# Patient Record
Sex: FEMALE | Race: WHITE | NOT HISPANIC OR LATINO | Employment: OTHER | URBAN - METROPOLITAN AREA
[De-identification: names, ages, dates, MRNs, and addresses within clinical notes are randomized per-mention and may not be internally consistent; named-entity substitution may affect disease eponyms.]

---

## 2017-01-04 ENCOUNTER — OFFICE VISIT (OUTPATIENT)
Dept: INTERNAL MEDICINE | Facility: CLINIC | Age: 67
End: 2017-01-04

## 2017-01-04 VITALS
SYSTOLIC BLOOD PRESSURE: 140 MMHG | DIASTOLIC BLOOD PRESSURE: 80 MMHG | OXYGEN SATURATION: 98 % | HEIGHT: 63 IN | BODY MASS INDEX: 35.26 KG/M2 | WEIGHT: 199 LBS | HEART RATE: 87 BPM

## 2017-01-04 DIAGNOSIS — I10 ESSENTIAL HYPERTENSION: ICD-10-CM

## 2017-01-04 DIAGNOSIS — R10.11 RUQ PAIN: ICD-10-CM

## 2017-01-04 DIAGNOSIS — N30.00 ACUTE CYSTITIS WITHOUT HEMATURIA: Primary | ICD-10-CM

## 2017-01-04 DIAGNOSIS — R53.82 CHRONIC FATIGUE: ICD-10-CM

## 2017-01-04 LAB
ALBUMIN SERPL-MCNC: 3.9 G/DL (ref 3.5–5.2)
ALBUMIN/GLOB SERPL: 1.6 G/DL
ALP SERPL-CCNC: 69 U/L (ref 40–129)
ALT SERPL-CCNC: 90 U/L (ref 5–33)
AST SERPL-CCNC: 130 U/L (ref 5–32)
BASOPHILS # BLD AUTO: 0.08 10*3/MM3 (ref 0–0.2)
BASOPHILS NFR BLD AUTO: 1.3 % (ref 0–2)
BILIRUB BLD-MCNC: NEGATIVE MG/DL
BILIRUB SERPL-MCNC: 0.3 MG/DL (ref 0.2–1.2)
BUN SERPL-MCNC: 12 MG/DL (ref 8–23)
BUN/CREAT SERPL: 20.3 (ref 7–25)
CALCIUM SERPL-MCNC: 9.1 MG/DL (ref 8.8–10.5)
CHLORIDE SERPL-SCNC: 91 MMOL/L (ref 98–107)
CLARITY, POC: CLEAR
CO2 SERPL-SCNC: 23.8 MMOL/L (ref 22–29)
COLOR UR: YELLOW
CREAT SERPL-MCNC: 0.59 MG/DL (ref 0.57–1)
EOSINOPHIL # BLD AUTO: 0.34 10*3/MM3 (ref 0.1–0.3)
EOSINOPHIL NFR BLD AUTO: 5.4 % (ref 0–4)
ERYTHROCYTE [DISTWIDTH] IN BLOOD BY AUTOMATED COUNT: 13.3 % (ref 11.5–14.5)
GLOBULIN SER CALC-MCNC: 2.4 GM/DL
GLUCOSE SERPL-MCNC: 370 MG/DL (ref 65–99)
GLUCOSE UR STRIP-MCNC: NEGATIVE MG/DL
HCT VFR BLD AUTO: 37.7 % (ref 37–47)
HGB BLD-MCNC: 12 G/DL (ref 12–16)
IMM GRANULOCYTES # BLD: 0.03 10*3/MM3 (ref 0–0.03)
IMM GRANULOCYTES NFR BLD: 0.5 % (ref 0–0.5)
KETONES UR QL: ABNORMAL
LEUKOCYTE EST, POC: NEGATIVE
LYMPHOCYTES # BLD AUTO: 2.16 10*3/MM3 (ref 0.6–4.8)
LYMPHOCYTES NFR BLD AUTO: 34.5 % (ref 20–45)
MCH RBC QN AUTO: 26.5 PG (ref 27–31)
MCHC RBC AUTO-ENTMCNC: 31.8 G/DL (ref 31–37)
MCV RBC AUTO: 83.4 FL (ref 81–99)
MONOCYTES # BLD AUTO: 0.49 10*3/MM3 (ref 0–1)
MONOCYTES NFR BLD AUTO: 7.8 % (ref 3–8)
NEUTROPHILS # BLD AUTO: 3.16 10*3/MM3 (ref 1.5–8.3)
NEUTROPHILS NFR BLD AUTO: 50.5 % (ref 45–70)
NITRITE UR-MCNC: NEGATIVE MG/ML
NRBC BLD AUTO-RTO: 0 /100 WBC (ref 0–0)
PH UR: 5.5 [PH] (ref 5–8)
PLATELET # BLD AUTO: 372 10*3/MM3 (ref 140–500)
POTASSIUM SERPL-SCNC: 4.1 MMOL/L (ref 3.5–5.2)
PROT SERPL-MCNC: 6.3 G/DL (ref 6–8.5)
PROT UR STRIP-MCNC: NEGATIVE MG/DL
RBC # BLD AUTO: 4.52 10*6/MM3 (ref 4.2–5.4)
RBC # UR STRIP: NEGATIVE /UL
SODIUM SERPL-SCNC: 132 MMOL/L (ref 136–145)
SP GR UR: 1.02 (ref 1–1.03)
T4 SERPL-MCNC: 8.57 MCG/DL (ref 4.5–11.7)
TSH SERPL DL<=0.005 MIU/L-ACNC: 1.96 MIU/ML (ref 0.27–4.2)
UROBILINOGEN UR QL: NORMAL
VIT B12 SERPL-MCNC: 450 PG/ML (ref 211–946)
WBC # BLD AUTO: 6.26 10*3/MM3 (ref 4.8–10.8)

## 2017-01-04 PROCEDURE — 99214 OFFICE O/P EST MOD 30 MIN: CPT | Performed by: NURSE PRACTITIONER

## 2017-01-04 PROCEDURE — 81003 URINALYSIS AUTO W/O SCOPE: CPT | Performed by: NURSE PRACTITIONER

## 2017-01-04 RX ORDER — CIPROFLOXACIN 250 MG/1
250 TABLET, FILM COATED ORAL 2 TIMES DAILY
Qty: 14 TABLET | Refills: 0 | Status: SHIPPED | OUTPATIENT
Start: 2017-01-04 | End: 2017-01-11

## 2017-01-04 NOTE — PROGRESS NOTES
"Chief Complaint   Patient presents with   • Urinary Tract Infection       Subjective   Amber Campos is a 66 y.o. female is being seen for an acute appointment for fatigue and \"several concerns.\". She is complaining of fatigue for 1 year that has progressively worsened. She goes to sleep tired, wakes up tired. She is not well sleeping at night, waking up 2-3 times a night. No known snoring.     She is also seeing Dr. Finn for frequent PVCs. Her ECHO was normal. She switched her to atenolol for BP control. She is tolerating that well.     She also reports RUQ pain intermittently for several months. Worse with high fat foods. No N/V/D. She has a family history of colon and esophageal cancer. She is going to see Dr. Roca for a repeat C-scope and EGD.     She also has dark urine and suprapubic tenderness for 1 week. No back pain or blood in stool.     History of Present Illness     Current Outpatient Prescriptions on File Prior to Visit   Medication Sig Dispense Refill   • albuterol (PROVENTIL) (2.5 MG/3ML) 0.083% nebulizer solution USE 1 UNIT DOSE EVERY 4-6 HOURS AS NEEDED FOR WHEEZING     • atenolol (TENORMIN) 50 MG tablet Take 1 tablet by mouth Daily. 30 tablet 6   • BENICAR 40 MG tablet TAKE 1 TABLET DAILY 90 tablet 2   • budesonide-formoterol (SYMBICORT) 160-4.5 MCG/ACT inhaler 2 puffs 2 (two) times a day.     • Calcium-Magnesium-Vitamin D (CALCIUM 500 PO) Take by mouth.     • cetirizine (ZyrTEC) 1 MG/ML syrup Take  by mouth.     • chlorhexidine (PERIDEX) 0.12 % solution Chlorhexidine Gluconate 0.12 % Mouth/Throat Solution; Patient Sig: Chlorhexidine Gluconate 0.12 % Mouth/Throat Solution ; 473; 0; 16-Dec-2015; Active     • Cholecalciferol (VITAMIN D3) 10301 UNITS capsule Take 1 capsule by mouth every 7 days. 12 capsule 3   • Clocortolone Pivalate 0.1 % cream Apply topically 3 (three) times a day.     • DUAVEE 0.45-20 MG tablet Take 1 tablet by mouth daily. 90 tablet 3   • Empagliflozin-Linagliptin (GLYXAMBI) " 10-5 MG tablet Take 1 tablet by mouth Daily. 30 tablet 0   • glucose blood test strip 3 (three) times a day.     • metFORMIN (GLUCOPHAGE) 1000 MG tablet Take 1 tablet by mouth 2 (Two) Times a Day With Meals. 60 tablet 6   • Misc Natural Products (GLUCOSAMINE CHONDROITIN ADV PO) Take  by mouth.     • mometasone (NASONEX) 50 MCG/ACT nasal spray 2 sprays into each nostril.     • montelukast (SINGULAIR) 10 MG tablet Take by mouth. TAKE 1 AT BEDTIME     • MULTIPLE VITAMIN PO Take  by mouth.     • Omega-3 Fatty Acids (FISH OIL) 1200 MG capsule capsule Take  by mouth.     • pantoprazole (PROTONIX) 40 MG EC tablet TAKE 1 TABLET DAILY 90 tablet 2   • urea (CARMOL) 40 % cream Apply topically.     • venlafaxine XR (EFFEXOR-XR) 75 MG 24 hr capsule Take 1 capsule by mouth daily. 90 capsule 3   • VOLTAREN 1 % gel gel APPLY TO AFFECTED AREA TWICE A DAY  3     No current facility-administered medications on file prior to visit.        The following portions of the patient's history were reviewed and updated as appropriate: allergies, current medications, past family history, past medical history, past social history, past surgical history and problem list.    Review of Systems   Constitutional: Positive for fatigue.   HENT: Negative.    Eyes: Negative.    Cardiovascular: Negative.    Gastrointestinal: Positive for abdominal pain (RUQ).   Endocrine: Negative.    Genitourinary: Positive for frequency. Negative for menstrual problem, vaginal bleeding and vaginal discharge.   Musculoskeletal: Positive for arthralgias.   Skin: Negative.    Allergic/Immunologic: Positive for environmental allergies.   Neurological: Negative.  Negative for dizziness.   Hematological: Negative.    Psychiatric/Behavioral: Positive for sleep disturbance. Negative for behavioral problems.       Objective   Physical Exam   Constitutional: She is oriented to person, place, and time. She appears well-developed and well-nourished. No distress.   HENT:   Head:  Normocephalic.   Right Ear: External ear normal.   Left Ear: External ear normal.   Nose: Nose normal.   Mouth/Throat: Oropharynx is clear and moist. No oropharyngeal exudate.   Neck large. Small air passage opening due to redundant tissue.    Neck: Neck supple.   Cardiovascular: Normal rate and intact distal pulses.  A regularly irregular rhythm present.   No murmur heard.  Pulmonary/Chest: Effort normal and breath sounds normal. No respiratory distress. She has no wheezes.   Abdominal: Soft. Bowel sounds are normal. There is no hepatosplenomegaly. There is tenderness in the right upper quadrant, epigastric area and suprapubic area. There is positive Huynh's sign. There is no rebound, no guarding and no CVA tenderness.   Neurological: She is alert and oriented to person, place, and time. No cranial nerve deficit.   Skin: Skin is warm.   Psychiatric: She has a normal mood and affect. Her speech is normal and behavior is normal. Judgment normal. Cognition and memory are normal.       Assessment/Plan   Amber was seen today for urinary tract infection.    Diagnoses and all orders for this visit:    Acute cystitis without hematuria  -     POC Urinalysis Dipstick, Automated  -     ciprofloxacin (CIPRO) 250 MG tablet; Take 1 tablet by mouth 2 (Two) Times a Day for 7 days.    Chronic fatigue  -     CBC & Differential  -     Vitamin B12  -     Comprehensive Metabolic Panel  -     T4 & TSH ([facility])  -     Ambulatory Referral to Sleep Medicine    Essential hypertension  -     Comprehensive Metabolic Panel  -     Ambulatory Referral to Sleep Medicine    RUQ pain  -     US Gallbladder; Future      We are going to start valuating her chronic fatigue with labs today, ruling out anemia and thyroid dysfunction. She has some other contributing factors for fatigue including BP medications, Diabetes, and poor sleep quality. Pending labs, we will evaluate for sleep apnea.

## 2017-01-05 ENCOUNTER — TELEPHONE (OUTPATIENT)
Dept: INTERNAL MEDICINE | Facility: CLINIC | Age: 67
End: 2017-01-05

## 2017-01-05 NOTE — TELEPHONE ENCOUNTER
----- Message from JAKE Blount sent at 1/4/2017  4:16 PM EST -----  No pernicious anemia. Glucose was 370, which is very high, and her Liver function is up again. Please give her the numbers. I want her to get the sleep study complete.   ----- Message -----     From: Nicol Vanegas MA     Sent: 1/4/2017  10:10 AM       To: JAKE Blount

## 2017-01-05 NOTE — TELEPHONE ENCOUNTER
----- Message from JAKE Blount sent at 1/5/2017 10:54 AM EST -----  Yes, i already ordered yesterday. Hospital will be calling her.   ----- Message -----     From: Nicol Vanegas MA     Sent: 1/5/2017  10:14 AM       To: JAKE Blount    Pt. Was to know if she still needs to get a gb us  ----- Message -----     From: JAKE Blount     Sent: 1/4/2017   4:16 PM       To: [facility] Clinical Pool    No pernicious anemia. Glucose was 370, which is very high, and her Liver function is up again. Please give her the numbers. I want her to get the sleep study complete.   ----- Message -----     From: Nicol Vanegas MA     Sent: 1/4/2017  10:10 AM       To: JAKE Blount

## 2017-01-10 ENCOUNTER — TELEPHONE (OUTPATIENT)
Dept: INTERNAL MEDICINE | Facility: CLINIC | Age: 67
End: 2017-01-10

## 2017-01-10 ENCOUNTER — HOSPITAL ENCOUNTER (OUTPATIENT)
Dept: ULTRASOUND IMAGING | Facility: HOSPITAL | Age: 67
Discharge: HOME | End: 2017-01-10
Admitting: NURSE PRACTITIONER

## 2017-01-10 DIAGNOSIS — R10.11 RUQ PAIN: ICD-10-CM

## 2017-01-10 PROCEDURE — 76705 ECHO EXAM OF ABDOMEN: CPT

## 2017-01-10 NOTE — TELEPHONE ENCOUNTER
----- Message from JAKE Blount sent at 1/10/2017  9:52 AM EST -----  GB US is normal.   ----- Message -----     From: Marcell, Rad Results [place] [facility]     Sent: 1/10/2017   9:50 AM       To: JAKE Blount

## 2017-03-06 ENCOUNTER — OFFICE VISIT (OUTPATIENT)
Dept: INTERNAL MEDICINE | Facility: CLINIC | Age: 67
End: 2017-03-06

## 2017-03-06 VITALS
HEIGHT: 63 IN | TEMPERATURE: 98.1 F | DIASTOLIC BLOOD PRESSURE: 88 MMHG | OXYGEN SATURATION: 98 % | WEIGHT: 198 LBS | SYSTOLIC BLOOD PRESSURE: 148 MMHG | HEART RATE: 78 BPM | BODY MASS INDEX: 35.08 KG/M2

## 2017-03-06 DIAGNOSIS — E11.69 DIABETES MELLITUS TYPE 2 IN OBESE (HCC): ICD-10-CM

## 2017-03-06 DIAGNOSIS — J01.00 SUBACUTE MAXILLARY SINUSITIS: ICD-10-CM

## 2017-03-06 DIAGNOSIS — E66.9 DIABETES MELLITUS TYPE 2 IN OBESE (HCC): ICD-10-CM

## 2017-03-06 DIAGNOSIS — N30.00 ACUTE CYSTITIS WITHOUT HEMATURIA: Primary | ICD-10-CM

## 2017-03-06 LAB
BILIRUB BLD-MCNC: NEGATIVE MG/DL
CLARITY, POC: CLEAR
COLOR UR: YELLOW
GLUCOSE UR STRIP-MCNC: ABNORMAL MG/DL
KETONES UR QL: ABNORMAL
LEUKOCYTE EST, POC: NEGATIVE
NITRITE UR-MCNC: NEGATIVE MG/ML
PH UR: 5 [PH] (ref 5–8)
PROT UR STRIP-MCNC: NEGATIVE MG/DL
RBC # UR STRIP: NEGATIVE /UL
SP GR UR: 1.01 (ref 1–1.03)
UROBILINOGEN UR QL: NORMAL

## 2017-03-06 PROCEDURE — 81003 URINALYSIS AUTO W/O SCOPE: CPT | Performed by: NURSE PRACTITIONER

## 2017-03-06 PROCEDURE — 99213 OFFICE O/P EST LOW 20 MIN: CPT | Performed by: NURSE PRACTITIONER

## 2017-03-06 RX ORDER — CEFDINIR 300 MG/1
300 CAPSULE ORAL 2 TIMES DAILY
Qty: 20 CAPSULE | Refills: 0 | Status: SHIPPED | OUTPATIENT
Start: 2017-03-06 | End: 2017-04-20

## 2017-03-06 NOTE — PROGRESS NOTES
Chief Complaint   Patient presents with   • Sinusitis   • Urinary Tract Infection       Subjective   Amber Campos is a 66 y.o. female is being seen for an acute appointment for sinus symptoms. This began 1 week ago. She is currently on Nasonex, Singulair, Zyrtec. Associated fatigue, sinus pressure, green nasal congestion. Denies     History of Present Illness     Current Outpatient Prescriptions on File Prior to Visit   Medication Sig Dispense Refill   • albuterol (PROVENTIL) (2.5 MG/3ML) 0.083% nebulizer solution USE 1 UNIT DOSE EVERY 4-6 HOURS AS NEEDED FOR WHEEZING     • atenolol (TENORMIN) 50 MG tablet Take 1 tablet by mouth Daily. 30 tablet 6   • BENICAR 40 MG tablet TAKE 1 TABLET DAILY 90 tablet 2   • budesonide-formoterol (SYMBICORT) 160-4.5 MCG/ACT inhaler 2 puffs 2 (two) times a day.     • Calcium-Magnesium-Vitamin D (CALCIUM 500 PO) Take by mouth.     • cetirizine (ZyrTEC) 1 MG/ML syrup Take  by mouth.     • chlorhexidine (PERIDEX) 0.12 % solution Chlorhexidine Gluconate 0.12 % Mouth/Throat Solution; Patient Sig: Chlorhexidine Gluconate 0.12 % Mouth/Throat Solution ; 473; 0; 16-Dec-2015; Active     • Cholecalciferol (VITAMIN D3) 27360 UNITS capsule Take 1 capsule by mouth every 7 days. 12 capsule 3   • Clocortolone Pivalate 0.1 % cream Apply topically 3 (three) times a day.     • DUAVEE 0.45-20 MG tablet Take 1 tablet by mouth daily. 90 tablet 3   • Empagliflozin-Linagliptin (GLYXAMBI) 10-5 MG tablet Take 1 tablet by mouth Daily. 30 tablet 0   • glucose blood test strip 3 (three) times a day.     • metFORMIN (GLUCOPHAGE) 1000 MG tablet Take 1 tablet by mouth 2 (Two) Times a Day With Meals. 60 tablet 6   • Misc Natural Products (GLUCOSAMINE CHONDROITIN ADV PO) Take  by mouth.     • mometasone (NASONEX) 50 MCG/ACT nasal spray 2 sprays into each nostril.     • montelukast (SINGULAIR) 10 MG tablet Take by mouth. TAKE 1 AT BEDTIME     • MULTIPLE VITAMIN PO Take  by mouth.     • Omega-3 Fatty Acids (FISH OIL)  1200 MG capsule capsule Take  by mouth.     • pantoprazole (PROTONIX) 40 MG EC tablet TAKE 1 TABLET DAILY 90 tablet 2   • urea (CARMOL) 40 % cream Apply topically.     • venlafaxine XR (EFFEXOR-XR) 75 MG 24 hr capsule Take 1 capsule by mouth daily. 90 capsule 3   • VOLTAREN 1 % gel gel APPLY TO AFFECTED AREA TWICE A DAY  3     No current facility-administered medications on file prior to visit.        The following portions of the patient's history were reviewed and updated as appropriate: allergies, current medications, past family history, past medical history, past social history, past surgical history and problem list.    Review of Systems   Constitutional: Positive for fatigue. Negative for fever.   HENT: Positive for postnasal drip, rhinorrhea, sneezing and sore throat.    Eyes: Negative.    Respiratory: Positive for cough and chest tightness. Negative for apnea, choking and shortness of breath.    Cardiovascular: Negative.    Gastrointestinal: Negative.    Musculoskeletal: Negative.    Allergic/Immunologic: Positive for environmental allergies.   Hematological: Negative.    Psychiatric/Behavioral: Negative.        Objective   Physical Exam   Constitutional: She is oriented to person, place, and time. She appears well-developed and well-nourished.   HENT:   Head: Normocephalic.   Right Ear: External ear normal. A middle ear effusion is present.   Left Ear: External ear normal. A middle ear effusion is present.   Nose: Right sinus exhibits frontal sinus tenderness. Left sinus exhibits frontal sinus tenderness.   Mouth/Throat: Oropharynx is clear and moist. No oropharyngeal exudate.   Neck: Neck supple. No thyromegaly present.   Cardiovascular: Normal rate, regular rhythm and normal heart sounds.    No murmur heard.  Pulmonary/Chest: Effort normal and breath sounds normal. No respiratory distress. She has no wheezes.   Musculoskeletal: She exhibits no edema.   Neurological: She is alert and oriented to person,  place, and time. No cranial nerve deficit.   Psychiatric: She has a normal mood and affect. Her behavior is normal.   Vitals reviewed.      Assessment/Plan        Diagnosis Plan   1. Acute cystitis without hematuria  POCT urinalysis dipstick, automated   2. Subacute maxillary sinusitis  cefdinir (OMNICEF) 300 MG capsule

## 2017-03-21 ENCOUNTER — TELEPHONE (OUTPATIENT)
Dept: GASTROENTEROLOGY | Facility: CLINIC | Age: 67
End: 2017-03-21

## 2017-03-27 ENCOUNTER — PREP FOR SURGERY (OUTPATIENT)
Dept: GASTROENTEROLOGY | Facility: CLINIC | Age: 67
End: 2017-03-27

## 2017-03-27 DIAGNOSIS — Z80.0 FAMILY HISTORY OF GI MALIGNANCY: Primary | ICD-10-CM

## 2017-03-27 RX ORDER — SODIUM CHLORIDE 0.9 % (FLUSH) 0.9 %
1-10 SYRINGE (ML) INJECTION AS NEEDED
Status: CANCELLED | OUTPATIENT
Start: 2017-03-27

## 2017-03-27 RX ORDER — SODIUM CHLORIDE, SODIUM LACTATE, POTASSIUM CHLORIDE, CALCIUM CHLORIDE 600; 310; 30; 20 MG/100ML; MG/100ML; MG/100ML; MG/100ML
30 INJECTION, SOLUTION INTRAVENOUS CONTINUOUS
Status: CANCELLED | OUTPATIENT
Start: 2017-03-27

## 2017-03-28 ENCOUNTER — APPOINTMENT (OUTPATIENT)
Dept: WOMENS IMAGING | Facility: HOSPITAL | Age: 67
End: 2017-03-28

## 2017-03-28 PROCEDURE — G0202 SCR MAMMO BI INCL CAD: HCPCS | Performed by: RADIOLOGY

## 2017-04-20 ENCOUNTER — OFFICE VISIT (OUTPATIENT)
Dept: INTERNAL MEDICINE | Facility: CLINIC | Age: 67
End: 2017-04-20

## 2017-04-20 VITALS
DIASTOLIC BLOOD PRESSURE: 88 MMHG | HEART RATE: 91 BPM | SYSTOLIC BLOOD PRESSURE: 162 MMHG | HEIGHT: 63 IN | BODY MASS INDEX: 34.38 KG/M2 | OXYGEN SATURATION: 98 % | WEIGHT: 194 LBS

## 2017-04-20 DIAGNOSIS — H11.32 CONJUNCTIVAL HEMORRHAGE OF LEFT EYE: ICD-10-CM

## 2017-04-20 DIAGNOSIS — J32.0 CHRONIC MAXILLARY SINUSITIS: Primary | ICD-10-CM

## 2017-04-20 PROCEDURE — 99213 OFFICE O/P EST LOW 20 MIN: CPT | Performed by: NURSE PRACTITIONER

## 2017-04-20 NOTE — PATIENT INSTRUCTIONS
Subconjunctival Hemorrhage  Subconjunctival hemorrhage is bleeding that happens between the white part of your eye (sclera) and the clear membrane that covers the outside of your eye (conjunctiva). There are many tiny blood vessels near the surface of your eye. A subconjunctival hemorrhage happens when one or more of these vessels breaks and bleeds, causing a red patch to appear on your eye. This is similar to a bruise.  Depending on the amount of bleeding, the red patch may only cover a small area of your eye or it may cover the entire visible part of the sclera. If a lot of blood collects under the conjunctiva, there may also be swelling. Subconjunctival hemorrhages do not affect your vision or cause pain, but your eye may feel irritated if there is swelling. Subconjunctival hemorrhages usually do not require treatment, and they disappear on their own within two weeks.  CAUSES  This condition may be caused by:  · Mild trauma, such as rubbing your eye too hard.  · Severe trauma or blunt injuries.  · Coughing, sneezing, or vomiting.  · Straining, such as when lifting a heavy object.  · High blood pressure.  · Recent eye surgery.  · A history of diabetes.  · Certain medicines, especially blood thinners (anticoagulants).  · Other conditions, such as eye tumors, bleeding disorders, or blood vessel abnormalities.  Subconjunctival hemorrhages can happen without an obvious cause.   SYMPTOMS   Symptoms of this condition include:  · A bright red or dark red patch on the white part of the eye.    The red area may spread out to cover a larger area of the eye before it goes away.    The red area may turn brownish-yellow before it goes away.  · Swelling.  · Mild eye irritation.  DIAGNOSIS  This condition is diagnosed with a physical exam. If your subconjunctival hemorrhage was caused by trauma, your health care provider may refer you to an eye specialist (ophthalmologist) or another specialist to check for other injuries. You  may have other tests, including:  · An eye exam.  · A blood pressure check.  · Blood tests to check for bleeding disorders.  If your subconjunctival hemorrhage was caused by trauma, X-rays or a CT scan may be done to check for other injuries.  TREATMENT  Usually, no treatment is needed. Your health care provider may recommend eye drops or cold compresses to help with discomfort.  HOME CARE INSTRUCTIONS  · Take over-the-counter and prescription medicines only as directed by your health care provider.  · Use eye drops or cold compresses to help with discomfort as directed by your health care provider.  · Avoid activities, things, and environments that may irritate or injure your eye.  · Keep all follow-up visits as told by your health care provider. This is important.  SEEK MEDICAL CARE IF:  · You have pain in your eye.  · The bleeding does not go away within 3 weeks.  · You keep getting new subconjunctival hemorrhages.  SEEK IMMEDIATE MEDICAL CARE IF:  · Your vision changes or you have difficulty seeing.  · You suddenly develop severe sensitivity to light.  · You develop a severe headache, persistent vomiting, confusion, or abnormal tiredness (lethargy).  · Your eye seems to bulge or protrude from your eye socket.  · You develop unexplained bruises on your body.  · You have unexplained bleeding in another area of your body.     This information is not intended to replace advice given to you by your health care provider. Make sure you discuss any questions you have with your health care provider.     Document Released: 12/18/2006 Document Revised: 09/07/2016 Document Reviewed: 02/24/2016  [employer] Interactive Patient Education ©2016 [employer]

## 2017-04-20 NOTE — PROGRESS NOTES
Chief Complaint   Patient presents with   • Sinusitis   • Eye Problem     blood in eye       Subjective   Amber Campos is a 66 y.o. female is being seen for an acute appointment for Sinus problems  And red eye. She is currently on allergy injections, but she is still having bloody discharge from nose, nasal congestion, sinus pressure. Associted Left eye redness for 24 hours after a sneezing fit. No visual changes or eye pain.     History of Present Illness     Current Outpatient Prescriptions on File Prior to Visit   Medication Sig Dispense Refill   • BENICAR 40 MG tablet TAKE 1 TABLET DAILY 90 tablet 2   • budesonide-formoterol (SYMBICORT) 160-4.5 MCG/ACT inhaler 2 puffs 2 (two) times a day.     • Calcium-Magnesium-Vitamin D (CALCIUM 500 PO) Take by mouth.     • cetirizine (ZyrTEC) 1 MG/ML syrup Take  by mouth.     • chlorhexidine (PERIDEX) 0.12 % solution Chlorhexidine Gluconate 0.12 % Mouth/Throat Solution; Patient Sig: Chlorhexidine Gluconate 0.12 % Mouth/Throat Solution ; 473; 0; 16-Dec-2015; Active     • Cholecalciferol (VITAMIN D3) 86570 UNITS capsule Take 1 capsule by mouth every 7 days. 12 capsule 3   • Clocortolone Pivalate 0.1 % cream Apply topically 3 (three) times a day.     • DUAVEE 0.45-20 MG tablet Take 1 tablet by mouth daily. 90 tablet 3   • Empagliflozin-Linagliptin (GLYXAMBI) 10-5 MG tablet Take 1 tablet by mouth Daily. 30 tablet 0   • glucose blood test strip 3 (three) times a day.     • metFORMIN (GLUCOPHAGE) 1000 MG tablet Take 1 tablet by mouth 2 (Two) Times a Day With Meals. 60 tablet 6   • Misc Natural Products (GLUCOSAMINE CHONDROITIN ADV PO) Take  by mouth.     • mometasone (NASONEX) 50 MCG/ACT nasal spray 2 sprays into each nostril.     • montelukast (SINGULAIR) 10 MG tablet Take by mouth. TAKE 1 AT BEDTIME     • MULTIPLE VITAMIN PO Take  by mouth.     • naltrexone-bupropion ER (CONTRAVE) 8-90 MG tablet Wk 1: 1 tab daily, Wk 2: 1 tab twice a day, Wk 3: 2 tabs in AM, 1 tab in PM, Wk 4: 2  tabs twice a day, Maintenance dose: 2 tabs twice daily. 60 tablet 3   • Omega-3 Fatty Acids (FISH OIL) 1200 MG capsule capsule Take  by mouth.     • pantoprazole (PROTONIX) 40 MG EC tablet TAKE 1 TABLET DAILY 90 tablet 2   • urea (CARMOL) 40 % cream Apply topically.     • venlafaxine XR (EFFEXOR-XR) 75 MG 24 hr capsule Take 1 capsule by mouth daily. 90 capsule 3   • VOLTAREN 1 % gel gel APPLY TO AFFECTED AREA TWICE A DAY  3   • atenolol (TENORMIN) 50 MG tablet Take 1 tablet by mouth Daily. 30 tablet 6   • cefdinir (OMNICEF) 300 MG capsule Take 1 capsule by mouth 2 (Two) Times a Day. 20 capsule 0     No current facility-administered medications on file prior to visit.        The following portions of the patient's history were reviewed and updated as appropriate: allergies, current medications, past family history, past medical history, past social history, past surgical history and problem list.    Review of Systems   Constitutional: Negative.    HENT: Positive for congestion, postnasal drip, rhinorrhea and sneezing.    Eyes: Positive for redness. Negative for visual disturbance.   Respiratory: Negative.  Negative for shortness of breath and stridor.    Cardiovascular: Negative.    Gastrointestinal: Negative.    Endocrine: Negative.    Genitourinary: Negative.    Musculoskeletal: Positive for arthralgias.   Allergic/Immunologic: Positive for environmental allergies.   Neurological: Negative.    Hematological: Negative.    Psychiatric/Behavioral: Negative.        Objective   Physical Exam   Constitutional: She is oriented to person, place, and time. She appears well-developed and well-nourished.   HENT:   Head: Normocephalic.   Right Ear: No decreased hearing is noted.   Left Ear: No decreased hearing is noted.   Nose: Mucosal edema and rhinorrhea present. No sinus tenderness. Left sinus exhibits frontal sinus tenderness.   Mouth/Throat: No oropharyngeal exudate or posterior oropharyngeal edema.   Eyes: Pupils are  equal, round, and reactive to light.   Neck: Neck supple. No thyromegaly present.   Cardiovascular: Normal rate, regular rhythm and normal heart sounds.    No murmur heard.  Pulmonary/Chest: Effort normal and breath sounds normal. No respiratory distress. She has no wheezes.   Abdominal: Soft. Bowel sounds are normal. She exhibits no distension.   Musculoskeletal: Normal range of motion. She exhibits no edema.   Neurological: She is alert and oriented to person, place, and time. No cranial nerve deficit.   Psychiatric: She has a normal mood and affect. Her behavior is normal.   Vitals reviewed.      Assessment/Plan   Amber was seen today for sinusitis and eye problem.    Diagnoses and all orders for this visit:    Chronic maxillary sinusitis  -     CT Sinus Without Contrast; Future  -     Ambulatory Referral to ENT (Otolaryngology)    Conjunctival hemorrhage of left eye  -     CT Sinus Without Contrast; Future

## 2017-04-24 RX ORDER — OLMESARTAN MEDOXOMIL 40 MG/1
40 TABLET ORAL EVERY MORNING
COMMUNITY
End: 2017-05-04 | Stop reason: SDUPTHER

## 2017-04-24 RX ORDER — MELATONIN
1000 DAILY
COMMUNITY
End: 2021-08-05

## 2017-04-25 ENCOUNTER — ANESTHESIA (OUTPATIENT)
Dept: GASTROENTEROLOGY | Facility: HOSPITAL | Age: 67
End: 2017-04-25

## 2017-04-25 ENCOUNTER — HOSPITAL ENCOUNTER (OUTPATIENT)
Facility: HOSPITAL | Age: 67
Setting detail: HOSPITAL OUTPATIENT SURGERY
Discharge: HOME | End: 2017-04-25
Attending: INTERNAL MEDICINE | Admitting: INTERNAL MEDICINE

## 2017-04-25 ENCOUNTER — ANESTHESIA EVENT (OUTPATIENT)
Dept: GASTROENTEROLOGY | Facility: HOSPITAL | Age: 67
End: 2017-04-25

## 2017-04-25 VITALS
WEIGHT: 190 LBS | SYSTOLIC BLOOD PRESSURE: 133 MMHG | HEART RATE: 89 BPM | OXYGEN SATURATION: 95 % | BODY MASS INDEX: 33.66 KG/M2 | DIASTOLIC BLOOD PRESSURE: 72 MMHG | RESPIRATION RATE: 14 BRPM | HEIGHT: 63 IN | TEMPERATURE: 98 F

## 2017-04-25 DIAGNOSIS — Z80.0 FAMILY HISTORY OF GI MALIGNANCY: ICD-10-CM

## 2017-04-25 LAB — GLUCOSE BLDC GLUCOMTR-MCNC: 323 MG/DL (ref 70–130)

## 2017-04-25 PROCEDURE — 82962 GLUCOSE BLOOD TEST: CPT

## 2017-04-25 PROCEDURE — 25010000002 MIDAZOLAM PER 1 MG: Performed by: ANESTHESIOLOGY

## 2017-04-25 PROCEDURE — G0105 COLORECTAL SCRN; HI RISK IND: HCPCS | Performed by: INTERNAL MEDICINE

## 2017-04-25 PROCEDURE — 25010000002 PROPOFOL 10 MG/ML EMULSION: Performed by: ANESTHESIOLOGY

## 2017-04-25 RX ORDER — PROPOFOL 10 MG/ML
VIAL (ML) INTRAVENOUS CONTINUOUS PRN
Status: DISCONTINUED | OUTPATIENT
Start: 2017-04-25 | End: 2017-04-25 | Stop reason: SURG

## 2017-04-25 RX ORDER — MIDAZOLAM HYDROCHLORIDE 1 MG/ML
INJECTION INTRAMUSCULAR; INTRAVENOUS AS NEEDED
Status: DISCONTINUED | OUTPATIENT
Start: 2017-04-25 | End: 2017-04-25 | Stop reason: SURG

## 2017-04-25 RX ORDER — SODIUM CHLORIDE, SODIUM LACTATE, POTASSIUM CHLORIDE, CALCIUM CHLORIDE 600; 310; 30; 20 MG/100ML; MG/100ML; MG/100ML; MG/100ML
30 INJECTION, SOLUTION INTRAVENOUS CONTINUOUS
Status: DISCONTINUED | OUTPATIENT
Start: 2017-04-25 | End: 2017-04-25 | Stop reason: HOSPADM

## 2017-04-25 RX ORDER — PROPOFOL 10 MG/ML
VIAL (ML) INTRAVENOUS AS NEEDED
Status: DISCONTINUED | OUTPATIENT
Start: 2017-04-25 | End: 2017-04-25 | Stop reason: SURG

## 2017-04-25 RX ORDER — SODIUM CHLORIDE 0.9 % (FLUSH) 0.9 %
1-10 SYRINGE (ML) INJECTION AS NEEDED
Status: DISCONTINUED | OUTPATIENT
Start: 2017-04-25 | End: 2017-04-25 | Stop reason: HOSPADM

## 2017-04-25 RX ORDER — LIDOCAINE HYDROCHLORIDE 20 MG/ML
INJECTION, SOLUTION INFILTRATION; PERINEURAL AS NEEDED
Status: DISCONTINUED | OUTPATIENT
Start: 2017-04-25 | End: 2017-04-25 | Stop reason: SURG

## 2017-04-25 RX ADMIN — LIDOCAINE HYDROCHLORIDE 60 MG: 20 INJECTION, SOLUTION INFILTRATION; PERINEURAL at 09:25

## 2017-04-25 RX ADMIN — PROPOFOL 100 MCG/KG/MIN: 10 INJECTION, EMULSION INTRAVENOUS at 09:24

## 2017-04-25 RX ADMIN — PROPOFOL 100 MG: 10 INJECTION, EMULSION INTRAVENOUS at 09:24

## 2017-04-25 RX ADMIN — ALFENTANIL HYDROCHLORIDE 250 MCG: 500 INJECTION, SOLUTION INTRAVENOUS at 09:23

## 2017-04-25 RX ADMIN — SODIUM CHLORIDE, POTASSIUM CHLORIDE, SODIUM LACTATE AND CALCIUM CHLORIDE: 600; 310; 30; 20 INJECTION, SOLUTION INTRAVENOUS at 09:29

## 2017-04-25 RX ADMIN — MIDAZOLAM HYDROCHLORIDE 1 MG: 1 INJECTION, SOLUTION INTRAMUSCULAR; INTRAVENOUS at 09:23

## 2017-04-25 NOTE — ANESTHESIA PREPROCEDURE EVALUATION
Anesthesia Evaluation     Patient summary reviewed and Nursing notes reviewed   no history of anesthetic complications:  NPO Status: > 8 hours   Airway   Mallampati: II  Dental      Pulmonary - normal exam   (+) asthma,   Cardiovascular - normal exam    (+) hypertension well controlled,       Neuro/Psych  (+) psychiatric history Anxiety and Depression,    GI/Hepatic/Renal/Endo    (+)  GERD, diabetes mellitus,     Musculoskeletal     Abdominal    Substance History      OB/GYN          Other                                    Anesthesia Plan    ASA 3     MAC     intravenous induction   Anesthetic plan and risks discussed with patient.

## 2017-04-25 NOTE — ANESTHESIA POSTPROCEDURE EVALUATION
Patient: Amber Campos    Procedure Summary     Date Anesthesia Start Anesthesia Stop Room / Location    04/25/17 0918 0947  [facility]       Procedure Diagnosis Surgeon Provider    COLONOSCOPY TO CECUM  (N/A ) Family history of GI malignancy  (Family history of GI malignancy [Z80.0]) MD Brown Wheeler MD          Anesthesia Type: MAC  Last vitals  /73 (04/25/17 0946)    Temp      Pulse 87 (04/25/17 0946)   Resp 14 (04/25/17 0946)    SpO2 97 % (04/25/17 0946)      Post Anesthesia Care and Evaluation    Patient location during evaluation: bedside  Patient participation: complete - patient participated  Level of consciousness: awake and alert  Pain management: adequate  Airway patency: patent  Anesthetic complications: No anesthetic complications    Cardiovascular status: acceptable  Respiratory status: acceptable  Hydration status: acceptable

## 2017-04-28 ENCOUNTER — HOSPITAL ENCOUNTER (OUTPATIENT)
Dept: CT IMAGING | Facility: HOSPITAL | Age: 67
Discharge: HOME | End: 2017-04-28
Admitting: NURSE PRACTITIONER

## 2017-04-28 DIAGNOSIS — J32.0 CHRONIC MAXILLARY SINUSITIS: ICD-10-CM

## 2017-04-28 DIAGNOSIS — H11.32 CONJUNCTIVAL HEMORRHAGE OF LEFT EYE: ICD-10-CM

## 2017-04-28 PROCEDURE — 70486 CT MAXILLOFACIAL W/O DYE: CPT

## 2017-05-01 ENCOUNTER — TELEPHONE (OUTPATIENT)
Dept: INTERNAL MEDICINE | Facility: CLINIC | Age: 67
End: 2017-05-01

## 2017-05-04 DIAGNOSIS — M81.0 OSTEOPOROSIS: ICD-10-CM

## 2017-05-04 RX ORDER — OLMESARTAN MEDOXOMIL 40 MG/1
40 TABLET ORAL EVERY MORNING
Qty: 90 TABLET | Refills: 3 | Status: SHIPPED | OUTPATIENT
Start: 2017-05-04 | End: 2018-05-14 | Stop reason: SDUPTHER

## 2017-05-04 RX ORDER — CONJUGATED ESTROGENS/BAZEDOXIFENE .45; 2 MG/1; MG/1
1 TABLET, FILM COATED ORAL DAILY
Qty: 90 TABLET | Refills: 3 | Status: SHIPPED | OUTPATIENT
Start: 2017-05-04 | End: 2018-08-09 | Stop reason: SDUPTHER

## 2017-05-09 ENCOUNTER — TELEPHONE (OUTPATIENT)
Dept: INTERNAL MEDICINE | Facility: CLINIC | Age: 67
End: 2017-05-09

## 2017-06-19 ENCOUNTER — OFFICE VISIT (OUTPATIENT)
Dept: INTERNAL MEDICINE | Facility: CLINIC | Age: 67
End: 2017-06-19

## 2017-06-19 VITALS
WEIGHT: 193 LBS | HEART RATE: 112 BPM | DIASTOLIC BLOOD PRESSURE: 102 MMHG | BODY MASS INDEX: 34.2 KG/M2 | SYSTOLIC BLOOD PRESSURE: 156 MMHG | OXYGEN SATURATION: 95 % | HEIGHT: 63 IN

## 2017-06-19 DIAGNOSIS — I10 ESSENTIAL HYPERTENSION: ICD-10-CM

## 2017-06-19 DIAGNOSIS — R35.0 FREQUENT URINATION: Primary | ICD-10-CM

## 2017-06-19 DIAGNOSIS — IMO0001 UNCONTROLLED TYPE 2 DIABETES MELLITUS WITHOUT COMPLICATION, WITHOUT LONG-TERM CURRENT USE OF INSULIN: ICD-10-CM

## 2017-06-19 DIAGNOSIS — M79.89 MASS OF SOFT TISSUE: ICD-10-CM

## 2017-06-19 DIAGNOSIS — B37.9 CANDIDA INFECTION: ICD-10-CM

## 2017-06-19 LAB
BILIRUB BLD-MCNC: ABNORMAL MG/DL
CLARITY, POC: CLEAR
COLOR UR: YELLOW
GLUCOSE UR STRIP-MCNC: ABNORMAL MG/DL
HBA1C MFR BLD: 11.5 %
KETONES UR QL: ABNORMAL
LEUKOCYTE EST, POC: NEGATIVE
NITRITE UR-MCNC: NEGATIVE MG/ML
PH UR: 5 [PH] (ref 5–8)
PROT UR STRIP-MCNC: ABNORMAL MG/DL
RBC # UR STRIP: ABNORMAL /UL
SP GR UR: 1.02 (ref 1–1.03)
UROBILINOGEN UR QL: NORMAL

## 2017-06-19 PROCEDURE — 99214 OFFICE O/P EST MOD 30 MIN: CPT | Performed by: NURSE PRACTITIONER

## 2017-06-19 PROCEDURE — 81003 URINALYSIS AUTO W/O SCOPE: CPT | Performed by: NURSE PRACTITIONER

## 2017-06-19 PROCEDURE — 83036 HEMOGLOBIN GLYCOSYLATED A1C: CPT | Performed by: NURSE PRACTITIONER

## 2017-06-19 RX ORDER — FLUCONAZOLE 150 MG/1
TABLET ORAL
Qty: 3 TABLET | Refills: 0 | Status: SHIPPED | OUTPATIENT
Start: 2017-06-19 | End: 2017-08-10

## 2017-06-19 NOTE — PROGRESS NOTES
Chief Complaint   Patient presents with   • Urinary Tract Infection       Subjective   Amber Campos is a 66 y.o. female is being seen for an acute appointment for UTI symptoms and to follow up on DM 2. UTI symptoms include Frequency and urgency. She has been checking her glucose 194-200s, fasting. She admits to poor diet, but recently started walking with her neighbor for exercise.     She is not taking her atenolol at this time. No BP checks at this time. Denies CP. She does have SOA with walking, but she also is not taking her dulera at this time.     RLE with palpable mass for > 1 month. She had an XR of RLE at the podiatrist's office with neg results. Nontender, without increase in size.      History of Present Illness     Current Outpatient Prescriptions on File Prior to Visit   Medication Sig Dispense Refill   • atenolol (TENORMIN) 50 MG tablet Take 1 tablet by mouth Daily. (Patient taking differently: Take 50 mg by mouth Every Evening.) 30 tablet 6   • budesonide-formoterol (SYMBICORT) 160-4.5 MCG/ACT inhaler Inhale 2 puffs As Needed.     • Calcium-Magnesium-Vitamin D (CALCIUM 500 PO) Take 2 tablets by mouth Every Night.     • cetirizine (ZyrTEC) 1 MG/ML syrup Take 10 mg by mouth As Needed.     • cholecalciferol (VITAMIN D3) 1000 UNITS tablet Take 1,000 Units by mouth Daily.     • Clocortolone Pivalate 0.1 % cream Apply  topically As Needed.     • DUAVEE 0.45-20 MG tablet Take 1 tablet by mouth Daily. 90 tablet 3   • glucose blood test strip 3 (three) times a day.     • metFORMIN (GLUCOPHAGE) 850 MG tablet Take 1 tablet by mouth 2 (Two) Times a Day With Meals. 180 tablet 3   • Misc Natural Products (GLUCOSAMINE CHONDROITIN ADV PO) Take 1 tablet by mouth Daily.     • mometasone (NASONEX) 50 MCG/ACT nasal spray 2 sprays into each nostril As Needed.     • MULTIPLE VITAMIN PO Take 1 tablet by mouth Daily.     • naltrexone-bupropion ER (CONTRAVE) 8-90 MG tablet Wk 1: 1 tab daily, Wk 2: 1 tab twice a day, Wk 3: 2  tabs in AM, 1 tab in PM, Wk 4: 2 tabs twice a day, Maintenance dose: 2 tabs twice daily. (Patient taking differently: Take 1 tablet by mouth Daily.) 60 tablet 3   • olmesartan (BENICAR) 40 MG tablet Take 1 tablet by mouth Every Morning. 90 tablet 3   • Omega-3 Fatty Acids (FISH OIL) 1200 MG capsule capsule Take  by mouth.     • pantoprazole (PROTONIX) 40 MG EC tablet TAKE 1 TABLET DAILY 90 tablet 2   • urea (CARMOL) 40 % cream Apply topically.     • venlafaxine XR (EFFEXOR-XR) 75 MG 24 hr capsule Take 1 capsule by mouth daily. 90 capsule 3   • VOLTAREN 1 % gel gel As Needed. APPLY TO AFFECTED AREA TWICE A DAY  3     No current facility-administered medications on file prior to visit.        The following portions of the patient's history were reviewed and updated as appropriate: allergies, current medications, past family history, past medical history, past social history, past surgical history and problem list.    Review of Systems   Constitutional: Negative.    HENT: Negative.    Eyes: Negative.    Respiratory: Negative.    Cardiovascular: Negative for chest pain and leg swelling.   Gastrointestinal: Negative.    Endocrine: Negative.    Genitourinary: Negative.    Musculoskeletal: Positive for arthralgias.   Skin: Negative.    Neurological: Positive for dizziness.   Hematological: Negative.    Psychiatric/Behavioral: Negative.        Objective   Physical Exam   Constitutional: She is oriented to person, place, and time. She appears well-developed and well-nourished.   HENT:   Head: Normocephalic.   Right Ear: External ear normal.   Left Ear: External ear normal.   Nose: Nose normal.   Mouth/Throat: Oropharynx is clear and moist. No oropharyngeal exudate.   Eyes: Pupils are equal, round, and reactive to light.   Neck: Neck supple. No thyromegaly present.   Cardiovascular: Normal rate, regular rhythm and normal heart sounds.    No murmur heard.  Pulmonary/Chest: Effort normal and breath sounds normal. No respiratory  distress. She has no wheezes.   Musculoskeletal: She exhibits no edema.   Right lower shin with 5 cm soft tissue mass   Neurological: She is alert and oriented to person, place, and time.   Psychiatric: She has a normal mood and affect. Her behavior is normal.   Vitals reviewed.      Assessment/Plan   Amber was seen today for urinary tract infection.    Diagnoses and all orders for this visit:    Frequent urination  -     POCT urinalysis dipstick, automated    Uncontrolled type 2 diabetes mellitus without complication, without long-term current use of insulin  -     POC Glycosylated Hemoglobin (Hb A1C)    Essential hypertension    Mass of soft tissue  -     US soft tissue; Future    Candida infection  -     fluconazole (DIFLUCAN) 150 MG tablet; Take 1 by mouth daily      She will resume her atenolol due to elevated BP in office today. I have asked her to take Symbicort 15 minutes prior to exercise.

## 2017-07-19 ENCOUNTER — TELEPHONE (OUTPATIENT)
Dept: INTERNAL MEDICINE | Facility: CLINIC | Age: 67
End: 2017-07-19

## 2017-07-19 ENCOUNTER — HOSPITAL ENCOUNTER (OUTPATIENT)
Dept: ULTRASOUND IMAGING | Facility: HOSPITAL | Age: 67
Discharge: HOME | End: 2017-07-19
Admitting: NURSE PRACTITIONER

## 2017-07-19 DIAGNOSIS — M79.89 MASS OF SOFT TISSUE: ICD-10-CM

## 2017-07-19 DIAGNOSIS — R22.41 MASS OF LEG, RIGHT: Primary | ICD-10-CM

## 2017-07-19 PROCEDURE — 76999 ECHO EXAMINATION PROCEDURE: CPT

## 2017-07-19 NOTE — TELEPHONE ENCOUNTER
Pt. Notified     ----- Message from JAKE Blount sent at 7/19/2017 12:02 PM EDT -----  The US of her leg is not capturing a mass. The radiologist is recommending an MRI. I will order.   ----- Message -----     From: Interface, Rad Results [place] In     Sent: 7/19/2017   9:47 AM       To: JAKE Blount

## 2017-07-21 ENCOUNTER — TELEPHONE (OUTPATIENT)
Dept: INTERNAL MEDICINE | Facility: CLINIC | Age: 67
End: 2017-07-21

## 2017-07-21 NOTE — TELEPHONE ENCOUNTER
----- Message from Tuyet Morocho MA sent at 7/21/2017  3:20 PM EDT -----  PT IS HAVING MRI OPN  7/18./17 AT 8:00 SHE ALWAYS HAS TO TAKE MEDICATION AS SHE IS CLAUSTROPHOBIC.  [facility] USUALLY GIVES HER SOMETHING BEFORE THESE TEST.     [phone]            [facility],   VALIUM  10 MG

## 2017-07-24 RX ORDER — LORAZEPAM 2 MG/1
TABLET ORAL
Qty: 1 TABLET | Refills: 0
Start: 2017-07-24 | End: 2017-08-08

## 2017-07-27 ENCOUNTER — TELEPHONE (OUTPATIENT)
Dept: INTERNAL MEDICINE | Facility: CLINIC | Age: 67
End: 2017-07-27

## 2017-07-28 ENCOUNTER — APPOINTMENT (OUTPATIENT)
Dept: MRI IMAGING | Facility: HOSPITAL | Age: 67
End: 2017-07-28

## 2017-08-08 ENCOUNTER — TELEPHONE (OUTPATIENT)
Dept: INTERNAL MEDICINE | Facility: CLINIC | Age: 67
End: 2017-08-08

## 2017-08-08 RX ORDER — DIAZEPAM 10 MG/1
TABLET ORAL
Qty: 1 TABLET | Refills: 0
Start: 2017-08-08 | End: 2017-10-31

## 2017-08-08 NOTE — TELEPHONE ENCOUNTER
----- Message from Tiffany Srivastava MA sent at 8/1/2017 11:11 AM EDT -----      ----- Message -----     From: JAKE Blount     Sent: 8/1/2017   9:50 AM       To: Marcos García [facility]    This was a valium 10mg, which she can have and had prior. (not lorazepam)  ----- Message -----     From: Tuyet Morocho MA     Sent: 7/27/2017   2:10 PM       To: JAKE Blount     Pt canceled her mri on her knee for 7/28/17  Due to some problem with the machine at [place],  Dr russell order lorazepam  2 mg.  Pt wants  The lorazepam   10mg that we ordered for her several years ago.  She is going to reschedule  This test and will let us know when this is reschedule     ----- Message -----     From: John Vergara MD     Sent: 7/24/2017   5:08 PM       To: Tuyet Morocho MA    Lorazepam 2 mg tablet.  Take one hour prior to procedure.  ----- Message -----     From: Tuyet Morocho MA     Sent: 7/24/2017   6:49 AM       To: John Vergara MD    This is [facility] pt can we call in the lorazepam  10mg  Take one hour prior to procedure for pt.   This is what was prescribed before,  I looked on allscript   thanks  ----- Message -----     From: Tuyet Morocho MA     Sent: 7/21/2017   4:36 PM       To: Tuyet Morocho MA        ----- Message -----     From: Tuyet Morocho MA     Sent: 7/21/2017   3:20 PM       To: Tuyet Morocho MA    PT IS HAVING MRI OPN  7/18./17 AT 8:00 SHE ALWAYS HAS TO TAKE MEDICATION AS SHE IS CLAUSTROPHOBIC.  [facility]UALLY GIVES HER SOMETHING BEFORE THESE TEST.     [phone]          PT IS GOING TO MAKE APPT W/[facility] TO DISCUSS THEN SHE WILL NEEDS 10MG

## 2017-08-10 ENCOUNTER — OFFICE VISIT (OUTPATIENT)
Dept: INTERNAL MEDICINE | Facility: CLINIC | Age: 67
End: 2017-08-10

## 2017-08-10 VITALS
DIASTOLIC BLOOD PRESSURE: 80 MMHG | HEIGHT: 63 IN | HEART RATE: 107 BPM | SYSTOLIC BLOOD PRESSURE: 128 MMHG | WEIGHT: 185 LBS | BODY MASS INDEX: 32.78 KG/M2 | OXYGEN SATURATION: 96 %

## 2017-08-10 DIAGNOSIS — E78.5 HYPERLIPIDEMIA, UNSPECIFIED HYPERLIPIDEMIA TYPE: ICD-10-CM

## 2017-08-10 DIAGNOSIS — IMO0001 UNCONTROLLED TYPE 2 DIABETES MELLITUS WITHOUT COMPLICATION, WITHOUT LONG-TERM CURRENT USE OF INSULIN: Primary | ICD-10-CM

## 2017-08-10 DIAGNOSIS — I10 ESSENTIAL HYPERTENSION: ICD-10-CM

## 2017-08-10 DIAGNOSIS — Z11.59 NEED FOR HEPATITIS C SCREENING TEST: ICD-10-CM

## 2017-08-10 PROCEDURE — 99213 OFFICE O/P EST LOW 20 MIN: CPT | Performed by: NURSE PRACTITIONER

## 2017-08-10 NOTE — PROGRESS NOTES
Chief Complaint   Patient presents with   • Diabetes       Subjective     Amber Campos is a 66 y.o. female being seen for a follow up appointment today regarding DM 2. She has joined [facility] 3 weeks ago. She is on a diabetic 1300 ADA diet. Weight is down 8 pounds . She is feeling better. Her fasting glucose is running 253-298. She is only taking Glucophage intermittently and she is switching from 850-1000 mg once daily. She is not having any hypoglycemic symptoms.       History of Present Illness     Allergies   Allergen Reactions   • Adhesive Tape      EKG stickers only   • Iodinated Diagnostic Agents Photosensitivity     contrast   • Neomycin-Bacitracin Zn-Polymyx Rash         Current Outpatient Prescriptions:   •  atenolol (TENORMIN) 50 MG tablet, Take 1 tablet by mouth Daily. (Patient taking differently: Take 50 mg by mouth Every Evening.), Disp: 30 tablet, Rfl: 6  •  budesonide-formoterol (SYMBICORT) 160-4.5 MCG/ACT inhaler, Inhale 2 puffs As Needed., Disp: , Rfl:   •  Calcium-Magnesium-Vitamin D (CALCIUM 500 PO), Take 2 tablets by mouth Every Night., Disp: , Rfl:   •  cetirizine (ZyrTEC) 1 MG/ML syrup, Take 10 mg by mouth As Needed., Disp: , Rfl:   •  cholecalciferol (VITAMIN D3) 1000 UNITS tablet, Take 1,000 Units by mouth Daily., Disp: , Rfl:   •  Clocortolone Pivalate 0.1 % cream, Apply  topically As Needed., Disp: , Rfl:   •  diazePAM (VALIUM) 10 MG tablet, TAKE ONE HOUR PRIOR TO PROCEDURE, Disp: 1 tablet, Rfl: 0  •  DUAVEE 0.45-20 MG tablet, Take 1 tablet by mouth Daily., Disp: 90 tablet, Rfl: 3  •  fluconazole (DIFLUCAN) 150 MG tablet, Take 1 by mouth daily, Disp: 3 tablet, Rfl: 0  •  glucose blood test strip, 3 (three) times a day., Disp: , Rfl:   •  metFORMIN (GLUCOPHAGE) 850 MG tablet, Take 1 tablet by mouth 2 (Two) Times a Day With Meals., Disp: 180 tablet, Rfl: 3  •  Misc Natural Products (GLUCOSAMINE CHONDROITIN ADV PO), Take 1 tablet by mouth Daily., Disp: , Rfl:   •  mometasone (NASONEX) 50  MCG/ACT nasal spray, 2 sprays into each nostril As Needed., Disp: , Rfl:   •  MULTIPLE VITAMIN PO, Take 1 tablet by mouth Daily., Disp: , Rfl:   •  naltrexone-bupropion ER (CONTRAVE) 8-90 MG tablet, Wk 1: 1 tab daily, Wk 2: 1 tab twice a day, Wk 3: 2 tabs in AM, 1 tab in PM, Wk 4: 2 tabs twice a day, Maintenance dose: 2 tabs twice daily. (Patient taking differently: Take 1 tablet by mouth Daily.), Disp: 60 tablet, Rfl: 3  •  olmesartan (BENICAR) 40 MG tablet, Take 1 tablet by mouth Every Morning., Disp: 90 tablet, Rfl: 3  •  Omega-3 Fatty Acids (FISH OIL) 1200 MG capsule capsule, Take  by mouth., Disp: , Rfl:   •  pantoprazole (PROTONIX) 40 MG EC tablet, TAKE 1 TABLET DAILY, Disp: 90 tablet, Rfl: 2  •  urea (CARMOL) 40 % cream, Apply topically., Disp: , Rfl:   •  venlafaxine XR (EFFEXOR-XR) 75 MG 24 hr capsule, Take 1 capsule by mouth daily., Disp: 90 capsule, Rfl: 3  •  VOLTAREN 1 % gel gel, As Needed. APPLY TO AFFECTED AREA TWICE A DAY, Disp: , Rfl: 3  •  ciprofloxacin (CIPRO) 500 MG tablet, Take 1 tablet by mouth 2 (Two) Times a Day., Disp: 14 tablet, Rfl: 0    The following portions of the patient's history were reviewed and updated as appropriate: allergies, current medications, past family history, past medical history, past social history, past surgical history and problem list.    Review of Systems   Constitutional: Negative.    HENT: Negative.    Eyes: Negative.    Respiratory: Negative.  Negative for shortness of breath.    Gastrointestinal: Negative.    Endocrine: Negative.    Genitourinary: Negative.    Musculoskeletal: Negative.    Skin: Negative.    Allergic/Immunologic: Negative.    Neurological: Negative.    Hematological: Negative.    Psychiatric/Behavioral: Negative.  Negative for agitation.       Assessment     Physical Exam   Constitutional: She is oriented to person, place, and time. She appears well-developed and well-nourished.   HENT:   Head: Normocephalic.   Cardiovascular: Normal rate,  regular rhythm and normal heart sounds.    No murmur heard.  Pulmonary/Chest: Effort normal and breath sounds normal. No respiratory distress. She has no wheezes.   Musculoskeletal: She exhibits no edema.   Neurological: She is alert and oriented to person, place, and time. No cranial nerve deficit.   Psychiatric: She has a normal mood and affect. Her behavior is normal.   Vitals reviewed.      Plan     Her fasting labs were reviewed with the patient from last week.     Amber was seen today for diabetes.    Diagnoses and all orders for this visit:    Uncontrolled type 2 diabetes mellitus without complication, without long-term current use of insulin  -     Hemoglobin A1c  -     Comprehensive Metabolic Panel    Essential hypertension  -     Hemoglobin A1c  -     Comprehensive Metabolic Panel    Hyperlipidemia, unspecified hyperlipidemia type  -     Hemoglobin A1c

## 2017-08-15 ENCOUNTER — RESULTS ENCOUNTER (OUTPATIENT)
Dept: INTERNAL MEDICINE | Facility: CLINIC | Age: 67
End: 2017-08-15

## 2017-08-15 DIAGNOSIS — Z11.59 NEED FOR HEPATITIS C SCREENING TEST: ICD-10-CM

## 2017-08-15 DIAGNOSIS — E11.8 TYPE 2 DIABETES MELLITUS WITH COMPLICATION, UNSPECIFIED LONG TERM INSULIN USE STATUS: ICD-10-CM

## 2017-08-15 DIAGNOSIS — E78.5 HYPERLIPIDEMIA, UNSPECIFIED HYPERLIPIDEMIA TYPE: Primary | ICD-10-CM

## 2017-08-15 DIAGNOSIS — I10 ESSENTIAL HYPERTENSION: ICD-10-CM

## 2017-08-16 LAB
ALBUMIN SERPL-MCNC: 4.3 G/DL (ref 3.5–5.2)
ALBUMIN/GLOB SERPL: 1.9 G/DL
ALP SERPL-CCNC: 60 U/L (ref 40–129)
ALT SERPL-CCNC: 72 U/L (ref 5–33)
AST SERPL-CCNC: 90 U/L (ref 5–32)
BASOPHILS # BLD AUTO: 0.07 10*3/MM3 (ref 0–0.2)
BASOPHILS NFR BLD AUTO: 1 % (ref 0–2)
BILIRUB SERPL-MCNC: 0.4 MG/DL (ref 0.2–1.2)
BUN SERPL-MCNC: 11 MG/DL (ref 8–23)
BUN/CREAT SERPL: 19.3 (ref 7–25)
CALCIUM SERPL-MCNC: 9.6 MG/DL (ref 8.8–10.5)
CHLORIDE SERPL-SCNC: 96 MMOL/L (ref 98–107)
CHOLEST SERPL-MCNC: 174 MG/DL (ref 0–200)
CHOLEST/HDLC SERPL: 3 {RATIO}
CO2 SERPL-SCNC: 26.8 MMOL/L (ref 22–29)
CREAT SERPL-MCNC: 0.57 MG/DL (ref 0.57–1)
EOSINOPHIL # BLD AUTO: 0.42 10*3/MM3 (ref 0.1–0.3)
EOSINOPHIL NFR BLD AUTO: 5.8 % (ref 0–4)
ERYTHROCYTE [DISTWIDTH] IN BLOOD BY AUTOMATED COUNT: 13.9 % (ref 11.5–14.5)
GLOBULIN SER CALC-MCNC: 2.3 GM/DL
GLUCOSE SERPL-MCNC: 246 MG/DL (ref 65–99)
HBA1C MFR BLD: 9.9 % (ref 4.8–5.6)
HCT VFR BLD AUTO: 39.4 % (ref 37–47)
HCV AB S/CO SERPL IA: <0.1 S/CO RATIO (ref 0–0.9)
HDLC SERPL-MCNC: 58 MG/DL (ref 40–60)
HGB BLD-MCNC: 12.5 G/DL (ref 12–16)
IMM GRANULOCYTES # BLD: 0.02 10*3/MM3 (ref 0–0.03)
IMM GRANULOCYTES NFR BLD: 0.3 % (ref 0–0.5)
LDLC SERPL CALC-MCNC: 72 MG/DL (ref 0–100)
LYMPHOCYTES # BLD AUTO: 2.48 10*3/MM3 (ref 0.6–4.8)
LYMPHOCYTES NFR BLD AUTO: 34.3 % (ref 20–45)
MCH RBC QN AUTO: 26.8 PG (ref 27–31)
MCHC RBC AUTO-ENTMCNC: 31.7 G/DL (ref 31–37)
MCV RBC AUTO: 84.5 FL (ref 81–99)
MONOCYTES # BLD AUTO: 0.44 10*3/MM3 (ref 0–1)
MONOCYTES NFR BLD AUTO: 6.1 % (ref 3–8)
NEUTROPHILS # BLD AUTO: 3.79 10*3/MM3 (ref 1.5–8.3)
NEUTROPHILS NFR BLD AUTO: 52.5 % (ref 45–70)
NRBC BLD AUTO-RTO: 0 /100 WBC (ref 0–0)
PLATELET # BLD AUTO: 316 10*3/MM3 (ref 140–500)
POTASSIUM SERPL-SCNC: 4.7 MMOL/L (ref 3.5–5.2)
PROT SERPL-MCNC: 6.6 G/DL (ref 6–8.5)
RBC # BLD AUTO: 4.66 10*6/MM3 (ref 4.2–5.4)
SODIUM SERPL-SCNC: 138 MMOL/L (ref 136–145)
TRIGL SERPL-MCNC: 218 MG/DL (ref 0–150)
VLDLC SERPL CALC-MCNC: 43.6 MG/DL (ref 7–27)
WBC # BLD AUTO: 7.22 10*3/MM3 (ref 4.8–10.8)

## 2017-08-18 ENCOUNTER — TELEPHONE (OUTPATIENT)
Dept: INTERNAL MEDICINE | Facility: CLINIC | Age: 67
End: 2017-08-18

## 2017-08-18 NOTE — TELEPHONE ENCOUNTER
Pt. Notified     ----- Message from JAKE Blount sent at 8/16/2017  8:51 AM EDT -----  Hgb A1c 9.9. Cholesterol and glucose improving., Consider this a baseline will repeat before next appt. Stay on Nutri systems.  ----- Message -----     From: Marcell, Reflab Results In     Sent: 8/16/2017   8:18 AM       To: JAKE Blount

## 2017-08-30 ENCOUNTER — OFFICE VISIT (OUTPATIENT)
Dept: INTERNAL MEDICINE | Facility: CLINIC | Age: 67
End: 2017-08-30

## 2017-08-30 VITALS
HEART RATE: 102 BPM | WEIGHT: 188 LBS | SYSTOLIC BLOOD PRESSURE: 130 MMHG | OXYGEN SATURATION: 98 % | BODY MASS INDEX: 33.31 KG/M2 | HEIGHT: 63 IN | DIASTOLIC BLOOD PRESSURE: 72 MMHG | TEMPERATURE: 97.2 F

## 2017-08-30 DIAGNOSIS — N30.01 ACUTE CYSTITIS WITH HEMATURIA: Primary | ICD-10-CM

## 2017-08-30 DIAGNOSIS — IMO0001 UNCONTROLLED TYPE 2 DIABETES MELLITUS WITHOUT COMPLICATION, WITHOUT LONG-TERM CURRENT USE OF INSULIN: ICD-10-CM

## 2017-08-30 LAB
BILIRUB BLD-MCNC: NEGATIVE MG/DL
CLARITY, POC: CLEAR
COLOR UR: YELLOW
GLUCOSE UR STRIP-MCNC: NEGATIVE MG/DL
KETONES UR QL: ABNORMAL
LEUKOCYTE EST, POC: ABNORMAL
NITRITE UR-MCNC: NEGATIVE MG/ML
PH UR: 5 [PH] (ref 5–8)
PROT UR STRIP-MCNC: NEGATIVE MG/DL
RBC # UR STRIP: ABNORMAL /UL
SP GR UR: 1.02 (ref 1–1.03)
UROBILINOGEN UR QL: NORMAL

## 2017-08-30 PROCEDURE — 99213 OFFICE O/P EST LOW 20 MIN: CPT | Performed by: NURSE PRACTITIONER

## 2017-08-30 PROCEDURE — 81003 URINALYSIS AUTO W/O SCOPE: CPT | Performed by: NURSE PRACTITIONER

## 2017-08-30 RX ORDER — NITROFURANTOIN 25; 75 MG/1; MG/1
100 CAPSULE ORAL 2 TIMES DAILY
Qty: 14 CAPSULE | Refills: 0 | Status: SHIPPED | OUTPATIENT
Start: 2017-08-30 | End: 2017-09-13

## 2017-08-30 NOTE — PROGRESS NOTES
Chief Complaint   Patient presents with   • Urinary Tract Infection       Subjective   Amber Campos is a 66 y.o. female is being seen for an acute appointment for UTI. She is following up on DM 2. Luz is still on Navid Jovanny, and her total weight loss She started with symptoms yesterday, with dysuria, frequency.     History of Present Illness     Current Outpatient Prescriptions on File Prior to Visit   Medication Sig Dispense Refill   • atenolol (TENORMIN) 50 MG tablet Take 1 tablet by mouth Daily. (Patient taking differently: Take 50 mg by mouth Every Evening.) 30 tablet 6   • budesonide-formoterol (SYMBICORT) 160-4.5 MCG/ACT inhaler Inhale 2 puffs As Needed.     • Calcium-Magnesium-Vitamin D (CALCIUM 500 PO) Take 2 tablets by mouth Every Night.     • cetirizine (ZyrTEC) 1 MG/ML syrup Take 10 mg by mouth As Needed.     • cholecalciferol (VITAMIN D3) 1000 UNITS tablet Take 1,000 Units by mouth Daily.     • Clocortolone Pivalate 0.1 % cream Apply  topically As Needed.     • diazePAM (VALIUM) 10 MG tablet TAKE ONE HOUR PRIOR TO PROCEDURE 1 tablet 0   • DUAVEE 0.45-20 MG tablet Take 1 tablet by mouth Daily. 90 tablet 3   • glucose blood test strip 3 (three) times a day.     • metFORMIN (GLUCOPHAGE) 850 MG tablet Take 1 tablet by mouth 2 (Two) Times a Day With Meals. 180 tablet 3   • Misc Natural Products (GLUCOSAMINE CHONDROITIN ADV PO) Take 1 tablet by mouth Daily.     • mometasone (NASONEX) 50 MCG/ACT nasal spray 2 sprays into each nostril As Needed.     • MULTIPLE VITAMIN PO Take 1 tablet by mouth Daily.     • naltrexone-bupropion ER (CONTRAVE) 8-90 MG tablet Wk 1: 1 tab daily, Wk 2: 1 tab twice a day, Wk 3: 2 tabs in AM, 1 tab in PM, Wk 4: 2 tabs twice a day, Maintenance dose: 2 tabs twice daily. (Patient taking differently: Take 1 tablet by mouth Daily.) 60 tablet 3   • olmesartan (BENICAR) 40 MG tablet Take 1 tablet by mouth Every Morning. 90 tablet 3   • Omega-3 Fatty Acids (FISH OIL) 1200 MG capsule capsule  Take  by mouth.     • pantoprazole (PROTONIX) 40 MG EC tablet TAKE 1 TABLET DAILY 90 tablet 2   • urea (CARMOL) 40 % cream Apply topically.     • venlafaxine XR (EFFEXOR-XR) 75 MG 24 hr capsule Take 1 capsule by mouth daily. 90 capsule 3   • VOLTAREN 1 % gel gel As Needed. APPLY TO AFFECTED AREA TWICE A DAY  3     No current facility-administered medications on file prior to visit.        The following portions of the patient's history were reviewed and updated as appropriate: allergies, current medications, past family history, past medical history, past social history, past surgical history and problem list.    Review of Systems   Constitutional: Negative.    HENT: Negative.  Negative for congestion and dental problem.    Eyes: Negative.    Respiratory: Negative.    Cardiovascular: Negative.  Negative for chest pain and leg swelling.   Gastrointestinal: Negative.    Endocrine: Negative.    Genitourinary: Positive for dysuria, flank pain and frequency. Negative for decreased urine volume, difficulty urinating and urgency.   Skin: Negative.    Allergic/Immunologic: Negative.    Neurological: Negative.    Hematological: Negative.    Psychiatric/Behavioral: Negative.        Objective   Physical Exam   Constitutional: She is oriented to person, place, and time. She appears well-developed and well-nourished.   HENT:   Head: Normocephalic.   Right Ear: External ear normal.   Left Ear: External ear normal.   Nose: Nose normal.   Mouth/Throat: Oropharynx is clear and moist. No oropharyngeal exudate.   Neck: Neck supple. No thyromegaly present.   Cardiovascular: Normal rate, regular rhythm and normal heart sounds.    No murmur heard.  Pulmonary/Chest: Effort normal and breath sounds normal. No respiratory distress. She has no wheezes.   Abdominal: Bowel sounds are normal. There is no CVA tenderness.   Neurological: She is alert and oriented to person, place, and time.   Psychiatric: She has a normal mood and affect. Her  behavior is normal.   Vitals reviewed.      Assessment/Plan   Amber was seen today for urinary tract infection.    Diagnoses and all orders for this visit:    Acute cystitis with hematuria  -     POCT urinalysis dipstick, automated  -     Urine Culture  -     nitrofurantoin, macrocrystal-monohydrate, (MACROBID) 100 MG capsule; Take 1 capsule by mouth 2 (Two) Times a Day.    Uncontrolled type 2 diabetes mellitus without complication, without long-term current use of insulin  -     metFORMIN (GLUCOPHAGE) 1000 MG tablet; Take 1 tablet by mouth 2 (Two) Times a Day With Meals.  -     Comprehensive metabolic panel; Future  -     Lipid panel; Future  -     Hemoglobin A1c; Future

## 2017-09-03 LAB
BACTERIA UR CULT: ABNORMAL
BACTERIA UR CULT: ABNORMAL
OTHER ANTIBIOTIC SUSC ISLT: ABNORMAL

## 2017-09-05 ENCOUNTER — TELEPHONE (OUTPATIENT)
Dept: INTERNAL MEDICINE | Facility: CLINIC | Age: 67
End: 2017-09-05

## 2017-09-05 NOTE — TELEPHONE ENCOUNTER
----- Message from JAKE Blount sent at 9/5/2017  8:07 AM EDT -----  Bactrim as prescribed will treat UTI  ----- Message -----     From: Nicol Vanegas MA     Sent: 8/30/2017   1:31 PM       To: JAKE Blount

## 2017-09-13 ENCOUNTER — OFFICE VISIT (OUTPATIENT)
Dept: INTERNAL MEDICINE | Facility: CLINIC | Age: 67
End: 2017-09-13

## 2017-09-13 VITALS
WEIGHT: 184.6 LBS | HEART RATE: 100 BPM | OXYGEN SATURATION: 95 % | DIASTOLIC BLOOD PRESSURE: 84 MMHG | HEIGHT: 63 IN | SYSTOLIC BLOOD PRESSURE: 142 MMHG | BODY MASS INDEX: 32.71 KG/M2

## 2017-09-13 DIAGNOSIS — J01.00 SUBACUTE MAXILLARY SINUSITIS: ICD-10-CM

## 2017-09-13 DIAGNOSIS — J45.20 ASTHMATIC BRONCHITIS, MILD INTERMITTENT, UNCOMPLICATED: Primary | ICD-10-CM

## 2017-09-13 PROBLEM — N30.00 ACUTE CYSTITIS WITHOUT HEMATURIA: Status: RESOLVED | Noted: 2017-01-04 | Resolved: 2017-09-13

## 2017-09-13 PROCEDURE — 99213 OFFICE O/P EST LOW 20 MIN: CPT | Performed by: NURSE PRACTITIONER

## 2017-09-13 PROCEDURE — 94640 AIRWAY INHALATION TREATMENT: CPT | Performed by: NURSE PRACTITIONER

## 2017-09-13 PROCEDURE — 90471 IMMUNIZATION ADMIN: CPT | Performed by: NURSE PRACTITIONER

## 2017-09-13 RX ORDER — PREDNISONE 20 MG/1
20 TABLET ORAL DAILY
Qty: 5 TABLET | Refills: 0 | Status: CANCELLED | OUTPATIENT
Start: 2017-09-13

## 2017-09-13 RX ORDER — IPRATROPIUM BROMIDE AND ALBUTEROL SULFATE 2.5; .5 MG/3ML; MG/3ML
3 SOLUTION RESPIRATORY (INHALATION) ONCE
Status: COMPLETED | OUTPATIENT
Start: 2017-09-13 | End: 2017-09-13

## 2017-09-13 RX ORDER — TRIAMCINOLONE ACETONIDE 40 MG/ML
40 INJECTION, SUSPENSION INTRA-ARTICULAR; INTRAMUSCULAR ONCE
Status: COMPLETED | OUTPATIENT
Start: 2017-09-13 | End: 2017-09-13

## 2017-09-13 RX ORDER — CEFDINIR 300 MG/1
300 CAPSULE ORAL 2 TIMES DAILY
Qty: 20 CAPSULE | Refills: 0 | Status: SHIPPED | OUTPATIENT
Start: 2017-09-13 | End: 2017-10-31

## 2017-09-13 RX ADMIN — IPRATROPIUM BROMIDE AND ALBUTEROL SULFATE 3 ML: 2.5; .5 SOLUTION RESPIRATORY (INHALATION) at 10:19

## 2017-09-13 RX ADMIN — TRIAMCINOLONE ACETONIDE 40 MG: 40 INJECTION, SUSPENSION INTRA-ARTICULAR; INTRAMUSCULAR at 10:20

## 2017-09-13 NOTE — PROGRESS NOTES
Chief Complaint   Patient presents with   • URI       Subjective   Amber Campos is a 66 y.o. female is being seen for an acute appointment for nasal congestion, dry cough. She returned home from a trip and this started Friday. She has been on dimetapp OTC and mucinex. She recently resumed Symbicort due to symptoms. Asoicted wheezing. No fevers, no Chest pain.     History of Present Illness     Current Outpatient Prescriptions on File Prior to Visit   Medication Sig Dispense Refill   • budesonide-formoterol (SYMBICORT) 160-4.5 MCG/ACT inhaler Inhale 2 puffs As Needed.     • Calcium-Magnesium-Vitamin D (CALCIUM 500 PO) Take 2 tablets by mouth Every Night.     • cetirizine (ZyrTEC) 1 MG/ML syrup Take 10 mg by mouth As Needed.     • cholecalciferol (VITAMIN D3) 1000 UNITS tablet Take 1,000 Units by mouth Daily.     • Clocortolone Pivalate 0.1 % cream Apply  topically As Needed.     • diazePAM (VALIUM) 10 MG tablet TAKE ONE HOUR PRIOR TO PROCEDURE 1 tablet 0   • DUAVEE 0.45-20 MG tablet Take 1 tablet by mouth Daily. 90 tablet 3   • glucose blood test strip 3 (three) times a day.     • metFORMIN (GLUCOPHAGE) 1000 MG tablet Take 1 tablet by mouth 2 (Two) Times a Day With Meals. 60 tablet 3   • Misc Natural Products (GLUCOSAMINE CHONDROITIN ADV PO) Take 1 tablet by mouth Daily.     • mometasone (NASONEX) 50 MCG/ACT nasal spray 2 sprays into each nostril As Needed.     • MULTIPLE VITAMIN PO Take 1 tablet by mouth Daily.     • naltrexone-bupropion ER (CONTRAVE) 8-90 MG tablet Wk 1: 1 tab daily, Wk 2: 1 tab twice a day, Wk 3: 2 tabs in AM, 1 tab in PM, Wk 4: 2 tabs twice a day, Maintenance dose: 2 tabs twice daily. (Patient taking differently: Take 1 tablet by mouth Daily.) 60 tablet 3   • nitrofurantoin, macrocrystal-monohydrate, (MACROBID) 100 MG capsule Take 1 capsule by mouth 2 (Two) Times a Day. 14 capsule 0   • olmesartan (BENICAR) 40 MG tablet Take 1 tablet by mouth Every Morning. 90 tablet 3   • Omega-3 Fatty Acids  (FISH OIL) 1200 MG capsule capsule Take  by mouth.     • pantoprazole (PROTONIX) 40 MG EC tablet TAKE 1 TABLET DAILY 90 tablet 2   • urea (CARMOL) 40 % cream Apply topically.     • venlafaxine XR (EFFEXOR-XR) 75 MG 24 hr capsule Take 1 capsule by mouth daily. 90 capsule 3   • VOLTAREN 1 % gel gel As Needed. APPLY TO AFFECTED AREA TWICE A DAY  3   • atenolol (TENORMIN) 50 MG tablet Take 1 tablet by mouth Daily. (Patient taking differently: Take 50 mg by mouth Every Evening.) 30 tablet 6     No current facility-administered medications on file prior to visit.        The following portions of the patient's history were reviewed and updated as appropriate: allergies, current medications, past family history, past medical history, past social history, past surgical history and problem list.    Review of Systems   Constitutional: Negative for fever.   HENT: Positive for congestion, postnasal drip, rhinorrhea, sinus pressure, sneezing and sore throat.    Eyes: Negative.    Respiratory: Positive for cough, chest tightness and wheezing. Negative for shortness of breath.    Cardiovascular: Negative.    Gastrointestinal: Negative.    Musculoskeletal: Positive for arthralgias.   Allergic/Immunologic: Positive for environmental allergies.   Psychiatric/Behavioral: Negative.        Objective   Physical Exam   Constitutional: She is oriented to person, place, and time. She appears well-developed and well-nourished. No distress.   HENT:   Head: Normocephalic.   Nose: Mucosal edema, rhinorrhea and septal deviation present. Right sinus exhibits frontal sinus tenderness. Left sinus exhibits frontal sinus tenderness.   Neck: Neck supple.   Cardiovascular: Normal rate, regular rhythm and normal heart sounds.    No murmur heard.  Pulmonary/Chest: Breath sounds normal.   Musculoskeletal: She exhibits no edema.   Lymphadenopathy:     She has cervical adenopathy.   Neurological: She is alert and oriented to person, place, and time.    Psychiatric: She has a normal mood and affect. Her behavior is normal.   Vitals reviewed.      Assessment/Plan   Amber was seen today for uri.    Diagnoses and all orders for this visit:    Asthmatic bronchitis, mild intermittent, uncomplicated  -     HYDROcod Polst-CPM Polst ER (TUSSIONEX PENNKINETIC ER) 10-8 MG/5ML ER suspension; Take 5 mL by mouth Every 12 (Twelve) Hours As Needed for Cough.    Subacute maxillary sinusitis  -     cefdinir (OMNICEF) 300 MG capsule; Take 1 capsule by mouth 2 (Two) Times a Day.    Other orders  -     Cancel: predniSONE (DELTASONE) 20 MG tablet; Take 1 tablet by mouth Daily.         Symbicort 160 2 puffs given in office with Combivent nebulizer. Kenalog 40mg IM today in office. Will give a prednisone burst if needed.

## 2017-10-13 ENCOUNTER — LAB (OUTPATIENT)
Dept: INTERNAL MEDICINE | Facility: CLINIC | Age: 67
End: 2017-10-13

## 2017-10-13 DIAGNOSIS — IMO0001 UNCONTROLLED TYPE 2 DIABETES MELLITUS WITHOUT COMPLICATION, WITHOUT LONG-TERM CURRENT USE OF INSULIN: ICD-10-CM

## 2017-10-13 LAB
ALBUMIN SERPL-MCNC: 4.1 G/DL (ref 3.5–5.2)
ALBUMIN/GLOB SERPL: 1.8 G/DL
ALP SERPL-CCNC: 51 U/L (ref 40–129)
ALT SERPL-CCNC: 64 U/L (ref 5–33)
AST SERPL-CCNC: 85 U/L (ref 5–32)
BILIRUB SERPL-MCNC: 0.3 MG/DL (ref 0.2–1.2)
BUN SERPL-MCNC: 13 MG/DL (ref 8–23)
BUN/CREAT SERPL: 22.8 (ref 7–25)
CALCIUM SERPL-MCNC: 8.8 MG/DL (ref 8.8–10.5)
CHLORIDE SERPL-SCNC: 101 MMOL/L (ref 98–107)
CHOLEST SERPL-MCNC: 191 MG/DL (ref 0–200)
CO2 SERPL-SCNC: 25.1 MMOL/L (ref 22–29)
CREAT SERPL-MCNC: 0.57 MG/DL (ref 0.57–1)
GLOBULIN SER CALC-MCNC: 2.3 GM/DL
GLUCOSE SERPL-MCNC: 239 MG/DL (ref 65–99)
HBA1C MFR BLD: 8.9 % (ref 4.8–5.6)
HDLC SERPL-MCNC: 66 MG/DL (ref 40–60)
LDLC SERPL CALC-MCNC: 89 MG/DL (ref 0–100)
POTASSIUM SERPL-SCNC: 4.5 MMOL/L (ref 3.5–5.2)
PROT SERPL-MCNC: 6.4 G/DL (ref 6–8.5)
SODIUM SERPL-SCNC: 140 MMOL/L (ref 136–145)
TRIGL SERPL-MCNC: 181 MG/DL (ref 0–150)
VLDLC SERPL CALC-MCNC: 36.2 MG/DL (ref 7–27)

## 2017-10-16 DIAGNOSIS — IMO0001 UNCONTROLLED TYPE 2 DIABETES MELLITUS WITHOUT COMPLICATION, WITHOUT LONG-TERM CURRENT USE OF INSULIN: ICD-10-CM

## 2017-10-18 RX ORDER — VENLAFAXINE HYDROCHLORIDE 75 MG/1
CAPSULE, EXTENDED RELEASE ORAL
Qty: 90 CAPSULE | Refills: 3 | Status: SHIPPED | OUTPATIENT
Start: 2017-10-18 | End: 2018-10-01 | Stop reason: SDUPTHER

## 2017-10-31 ENCOUNTER — OFFICE VISIT (OUTPATIENT)
Dept: INTERNAL MEDICINE | Facility: CLINIC | Age: 67
End: 2017-10-31

## 2017-10-31 VITALS
BODY MASS INDEX: 33.38 KG/M2 | WEIGHT: 188.4 LBS | HEIGHT: 63 IN | TEMPERATURE: 98.7 F | DIASTOLIC BLOOD PRESSURE: 70 MMHG | HEART RATE: 98 BPM | SYSTOLIC BLOOD PRESSURE: 138 MMHG | OXYGEN SATURATION: 97 %

## 2017-10-31 DIAGNOSIS — IMO0001 UNCONTROLLED TYPE 2 DIABETES MELLITUS WITHOUT COMPLICATION, WITHOUT LONG-TERM CURRENT USE OF INSULIN: ICD-10-CM

## 2017-10-31 DIAGNOSIS — Z91.09 ENVIRONMENTAL ALLERGIES: Primary | ICD-10-CM

## 2017-10-31 PROCEDURE — 99213 OFFICE O/P EST LOW 20 MIN: CPT | Performed by: NURSE PRACTITIONER

## 2017-10-31 RX ORDER — MONTELUKAST SODIUM 10 MG/1
10 TABLET ORAL NIGHTLY
Qty: 30 TABLET | Refills: 3 | Status: SHIPPED | OUTPATIENT
Start: 2017-10-31 | End: 2018-07-26

## 2017-10-31 NOTE — PROGRESS NOTES
Chief Complaint   Patient presents with   • Diabetes       Subjective     Amber Campos is a 66 y.o. female being seen for a follow up appointment today regarding  DM 2. She is currently on Glucophage 1000mg twice daily.  She is getting 190-216 for morning glucose. She is eating well, and following Anamika Petty at home.       History of Present Illness     Allergies   Allergen Reactions   • Adhesive Tape      EKG stickers only   • Iodinated Diagnostic Agents Photosensitivity     contrast   • Neomycin-Bacitracin Zn-Polymyx Rash         Current Outpatient Prescriptions:   •  budesonide-formoterol (SYMBICORT) 160-4.5 MCG/ACT inhaler, Inhale 2 puffs As Needed., Disp: , Rfl:   •  Calcium-Magnesium-Vitamin D (CALCIUM 500 PO), Take 2 tablets by mouth Every Night., Disp: , Rfl:   •  cetirizine (ZyrTEC) 1 MG/ML syrup, Take 10 mg by mouth As Needed., Disp: , Rfl:   •  cholecalciferol (VITAMIN D3) 1000 UNITS tablet, Take 1,000 Units by mouth Daily., Disp: , Rfl:   •  Clocortolone Pivalate 0.1 % cream, Apply  topically As Needed., Disp: , Rfl:   •  DUAVEE 0.45-20 MG tablet, Take 1 tablet by mouth Daily., Disp: 90 tablet, Rfl: 3  •  glucose blood test strip, 3 (three) times a day., Disp: , Rfl:   •  metFORMIN (GLUCOPHAGE) 1000 MG tablet, Take 1 tablet by mouth 2 (Two) Times a Day With Meals., Disp: 180 tablet, Rfl: 3  •  Misc Natural Products (GLUCOSAMINE CHONDROITIN ADV PO), Take 1 tablet by mouth Daily., Disp: , Rfl:   •  mometasone (NASONEX) 50 MCG/ACT nasal spray, 2 sprays into each nostril As Needed., Disp: , Rfl:   •  MULTIPLE VITAMIN PO, Take 1 tablet by mouth Daily., Disp: , Rfl:   •  olmesartan (BENICAR) 40 MG tablet, Take 1 tablet by mouth Every Morning., Disp: 90 tablet, Rfl: 3  •  Omega-3 Fatty Acids (FISH OIL) 1200 MG capsule capsule, Take  by mouth., Disp: , Rfl:   •  pantoprazole (PROTONIX) 40 MG EC tablet, TAKE 1 TABLET DAILY, Disp: 90 tablet, Rfl: 2  •  urea (CARMOL) 40 % cream, Apply topically., Disp: , Rfl:   •   venlafaxine XR (EFFEXOR-XR) 75 MG 24 hr capsule, TAKE 1 CAPSULE DAILY, Disp: 90 capsule, Rfl: 3  •  VOLTAREN 1 % gel gel, As Needed. APPLY TO AFFECTED AREA TWICE A DAY, Disp: , Rfl: 3    The following portions of the patient's history were reviewed and updated as appropriate: allergies, current medications, past family history, past medical history, past social history, past surgical history and problem list.    Review of Systems   Constitutional: Negative.  Negative for fever.   HENT: Positive for congestion.    Eyes: Negative.    Respiratory: Negative.  Negative for shortness of breath and wheezing.    Cardiovascular: Negative.  Negative for chest pain, palpitations and leg swelling.   Gastrointestinal: Negative.    Endocrine: Negative.    Musculoskeletal: Negative.    Allergic/Immunologic: Positive for environmental allergies.       Assessment     Physical Exam   Constitutional: She is oriented to person, place, and time. She appears well-developed and well-nourished.   HENT:   Head: Normocephalic.   Right Ear: External ear normal.   Nose: Mucosal edema and rhinorrhea present. No sinus tenderness. Right sinus exhibits no frontal sinus tenderness. Left sinus exhibits no frontal sinus tenderness.   Mouth/Throat: Uvula is midline and oropharynx is clear and moist. No oropharyngeal exudate or posterior oropharyngeal edema.   Neck: Neck supple. No thyromegaly present.   Cardiovascular: Normal rate, regular rhythm and normal heart sounds.    No murmur heard.  Pulmonary/Chest: Effort normal and breath sounds normal. No respiratory distress. She has no wheezes.   Musculoskeletal: She exhibits no edema.   Neurological: She is alert and oriented to person, place, and time.   Psychiatric: She has a normal mood and affect. Her behavior is normal.   Vitals reviewed.      Plan      Diagnosis Plan   1. Environmental allergies  montelukast (SINGULAIR) 10 MG tablet   2. Uncontrolled type 2 diabetes mellitus without complication,  without long-term current use of insulin         She will start Contrave 1 at night and then increase to 2 by mouth fdaily.

## 2017-11-21 ENCOUNTER — OFFICE VISIT (OUTPATIENT)
Dept: INTERNAL MEDICINE | Facility: CLINIC | Age: 67
End: 2017-11-21

## 2017-11-21 VITALS
TEMPERATURE: 97.9 F | HEIGHT: 63 IN | RESPIRATION RATE: 16 BRPM | OXYGEN SATURATION: 96 % | SYSTOLIC BLOOD PRESSURE: 140 MMHG | WEIGHT: 191 LBS | BODY MASS INDEX: 33.84 KG/M2 | HEART RATE: 109 BPM | DIASTOLIC BLOOD PRESSURE: 80 MMHG

## 2017-11-21 DIAGNOSIS — E11.00 UNCONTROLLED TYPE 2 DIABETES MELLITUS WITH HYPEROSMOLARITY WITHOUT COMA, WITHOUT LONG-TERM CURRENT USE OF INSULIN (HCC): Primary | ICD-10-CM

## 2017-11-21 DIAGNOSIS — R82.998 LEUKOCYTES IN URINE: ICD-10-CM

## 2017-11-21 DIAGNOSIS — N30.01 ACUTE CYSTITIS WITH HEMATURIA: ICD-10-CM

## 2017-11-21 LAB
BILIRUB BLD-MCNC: NEGATIVE MG/DL
CLARITY, POC: ABNORMAL
COLOR UR: YELLOW
GLUCOSE UR STRIP-MCNC: NEGATIVE MG/DL
KETONES UR QL: ABNORMAL
LEUKOCYTE EST, POC: ABNORMAL
NITRITE UR-MCNC: POSITIVE MG/ML
PH UR: 6 [PH] (ref 5–8)
POC CREATININE URINE: 200
POC MICROALBUMIN URINE: 80
PROT UR STRIP-MCNC: NEGATIVE MG/DL
RBC # UR STRIP: ABNORMAL /UL
SP GR UR: 1.01 (ref 1–1.03)
UROBILINOGEN UR QL: NORMAL

## 2017-11-21 PROCEDURE — 81003 URINALYSIS AUTO W/O SCOPE: CPT | Performed by: NURSE PRACTITIONER

## 2017-11-21 PROCEDURE — 82044 UR ALBUMIN SEMIQUANTITATIVE: CPT | Performed by: NURSE PRACTITIONER

## 2017-11-21 PROCEDURE — 99213 OFFICE O/P EST LOW 20 MIN: CPT | Performed by: NURSE PRACTITIONER

## 2017-11-21 RX ORDER — SULFAMETHOXAZOLE AND TRIMETHOPRIM 800; 160 MG/1; MG/1
1 TABLET ORAL 2 TIMES DAILY
Qty: 14 TABLET | Refills: 0 | Status: SHIPPED | OUTPATIENT
Start: 2017-11-21 | End: 2018-01-23

## 2017-11-21 NOTE — PROGRESS NOTES
Chief Complaint   Patient presents with   • Urinary Tract Infection     burning   • Diabetes     a1c       Subjective     Amber Campos is a 66 y.o. female being seen for a follow up appointment today regarding DM 2. She is following a weight watchers plan. She did not have any side effects but she reports it was . She is checking her glucose in the morning, running 220. .       History of Present Illness     Allergies   Allergen Reactions   • Adhesive Tape      EKG stickers only   • Iodinated Diagnostic Agents Photosensitivity     contrast   • Neomycin-Bacitracin Zn-Polymyx Rash         Current Outpatient Prescriptions:   •  budesonide-formoterol (SYMBICORT) 160-4.5 MCG/ACT inhaler, Inhale 2 puffs As Needed., Disp: , Rfl:   •  Calcium-Magnesium-Vitamin D (CALCIUM 500 PO), Take 2 tablets by mouth Every Night., Disp: , Rfl:   •  cetirizine (ZyrTEC) 1 MG/ML syrup, Take 10 mg by mouth As Needed., Disp: , Rfl:   •  cholecalciferol (VITAMIN D3) 1000 UNITS tablet, Take 1,000 Units by mouth Daily., Disp: , Rfl:   •  Clocortolone Pivalate 0.1 % cream, Apply  topically As Needed., Disp: , Rfl:   •  DUAVEE 0.45-20 MG tablet, Take 1 tablet by mouth Daily., Disp: 90 tablet, Rfl: 3  •  glucose blood test strip, 3 (three) times a day., Disp: , Rfl:   •  metFORMIN (GLUCOPHAGE) 1000 MG tablet, Take 1 tablet by mouth 2 (Two) Times a Day With Meals., Disp: 180 tablet, Rfl: 3  •  Misc Natural Products (GLUCOSAMINE CHONDROITIN ADV PO), Take 1 tablet by mouth Daily., Disp: , Rfl:   •  mometasone (NASONEX) 50 MCG/ACT nasal spray, 2 sprays into each nostril As Needed., Disp: , Rfl:   •  montelukast (SINGULAIR) 10 MG tablet, Take 1 tablet by mouth Every Night., Disp: 30 tablet, Rfl: 3  •  MULTIPLE VITAMIN PO, Take 1 tablet by mouth Daily., Disp: , Rfl:   •  olmesartan (BENICAR) 40 MG tablet, Take 1 tablet by mouth Every Morning., Disp: 90 tablet, Rfl: 3  •  Omega-3 Fatty Acids (FISH OIL) 1200 MG capsule capsule, Take  by mouth.,  Disp: , Rfl:   •  pantoprazole (PROTONIX) 40 MG EC tablet, TAKE 1 TABLET DAILY, Disp: 90 tablet, Rfl: 2  •  urea (CARMOL) 40 % cream, Apply topically., Disp: , Rfl:   •  venlafaxine XR (EFFEXOR-XR) 75 MG 24 hr capsule, TAKE 1 CAPSULE DAILY, Disp: 90 capsule, Rfl: 3  •  VOLTAREN 1 % gel gel, As Needed. APPLY TO AFFECTED AREA TWICE A DAY, Disp: , Rfl: 3    The following portions of the patient's history were reviewed and updated as appropriate: allergies, current medications, past family history, past medical history, past social history, past surgical history and problem list.    Review of Systems   Constitutional: Negative.    HENT: Negative.    Eyes: Negative.    Respiratory: Negative.    Cardiovascular: Negative.  Negative for chest pain.   Gastrointestinal: Negative.    Endocrine: Negative.    Genitourinary: Negative.    Musculoskeletal: Negative.    Allergic/Immunologic: Negative.  Negative for environmental allergies, food allergies and immunocompromised state.   Neurological: Negative.    Hematological: Negative.    Psychiatric/Behavioral: Negative.        Assessment     Physical Exam   Constitutional: She is oriented to person, place, and time. She appears well-developed and well-nourished.   HENT:   Head: Normocephalic.   Right Ear: External ear normal.   Left Ear: External ear normal.   Nose: Nose normal.   Mouth/Throat: Oropharynx is clear and moist.   Neck: Neck supple. No thyromegaly present.   Cardiovascular: Normal rate, regular rhythm and normal heart sounds.    No murmur heard.  Pulmonary/Chest: Effort normal and breath sounds normal. No respiratory distress. She has no wheezes.   Neurological: She is alert and oriented to person, place, and time. No cranial nerve deficit.   Skin: Skin is warm and dry.   Psychiatric: She has a normal mood and affect. Her behavior is normal.   Vitals reviewed.      Plan     Her fasting labs were reviewed with the patient from last week.     Amber was seen today for  urinary tract infection and diabetes.    Diagnoses and all orders for this visit:    Uncontrolled type 2 diabetes mellitus with hyperosmolarity without coma, without long-term current use of insulin  -     naltrexone-bupropion ER (CONTRAVE) 8-90 MG tablet; Wk 1: 1 tab daily, Wk 2: 1 tab twice a day, Wk 3: 2 tabs in AM, 1 tab in PM, Wk 4: 2 tabs twice a day, Maintenance dose: 2 tabs twice daily.  -     POCT urinalysis dipstick, automated  -     POCT microalbumin    Acute cystitis with hematuria  -     sulfamethoxazole-trimethoprim (BACTRIM DS,SEPTRA DS) 800-160 MG per tablet; Take 1 tablet by mouth 2 (Two) Times a Day.  -     POCT urinalysis dipstick, automated  -     Urine Culture - Urine, Urine, Clean Catch    Leukocytes in urine  -     Urine Culture - Urine, Urine, Clean Catch

## 2017-11-22 ENCOUNTER — TELEPHONE (OUTPATIENT)
Dept: INTERNAL MEDICINE | Facility: CLINIC | Age: 67
End: 2017-11-22

## 2017-11-22 NOTE — TELEPHONE ENCOUNTER
Tiffany BERMUDEZ sent for culture yesterday.     ----- Message from JAKE Blount sent at 11/22/2017  7:44 AM EST -----  Send for a culture.

## 2017-11-25 LAB
BACTERIA UR CULT: ABNORMAL
BACTERIA UR CULT: ABNORMAL
OTHER ANTIBIOTIC SUSC ISLT: ABNORMAL

## 2017-12-14 ENCOUNTER — TELEPHONE (OUTPATIENT)
Dept: INTERNAL MEDICINE | Facility: CLINIC | Age: 67
End: 2017-12-14

## 2017-12-14 ENCOUNTER — CLINICAL SUPPORT (OUTPATIENT)
Dept: INTERNAL MEDICINE | Facility: CLINIC | Age: 67
End: 2017-12-14

## 2017-12-14 DIAGNOSIS — N39.0 URINARY TRACT INFECTION WITHOUT HEMATURIA, SITE UNSPECIFIED: ICD-10-CM

## 2017-12-14 DIAGNOSIS — N39.0 URINARY TRACT INFECTION WITHOUT HEMATURIA, SITE UNSPECIFIED: Primary | ICD-10-CM

## 2017-12-14 NOTE — TELEPHONE ENCOUNTER
----- Message from JAKE Blount sent at 12/14/2017  8:25 AM EST -----  Regarding: FW: UTI or Bladder infection again  Contact: [phone]  Have her leave a urine for culture, level 1  ----- Message -----     From: Tuyet Morocho MA     Sent: 12/14/2017   8:24 AM       To: JAKE Blount  Subject: FW: UTI or Bladder infection again                   ----- Message -----     From: Neva Rivera     Sent: 12/14/2017   8:18 AM       To: [facility]  Subject: UTI or Bladder infection again                   Jenny pt    Patient wanted to make appointment (after 1:00 today), she said that she has the same thing again but now it has a chemical smell to it. She thinks maybe the med was not strong enough to kill the infection?      [facility]    Thanks!  Neva CAMPOS AWARE COMING IN TODAY AT  12:30

## 2017-12-19 LAB
APPEARANCE UR: CLEAR
BACTERIA #/AREA URNS HPF: ABNORMAL /HPF
BACTERIA UR CULT: ABNORMAL
BACTERIA UR CULT: ABNORMAL
BILIRUB UR QL STRIP: NEGATIVE
COLOR UR: YELLOW
EPI CELLS #/AREA URNS HPF: ABNORMAL /HPF
GLUCOSE UR QL: ABNORMAL
HGB UR QL STRIP: NEGATIVE
KETONES UR QL STRIP: NEGATIVE
LEUKOCYTE ESTERASE UR QL STRIP: ABNORMAL
MICRO URNS: ABNORMAL
NITRITE UR QL STRIP: POSITIVE
OTHER ANTIBIOTIC SUSC ISLT: ABNORMAL
PH UR STRIP: 5 [PH] (ref 5–7.5)
PROT UR QL STRIP: NEGATIVE
RBC #/AREA URNS HPF: ABNORMAL /HPF
SP GR UR: 1.03 (ref 1–1.03)
URINALYSIS REFLEX: ABNORMAL
UROBILINOGEN UR STRIP-MCNC: 0.2 MG/DL (ref 0.2–1)
WBC #/AREA URNS HPF: >30 /HPF

## 2017-12-20 ENCOUNTER — TELEPHONE (OUTPATIENT)
Dept: INTERNAL MEDICINE | Facility: CLINIC | Age: 67
End: 2017-12-20

## 2017-12-20 RX ORDER — NITROFURANTOIN 25; 75 MG/1; MG/1
100 CAPSULE ORAL EVERY 12 HOURS SCHEDULED
Qty: 14 CAPSULE | Refills: 0 | Status: SHIPPED | OUTPATIENT
Start: 2017-12-20 | End: 2018-01-23

## 2017-12-20 NOTE — TELEPHONE ENCOUNTER
Patient advised and Rx sent in per note on lab result.    ----- Message from Tiffany Srivastava MA sent at 12/20/2017  8:32 AM EST -----  Regarding: FW: UA RESULTS  Contact: [phone]      ----- Message -----     From: JAKE Blount     Sent: 12/20/2017   8:09 AM       To: Tiffany Srivastava MA  Subject: FW: UA RESULTS                                   Addressed in other communication  ----- Message -----     From: Tiffany Srivastava MA     Sent: 12/19/2017   5:16 PM       To: JAKE Blount  Subject: FW: UA RESULTS                                   Results on your desk.  ----- Message -----     From: Tiffany Srivastava MA     Sent: 12/19/2017  12:17 PM       To: Tiffany Srivastava MA  Subject: FW: UA RESULTS                                       ----- Message -----     From: Mitch Rivera     Sent: 12/19/2017   9:30 AM       [facility]  Subject: UA RESULTS                                       KIRK CAMPOS    Patient is calling for lab results from ua. Please call her    Thanks!  mitch

## 2018-01-22 RX ORDER — PANTOPRAZOLE SODIUM 40 MG/1
TABLET, DELAYED RELEASE ORAL
Qty: 90 TABLET | Refills: 2 | Status: SHIPPED | OUTPATIENT
Start: 2018-01-22 | End: 2018-10-01 | Stop reason: SDUPTHER

## 2018-01-23 ENCOUNTER — OFFICE VISIT (OUTPATIENT)
Dept: INTERNAL MEDICINE | Facility: CLINIC | Age: 68
End: 2018-01-23

## 2018-01-23 VITALS
TEMPERATURE: 98.4 F | BODY MASS INDEX: 34.2 KG/M2 | HEART RATE: 112 BPM | SYSTOLIC BLOOD PRESSURE: 136 MMHG | DIASTOLIC BLOOD PRESSURE: 70 MMHG | HEIGHT: 63 IN | OXYGEN SATURATION: 98 % | WEIGHT: 193 LBS

## 2018-01-23 DIAGNOSIS — N30.00 ACUTE CYSTITIS WITHOUT HEMATURIA: Primary | ICD-10-CM

## 2018-01-23 DIAGNOSIS — K21.9 GASTROESOPHAGEAL REFLUX DISEASE WITHOUT ESOPHAGITIS: ICD-10-CM

## 2018-01-23 DIAGNOSIS — R07.89 MUSCULOSKELETAL CHEST PAIN: ICD-10-CM

## 2018-01-23 LAB
BILIRUB BLD-MCNC: NEGATIVE MG/DL
CLARITY, POC: ABNORMAL
COLOR UR: YELLOW
GLUCOSE UR STRIP-MCNC: ABNORMAL MG/DL
KETONES UR QL: ABNORMAL
LEUKOCYTE EST, POC: ABNORMAL
NITRITE UR-MCNC: POSITIVE MG/ML
PH UR: 5 [PH] (ref 5–8)
PROT UR STRIP-MCNC: ABNORMAL MG/DL
RBC # UR STRIP: ABNORMAL /UL
SP GR UR: 1.02 (ref 1–1.03)
UROBILINOGEN UR QL: NORMAL

## 2018-01-23 PROCEDURE — 99214 OFFICE O/P EST MOD 30 MIN: CPT | Performed by: NURSE PRACTITIONER

## 2018-01-23 PROCEDURE — 81003 URINALYSIS AUTO W/O SCOPE: CPT | Performed by: NURSE PRACTITIONER

## 2018-01-23 RX ORDER — SULFAMETHOXAZOLE AND TRIMETHOPRIM 800; 160 MG/1; MG/1
1 TABLET ORAL 2 TIMES DAILY
Qty: 14 TABLET | Refills: 0 | Status: SHIPPED | OUTPATIENT
Start: 2018-01-23 | End: 2018-02-27

## 2018-01-23 NOTE — PROGRESS NOTES
Chief Complaint   Patient presents with   • Urinary Tract Infection       Subjective   Amber Campos is a 67 y.o. female is being seen for an acute appointment for possible UTI. Symptoms began 1 week ago. Frequency, urgency.     She is having pain in breast bone since picking up the laundry basket. Described as soreness in breastbone. No masses, no N/V/D.      She is having increasing heartburn since running out of Protonix. She I on Nexium 20 mg daily. She has been having break thru GERD symptoms.     History of Present Illness     Current Outpatient Prescriptions on File Prior to Visit   Medication Sig Dispense Refill   • budesonide-formoterol (SYMBICORT) 160-4.5 MCG/ACT inhaler Inhale 2 puffs As Needed.     • Calcium-Magnesium-Vitamin D (CALCIUM 500 PO) Take 2 tablets by mouth Every Night.     • cetirizine (ZyrTEC) 1 MG/ML syrup Take 10 mg by mouth As Needed.     • cholecalciferol (VITAMIN D3) 1000 UNITS tablet Take 1,000 Units by mouth Daily.     • Clocortolone Pivalate 0.1 % cream Apply  topically As Needed.     • DUAVEE 0.45-20 MG tablet Take 1 tablet by mouth Daily. 90 tablet 3   • glucose blood test strip 3 (three) times a day.     • metFORMIN (GLUCOPHAGE) 1000 MG tablet Take 1 tablet by mouth 2 (Two) Times a Day With Meals. 180 tablet 3   • mometasone (NASONEX) 50 MCG/ACT nasal spray 2 sprays into each nostril As Needed.     • MULTIPLE VITAMIN PO Take 1 tablet by mouth Daily.     • olmesartan (BENICAR) 40 MG tablet Take 1 tablet by mouth Every Morning. 90 tablet 3   • Omega-3 Fatty Acids (FISH OIL) 1200 MG capsule capsule Take  by mouth.     • pantoprazole (PROTONIX) 40 MG EC tablet Take one po daily 90 tablet 2   • urea (CARMOL) 40 % cream Apply topically.     • venlafaxine XR (EFFEXOR-XR) 75 MG 24 hr capsule TAKE 1 CAPSULE DAILY 90 capsule 3   • VOLTAREN 1 % gel gel As Needed. APPLY TO AFFECTED AREA TWICE A DAY  3   • Misc Natural Products (GLUCOSAMINE CHONDROITIN ADV PO) Take 1 tablet by mouth Daily.      • montelukast (SINGULAIR) 10 MG tablet Take 1 tablet by mouth Every Night. 30 tablet 3   • naltrexone-bupropion ER (CONTRAVE) 8-90 MG tablet Wk 1: 1 tab daily, Wk 2: 1 tab twice a day, Wk 3: 2 tabs in AM, 1 tab in PM, Wk 4: 2 tabs twice a day, Maintenance dose: 2 tabs twice daily. 120 tablet 3   • [DISCONTINUED] nitrofurantoin, macrocrystal-monohydrate, (MACROBID) 100 MG capsule Take 1 capsule by mouth Every 12 (Twelve) Hours. 14 capsule 0   • [DISCONTINUED] sulfamethoxazole-trimethoprim (BACTRIM DS,SEPTRA DS) 800-160 MG per tablet Take 1 tablet by mouth 2 (Two) Times a Day. 14 tablet 0     No current facility-administered medications on file prior to visit.        The following portions of the patient's history were reviewed and updated as appropriate: allergies, current medications, past family history, past medical history, past social history, past surgical history and problem list.    Review of Systems   Constitutional: Negative.    HENT: Negative.    Eyes: Negative.    Respiratory: Negative.    Gastrointestinal: Negative.    Endocrine: Negative.    Genitourinary: Positive for frequency and urgency.   Musculoskeletal: Negative.    Allergic/Immunologic: Negative.    Neurological: Negative.    Hematological: Negative.    Psychiatric/Behavioral: Negative.        Objective   Physical Exam   Constitutional: She is oriented to person, place, and time. She appears well-developed and well-nourished.   HENT:   Head: Normocephalic.   Cardiovascular: Normal rate, regular rhythm and normal heart sounds.    No murmur heard.  Pulmonary/Chest: Effort normal and breath sounds normal. No respiratory distress. She has no wheezes.   Abdominal: There is tenderness in the epigastric area. There is no CVA tenderness.   Musculoskeletal: Normal range of motion.   Neurological: She is alert and oriented to person, place, and time.   Psychiatric: She has a normal mood and affect. Her behavior is normal.   Vitals  reviewed.      Assessment/Plan   Amber was seen today for urinary tract infection.    Diagnoses and all orders for this visit:    Acute cystitis without hematuria  -     sulfamethoxazole-trimethoprim (BACTRIM DS,SEPTRA DS) 800-160 MG per tablet; Take 1 tablet by mouth 2 (Two) Times a Day.    Gastroesophageal reflux disease without esophagitis    Musculoskeletal chest pain    Pain under breasts should resolve in 1 week.     Increase Nexium to 20 mg twice daily.     Follow up on DM in 4 weeks

## 2018-01-25 LAB
BACTERIA UR CULT: ABNORMAL
BACTERIA UR CULT: ABNORMAL
OTHER ANTIBIOTIC SUSC ISLT: ABNORMAL

## 2018-02-01 NOTE — TELEPHONE ENCOUNTER
----- Message from Rox Todd sent at 1/31/2018  3:51 PM EST -----  Regarding: TEST STRIPS  ZEB    PATIENT NEEDS SCRIPT FOR FREESTYLE LITE STRIPS.  PHARMACIST RECOMMENDED PT GET A SCRIPT FOR IT AS IT SHOULD BE LESS EXPENSIVE THEN BUYING IT OTC.    PATIENT REQUESTING SCRIPT BE SENT TO [facility]    FAXED TO [facility]   [facility]

## 2018-02-19 DIAGNOSIS — I10 ESSENTIAL HYPERTENSION: ICD-10-CM

## 2018-02-19 DIAGNOSIS — IMO0001 UNCONTROLLED TYPE 2 DIABETES MELLITUS WITHOUT COMPLICATION, WITHOUT LONG-TERM CURRENT USE OF INSULIN: ICD-10-CM

## 2018-02-19 DIAGNOSIS — E78.5 HYPERLIPIDEMIA, UNSPECIFIED HYPERLIPIDEMIA TYPE: Primary | ICD-10-CM

## 2018-02-21 LAB
ALBUMIN SERPL-MCNC: 3.9 G/DL (ref 3.5–5.2)
ALBUMIN/GLOB SERPL: 1.5 G/DL
ALP SERPL-CCNC: 55 U/L (ref 40–129)
ALT SERPL-CCNC: 52 U/L (ref 5–33)
AST SERPL-CCNC: 60 U/L (ref 5–32)
BASOPHILS # BLD AUTO: 0.05 10*3/MM3 (ref 0–0.2)
BASOPHILS NFR BLD AUTO: 0.9 % (ref 0–2)
BILIRUB SERPL-MCNC: 0.3 MG/DL (ref 0.2–1.2)
BUN SERPL-MCNC: 11 MG/DL (ref 8–23)
BUN/CREAT SERPL: 22 (ref 7–25)
CALCIUM SERPL-MCNC: 8.7 MG/DL (ref 8.8–10.5)
CHLORIDE SERPL-SCNC: 99 MMOL/L (ref 98–107)
CHOLEST SERPL-MCNC: 192 MG/DL (ref 0–200)
CO2 SERPL-SCNC: 24.6 MMOL/L (ref 22–29)
CREAT SERPL-MCNC: 0.5 MG/DL (ref 0.57–1)
EOSINOPHIL # BLD AUTO: 0.38 10*3/MM3 (ref 0.1–0.3)
EOSINOPHIL NFR BLD AUTO: 6.9 % (ref 0–4)
ERYTHROCYTE [DISTWIDTH] IN BLOOD BY AUTOMATED COUNT: 13 % (ref 11.5–14.5)
GFR SERPLBLD CREATININE-BSD FMLA CKD-EPI: 122 ML/MIN/1.73
GFR SERPLBLD CREATININE-BSD FMLA CKD-EPI: 148 ML/MIN/1.73
GLOBULIN SER CALC-MCNC: 2.6 GM/DL
GLUCOSE SERPL-MCNC: 211 MG/DL (ref 65–99)
HBA1C MFR BLD: 8.4 % (ref 4.8–5.6)
HCT VFR BLD AUTO: 37.4 % (ref 37–47)
HDLC SERPL-MCNC: 59 MG/DL (ref 40–60)
HGB BLD-MCNC: 12.4 G/DL (ref 12–16)
IMM GRANULOCYTES # BLD: 0.01 10*3/MM3 (ref 0–0.03)
IMM GRANULOCYTES NFR BLD: 0.2 % (ref 0–0.5)
LDLC SERPL CALC-MCNC: 96 MG/DL (ref 0–100)
LYMPHOCYTES # BLD AUTO: 2.54 10*3/MM3 (ref 0.6–4.8)
LYMPHOCYTES NFR BLD AUTO: 46.2 % (ref 20–45)
MCH RBC QN AUTO: 28.6 PG (ref 27–31)
MCHC RBC AUTO-ENTMCNC: 33.2 G/DL (ref 31–37)
MCV RBC AUTO: 86.4 FL (ref 81–99)
MONOCYTES # BLD AUTO: 0.41 10*3/MM3 (ref 0–1)
MONOCYTES NFR BLD AUTO: 7.5 % (ref 3–8)
NEUTROPHILS # BLD AUTO: 2.11 10*3/MM3 (ref 1.5–8.3)
NEUTROPHILS NFR BLD AUTO: 38.3 % (ref 45–70)
NRBC BLD AUTO-RTO: 0 /100 WBC (ref 0–0)
PLATELET # BLD AUTO: 322 10*3/MM3 (ref 140–500)
POTASSIUM SERPL-SCNC: 4.3 MMOL/L (ref 3.5–5.2)
PROT SERPL-MCNC: 6.5 G/DL (ref 6–8.5)
RBC # BLD AUTO: 4.33 10*6/MM3 (ref 4.2–5.4)
SODIUM SERPL-SCNC: 137 MMOL/L (ref 136–145)
TRIGL SERPL-MCNC: 187 MG/DL (ref 0–150)
VLDLC SERPL CALC-MCNC: 37.4 MG/DL (ref 7–27)
WBC # BLD AUTO: 5.5 10*3/MM3 (ref 4.8–10.8)

## 2018-02-24 ENCOUNTER — RESULTS ENCOUNTER (OUTPATIENT)
Dept: INTERNAL MEDICINE | Facility: CLINIC | Age: 68
End: 2018-02-24

## 2018-02-24 DIAGNOSIS — IMO0001 UNCONTROLLED TYPE 2 DIABETES MELLITUS WITHOUT COMPLICATION, WITHOUT LONG-TERM CURRENT USE OF INSULIN: ICD-10-CM

## 2018-02-24 DIAGNOSIS — I10 ESSENTIAL HYPERTENSION: ICD-10-CM

## 2018-02-24 DIAGNOSIS — E78.5 HYPERLIPIDEMIA, UNSPECIFIED HYPERLIPIDEMIA TYPE: ICD-10-CM

## 2018-02-27 ENCOUNTER — OFFICE VISIT (OUTPATIENT)
Dept: INTERNAL MEDICINE | Facility: CLINIC | Age: 68
End: 2018-02-27

## 2018-02-27 VITALS
WEIGHT: 193 LBS | HEIGHT: 63 IN | RESPIRATION RATE: 16 BRPM | DIASTOLIC BLOOD PRESSURE: 82 MMHG | HEART RATE: 102 BPM | TEMPERATURE: 98 F | OXYGEN SATURATION: 97 % | BODY MASS INDEX: 34.2 KG/M2 | SYSTOLIC BLOOD PRESSURE: 132 MMHG

## 2018-02-27 DIAGNOSIS — M25.541 ARTHRALGIA OF BOTH HANDS: Primary | ICD-10-CM

## 2018-02-27 DIAGNOSIS — E78.5 HYPERLIPIDEMIA, UNSPECIFIED HYPERLIPIDEMIA TYPE: ICD-10-CM

## 2018-02-27 DIAGNOSIS — M25.542 ARTHRALGIA OF BOTH HANDS: Primary | ICD-10-CM

## 2018-02-27 DIAGNOSIS — I10 ESSENTIAL HYPERTENSION: ICD-10-CM

## 2018-02-27 DIAGNOSIS — IMO0001 UNCONTROLLED TYPE 2 DIABETES MELLITUS WITHOUT COMPLICATION, WITHOUT LONG-TERM CURRENT USE OF INSULIN: ICD-10-CM

## 2018-02-27 DIAGNOSIS — Z82.61 FAMILY HISTORY OF RHEUMATOID ARTHRITIS: ICD-10-CM

## 2018-02-27 DIAGNOSIS — K21.9 GASTROESOPHAGEAL REFLUX DISEASE WITHOUT ESOPHAGITIS: ICD-10-CM

## 2018-02-27 PROCEDURE — 99214 OFFICE O/P EST MOD 30 MIN: CPT | Performed by: NURSE PRACTITIONER

## 2018-02-27 RX ORDER — LANCETS 28 GAUGE
EACH MISCELLANEOUS
COMMUNITY
Start: 2018-02-03

## 2018-02-27 NOTE — PROGRESS NOTES
"Chief Complaint   Patient presents with   • Diabetes   • Follow-up       Subjective     Amber Campos is a 67 y.o. female being seen for a follow up appointment today regarding her diabetes and hyperlipidemia.She is taking Metformin 1000mg BID.  She reports to be doing well and does not have any complaints.  She has been monitoring her glucose every morning and it is usually ~160.  She admits that she is not checking it during the day on most days.       She was previously on Anamika Jovanny but has become \"bored\" with the menu and is considering [facility]'s HMR program.    She is currently taking an OTC remedy that contains cranberries and probiotics.  Reports that this has decreased her night waking to urinate.  Requesting urinalysis with next visit to check if current OTC regimen is improving her urine quality and kidney function.      Diabetes   She presents for her follow-up diabetic visit. She has type 2 diabetes mellitus. Her disease course has been stable. There are no hypoglycemic associated symptoms. Pertinent negatives for hypoglycemia include no dizziness, headaches, nervousness/anxiousness or pallor. Associated symptoms include fatigue (attributes symptoms to allergies) and polyuria (usually gets up to bathroom 3-4x per night). Pertinent negatives for diabetes include no blurred vision, no chest pain, no foot ulcerations, no visual change and no weight loss. There are no hypoglycemic complications. Symptoms are stable. Diabetic complications include a CVA. Pertinent negatives for diabetic complications include no autonomic neuropathy, nephropathy, peripheral neuropathy or retinopathy. Risk factors for coronary artery disease include obesity, family history, post-menopausal, sedentary lifestyle, hypertension and diabetes mellitus. Current diabetic treatment includes diet and oral agent (monotherapy). She is compliant with treatment some of the time. Meal planning includes carbohydrate counting and calorie " counting. She never participates in exercise. Her home blood glucose trend is decreasing steadily. Her breakfast blood glucose range is generally 140-180 mg/dl. Sees podiatrist: pain related to fall and surgery 2015--scheduling appointment soon.Eye exam is current.        Allergies   Allergen Reactions   • Adhesive Tape      EKG stickers only   • Iodinated Diagnostic Agents Photosensitivity     contrast   • Neomycin-Bacitracin Zn-Polymyx Rash         Current Outpatient Prescriptions:   •  budesonide-formoterol (SYMBICORT) 160-4.5 MCG/ACT inhaler, Inhale 2 puffs As Needed., Disp: , Rfl:   •  Calcium-Magnesium-Vitamin D (CALCIUM 500 PO), Take 2 tablets by mouth Every Night., Disp: , Rfl:   •  cetirizine (ZyrTEC) 1 MG/ML syrup, Take 10 mg by mouth As Needed., Disp: , Rfl:   •  cholecalciferol (VITAMIN D3) 1000 UNITS tablet, Take 1,000 Units by mouth Daily., Disp: , Rfl:   •  Clocortolone Pivalate 0.1 % cream, Apply  topically As Needed., Disp: , Rfl:   •  DUAVEE 0.45-20 MG tablet, Take 1 tablet by mouth Daily., Disp: 90 tablet, Rfl: 3  •  glucose blood test strip, 3 (three) times a day., Disp: , Rfl:   •  Lancets (FREESTYLE) lancets, , Disp: , Rfl:   •  metFORMIN (GLUCOPHAGE) 1000 MG tablet, Take 1 tablet by mouth 2 (Two) Times a Day With Meals., Disp: 180 tablet, Rfl: 3  •  Misc Natural Products (GLUCOSAMINE CHONDROITIN ADV PO), Take 1 tablet by mouth Daily., Disp: , Rfl:   •  MULTIPLE VITAMIN PO, Take 1 tablet by mouth Daily., Disp: , Rfl:   •  olmesartan (BENICAR) 40 MG tablet, Take 1 tablet by mouth Every Morning., Disp: 90 tablet, Rfl: 3  •  Omega-3 Fatty Acids (FISH OIL) 1200 MG capsule capsule, Take  by mouth., Disp: , Rfl:   •  pantoprazole (PROTONIX) 40 MG EC tablet, Take one po daily, Disp: 90 tablet, Rfl: 2  •  urea (CARMOL) 40 % cream, Apply topically., Disp: , Rfl:   •  venlafaxine XR (EFFEXOR-XR) 75 MG 24 hr capsule, TAKE 1 CAPSULE DAILY, Disp: 90 capsule, Rfl: 3  •  VOLTAREN 1 % gel gel, As Needed. APPLY  TO AFFECTED AREA TWICE A DAY, Disp: , Rfl: 3  •  mometasone (NASONEX) 50 MCG/ACT nasal spray, 2 sprays into each nostril As Needed., Disp: , Rfl:   •  montelukast (SINGULAIR) 10 MG tablet, Take 1 tablet by mouth Every Night., Disp: 30 tablet, Rfl: 3  •  naltrexone-bupropion ER (CONTRAVE) 8-90 MG tablet, Wk 1: 1 tab daily, Wk 2: 1 tab twice a day, Wk 3: 2 tabs in AM, 1 tab in PM, Wk 4: 2 tabs twice a day, Maintenance dose: 2 tabs twice daily., Disp: 120 tablet, Rfl: 3    The following portions of the patient's history were reviewed and updated as appropriate: allergies, current medications, past family history, past medical history, past social history, past surgical history and problem list.    Review of Systems   Constitutional: Positive for fatigue (attributes symptoms to allergies). Negative for activity change, appetite change, unexpected weight change and weight loss.   HENT: Positive for postnasal drip (attributes to allergies) and rhinorrhea. Negative for sore throat.    Eyes: Positive for itching (allergies). Negative for blurred vision and visual disturbance.   Respiratory: Negative for cough, shortness of breath and wheezing.    Cardiovascular: Negative for chest pain and leg swelling.   Gastrointestinal: Positive for vomiting. Negative for constipation, diarrhea and nausea.   Endocrine: Positive for polyuria (usually gets up to bathroom 3-4x per night).   Genitourinary: Positive for frequency. Negative for difficulty urinating.   Musculoskeletal: Positive for arthralgias (reports occassional joint pain in hands).   Skin: Negative.  Negative for color change and pallor.   Allergic/Immunologic: Positive for environmental allergies. Negative for food allergies.   Neurological: Negative for dizziness and headaches.   Psychiatric/Behavioral: The patient is not nervous/anxious.        Assessment     Physical Exam   Constitutional: She is oriented to person, place, and time. Vital signs are normal. She appears  well-developed and well-nourished. She is cooperative.  Non-toxic appearance. She does not have a sickly appearance. She does not appear ill. No distress.   HENT:   Head: Normocephalic.   Mouth/Throat: No oropharyngeal exudate.   Eyes: Conjunctivae are normal. Right eye exhibits no discharge. Left eye exhibits no discharge.   Cardiovascular: Normal rate, regular rhythm and normal heart sounds.    No murmur heard.  Pulmonary/Chest: Effort normal and breath sounds normal. No respiratory distress. She has no wheezes.   Neurological: She is alert and oriented to person, place, and time.   Skin: Skin is warm.   Psychiatric: She has a normal mood and affect. Her behavior is normal.   Vitals reviewed.     Diagnosis Plan   1. Arthralgia of both hands  Cyclic Citrul Peptide Antibody, IgG / IgA    ALFA With / DsDNA, RNP, Sjogrens A / B, Carney   2. Family history of rheumatoid arthritis  Cyclic Citrul Peptide Antibody, IgG / IgA    ALFA With / DsDNA, RNP, Sjogrens A / B, Smith   3. Uncontrolled type 2 diabetes mellitus without complication, without long-term current use of insulin     4. Essential hypertension     5. Hyperlipidemia, unspecified hyperlipidemia type     6. Gastroesophageal reflux disease without esophagitis           Plan     Her fasting labs were reviewed with the patient from last week. Discussed gradual progress but need to stick to diet/medication plan and to include exercise in her routine. Advised that diet and exercise can prevent further progression and complications of diabetes and hyperlipidemia.  Discussed other diabetic and weight loss prescription options but she declines at this time.    To continue all current medications and return to clinic in 3 months for repeat fasting labs and urinalysis.

## 2018-03-30 ENCOUNTER — OFFICE VISIT (OUTPATIENT)
Dept: INTERNAL MEDICINE | Facility: CLINIC | Age: 68
End: 2018-03-30

## 2018-03-30 VITALS
DIASTOLIC BLOOD PRESSURE: 82 MMHG | SYSTOLIC BLOOD PRESSURE: 160 MMHG | BODY MASS INDEX: 34.73 KG/M2 | HEART RATE: 112 BPM | TEMPERATURE: 98.3 F | HEIGHT: 63 IN | RESPIRATION RATE: 16 BRPM | WEIGHT: 196 LBS | OXYGEN SATURATION: 96 %

## 2018-03-30 DIAGNOSIS — Z82.0 FAMILY HISTORY OF ALZHEIMER'S DISEASE: ICD-10-CM

## 2018-03-30 DIAGNOSIS — G89.29 CHRONIC LEFT SHOULDER PAIN: ICD-10-CM

## 2018-03-30 DIAGNOSIS — M25.512 CHRONIC LEFT SHOULDER PAIN: ICD-10-CM

## 2018-03-30 DIAGNOSIS — J45.20 MILD INTERMITTENT ASTHMA WITHOUT COMPLICATION: ICD-10-CM

## 2018-03-30 DIAGNOSIS — N39.0 URINARY TRACT INFECTION WITHOUT HEMATURIA, SITE UNSPECIFIED: Primary | ICD-10-CM

## 2018-03-30 LAB
BILIRUB BLD-MCNC: NEGATIVE MG/DL
CLARITY, POC: ABNORMAL
COLOR UR: YELLOW
GLUCOSE UR STRIP-MCNC: ABNORMAL MG/DL
KETONES UR QL: ABNORMAL
LEUKOCYTE EST, POC: ABNORMAL
NITRITE UR-MCNC: POSITIVE MG/ML
PH UR: 5.5 [PH] (ref 5–8)
PROT UR STRIP-MCNC: ABNORMAL MG/DL
RBC # UR STRIP: ABNORMAL /UL
SP GR UR: 1.02 (ref 1–1.03)
UROBILINOGEN UR QL: NORMAL

## 2018-03-30 PROCEDURE — 99214 OFFICE O/P EST MOD 30 MIN: CPT | Performed by: NURSE PRACTITIONER

## 2018-03-30 PROCEDURE — 81003 URINALYSIS AUTO W/O SCOPE: CPT | Performed by: NURSE PRACTITIONER

## 2018-03-30 RX ORDER — OXYCODONE HYDROCHLORIDE AND ACETAMINOPHEN 5; 325 MG/1; MG/1
TABLET ORAL
Refills: 0 | COMMUNITY
Start: 2018-03-12 | End: 2018-03-30

## 2018-03-30 RX ORDER — CIPROFLOXACIN 250 MG/1
250 TABLET, FILM COATED ORAL 2 TIMES DAILY
Qty: 14 TABLET | Refills: 0 | Status: SHIPPED | OUTPATIENT
Start: 2018-03-30 | End: 2018-05-29

## 2018-03-30 RX ORDER — TRAMADOL HYDROCHLORIDE 50 MG/1
50 TABLET ORAL EVERY 12 HOURS
Qty: 60 TABLET | Refills: 0 | Status: SHIPPED | OUTPATIENT
Start: 2018-03-30 | End: 2018-07-26

## 2018-03-30 RX ORDER — L-MEFOL/A-CYST/MEB12/ALGAL OIL 6-600-2 MG
1 TABLET ORAL DAILY
Qty: 30 TABLET | Refills: 0
Start: 2018-03-30 | End: 2018-05-29

## 2018-03-30 NOTE — PROGRESS NOTES
"Chief Complaint   Patient presents with   • Urinary Tract Infection     Pt just completed a round of amoxicillin    • Shoulder Pain       Subjective   Amber Campos is a 67 y.o. female is being seen for an acute appointment for UTI. However, she has several other concerns.     She just recently returned from [place]. She was taking Amoxil. She started with symptoms while out of town last week. Symptoms include burning, frequency. Denies fever, denies back pain    Secondly, she reports \" I am terrified by the possibility of Alzheimer's'.\" She was reading about short term memory loss and had some uncles with the disease.    She has seen the allergist, and she needs a spacer for her rescue inhaler. Denies wheezing, SOA.     History of Present Illness     Current Outpatient Prescriptions on File Prior to Visit   Medication Sig Dispense Refill   • budesonide-formoterol (SYMBICORT) 160-4.5 MCG/ACT inhaler Inhale 2 puffs As Needed.     • Calcium-Magnesium-Vitamin D (CALCIUM 500 PO) Take 2 tablets by mouth Every Night.     • cetirizine (ZyrTEC) 1 MG/ML syrup Take 10 mg by mouth As Needed.     • cholecalciferol (VITAMIN D3) 1000 UNITS tablet Take 1,000 Units by mouth Daily.     • Clocortolone Pivalate 0.1 % cream Apply  topically As Needed.     • DUAVEE 0.45-20 MG tablet Take 1 tablet by mouth Daily. 90 tablet 3   • glucose blood test strip 3 (three) times a day.     • Lancets (FREESTYLE) lancets      • metFORMIN (GLUCOPHAGE) 1000 MG tablet Take 1 tablet by mouth 2 (Two) Times a Day With Meals. 180 tablet 3   • Misc Natural Products (GLUCOSAMINE CHONDROITIN ADV PO) Take 1 tablet by mouth Daily.     • mometasone (NASONEX) 50 MCG/ACT nasal spray 2 sprays into each nostril As Needed.     • MULTIPLE VITAMIN PO Take 1 tablet by mouth Daily.     • olmesartan (BENICAR) 40 MG tablet Take 1 tablet by mouth Every Morning. 90 tablet 3   • Omega-3 Fatty Acids (FISH OIL) 1200 MG capsule capsule Take  by mouth.     • pantoprazole " (PROTONIX) 40 MG EC tablet Take one po daily 90 tablet 2   • venlafaxine XR (EFFEXOR-XR) 75 MG 24 hr capsule TAKE 1 CAPSULE DAILY 90 capsule 3   • VOLTAREN 1 % gel gel As Needed. APPLY TO AFFECTED AREA TWICE A DAY  3   • montelukast (SINGULAIR) 10 MG tablet Take 1 tablet by mouth Every Night. 30 tablet 3   • naltrexone-bupropion ER (CONTRAVE) 8-90 MG tablet Wk 1: 1 tab daily, Wk 2: 1 tab twice a day, Wk 3: 2 tabs in AM, 1 tab in PM, Wk 4: 2 tabs twice a day, Maintenance dose: 2 tabs twice daily. 120 tablet 3   • urea (CARMOL) 40 % cream Apply topically.       No current facility-administered medications on file prior to visit.        The following portions of the patient's history were reviewed and updated as appropriate: allergies, current medications, past family history, past medical history, past social history, past surgical history and problem list.    Review of Systems   Constitutional: Negative.    HENT: Negative.    Eyes: Negative.    Respiratory: Negative.  Negative for shortness of breath and wheezing.    Cardiovascular: Negative.  Negative for chest pain, palpitations and leg swelling.   Gastrointestinal: Negative.    Endocrine: Negative.    Genitourinary: Positive for frequency. Negative for flank pain.   Musculoskeletal: Negative.    Skin: Negative.    Allergic/Immunologic: Negative.  Negative for environmental allergies, food allergies and immunocompromised state.   Neurological: Negative.    Hematological: Negative.    Psychiatric/Behavioral: Negative.        Objective   Physical Exam   Constitutional: She is oriented to person, place, and time. She appears well-developed and well-nourished.   HENT:   Head: Normocephalic.   Right Ear: External ear normal.   Left Ear: External ear normal.   Nose: Nose normal.   Mouth/Throat: Oropharynx is clear and moist.   Cardiovascular: Normal rate, regular rhythm and normal heart sounds.    No murmur heard.  Pulmonary/Chest: Effort normal and breath sounds normal.  No respiratory distress. She has no wheezes.   Abdominal: Soft. Bowel sounds are normal.   Musculoskeletal: She exhibits no edema.   Neurological: She is alert and oriented to person, place, and time.   Psychiatric: She has a normal mood and affect. Her behavior is normal.   Vitals reviewed.      Assessment/Plan           Diagnosis Plan   1. Urinary tract infection without hematuria, site unspecified  ciprofloxacin (CIPRO) 250 MG tablet    POCT urinalysis dipstick, automated   2. Chronic left shoulder pain  traMADol (ULTRAM) 50 MG tablet   3. Mild intermittent asthma without complication  Spacer/Aero Chamber Mouthpiece misc   4. Family history of Alzheimer's disease  Methylfol-Algae-R54-Mwikptgeyd (CEREFOLIN NAC) 6-90.314-2-600 MG tablet       Follow up in 3 months with labs as scheduled

## 2018-04-02 ENCOUNTER — APPOINTMENT (OUTPATIENT)
Dept: WOMENS IMAGING | Facility: HOSPITAL | Age: 68
End: 2018-04-02

## 2018-04-02 PROCEDURE — 77063 BREAST TOMOSYNTHESIS BI: CPT | Performed by: RADIOLOGY

## 2018-04-02 PROCEDURE — 77067 SCR MAMMO BI INCL CAD: CPT | Performed by: RADIOLOGY

## 2018-04-09 ENCOUNTER — HOSPITAL ENCOUNTER (OUTPATIENT)
Dept: GENERAL RADIOLOGY | Facility: HOSPITAL | Age: 68
Discharge: HOME | End: 2018-04-09
Attending: PODIATRIST | Admitting: PODIATRIST

## 2018-04-09 ENCOUNTER — HOSPITAL ENCOUNTER (OUTPATIENT)
Dept: GENERAL RADIOLOGY | Facility: HOSPITAL | Age: 68
Discharge: HOME | End: 2018-04-09
Attending: PODIATRIST

## 2018-04-09 ENCOUNTER — TRANSCRIBE ORDERS (OUTPATIENT)
Dept: ADMINISTRATIVE | Facility: HOSPITAL | Age: 68
End: 2018-04-09

## 2018-04-09 DIAGNOSIS — M25.571 ACUTE RIGHT ANKLE PAIN: ICD-10-CM

## 2018-04-09 DIAGNOSIS — M25.571 ACUTE RIGHT ANKLE PAIN: Primary | ICD-10-CM

## 2018-04-09 PROCEDURE — 73630 X-RAY EXAM OF FOOT: CPT

## 2018-04-09 PROCEDURE — 73650 X-RAY EXAM OF HEEL: CPT

## 2018-05-14 RX ORDER — OLMESARTAN MEDOXOMIL 40 MG/1
TABLET ORAL
Qty: 90 TABLET | Refills: 3 | Status: SHIPPED | OUTPATIENT
Start: 2018-05-14 | End: 2019-04-29 | Stop reason: SDUPTHER

## 2018-05-21 ENCOUNTER — TELEPHONE (OUTPATIENT)
Dept: INTERNAL MEDICINE | Facility: CLINIC | Age: 68
End: 2018-05-21

## 2018-05-21 DIAGNOSIS — M25.542 ARTHRALGIA OF LEFT HAND: ICD-10-CM

## 2018-05-21 DIAGNOSIS — Z82.61 FAMILY HISTORY OF ARTHRITIS: ICD-10-CM

## 2018-05-21 DIAGNOSIS — M25.541 ARTHRALGIA OF RIGHT HAND: Primary | ICD-10-CM

## 2018-05-21 DIAGNOSIS — Z20.5 CONTACT WITH AND (SUSPECTED) EXPOSURE TO VIRAL HEPATITIS (CODE): ICD-10-CM

## 2018-05-21 NOTE — TELEPHONE ENCOUNTER
Labs re-entered to include Hep A Igm.  Patient advised.    ----- Message from JAKE Blount sent at 5/21/2018  9:10 AM EDT -----  Regarding: FW: LAB ORDERS  Contact: [phone]  Hep A Igm add to labs    ----- Message -----  From: Tiffany Srivastava MA  Sent: 5/21/2018   8:49 AM  To: JAKE Blount  Subject: FW: LAB ORDERS                                   Just add the hep A or other labs?  ----- Message -----  From: Mitch Rivera  Sent: 5/21/2018   8:33 AM  To: Marcos García [address]  Subject: LAB ORDERS                                       KIRK CAMPOS    Patient is scheduled for labs on Wednesday. She called to say that she has not been feeling well, (she thought she had food poisoning), but she would like to have Hep A added to her labs.      Thanks!  mitch

## 2018-05-21 NOTE — TELEPHONE ENCOUNTER
----- Message from JAKE Blount sent at 5/21/2018  9:10 AM EDT -----  Regarding: FW: LAB ORDERS  Contact: [phone]  Hep A Igm add to labs    ----- Message -----  From: Tiffany Srivastava MA  Sent: 5/21/2018   8:49 AM  To: JAKE Blount  Subject: FW: LAB ORDERS                                   Just add the hep A or other labs?  ----- Message -----  From: Mitch Rivera  Sent: 5/21/2018   8:33 AM  [facility]  Subject: LAB ORDERS                                       KIRK PT    Patient is scheduled for labs on Wednesday. She called to say that she has not been feeling well, (she thought she had food poisoning), but she would like to have Hep A added to her labs.      Thanks!  mitch

## 2018-05-22 DIAGNOSIS — Z82.61 FAMILY HISTORY OF ARTHRITIS: ICD-10-CM

## 2018-05-22 DIAGNOSIS — M25.542 ARTHRALGIA OF LEFT HAND: ICD-10-CM

## 2018-05-22 DIAGNOSIS — Z20.5 EXPOSURE TO HEPATITIS A: ICD-10-CM

## 2018-05-22 DIAGNOSIS — E78.5 HYPERLIPIDEMIA, UNSPECIFIED HYPERLIPIDEMIA TYPE: ICD-10-CM

## 2018-05-22 DIAGNOSIS — IMO0001 UNCONTROLLED TYPE 2 DIABETES MELLITUS WITHOUT COMPLICATION, WITHOUT LONG-TERM CURRENT USE OF INSULIN: ICD-10-CM

## 2018-05-22 DIAGNOSIS — I10 ESSENTIAL HYPERTENSION: Primary | ICD-10-CM

## 2018-05-22 DIAGNOSIS — M25.541 ARTHRALGIA OF RIGHT HAND: ICD-10-CM

## 2018-05-23 ENCOUNTER — RESULTS ENCOUNTER (OUTPATIENT)
Dept: INTERNAL MEDICINE | Facility: CLINIC | Age: 68
End: 2018-05-23

## 2018-05-23 ENCOUNTER — LAB (OUTPATIENT)
Dept: INTERNAL MEDICINE | Facility: CLINIC | Age: 68
End: 2018-05-23

## 2018-05-23 DIAGNOSIS — Z20.5 CONTACT WITH AND (SUSPECTED) EXPOSURE TO VIRAL HEPATITIS (CODE): ICD-10-CM

## 2018-05-23 DIAGNOSIS — M25.541 ARTHRALGIA OF RIGHT HAND: ICD-10-CM

## 2018-05-23 DIAGNOSIS — Z82.61 FAMILY HISTORY OF ARTHRITIS: ICD-10-CM

## 2018-05-23 DIAGNOSIS — IMO0001 UNCONTROLLED TYPE 2 DIABETES MELLITUS WITHOUT COMPLICATION, WITHOUT LONG-TERM CURRENT USE OF INSULIN: ICD-10-CM

## 2018-05-23 DIAGNOSIS — I10 ESSENTIAL HYPERTENSION: ICD-10-CM

## 2018-05-23 DIAGNOSIS — M25.542 ARTHRALGIA OF LEFT HAND: ICD-10-CM

## 2018-05-23 DIAGNOSIS — E78.5 HYPERLIPIDEMIA, UNSPECIFIED HYPERLIPIDEMIA TYPE: ICD-10-CM

## 2018-05-23 DIAGNOSIS — Z20.5 EXPOSURE TO HEPATITIS A: ICD-10-CM

## 2018-05-25 LAB
ALBUMIN SERPL-MCNC: 3.9 G/DL (ref 3.5–5.2)
ALBUMIN/GLOB SERPL: 1.4 G/DL
ALP SERPL-CCNC: 55 U/L (ref 40–129)
ALT SERPL-CCNC: 72 U/L (ref 5–33)
ANA SER QL: NEGATIVE
AST SERPL-CCNC: 62 U/L (ref 5–32)
BASOPHILS # BLD MANUAL: 0.11 10*3/MM3 (ref 0–0.2)
BASOPHILS NFR BLD MANUAL: 2 % (ref 0–2)
BILIRUB SERPL-MCNC: 0.4 MG/DL (ref 0.2–1.2)
BUN SERPL-MCNC: 11 MG/DL (ref 8–23)
BUN/CREAT SERPL: 23.9 (ref 7–25)
CALCIUM SERPL-MCNC: 8.5 MG/DL (ref 8.8–10.5)
CCP IGA+IGG SERPL IA-ACNC: 4 UNITS (ref 0–19)
CHLORIDE SERPL-SCNC: 96 MMOL/L (ref 98–107)
CHOLEST SERPL-MCNC: 127 MG/DL (ref 0–200)
CHOLEST/HDLC SERPL: 2.59 {RATIO}
CO2 SERPL-SCNC: 28.4 MMOL/L (ref 22–29)
CREAT SERPL-MCNC: 0.46 MG/DL (ref 0.57–1)
DIFFERENTIAL COMMENT: ABNORMAL
EOSINOPHIL # BLD MANUAL: 0.34 10*3/MM3 (ref 0.1–0.3)
EOSINOPHIL NFR BLD MANUAL: 6 % (ref 0–4)
ERYTHROCYTE [DISTWIDTH] IN BLOOD BY AUTOMATED COUNT: 13.2 % (ref 11.5–14.5)
GFR SERPLBLD CREATININE-BSD FMLA CKD-EPI: 135 ML/MIN/1.73
GFR SERPLBLD CREATININE-BSD FMLA CKD-EPI: >150 ML/MIN/1.73
GLOBULIN SER CALC-MCNC: 2.8 GM/DL
GLUCOSE SERPL-MCNC: 269 MG/DL (ref 65–99)
HAV IGM SERPL QL IA: NEGATIVE
HBA1C MFR BLD: 9.4 % (ref 4.8–5.6)
HCT VFR BLD AUTO: 38.9 % (ref 37–47)
HDLC SERPL-MCNC: 49 MG/DL (ref 40–60)
HGB BLD-MCNC: 12.8 G/DL (ref 12–16)
LDLC SERPL CALC-MCNC: 49 MG/DL (ref 0–100)
LYMPHOCYTES # BLD MANUAL: 2.82 10*3/MM3 (ref 0.6–4.8)
LYMPHOCYTES NFR BLD MANUAL: 50 % (ref 20–45)
MCH RBC QN AUTO: 27.8 PG (ref 27–31)
MCHC RBC AUTO-ENTMCNC: 32.9 G/DL (ref 31–37)
MCV RBC AUTO: 84.6 FL (ref 81–99)
MONOCYTES # BLD MANUAL: 0.34 10*3/MM3 (ref 0–1)
MONOCYTES NFR BLD MANUAL: 6 % (ref 3–8)
NEUTROPHILS # BLD MANUAL: 1.63 10*3/MM3 (ref 1.5–8.3)
NEUTROPHILS NFR BLD MANUAL: 29 % (ref 45–70)
PLATELET # BLD AUTO: 315 10*3/MM3 (ref 140–500)
PLATELET BLD QL SMEAR: ABNORMAL
POTASSIUM SERPL-SCNC: 4.2 MMOL/L (ref 3.5–5.2)
PROT SERPL-MCNC: 6.7 G/DL (ref 6–8.5)
RBC # BLD AUTO: 4.6 10*6/MM3 (ref 4.2–5.4)
RBC MORPH BLD: ABNORMAL
SODIUM SERPL-SCNC: 137 MMOL/L (ref 136–145)
TRIGL SERPL-MCNC: 146 MG/DL (ref 0–150)
VLDLC SERPL CALC-MCNC: 29.2 MG/DL (ref 7–27)
WBC # BLD AUTO: 5.63 10*3/MM3 (ref 4.8–10.8)

## 2018-05-29 ENCOUNTER — OFFICE VISIT (OUTPATIENT)
Dept: INTERNAL MEDICINE | Facility: CLINIC | Age: 68
End: 2018-05-29

## 2018-05-29 VITALS
HEIGHT: 63 IN | BODY MASS INDEX: 34.02 KG/M2 | HEART RATE: 97 BPM | SYSTOLIC BLOOD PRESSURE: 182 MMHG | RESPIRATION RATE: 16 BRPM | TEMPERATURE: 98.5 F | WEIGHT: 192 LBS | DIASTOLIC BLOOD PRESSURE: 86 MMHG | OXYGEN SATURATION: 98 %

## 2018-05-29 DIAGNOSIS — IMO0001 UNCONTROLLED TYPE 2 DIABETES MELLITUS WITHOUT COMPLICATION, WITHOUT LONG-TERM CURRENT USE OF INSULIN: Primary | ICD-10-CM

## 2018-05-29 DIAGNOSIS — E78.2 MIXED HYPERLIPIDEMIA: ICD-10-CM

## 2018-05-29 DIAGNOSIS — I10 ESSENTIAL HYPERTENSION: ICD-10-CM

## 2018-05-29 DIAGNOSIS — F32.0 MILD SINGLE CURRENT EPISODE OF MAJOR DEPRESSIVE DISORDER (HCC): ICD-10-CM

## 2018-05-29 DIAGNOSIS — Z23 IMMUNIZATION DUE: ICD-10-CM

## 2018-05-29 LAB
BILIRUB BLD-MCNC: NEGATIVE MG/DL
CLARITY, POC: CLEAR
COLOR UR: YELLOW
GLUCOSE UR STRIP-MCNC: ABNORMAL MG/DL
KETONES UR QL: ABNORMAL
LEUKOCYTE EST, POC: NEGATIVE
NITRITE UR-MCNC: NEGATIVE MG/ML
PH UR: 5 [PH] (ref 5–8)
PROT UR STRIP-MCNC: ABNORMAL MG/DL
RBC # UR STRIP: ABNORMAL /UL
SP GR UR: 1.02 (ref 1–1.03)
UROBILINOGEN UR QL: NORMAL

## 2018-05-29 PROCEDURE — 81003 URINALYSIS AUTO W/O SCOPE: CPT | Performed by: NURSE PRACTITIONER

## 2018-05-29 PROCEDURE — 90632 HEPA VACCINE ADULT IM: CPT | Performed by: NURSE PRACTITIONER

## 2018-05-29 PROCEDURE — 99214 OFFICE O/P EST MOD 30 MIN: CPT | Performed by: NURSE PRACTITIONER

## 2018-05-29 PROCEDURE — 90471 IMMUNIZATION ADMIN: CPT | Performed by: NURSE PRACTITIONER

## 2018-05-29 RX ORDER — INHALER, ASSIST DEVICES
SPACER (EA) MISCELLANEOUS SEE ADMIN INSTRUCTIONS
Refills: 0 | COMMUNITY
Start: 2018-03-30 | End: 2018-05-29 | Stop reason: SDUPTHER

## 2018-05-29 RX ORDER — CONJUGATED ESTROGENS 0.62 MG/G
CREAM VAGINAL
Refills: 12 | COMMUNITY
Start: 2018-04-04 | End: 2018-07-26

## 2018-05-29 NOTE — PROGRESS NOTES
Chief Complaint   Patient presents with   • Follow-up   • Hypertension   • Hyperlipidemia   • Diabetes   • Urinary Tract Infection       Subjective     Amber Campos is a 67 y.o. female being seen for a follow up appointment today regarding uncontrolled DM 2, HTN, Hyperllipidemia. She has recently had BPPV and is followed by Dr. Stevens. She also had a recent viral illness,which is why she asked to be tested for Hepattis A. Otherwise, she is feeling well. She has not had any allergy issues.     She has been on a low sodium diet, and she reports BP running 139/81. She is taking Metformin 1000 mg BID and fasting glucose 139-155. She admits to poor diet and poor exercise.       History of Present Illness     Allergies   Allergen Reactions   • Adhesive Tape      EKG stickers only   • Iodinated Diagnostic Agents Photosensitivity     contrast   • Neomycin-Bacitracin Zn-Polymyx Rash         Current Outpatient Prescriptions:   •  budesonide-formoterol (SYMBICORT) 160-4.5 MCG/ACT inhaler, Inhale 2 puffs As Needed., Disp: , Rfl:   •  Calcium Carbonate-Vit D-Min (CALCIUM 600 + MINERALS) 600-200 MG-UNIT tablet, Take 1 tablet by mouth 2 (Two) Times a Day., Disp: 60 each, Rfl: 0  •  cetirizine (ZyrTEC) 1 MG/ML syrup, Take 10 mg by mouth As Needed., Disp: , Rfl:   •  cholecalciferol (VITAMIN D3) 1000 UNITS tablet, Take 1,000 Units by mouth Daily., Disp: , Rfl:   •  Clocortolone Pivalate 0.1 % cream, Apply  topically As Needed., Disp: , Rfl:   •  DUAVEE 0.45-20 MG tablet, Take 1 tablet by mouth Daily., Disp: 90 tablet, Rfl: 3  •  glucose blood test strip, 3 (three) times a day., Disp: , Rfl:   •  Lancets (FREESTYLE) lancets, , Disp: , Rfl:   •  metFORMIN (GLUCOPHAGE) 1000 MG tablet, Take 1 tablet by mouth 2 (Two) Times a Day With Meals., Disp: 180 tablet, Rfl: 3  •  Misc Natural Products (GLUCOSAMINE CHONDROITIN ADV PO), Take 1 tablet by mouth Daily., Disp: , Rfl:   •  mometasone (NASONEX) 50 MCG/ACT nasal spray, 2 sprays into each  nostril As Needed., Disp: , Rfl:   •  montelukast (SINGULAIR) 10 MG tablet, Take 1 tablet by mouth Every Night., Disp: 30 tablet, Rfl: 3  •  MULTIPLE VITAMIN PO, Take 1 tablet by mouth Daily., Disp: , Rfl:   •  olmesartan (BENICAR) 40 MG tablet, TAKE 1 TABLET EVERY MORNING, Disp: 90 tablet, Rfl: 3  •  pantoprazole (PROTONIX) 40 MG EC tablet, Take one po daily, Disp: 90 tablet, Rfl: 2  •  PREMARIN 0.625 MG/GM vaginal cream, USE 0.5 GRAM DAILY (APPLY SMALL PEAS SIZE AMOUNT DAILY) AS DIRECTED, Disp: , Rfl: 12  •  Spacer/Aero Chamber Mouthpiece misc, 1 each Daily., Disp: 1 each, Rfl: 0  •  urea (CARMOL) 40 % cream, Apply topically., Disp: , Rfl:   •  venlafaxine XR (EFFEXOR-XR) 75 MG 24 hr capsule, TAKE 1 CAPSULE DAILY, Disp: 90 capsule, Rfl: 3  •  VOLTAREN 1 % gel gel, As Needed. APPLY TO AFFECTED AREA TWICE A DAY, Disp: , Rfl: 3  •  traMADol (ULTRAM) 50 MG tablet, Take 1 tablet by mouth Every 12 (Twelve) Hours., Disp: 60 tablet, Rfl: 0    The following portions of the patient's history were reviewed and updated as appropriate: allergies, current medications, past family history, past medical history, past social history, past surgical history and problem list.    Review of Systems   Constitutional: Negative.    HENT: Negative.    Eyes: Negative.    Respiratory: Negative.    Cardiovascular: Negative.  Negative for chest pain, palpitations and leg swelling.   Gastrointestinal: Negative.    Endocrine: Negative.    Genitourinary: Positive for frequency.   Musculoskeletal: Positive for arthralgias.   Skin: Negative.    Allergic/Immunologic: Positive for environmental allergies.   Neurological: Negative.    Hematological: Negative.    Psychiatric/Behavioral: Negative.        Assessment     Physical Exam   Constitutional: She is oriented to person, place, and time. She appears well-developed and well-nourished.   HENT:   Head: Normocephalic.   Right Ear: External ear normal.   Left Ear: External ear normal.   Nose: Nose  normal.   Mouth/Throat: Oropharynx is clear and moist. No oropharyngeal exudate.   Neck: Neck supple. No thyromegaly present.   Cardiovascular: Normal rate, regular rhythm and normal heart sounds.    No murmur heard.  Pulmonary/Chest: Effort normal and breath sounds normal. No respiratory distress. She has no wheezes.   Musculoskeletal: She exhibits no edema.   Neurological: She is alert and oriented to person, place, and time.   Skin: Skin is warm and dry.   Psychiatric: She has a normal mood and affect. Her behavior is normal.   Vitals reviewed.      Plan     Her fasting labs were reviewed with the patient from last week.     Amber was seen today for follow-up, hypertension, hyperlipidemia, diabetes and urinary tract infection.    Diagnoses and all orders for this visit:    Uncontrolled type 2 diabetes mellitus without complication, without long-term current use of insulin  -     Comprehensive metabolic panel; Future  -     Lipid panel; Future  -     Hemoglobin A1c; Future    Essential hypertension  -     Comprehensive metabolic panel; Future  -     Lipid panel; Future    Mixed hyperlipidemia  -     Comprehensive metabolic panel; Future  -     Lipid panel; Future    Mild single current episode of major depressive disorder  -     Ambulatory Referral to Psychology    Immunization due  -     Hepatitis A Vaccine Adult IM      We discussed Victoza,. She will try a low calorie/low carb diet. 1500 calorie per day and 50 grams of carbs per meal or less.

## 2018-07-26 ENCOUNTER — OFFICE VISIT (OUTPATIENT)
Dept: INTERNAL MEDICINE | Facility: CLINIC | Age: 68
End: 2018-07-26

## 2018-07-26 VITALS
TEMPERATURE: 98.1 F | HEIGHT: 63 IN | BODY MASS INDEX: 33.13 KG/M2 | DIASTOLIC BLOOD PRESSURE: 84 MMHG | HEART RATE: 108 BPM | RESPIRATION RATE: 16 BRPM | OXYGEN SATURATION: 98 % | WEIGHT: 187 LBS | SYSTOLIC BLOOD PRESSURE: 138 MMHG

## 2018-07-26 DIAGNOSIS — N39.0 URINARY TRACT INFECTION WITHOUT HEMATURIA, SITE UNSPECIFIED: Primary | ICD-10-CM

## 2018-07-26 LAB
BILIRUB BLD-MCNC: NEGATIVE MG/DL
CLARITY, POC: ABNORMAL
COLOR UR: YELLOW
GLUCOSE UR STRIP-MCNC: ABNORMAL MG/DL
KETONES UR QL: ABNORMAL
LEUKOCYTE EST, POC: ABNORMAL
NITRITE UR-MCNC: NEGATIVE MG/ML
PH UR: 5 [PH] (ref 5–8)
PROT UR STRIP-MCNC: NEGATIVE MG/DL
RBC # UR STRIP: ABNORMAL /UL
SP GR UR: 1.01 (ref 1–1.03)
UROBILINOGEN UR QL: NORMAL

## 2018-07-26 PROCEDURE — 99213 OFFICE O/P EST LOW 20 MIN: CPT | Performed by: NURSE PRACTITIONER

## 2018-07-26 PROCEDURE — 81003 URINALYSIS AUTO W/O SCOPE: CPT | Performed by: NURSE PRACTITIONER

## 2018-07-26 RX ORDER — SULFAMETHOXAZOLE AND TRIMETHOPRIM 800; 160 MG/1; MG/1
1 TABLET ORAL 2 TIMES DAILY
Qty: 14 TABLET | Refills: 0 | Status: SHIPPED | OUTPATIENT
Start: 2018-07-26 | End: 2018-09-05

## 2018-07-26 NOTE — PROGRESS NOTES
"Chief Complaint   Patient presents with   • Urinary Tract Infection       Subjective   Amber Campos is a 67 y.o. female is being seen for an acute appointment for burning sensation when she urinates associated with frequency.  She states it feels like a \"hot poker up her urethra\".  Pt states her issues started approximately  3 weeks ago but has been getting worse the past week.  She has been really busy and on vacation which is why she has not come for treatment.  Pt has been running a very low grade fever per her account.      History of Present Illness     Current Outpatient Prescriptions on File Prior to Visit   Medication Sig Dispense Refill   • budesonide-formoterol (SYMBICORT) 160-4.5 MCG/ACT inhaler Inhale 2 puffs As Needed.     • Calcium Carbonate-Vit D-Min (CALCIUM 600 + MINERALS) 600-200 MG-UNIT tablet Take 1 tablet by mouth 2 (Two) Times a Day. 60 each 0   • cholecalciferol (VITAMIN D3) 1000 UNITS tablet Take 1,000 Units by mouth Daily.     • Clocortolone Pivalate 0.1 % cream Apply  topically As Needed.     • DUAVEE 0.45-20 MG tablet Take 1 tablet by mouth Daily. 90 tablet 3   • glucose blood test strip 3 (three) times a day.     • Lancets (FREESTYLE) lancets      • metFORMIN (GLUCOPHAGE) 1000 MG tablet Take 1 tablet by mouth 2 (Two) Times a Day With Meals. 180 tablet 3   • Misc Natural Products (GLUCOSAMINE CHONDROITIN ADV PO) Take 1 tablet by mouth Daily.     • mometasone (NASONEX) 50 MCG/ACT nasal spray 2 sprays into each nostril As Needed.     • MULTIPLE VITAMIN PO Take 1 tablet by mouth Daily.     • olmesartan (BENICAR) 40 MG tablet TAKE 1 TABLET EVERY MORNING 90 tablet 3   • pantoprazole (PROTONIX) 40 MG EC tablet Take one po daily 90 tablet 2   • Spacer/Aero Chamber Mouthpiece misc 1 each Daily. 1 each 0   • urea (CARMOL) 40 % cream Apply topically.     • venlafaxine XR (EFFEXOR-XR) 75 MG 24 hr capsule TAKE 1 CAPSULE DAILY 90 capsule 3   • VOLTAREN 1 % gel gel As Needed. APPLY TO AFFECTED AREA " TWICE A DAY  3   • [DISCONTINUED] cetirizine (ZyrTEC) 1 MG/ML syrup Take 10 mg by mouth As Needed.     • [DISCONTINUED] montelukast (SINGULAIR) 10 MG tablet Take 1 tablet by mouth Every Night. 30 tablet 3   • [DISCONTINUED] PREMARIN 0.625 MG/GM vaginal cream USE 0.5 GRAM DAILY (APPLY SMALL PEAS SIZE AMOUNT DAILY) AS DIRECTED  12   • [DISCONTINUED] traMADol (ULTRAM) 50 MG tablet Take 1 tablet by mouth Every 12 (Twelve) Hours. 60 tablet 0     No current facility-administered medications on file prior to visit.          Review of Systems   Constitutional: Negative for activity change, chills and fatigue.   Respiratory: Negative for chest tightness and shortness of breath.    Cardiovascular: Negative for chest pain and palpitations.   Gastrointestinal: Negative for abdominal pain and nausea.   Endocrine: Positive for polyuria.   Genitourinary: Positive for dysuria, frequency and urgency. Negative for flank pain and pelvic pain.       Objective   Physical Exam   Constitutional: She is oriented to person, place, and time. She appears well-developed and well-nourished.   HENT:   Head: Normocephalic.   Neck: Normal range of motion. Neck supple.   Cardiovascular: Normal rate, regular rhythm, normal heart sounds and intact distal pulses.    Pulmonary/Chest: Effort normal and breath sounds normal.   Abdominal: Soft. She exhibits no distension. There is no tenderness. There is no guarding and no CVA tenderness.   Neurological: She is alert and oriented to person, place, and time.   Skin: Skin is warm and dry.   Psychiatric: She has a normal mood and affect. Her behavior is normal.       Assessment/Plan    Your urine was sent for culture.    An antibiotic was sent to your pharmacy.  The office will call you with the results of your culture and if there is any change in your plan of care.  Please take all of the medication as prescribed.    If symptoms worsen or persist, please call the office back.    Amber was seen today for  urinary tract infection.    Diagnoses and all orders for this visit:    Urinary tract infection without hematuria, site unspecified  -     POCT urinalysis dipstick, automated  -     Urine Culture - Urine, Urine, Clean Catch       Diagnosis Plan   1. Urinary tract infection without hematuria, site unspecified  POCT urinalysis dipstick, automated    Urine Culture - Urine, Urine, Clean Catch    sulfamethoxazole-trimethoprim (BACTRIM DS,SEPTRA DS) 800-160 MG per tablet     Follow up prn    History, Physical exam and assessment reviewed by JAKE Blount

## 2018-08-01 LAB
BACTERIA UR CULT: ABNORMAL
BACTERIA UR CULT: ABNORMAL
OTHER ANTIBIOTIC SUSC ISLT: ABNORMAL

## 2018-08-09 DIAGNOSIS — M81.0 OSTEOPOROSIS: ICD-10-CM

## 2018-08-09 RX ORDER — CONJUGATED ESTROGENS/BAZEDOXIFENE .45; 2 MG/1; MG/1
TABLET, FILM COATED ORAL
Qty: 90 TABLET | Refills: 3 | Status: SHIPPED | OUTPATIENT
Start: 2018-08-09 | End: 2019-06-03

## 2018-08-29 ENCOUNTER — RESULTS ENCOUNTER (OUTPATIENT)
Dept: INTERNAL MEDICINE | Facility: CLINIC | Age: 68
End: 2018-08-29

## 2018-08-29 DIAGNOSIS — IMO0001 UNCONTROLLED TYPE 2 DIABETES MELLITUS WITHOUT COMPLICATION, WITHOUT LONG-TERM CURRENT USE OF INSULIN: ICD-10-CM

## 2018-08-29 DIAGNOSIS — I10 ESSENTIAL HYPERTENSION: ICD-10-CM

## 2018-08-29 DIAGNOSIS — E78.2 MIXED HYPERLIPIDEMIA: ICD-10-CM

## 2018-08-30 LAB
ALBUMIN SERPL-MCNC: 4.1 G/DL (ref 3.5–5.2)
ALBUMIN/GLOB SERPL: 1.6 G/DL
ALP SERPL-CCNC: 64 U/L (ref 40–129)
ALT SERPL-CCNC: 65 U/L (ref 5–33)
AST SERPL-CCNC: 75 U/L (ref 5–32)
BILIRUB SERPL-MCNC: 0.4 MG/DL (ref 0.2–1.2)
BUN SERPL-MCNC: 10 MG/DL (ref 8–23)
BUN/CREAT SERPL: 17.9 (ref 7–25)
CALCIUM SERPL-MCNC: 9 MG/DL (ref 8.8–10.5)
CHLORIDE SERPL-SCNC: 97 MMOL/L (ref 98–107)
CHOLEST SERPL-MCNC: 176 MG/DL (ref 0–200)
CO2 SERPL-SCNC: 27.2 MMOL/L (ref 22–29)
CREAT SERPL-MCNC: 0.56 MG/DL (ref 0.57–1)
GLOBULIN SER CALC-MCNC: 2.5 GM/DL
GLUCOSE SERPL-MCNC: 307 MG/DL (ref 65–99)
HBA1C MFR BLD: 10.5 % (ref 4.8–5.6)
HDLC SERPL-MCNC: 60 MG/DL (ref 40–60)
LDLC SERPL CALC-MCNC: 77 MG/DL (ref 0–100)
POTASSIUM SERPL-SCNC: 4.1 MMOL/L (ref 3.5–5.2)
PROT SERPL-MCNC: 6.6 G/DL (ref 6–8.5)
SODIUM SERPL-SCNC: 137 MMOL/L (ref 136–145)
TRIGL SERPL-MCNC: 193 MG/DL (ref 0–150)
VLDLC SERPL CALC-MCNC: 38.6 MG/DL (ref 7–27)

## 2018-09-05 ENCOUNTER — OFFICE VISIT (OUTPATIENT)
Dept: INTERNAL MEDICINE | Facility: CLINIC | Age: 68
End: 2018-09-05

## 2018-09-05 VITALS
RESPIRATION RATE: 16 BRPM | OXYGEN SATURATION: 98 % | WEIGHT: 190 LBS | BODY MASS INDEX: 33.66 KG/M2 | HEIGHT: 63 IN | SYSTOLIC BLOOD PRESSURE: 138 MMHG | DIASTOLIC BLOOD PRESSURE: 68 MMHG | HEART RATE: 114 BPM | TEMPERATURE: 98 F

## 2018-09-05 DIAGNOSIS — IMO0001 UNCONTROLLED TYPE 2 DIABETES MELLITUS WITHOUT COMPLICATION, WITHOUT LONG-TERM CURRENT USE OF INSULIN: Primary | ICD-10-CM

## 2018-09-05 DIAGNOSIS — K76.0 STEATOSIS OF LIVER: ICD-10-CM

## 2018-09-05 DIAGNOSIS — Z23 IMMUNIZATION DUE: ICD-10-CM

## 2018-09-05 DIAGNOSIS — B37.9 YEAST INFECTION: ICD-10-CM

## 2018-09-05 DIAGNOSIS — I10 ESSENTIAL HYPERTENSION: ICD-10-CM

## 2018-09-05 DIAGNOSIS — E78.2 MIXED HYPERLIPIDEMIA: ICD-10-CM

## 2018-09-05 PROBLEM — N30.00 ACUTE CYSTITIS: Status: RESOLVED | Noted: 2017-08-30 | Resolved: 2018-09-05

## 2018-09-05 PROBLEM — R10.11 RUQ PAIN: Status: RESOLVED | Noted: 2017-01-04 | Resolved: 2018-09-05

## 2018-09-05 PROBLEM — M25.512 CHRONIC LEFT SHOULDER PAIN: Status: RESOLVED | Noted: 2018-03-30 | Resolved: 2018-09-05

## 2018-09-05 PROBLEM — R07.89 MUSCULOSKELETAL CHEST PAIN: Status: RESOLVED | Noted: 2018-01-23 | Resolved: 2018-09-05

## 2018-09-05 PROBLEM — G89.29 CHRONIC LEFT SHOULDER PAIN: Status: RESOLVED | Noted: 2018-03-30 | Resolved: 2018-09-05

## 2018-09-05 PROCEDURE — 90662 IIV NO PRSV INCREASED AG IM: CPT | Performed by: NURSE PRACTITIONER

## 2018-09-05 PROCEDURE — 99214 OFFICE O/P EST MOD 30 MIN: CPT | Performed by: NURSE PRACTITIONER

## 2018-09-05 PROCEDURE — G0008 ADMIN INFLUENZA VIRUS VAC: HCPCS | Performed by: NURSE PRACTITIONER

## 2018-09-05 RX ORDER — FLUCONAZOLE 150 MG/1
150 TABLET ORAL ONCE
Qty: 1 TABLET | Refills: 0 | Status: SHIPPED | OUTPATIENT
Start: 2018-09-05 | End: 2018-09-05

## 2018-09-05 NOTE — PROGRESS NOTES
Chief Complaint   Patient presents with   • Follow-up   • Diabetes       Subjective     Amber Campos is a 67 y.o. female being seen for a follow up appointment today regarding DM 2, HTN, Hyperlipidemia, and MITCHELL. She reports that she has not been taking her glucose regularly. She admits to poor diet and poor exercise. She denies blurred vision, dry mouth, excess uriantion . She is on Metformin 1000mg twice daily. She has not been checking her BP at home. She is on Benicar 40 mg daily. She is wondering what her ALT/AST are. She does not drink, but reports that her  has cirrhosis from alcohol.        History of Present Illness     Allergies   Allergen Reactions   • Adhesive Tape      EKG stickers only   • Iodinated Diagnostic Agents Photosensitivity     contrast   • Farxiga [Dapagliflozin] Itching     Yeast infeciotn   • Neomycin-Bacitracin Zn-Polymyx Rash         Current Outpatient Prescriptions:   •  budesonide-formoterol (SYMBICORT) 160-4.5 MCG/ACT inhaler, Inhale 2 puffs As Needed., Disp: , Rfl:   •  Calcium Carbonate-Vit D-Min (CALCIUM 600 + MINERALS) 600-200 MG-UNIT tablet, Take 1 tablet by mouth 2 (Two) Times a Day., Disp: 60 each, Rfl: 0  •  cholecalciferol (VITAMIN D3) 1000 UNITS tablet, Take 1,000 Units by mouth Daily., Disp: , Rfl:   •  Clocortolone Pivalate 0.1 % cream, Apply  topically As Needed., Disp: , Rfl:   •  DUAVEE 0.45-20 MG tablet, TAKE 1 TABLET DAILY, Disp: 90 tablet, Rfl: 3  •  glucose blood test strip, 3 (three) times a day., Disp: , Rfl:   •  Lancets (FREESTYLE) lancets, , Disp: , Rfl:   •  metFORMIN (GLUCOPHAGE) 1000 MG tablet, Take 1 tablet by mouth 2 (Two) Times a Day With Meals., Disp: 180 tablet, Rfl: 3  •  Misc Natural Products (GLUCOSAMINE CHONDROITIN ADV PO), Take 1 tablet by mouth Daily., Disp: , Rfl:   •  mometasone (NASONEX) 50 MCG/ACT nasal spray, 2 sprays into each nostril As Needed., Disp: , Rfl:   •  MULTIPLE VITAMIN PO, Take 1 tablet by mouth Daily., Disp: , Rfl:   •   olmesartan (BENICAR) 40 MG tablet, TAKE 1 TABLET EVERY MORNING, Disp: 90 tablet, Rfl: 3  •  pantoprazole (PROTONIX) 40 MG EC tablet, Take one po daily, Disp: 90 tablet, Rfl: 2  •  Spacer/Aero Chamber Mouthpiece misc, 1 each Daily., Disp: 1 each, Rfl: 0  •  venlafaxine XR (EFFEXOR-XR) 75 MG 24 hr capsule, TAKE 1 CAPSULE DAILY, Disp: 90 capsule, Rfl: 3  •  VOLTAREN 1 % gel gel, As Needed. APPLY TO AFFECTED AREA TWICE A DAY, Disp: , Rfl: 3    The following portions of the patient's history were reviewed and updated as appropriate: allergies, current medications, past family history, past medical history, past social history, past surgical history and problem list.    Review of Systems   Constitutional: Negative.  Negative for fatigue, fever and unexpected weight change.   Eyes: Negative.    Respiratory: Negative.    Cardiovascular: Negative for chest pain, palpitations and leg swelling.   Gastrointestinal: Negative.    Endocrine: Negative.    Genitourinary: Negative.  Negative for difficulty urinating and dyspareunia.   Musculoskeletal: Positive for arthralgias.   Allergic/Immunologic: Positive for environmental allergies.   Neurological: Negative.    Hematological: Negative.  Negative for adenopathy. Does not bruise/bleed easily.   Psychiatric/Behavioral: Negative.        Assessment     Physical Exam   Constitutional: She is oriented to person, place, and time. She appears well-developed and well-nourished.   HENT:   Head: Normocephalic.   Right Ear: External ear normal.   Left Ear: External ear normal.   Nose: Nose normal.   Mouth/Throat: Oropharynx is clear and moist. No oropharyngeal exudate.   Neck: Neck supple.   Cardiovascular: Normal rate, regular rhythm and normal heart sounds.    No murmur heard.  Pulmonary/Chest: Effort normal and breath sounds normal. No respiratory distress. She has no wheezes.   Musculoskeletal: She exhibits no edema.   Neurological: She is alert and oriented to person, place, and time.    Skin: Skin is warm and dry.   Psychiatric: She has a normal mood and affect. Her behavior is normal.   Vitals reviewed.      Plan     Her fasting labs were reviewed with the patient from last week.     Amber was seen today for follow-up and diabetes.    Diagnoses and all orders for this visit:    Uncontrolled type 2 diabetes mellitus without complication, without long-term current use of insulin (CMS/[facility])  -     Dapagliflozin Propanediol 10 MG tablet; Take 10 mg by mouth Daily.    Essential hypertension    Mixed hyperlipidemia    Yeast infection  -     fluconazole (DIFLUCAN) 150 MG tablet; Take 1 tablet by mouth 1 (One) Time for 1 dose.    Immunization due  -     Flu Vaccine High Dose PF 65YR+ ([id number])    Steatosis of liver      We had an extensive discussion on lowering A1c, diet, and exercise. She will start a Farxiga 5 mg daily and increase to 10 mg after 2 weeks. She will stay on Metformin 1000mg twice daily.

## 2018-09-14 ENCOUNTER — OFFICE VISIT (OUTPATIENT)
Dept: INTERNAL MEDICINE | Facility: CLINIC | Age: 68
End: 2018-09-14

## 2018-09-14 VITALS
RESPIRATION RATE: 16 BRPM | OXYGEN SATURATION: 98 % | DIASTOLIC BLOOD PRESSURE: 74 MMHG | HEART RATE: 104 BPM | HEIGHT: 63 IN | WEIGHT: 188 LBS | BODY MASS INDEX: 33.31 KG/M2 | TEMPERATURE: 97.8 F | SYSTOLIC BLOOD PRESSURE: 138 MMHG

## 2018-09-14 DIAGNOSIS — A08.4 VIRAL GASTROENTERITIS: ICD-10-CM

## 2018-09-14 DIAGNOSIS — IMO0001 UNCONTROLLED TYPE 2 DIABETES MELLITUS WITHOUT COMPLICATION, WITHOUT LONG-TERM CURRENT USE OF INSULIN: ICD-10-CM

## 2018-09-14 DIAGNOSIS — N39.0 FREQUENT UTI: Primary | ICD-10-CM

## 2018-09-14 LAB
BILIRUB BLD-MCNC: NEGATIVE MG/DL
CLARITY, POC: CLEAR
COLOR UR: YELLOW
GLUCOSE UR STRIP-MCNC: ABNORMAL MG/DL
KETONES UR QL: ABNORMAL
LEUKOCYTE EST, POC: NEGATIVE
NITRITE UR-MCNC: NEGATIVE MG/ML
PH UR: 5 [PH] (ref 5–8)
PROT UR STRIP-MCNC: NEGATIVE MG/DL
RBC # UR STRIP: NEGATIVE /UL
SP GR UR: 1 (ref 1–1.03)
UROBILINOGEN UR QL: NORMAL

## 2018-09-14 PROCEDURE — 99213 OFFICE O/P EST LOW 20 MIN: CPT | Performed by: NURSE PRACTITIONER

## 2018-09-14 PROCEDURE — 81003 URINALYSIS AUTO W/O SCOPE: CPT | Performed by: NURSE PRACTITIONER

## 2018-09-14 RX ORDER — DIPHENOXYLATE HYDROCHLORIDE AND ATROPINE SULFATE 2.5; .025 MG/1; MG/1
1 TABLET ORAL 4 TIMES DAILY PRN
Qty: 10 TABLET | Refills: 0 | Status: SHIPPED | OUTPATIENT
Start: 2018-09-14 | End: 2019-02-12

## 2018-09-14 NOTE — PROGRESS NOTES
Chief Complaint   Patient presents with   • Diarrhea     X3 days        Subjective     Amber Campos is a 67 y.o. female being seen today regarding for diarrhea she has been experiencing the past 4-5 days. She reports that she is having liquid diarrhea multiple times each day.  She denies abdominal pain, fever, or seeing blood/pus in her stools.  She does not recall being exposed to anyone else who has been sick and states the only change she can recall is beginning Farxiga 4 days prior.      Patient states that she held her Metformin dose last night and has not taken this morning because of concerns that this is what is causing her stomach upset.      History of Present Illness     Allergies   Allergen Reactions   • Adhesive Tape      EKG stickers only   • Iodinated Diagnostic Agents Photosensitivity     contrast   • Farxiga [Dapagliflozin] Itching     Yeast infeciotn   • Neomycin-Bacitracin Zn-Polymyx Rash         Current Outpatient Prescriptions:   •  budesonide-formoterol (SYMBICORT) 160-4.5 MCG/ACT inhaler, Inhale 2 puffs As Needed., Disp: , Rfl:   •  Calcium Carbonate-Vit D-Min (CALCIUM 600 + MINERALS) 600-200 MG-UNIT tablet, Take 1 tablet by mouth 2 (Two) Times a Day., Disp: 60 each, Rfl: 0  •  cholecalciferol (VITAMIN D3) 1000 UNITS tablet, Take 1,000 Units by mouth Daily., Disp: , Rfl:   •  Clocortolone Pivalate 0.1 % cream, Apply  topically As Needed., Disp: , Rfl:   •  Dapagliflozin Propanediol 10 MG tablet, Take 10 mg by mouth Daily., Disp: 30 tablet, Rfl: 3  •  DUAVEE 0.45-20 MG tablet, TAKE 1 TABLET DAILY, Disp: 90 tablet, Rfl: 3  •  glucose blood test strip, 3 (three) times a day., Disp: , Rfl:   •  Lancets (FREESTYLE) lancets, , Disp: , Rfl:   •  metFORMIN (GLUCOPHAGE) 1000 MG tablet, Take 1 tablet by mouth 2 (Two) Times a Day With Meals., Disp: 180 tablet, Rfl: 3  •  Misc Natural Products (GLUCOSAMINE CHONDROITIN ADV PO), Take 1 tablet by mouth Daily., Disp: , Rfl:   •  mometasone (NASONEX) 50  MCG/ACT nasal spray, 2 sprays into each nostril As Needed., Disp: , Rfl:   •  MULTIPLE VITAMIN PO, Take 1 tablet by mouth Daily., Disp: , Rfl:   •  olmesartan (BENICAR) 40 MG tablet, TAKE 1 TABLET EVERY MORNING, Disp: 90 tablet, Rfl: 3  •  pantoprazole (PROTONIX) 40 MG EC tablet, Take one po daily, Disp: 90 tablet, Rfl: 2  •  Spacer/Aero Chamber Mouthpiece misc, 1 each Daily., Disp: 1 each, Rfl: 0  •  venlafaxine XR (EFFEXOR-XR) 75 MG 24 hr capsule, TAKE 1 CAPSULE DAILY, Disp: 90 capsule, Rfl: 3  •  VOLTAREN 1 % gel gel, As Needed. APPLY TO AFFECTED AREA TWICE A DAY, Disp: , Rfl: 3    The following portions of the patient's history were reviewed and updated as appropriate: allergies, current medications, past family history, past medical history, past social history, past surgical history and problem list.    Review of Systems   Constitutional: Positive for fatigue. Negative for activity change, appetite change and fever.   HENT: Negative for congestion.    Eyes: Negative for discharge and visual disturbance.   Respiratory: Negative for cough and shortness of breath.    Cardiovascular: Negative for chest pain.   Gastrointestinal: Positive for abdominal distention and diarrhea. Negative for abdominal pain, blood in stool, nausea and vomiting.   Endocrine: Positive for polyuria.   Genitourinary: Positive for frequency. Negative for difficulty urinating.   Musculoskeletal: Negative.    Skin: Negative for color change.   Allergic/Immunologic: Negative.    Neurological: Negative for dizziness and light-headedness.   Hematological: Negative.    Psychiatric/Behavioral: Positive for sleep disturbance (up during the night to go to the bathroom). Negative for agitation.       Assessment     Physical Exam   Constitutional: She is oriented to person, place, and time. Vital signs are normal. She appears well-developed and well-nourished. She is cooperative.  Non-toxic appearance. She does not have a sickly appearance. She does  not appear ill. No distress.   HENT:   Head: Normocephalic.   Eyes: Pupils are equal, round, and reactive to light. Right eye exhibits no discharge. Left eye exhibits no discharge.   Neck: Normal range of motion.   Cardiovascular: Normal rate, regular rhythm and normal heart sounds.    No murmur heard.  Pulmonary/Chest: Effort normal and breath sounds normal. No respiratory distress. She has no wheezes.   Abdominal: Soft. Bowel sounds are normal. She exhibits distension. There is no tenderness. There is no rigidity.   Musculoskeletal: Normal range of motion. She exhibits no edema.   Neurological: She is alert and oriented to person, place, and time.   Skin: Skin is warm and dry. She is not diaphoretic.   Psychiatric: She has a normal mood and affect. Her behavior is normal. Thought content normal.   Nursing note and vitals reviewed.      Plan      Urine in office today is within normal limits. Glucose is elevated as anticipated due to Farxiga regimen.     Diagnosis Plan   1. Viral gastroenteritis  diphenoxylate-atropine (LOMOTIL) 2.5-0.025 MG per tablet   2. Uncontrolled type 2 diabetes mellitus without complication, without long-term current use of insulin (CMS/[facility])  dapagliflozin-metformin HCl ER (XIGDUO XR)  MG tablet     Discussed likely viral etiology for diarrhea and that symptoms will run their course in next 7-10 days.  Patient to continue increasing fluids and rest.  Changing diabetic medication to Xigduo to help with possible stomach upset due to medications.  She is to take her Metformin dose tonight and then to begin Xigduo tomorrow evening with her dinner.  She is to stop Farxiga and Metformin after this evening.    Return to clinic at next scheduled appointment on 10/3/18.  To call if diarrhea does not improve in next few days.      Assessment, medical plan and documentation reviewed and approved by KOBE Blount.

## 2018-10-01 ENCOUNTER — OFFICE VISIT (OUTPATIENT)
Dept: INTERNAL MEDICINE | Facility: CLINIC | Age: 68
End: 2018-10-01

## 2018-10-01 VITALS
OXYGEN SATURATION: 98 % | SYSTOLIC BLOOD PRESSURE: 142 MMHG | RESPIRATION RATE: 16 BRPM | TEMPERATURE: 98.2 F | BODY MASS INDEX: 33.66 KG/M2 | HEIGHT: 63 IN | HEART RATE: 112 BPM | WEIGHT: 190 LBS | DIASTOLIC BLOOD PRESSURE: 80 MMHG

## 2018-10-01 DIAGNOSIS — IMO0001 UNCONTROLLED TYPE 2 DIABETES MELLITUS WITHOUT COMPLICATION, WITHOUT LONG-TERM CURRENT USE OF INSULIN: ICD-10-CM

## 2018-10-01 DIAGNOSIS — N30.00 ACUTE CYSTITIS WITHOUT HEMATURIA: ICD-10-CM

## 2018-10-01 DIAGNOSIS — N39.0 FREQUENT UTI: ICD-10-CM

## 2018-10-01 DIAGNOSIS — IMO0001 UNCONTROLLED DIABETES MELLITUS TYPE 2 WITHOUT COMPLICATIONS: ICD-10-CM

## 2018-10-01 PROBLEM — A08.4 VIRAL GASTROENTERITIS: Status: RESOLVED | Noted: 2018-09-14 | Resolved: 2018-10-01

## 2018-10-01 LAB
BILIRUB BLD-MCNC: NEGATIVE MG/DL
CLARITY, POC: CLEAR
COLOR UR: YELLOW
GLUCOSE UR STRIP-MCNC: ABNORMAL MG/DL
HBA1C MFR BLD: 10.4 %
KETONES UR QL: ABNORMAL
LEUKOCYTE EST, POC: ABNORMAL
NITRITE UR-MCNC: POSITIVE MG/ML
PH UR: 5 [PH] (ref 5–8)
PROT UR STRIP-MCNC: NEGATIVE MG/DL
RBC # UR STRIP: ABNORMAL /UL
SP GR UR: 1.02 (ref 1–1.03)
UROBILINOGEN UR QL: NORMAL

## 2018-10-01 PROCEDURE — 83036 HEMOGLOBIN GLYCOSYLATED A1C: CPT | Performed by: NURSE PRACTITIONER

## 2018-10-01 PROCEDURE — 99213 OFFICE O/P EST LOW 20 MIN: CPT | Performed by: NURSE PRACTITIONER

## 2018-10-01 PROCEDURE — 81003 URINALYSIS AUTO W/O SCOPE: CPT | Performed by: NURSE PRACTITIONER

## 2018-10-01 RX ORDER — PANTOPRAZOLE SODIUM 40 MG/1
TABLET, DELAYED RELEASE ORAL
Qty: 90 TABLET | Refills: 2 | Status: SHIPPED | OUTPATIENT
Start: 2018-10-01 | End: 2019-06-22 | Stop reason: SDUPTHER

## 2018-10-01 RX ORDER — VENLAFAXINE HYDROCHLORIDE 75 MG/1
CAPSULE, EXTENDED RELEASE ORAL
Qty: 90 CAPSULE | Refills: 3 | Status: SHIPPED | OUTPATIENT
Start: 2018-10-01 | End: 2019-09-20 | Stop reason: SDUPTHER

## 2018-10-01 RX ORDER — NITROFURANTOIN 25; 75 MG/1; MG/1
100 CAPSULE ORAL EVERY 12 HOURS SCHEDULED
Qty: 14 CAPSULE | Refills: 0 | Status: SHIPPED | OUTPATIENT
Start: 2018-10-01 | End: 2018-12-12

## 2018-10-01 NOTE — PROGRESS NOTES
"Chief Complaint   Patient presents with   • Follow-up   • Diabetes       Subjective     Amber Campos is a 67 y.o. female being seen for a follow up appointment today regarding uncontrolled DM 2. She was switched from Metformin to Xigduo due to diarrhea. She continued to have diarrhea, and now she is having UTI symptoms. Symptoms include frequency, urgency. She went back to the Metformin 1000 mg BID.     She has not checked her glucose lately, and admits \"I pretend that I don;t have diabetes.\" She drinks alcohol almost daily, eats a lower fat diet. She has tried and failed Anamika Jovanny diet.       History of Present Illness     Allergies   Allergen Reactions   • Adhesive Tape      EKG stickers only   • Iodinated Diagnostic Agents Photosensitivity     contrast   • Farxiga [Dapagliflozin] Itching     Yeast infeciotn   • Neomycin-Bacitracin Zn-Polymyx Rash         Current Outpatient Prescriptions:   •  budesonide-formoterol (SYMBICORT) 160-4.5 MCG/ACT inhaler, Inhale 2 puffs As Needed., Disp: , Rfl:   •  Calcium Carbonate-Vit D-Min (CALCIUM 600 + MINERALS) 600-200 MG-UNIT tablet, Take 1 tablet by mouth 2 (Two) Times a Day., Disp: 60 each, Rfl: 0  •  cholecalciferol (VITAMIN D3) 1000 UNITS tablet, Take 1,000 Units by mouth Daily., Disp: , Rfl:   •  Clocortolone Pivalate 0.1 % cream, Apply  topically As Needed., Disp: , Rfl:   •  diphenoxylate-atropine (LOMOTIL) 2.5-0.025 MG per tablet, Take 1 tablet by mouth 4 (Four) Times a Day As Needed for Diarrhea., Disp: 10 tablet, Rfl: 0  •  DUAVEE 0.45-20 MG tablet, TAKE 1 TABLET DAILY, Disp: 90 tablet, Rfl: 3  •  glucose blood test strip, 3 (three) times a day., Disp: , Rfl:   •  Lancets (FREESTYLE) lancets, , Disp: , Rfl:   •  metFORMIN (GLUCOPHAGE) 1000 MG tablet, Take 1,000 mg by mouth 2 (Two) Times a Day With Meals., Disp: , Rfl:   •  Misc Natural Products (GLUCOSAMINE CHONDROITIN ADV PO), Take 1 tablet by mouth Daily., Disp: , Rfl:   •  mometasone (NASONEX) 50 MCG/ACT nasal " spray, 2 sprays into each nostril As Needed., Disp: , Rfl:   •  MULTIPLE VITAMIN PO, Take 1 tablet by mouth Daily., Disp: , Rfl:   •  olmesartan (BENICAR) 40 MG tablet, TAKE 1 TABLET EVERY MORNING, Disp: 90 tablet, Rfl: 3  •  pantoprazole (PROTONIX) 40 MG EC tablet, Take one po daily, Disp: 90 tablet, Rfl: 2  •  Spacer/Aero Chamber Mouthpiece misc, 1 each Daily., Disp: 1 each, Rfl: 0  •  venlafaxine XR (EFFEXOR-XR) 75 MG 24 hr capsule, TAKE 1 CAPSULE DAILY, Disp: 90 capsule, Rfl: 3  •  VOLTAREN 1 % gel gel, As Needed. APPLY TO AFFECTED AREA TWICE A DAY, Disp: , Rfl: 3  •  dapagliflozin-metformin HCl ER (XIGDUO XR)  MG tablet, Take 1 tablet by mouth Daily., Disp: 30 tablet, Rfl: 3    The following portions of the patient's history were reviewed and updated as appropriate: allergies, current medications, past family history, past medical history, past social history, past surgical history and problem list.    Review of Systems   Constitutional: Negative.    HENT: Negative.    Eyes: Negative.    Respiratory: Negative.    Cardiovascular: Negative for chest pain and palpitations.   Gastrointestinal: Negative.    Endocrine: Negative.    Genitourinary: Positive for decreased urine volume, frequency and urgency.   Musculoskeletal: Negative.    Skin: Negative.    Allergic/Immunologic: Positive for environmental allergies.   Neurological: Negative.    Hematological: Negative.    Psychiatric/Behavioral: Negative.        Assessment     Physical Exam   Constitutional: She is oriented to person, place, and time. She appears well-developed and well-nourished.   Cardiovascular: Normal rate, regular rhythm and normal heart sounds.    No murmur heard.  Pulmonary/Chest: Effort normal and breath sounds normal. No respiratory distress. She has no wheezes.   Abdominal: Soft. Bowel sounds are normal. She exhibits no distension. There is no tenderness.   Musculoskeletal: She exhibits no edema.   Neurological: She is alert and  oriented to person, place, and time.   Skin: Skin is warm and dry.   Psychiatric: She has a normal mood and affect. Her behavior is normal.   Vitals reviewed.      Plan     Her fasting labs were reviewed with the patient from last week.     Amber was seen today for follow-up and diabetes.    Diagnoses and all orders for this visit:    Uncontrolled diabetes mellitus type 2 without complications (CMS/[facility])  -     POC Glycosylated Hemoglobin (Hb A1C)  -     Ambulatory Referral to Endocrinology    Acute cystitis without hematuria  -     nitrofurantoin, macrocrystal-monohydrate, (MACROBID) 100 MG capsule; Take 1 capsule by mouth Every 12 (Twelve) Hours.    Frequent UTI  -     Urine Culture - Urine, Urine, Clean Catch  -     POCT urinalysis dipstick, automated        She is declining Victoza today. She has tried and failed multiple oral agents including Farxiga (diarrhea), Invokkana (chroni yeast infection), actos (edema). Follow up with srinath to discuss options as she wished to avoid injectables.

## 2018-10-04 LAB
BACTERIA UR CULT: ABNORMAL
BACTERIA UR CULT: ABNORMAL
OTHER ANTIBIOTIC SUSC ISLT: ABNORMAL

## 2018-10-05 RX ORDER — SULFAMETHOXAZOLE AND TRIMETHOPRIM 800; 160 MG/1; MG/1
1 TABLET ORAL EVERY 12 HOURS
Qty: 14 TABLET | Refills: 0 | Status: SHIPPED | OUTPATIENT
Start: 2018-10-05 | End: 2018-10-12

## 2018-12-12 ENCOUNTER — OFFICE VISIT (OUTPATIENT)
Dept: INTERNAL MEDICINE | Facility: CLINIC | Age: 68
End: 2018-12-12

## 2018-12-12 VITALS
WEIGHT: 193 LBS | HEIGHT: 63 IN | DIASTOLIC BLOOD PRESSURE: 80 MMHG | OXYGEN SATURATION: 94 % | TEMPERATURE: 98.2 F | HEART RATE: 109 BPM | BODY MASS INDEX: 34.2 KG/M2 | SYSTOLIC BLOOD PRESSURE: 126 MMHG | RESPIRATION RATE: 16 BRPM

## 2018-12-12 DIAGNOSIS — M79.10 GENERALIZED MUSCLE ACHE: ICD-10-CM

## 2018-12-12 DIAGNOSIS — R53.83 FATIGUE, UNSPECIFIED TYPE: Primary | ICD-10-CM

## 2018-12-12 DIAGNOSIS — M67.442 GANGLION CYST OF FLEXOR TENDON SHEATH OF FINGER OF LEFT HAND: ICD-10-CM

## 2018-12-12 DIAGNOSIS — R30.0 DYSURIA: ICD-10-CM

## 2018-12-12 DIAGNOSIS — E11.628 TYPE 2 DIABETES MELLITUS WITH OTHER SKIN COMPLICATIONS (HCC): ICD-10-CM

## 2018-12-12 LAB
BILIRUB BLD-MCNC: NEGATIVE MG/DL
CLARITY, POC: ABNORMAL
COLOR UR: YELLOW
GLUCOSE UR STRIP-MCNC: ABNORMAL MG/DL
KETONES UR QL: ABNORMAL
LEUKOCYTE EST, POC: NEGATIVE
NITRITE UR-MCNC: NEGATIVE MG/ML
PH UR: 5 [PH] (ref 5–8)
PROT UR STRIP-MCNC: NEGATIVE MG/DL
RBC # UR STRIP: NEGATIVE /UL
SP GR UR: 1.01 (ref 1–1.03)
UROBILINOGEN UR QL: NORMAL

## 2018-12-12 PROCEDURE — 81003 URINALYSIS AUTO W/O SCOPE: CPT | Performed by: NURSE PRACTITIONER

## 2018-12-12 PROCEDURE — 99214 OFFICE O/P EST MOD 30 MIN: CPT | Performed by: NURSE PRACTITIONER

## 2018-12-12 PROCEDURE — 96372 THER/PROPH/DIAG INJ SC/IM: CPT | Performed by: NURSE PRACTITIONER

## 2018-12-12 RX ORDER — TRIAMCINOLONE ACETONIDE 40 MG/ML
40 INJECTION, SUSPENSION INTRA-ARTICULAR; INTRAMUSCULAR ONCE
Status: COMPLETED | OUTPATIENT
Start: 2018-12-12 | End: 2018-12-12

## 2018-12-12 RX ADMIN — TRIAMCINOLONE ACETONIDE 40 MG: 40 INJECTION, SUSPENSION INTRA-ARTICULAR; INTRAMUSCULAR at 15:36

## 2018-12-12 NOTE — PROGRESS NOTES
"Chief Complaint   Patient presents with   • Urinary Frequency     burning and pain   • Muscle Pain       Subjective     Amber Campos is a 67 y.o. female being seen for a follow up appointment today regarding for \"several things going on.\" She recently got back from a cruise, and she thinks she ay have a UTI. Complaining of dysuria, frequency. No blood, no back pain.     She has been having muscle aches and cramps for several months. She has tried magnesium with little benefit. It is worse at night with toes cramping. She is also taking Aleve as needed and seen a chiropractor as needed.     She is also complaining of a growth on her left hand. Non-tender. Denies trouble opening and closing hand.       History of Present Illness     Allergies   Allergen Reactions   • Adhesive Tape      EKG stickers only   • Iodinated Diagnostic Agents Photosensitivity     contrast   • Farxiga [Dapagliflozin] Diarrhea and Itching     Yeast infeciotn   • Neomycin-Bacitracin Zn-Polymyx Rash         Current Outpatient Medications:   •  budesonide-formoterol (SYMBICORT) 160-4.5 MCG/ACT inhaler, Inhale 2 puffs As Needed., Disp: , Rfl:   •  Calcium Carbonate-Vit D-Min (CALCIUM 600 + MINERALS) 600-200 MG-UNIT tablet, Take 1 tablet by mouth 2 (Two) Times a Day., Disp: 60 each, Rfl: 0  •  cholecalciferol (VITAMIN D3) 1000 UNITS tablet, Take 1,000 Units by mouth Daily., Disp: , Rfl:   •  Clocortolone Pivalate 0.1 % cream, Apply  topically As Needed., Disp: , Rfl:   •  DUAVEE 0.45-20 MG tablet, TAKE 1 TABLET DAILY, Disp: 90 tablet, Rfl: 3  •  glucose blood test strip, 3 (three) times a day., Disp: , Rfl:   •  Lancets (FREESTYLE) lancets, , Disp: , Rfl:   •  metFORMIN (GLUCOPHAGE) 1000 MG tablet, TAKE 1 TABLET TWICE DAILY  WITH MEALS, Disp: 180 tablet, Rfl: 3  •  metFORMIN (GLUCOPHAGE) 1000 MG tablet, Take 1,000 mg by mouth 2 (Two) Times a Day With Meals., Disp: , Rfl:   •  Misc Natural Products (GLUCOSAMINE CHONDROITIN ADV PO), Take 1 tablet by " mouth Daily., Disp: , Rfl:   •  mometasone (NASONEX) 50 MCG/ACT nasal spray, 2 sprays into each nostril As Needed., Disp: , Rfl:   •  MULTIPLE VITAMIN PO, Take 1 tablet by mouth Daily., Disp: , Rfl:   •  olmesartan (BENICAR) 40 MG tablet, TAKE 1 TABLET EVERY MORNING, Disp: 90 tablet, Rfl: 3  •  pantoprazole (PROTONIX) 40 MG EC tablet, TAKE 1 TABLET DAILY, Disp: 90 tablet, Rfl: 2  •  Spacer/Aero Chamber Mouthpiece misc, 1 each Daily., Disp: 1 each, Rfl: 0  •  venlafaxine XR (EFFEXOR-XR) 75 MG 24 hr capsule, TAKE 1 CAPSULE DAILY, Disp: 90 capsule, Rfl: 3  •  VOLTAREN 1 % gel gel, As Needed. APPLY TO AFFECTED AREA TWICE A DAY, Disp: , Rfl: 3  •  diphenoxylate-atropine (LOMOTIL) 2.5-0.025 MG per tablet, Take 1 tablet by mouth 4 (Four) Times a Day As Needed for Diarrhea., Disp: 10 tablet, Rfl: 0  •  nitrofurantoin, macrocrystal-monohydrate, (MACROBID) 100 MG capsule, Take 1 capsule by mouth Every 12 (Twelve) Hours., Disp: 14 capsule, Rfl: 0    The following portions of the patient's history were reviewed and updated as appropriate: allergies, current medications, past family history, past medical history, past social history, past surgical history and problem list.    Review of Systems   Constitutional: Positive for fatigue.   HENT: Negative.    Eyes: Negative.    Respiratory: Negative.    Cardiovascular: Negative.    Gastrointestinal: Negative.    Endocrine: Negative.    Genitourinary: Positive for dysuria, flank pain and frequency.   Skin: Negative.    Allergic/Immunologic: Negative.    Neurological: Negative.    Hematological: Negative.    Psychiatric/Behavioral: Negative.        Assessment     Physical Exam   Constitutional: She is oriented to person, place, and time. She appears well-developed and well-nourished. No distress.   HENT:   Head: Normocephalic.   Right Ear: External ear normal.   Left Ear: External ear normal.   Nose: Nose normal.   Mouth/Throat: Oropharynx is clear and moist.   Neck: Neck supple. No  thyromegaly present.   Cardiovascular: Normal rate, regular rhythm and normal heart sounds.   No murmur heard.  Pulmonary/Chest: Effort normal and breath sounds normal. No stridor. No respiratory distress.   Musculoskeletal: She exhibits no edema.   Neurological: She is alert and oriented to person, place, and time.   Skin: Skin is warm and dry.   Psychiatric: She has a normal mood and affect. Her behavior is normal.   Vitals reviewed.      Bailey Clark was seen today for urinary frequency and muscle pain.    Diagnoses and all orders for this visit:    Fatigue, unspecified type  -     CBC & Differential  -     Comprehensive Metabolic Panel  -     Vitamin B12  -     Vitamin D 1,25 Dihydroxy  -     Iron Profile    Dysuria  -     POCT urinalysis dipstick, automated  -     Urine Culture - Urine, Urine, Clean Catch; Future  -     Urine Culture - Urine, Urine, Clean Catch    Generalized muscle ache  -     CBC & Differential  -     Comprehensive Metabolic Panel  -     Vitamin B12  -     Vitamin D 1,25 Dihydroxy  -     Iron Profile  -     triamcinolone acetonide (KENALOG-40) injection 40 mg; Inject 1 mL into the appropriate muscle as directed by prescriber 1 (One) Time.    Ganglion cyst of flexor tendon sheath of finger of left hand  -     Ambulatory Referral to Hand Surgery    Type 2 diabetes mellitus with other skin complications (CMS/HCC)   -     Iron Profile        Follow up in 1 month

## 2018-12-13 LAB
1,25(OH)2D3 SERPL-MCNC: 70.5 PG/ML (ref 19.9–79.3)
ALBUMIN SERPL-MCNC: 4.3 G/DL (ref 3.5–5.2)
ALBUMIN/GLOB SERPL: 1.7 G/DL
ALP SERPL-CCNC: 73 U/L (ref 40–129)
ALT SERPL-CCNC: 61 U/L (ref 5–33)
AST SERPL-CCNC: 74 U/L (ref 5–32)
BASOPHILS # BLD AUTO: 0.07 10*3/MM3 (ref 0–0.2)
BASOPHILS NFR BLD AUTO: 1 % (ref 0–2)
BILIRUB SERPL-MCNC: <0.2 MG/DL (ref 0.2–1.2)
BUN SERPL-MCNC: 15 MG/DL (ref 8–23)
BUN/CREAT SERPL: 22.4 (ref 7–25)
CALCIUM SERPL-MCNC: 9.7 MG/DL (ref 8.8–10.5)
CHLORIDE SERPL-SCNC: 96 MMOL/L (ref 98–107)
CO2 SERPL-SCNC: 23.9 MMOL/L (ref 22–29)
CREAT SERPL-MCNC: 0.67 MG/DL (ref 0.57–1)
EOSINOPHIL # BLD AUTO: 0.35 10*3/MM3 (ref 0.1–0.3)
EOSINOPHIL NFR BLD AUTO: 5 % (ref 0–4)
ERYTHROCYTE [DISTWIDTH] IN BLOOD BY AUTOMATED COUNT: 14.1 % (ref 11.5–14.5)
GLOBULIN SER CALC-MCNC: 2.6 GM/DL
GLUCOSE SERPL-MCNC: 354 MG/DL (ref 65–99)
HCT VFR BLD AUTO: 38.3 % (ref 37–47)
HGB BLD-MCNC: 12.2 G/DL (ref 12–16)
IMM GRANULOCYTES # BLD: 0.01 10*3/MM3 (ref 0–0.03)
IMM GRANULOCYTES NFR BLD: 0.1 % (ref 0–0.5)
IRON SATN MFR SERPL: 7 %
IRON SERPL-MCNC: 32 MCG/DL (ref 37–145)
LYMPHOCYTES # BLD AUTO: 2.83 10*3/MM3 (ref 0.6–4.8)
LYMPHOCYTES NFR BLD AUTO: 40.2 % (ref 20–45)
MCH RBC QN AUTO: 26.7 PG (ref 27–31)
MCHC RBC AUTO-ENTMCNC: 31.9 G/DL (ref 31–37)
MCV RBC AUTO: 83.8 FL (ref 81–99)
MONOCYTES # BLD AUTO: 0.48 10*3/MM3 (ref 0–1)
MONOCYTES NFR BLD AUTO: 6.8 % (ref 3–8)
NEUTROPHILS # BLD AUTO: 3.3 10*3/MM3 (ref 1.5–8.3)
NEUTROPHILS NFR BLD AUTO: 46.9 % (ref 45–70)
NRBC BLD AUTO-RTO: 0 /100 WBC (ref 0–0)
PLATELET # BLD AUTO: 349 10*3/MM3 (ref 140–500)
POTASSIUM SERPL-SCNC: 4.1 MMOL/L (ref 3.5–5.2)
PROT SERPL-MCNC: 6.9 G/DL (ref 6–8.5)
RBC # BLD AUTO: 4.57 10*6/MM3 (ref 4.2–5.4)
SODIUM SERPL-SCNC: 136 MMOL/L (ref 136–145)
TIBC SERPL-MCNC: 429 MCG/DL (ref 261–478)
UIBC SERPL-MCNC: 397 MCG/DL (ref 112–346)
VIT B12 SERPL-MCNC: 508 PG/ML
WBC # BLD AUTO: 7.04 10*3/MM3 (ref 4.8–10.8)

## 2018-12-15 LAB
BACTERIA UR CULT: NORMAL
BACTERIA UR CULT: NORMAL

## 2019-01-09 ENCOUNTER — RESULTS ENCOUNTER (OUTPATIENT)
Dept: INTERNAL MEDICINE | Facility: CLINIC | Age: 69
End: 2019-01-09

## 2019-01-09 ENCOUNTER — OFFICE VISIT (OUTPATIENT)
Dept: INTERNAL MEDICINE | Facility: CLINIC | Age: 69
End: 2019-01-09

## 2019-01-09 VITALS
OXYGEN SATURATION: 97 % | HEART RATE: 114 BPM | HEIGHT: 63 IN | TEMPERATURE: 97.8 F | DIASTOLIC BLOOD PRESSURE: 80 MMHG | BODY MASS INDEX: 33.13 KG/M2 | SYSTOLIC BLOOD PRESSURE: 110 MMHG | RESPIRATION RATE: 16 BRPM | WEIGHT: 187 LBS

## 2019-01-09 DIAGNOSIS — E61.1 IRON DEFICIENCY: ICD-10-CM

## 2019-01-09 DIAGNOSIS — Z12.11 ENCOUNTER FOR SCREENING FOR MALIGNANT NEOPLASM OF COLON: ICD-10-CM

## 2019-01-09 DIAGNOSIS — E61.1 IRON DEFICIENCY: Primary | ICD-10-CM

## 2019-01-09 PROCEDURE — 99213 OFFICE O/P EST LOW 20 MIN: CPT | Performed by: NURSE PRACTITIONER

## 2019-01-09 NOTE — PROGRESS NOTES
Chief Complaint   Patient presents with   • Anemia     review labs-has been on MVI with Fe       Subjective     Amber Campos is a 68 y.o. female being seen for a follow up appointment today regarding low iron levels. She had labs done 4 weeks ago, showing low iron level.  She started on a MVI with iron. Denies constipation, dark stools.        History of Present Illness     Allergies   Allergen Reactions   • Adhesive Tape      EKG stickers only   • Iodinated Diagnostic Agents Photosensitivity     contrast   • Farxiga [Dapagliflozin] Diarrhea and Itching     Yeast infeciotn   • Neomycin-Bacitracin Zn-Polymyx Rash         Current Outpatient Medications:   •  budesonide-formoterol (SYMBICORT) 160-4.5 MCG/ACT inhaler, Inhale 2 puffs As Needed., Disp: , Rfl:   •  Calcium Carbonate-Vit D-Min (CALCIUM 600 + MINERALS) 600-200 MG-UNIT tablet, Take 1 tablet by mouth 2 (Two) Times a Day., Disp: 60 each, Rfl: 0  •  cholecalciferol (VITAMIN D3) 1000 UNITS tablet, Take 1,000 Units by mouth Daily., Disp: , Rfl:   •  Clocortolone Pivalate 0.1 % cream, Apply  topically As Needed., Disp: , Rfl:   •  DUAVEE 0.45-20 MG tablet, TAKE 1 TABLET DAILY, Disp: 90 tablet, Rfl: 3  •  glucose blood test strip, 3 (three) times a day., Disp: , Rfl:   •  Lancets (FREESTYLE) lancets, , Disp: , Rfl:   •  metFORMIN (GLUCOPHAGE) 1000 MG tablet, TAKE 1 TABLET TWICE DAILY  WITH MEALS, Disp: 180 tablet, Rfl: 3  •  metFORMIN (GLUCOPHAGE) 1000 MG tablet, Take 1,000 mg by mouth 2 (Two) Times a Day With Meals., Disp: , Rfl:   •  mometasone (NASONEX) 50 MCG/ACT nasal spray, 2 sprays into each nostril As Needed., Disp: , Rfl:   •  MULTIPLE VITAMIN PO, Take 1 tablet by mouth Daily., Disp: , Rfl:   •  Multiple Vitamins-Iron (MULTIPLE VITAMIN/IRON PO), Take 1 tablet/day by mouth., Disp: , Rfl:   •  olmesartan (BENICAR) 40 MG tablet, TAKE 1 TABLET EVERY MORNING, Disp: 90 tablet, Rfl: 3  •  pantoprazole (PROTONIX) 40 MG EC tablet, TAKE 1 TABLET DAILY, Disp: 90  tablet, Rfl: 2  •  Spacer/Aero Chamber Mouthpiece misc, 1 each Daily., Disp: 1 each, Rfl: 0  •  venlafaxine XR (EFFEXOR-XR) 75 MG 24 hr capsule, TAKE 1 CAPSULE DAILY, Disp: 90 capsule, Rfl: 3  •  VOLTAREN 1 % gel gel, As Needed. APPLY TO AFFECTED AREA TWICE A DAY, Disp: , Rfl: 3  •  diphenoxylate-atropine (LOMOTIL) 2.5-0.025 MG per tablet, Take 1 tablet by mouth 4 (Four) Times a Day As Needed for Diarrhea., Disp: 10 tablet, Rfl: 0    The following portions of the patient's history were reviewed and updated as appropriate: allergies, current medications, past family history, past medical history, past social history, past surgical history and problem list.    Review of Systems   Constitutional: Negative.    HENT: Negative.    Eyes: Negative.    Respiratory: Negative.    Cardiovascular: Negative.    Gastrointestinal: Negative.    Endocrine: Negative.    Genitourinary: Negative.    Musculoskeletal: Negative.    Allergic/Immunologic: Negative.    Neurological: Negative.    Hematological: Negative.    Psychiatric/Behavioral: Negative.        Assessment     Physical Exam   Constitutional: She is oriented to person, place, and time. She appears well-developed and well-nourished. No distress.   HENT:   Head: Normocephalic.   Right Ear: External ear normal.   Left Ear: External ear normal.   Mouth/Throat: Oropharynx is clear and moist.   Neck: Neck supple. No thyromegaly present.   Cardiovascular: Normal rate, regular rhythm and normal heart sounds.   No murmur heard.  Pulmonary/Chest: Effort normal and breath sounds normal. No stridor. No respiratory distress.   Musculoskeletal: She exhibits no edema.   Neurological: She is alert and oriented to person, place, and time.   Skin: Skin is warm and dry.   Psychiatric: She has a normal mood and affect. Her behavior is normal.   Vitals reviewed.      Plan     Her labs were reviewed with the patient from last week.      Diagnosis Plan   1. Iron deficiency  Cologuard - Stool, Per  Rectum    CBC & Differential    Iron Profile   2. Encounter for screening for malignant neoplasm of colon   Cologuard - Stool, Per Rectum     2 Weeks non fasting labs    Follow up in 3 months with fasting labs

## 2019-01-29 ENCOUNTER — OFFICE VISIT (OUTPATIENT)
Dept: INTERNAL MEDICINE | Facility: CLINIC | Age: 69
End: 2019-01-29

## 2019-01-29 VITALS
TEMPERATURE: 97.6 F | BODY MASS INDEX: 33.49 KG/M2 | HEIGHT: 63 IN | SYSTOLIC BLOOD PRESSURE: 142 MMHG | OXYGEN SATURATION: 97 % | WEIGHT: 189 LBS | RESPIRATION RATE: 16 BRPM | HEART RATE: 105 BPM | DIASTOLIC BLOOD PRESSURE: 86 MMHG

## 2019-01-29 DIAGNOSIS — R30.0 DYSURIA: Primary | ICD-10-CM

## 2019-01-29 DIAGNOSIS — R82.998 LEUKOCYTES IN URINE: ICD-10-CM

## 2019-01-29 DIAGNOSIS — N30.00 ACUTE CYSTITIS WITHOUT HEMATURIA: ICD-10-CM

## 2019-01-29 PROCEDURE — 81003 URINALYSIS AUTO W/O SCOPE: CPT | Performed by: NURSE PRACTITIONER

## 2019-01-29 PROCEDURE — 99213 OFFICE O/P EST LOW 20 MIN: CPT | Performed by: NURSE PRACTITIONER

## 2019-01-29 RX ORDER — NITROFURANTOIN 25; 75 MG/1; MG/1
100 CAPSULE ORAL 2 TIMES DAILY
Qty: 14 CAPSULE | Refills: 0 | Status: SHIPPED | OUTPATIENT
Start: 2019-01-29 | End: 2019-02-04 | Stop reason: ALTCHOICE

## 2019-01-29 NOTE — PROGRESS NOTES
Chief Complaint   Patient presents with   • Difficulty Urinating     burning and low back pain       Subjective     Amber Campos is a 68 y.o. female being seen for a follow up appointment today regarding Possible UTI. She was playing bridge yesterday with burning pain with urination, associated frequency, urgency. Denies blood in urine, back pain. She has lost 20 pounds on WW.       History of Present Illness     Allergies   Allergen Reactions   • Adhesive Tape      EKG stickers only   • Iodinated Diagnostic Agents Photosensitivity     contrast   • Farxiga [Dapagliflozin] Diarrhea and Itching     Yeast infeciotn   • Neomycin-Bacitracin Zn-Polymyx Rash         Current Outpatient Medications:   •  budesonide-formoterol (SYMBICORT) 160-4.5 MCG/ACT inhaler, Inhale 2 puffs As Needed., Disp: , Rfl:   •  Calcium Carbonate-Vit D-Min (CALCIUM 600 + MINERALS) 600-200 MG-UNIT tablet, Take 1 tablet by mouth 2 (Two) Times a Day., Disp: 60 each, Rfl: 0  •  cholecalciferol (VITAMIN D3) 1000 UNITS tablet, Take 1,000 Units by mouth Daily., Disp: , Rfl:   •  Clocortolone Pivalate 0.1 % cream, Apply  topically As Needed., Disp: , Rfl:   •  diphenoxylate-atropine (LOMOTIL) 2.5-0.025 MG per tablet, Take 1 tablet by mouth 4 (Four) Times a Day As Needed for Diarrhea., Disp: 10 tablet, Rfl: 0  •  DUAVEE 0.45-20 MG tablet, TAKE 1 TABLET DAILY, Disp: 90 tablet, Rfl: 3  •  glucose blood test strip, 3 (three) times a day., Disp: , Rfl:   •  Lancets (FREESTYLE) lancets, , Disp: , Rfl:   •  metFORMIN (GLUCOPHAGE) 1000 MG tablet, TAKE 1 TABLET TWICE DAILY  WITH MEALS, Disp: 180 tablet, Rfl: 3  •  metFORMIN (GLUCOPHAGE) 1000 MG tablet, Take 1,000 mg by mouth 2 (Two) Times a Day With Meals., Disp: , Rfl:   •  mometasone (NASONEX) 50 MCG/ACT nasal spray, 2 sprays into each nostril As Needed., Disp: , Rfl:   •  MULTIPLE VITAMIN PO, Take 1 tablet by mouth Daily., Disp: , Rfl:   •  Multiple Vitamins-Iron (MULTIPLE VITAMIN/IRON PO), Take 1 tablet/day  by mouth., Disp: , Rfl:   •  olmesartan (BENICAR) 40 MG tablet, TAKE 1 TABLET EVERY MORNING, Disp: 90 tablet, Rfl: 3  •  pantoprazole (PROTONIX) 40 MG EC tablet, TAKE 1 TABLET DAILY, Disp: 90 tablet, Rfl: 2  •  Spacer/Aero Chamber Mouthpiece misc, 1 each Daily., Disp: 1 each, Rfl: 0  •  venlafaxine XR (EFFEXOR-XR) 75 MG 24 hr capsule, TAKE 1 CAPSULE DAILY, Disp: 90 capsule, Rfl: 3  •  VOLTAREN 1 % gel gel, As Needed. APPLY TO AFFECTED AREA TWICE A DAY, Disp: , Rfl: 3    The following portions of the patient's history were reviewed and updated as appropriate: allergies, current medications, past family history, past medical history, past social history, past surgical history and problem list.    Review of Systems   Constitutional: Negative.    HENT: Positive for ear pain, rhinorrhea and sinus pain.    Eyes: Negative.    Respiratory: Negative.    Cardiovascular: Negative.    Gastrointestinal: Negative.    Endocrine: Negative.    Genitourinary: Positive for dysuria.   Musculoskeletal: Negative.    Skin: Negative.    Allergic/Immunologic: Negative.    Neurological: Negative.    Hematological: Negative.    Psychiatric/Behavioral: Negative.        Assessment     Physical Exam   Constitutional: She is oriented to person, place, and time. She appears well-developed and well-nourished.   Cardiovascular: Normal rate, regular rhythm and normal heart sounds.   No murmur heard.  Pulmonary/Chest: Effort normal and breath sounds normal. No stridor. No respiratory distress.   Abdominal: Soft. Bowel sounds are normal. She exhibits no distension. There is no tenderness.   Musculoskeletal: She exhibits no edema.   Neurological: She is alert and oriented to person, place, and time. No cranial nerve deficit.   Psychiatric: She has a normal mood and affect. Her behavior is normal.   Vitals reviewed.      Bailey Clark was seen today for difficulty urinating, sinus problem and earache.    Diagnoses and all orders for this  visit:    Dysuria  -     POCT urinalysis dipstick, automated    Acute cystitis without hematuria  -     nitrofurantoin, macrocrystal-monohydrate, (MACROBID) 100 MG capsule; Take 1 capsule by mouth 2 (Two) Times a Day.      Urine shows Nitrates today. Culture has sent.     Keep well hydrated.

## 2019-02-01 LAB
BACTERIA UR CULT: ABNORMAL
BACTERIA UR CULT: ABNORMAL
OTHER ANTIBIOTIC SUSC ISLT: ABNORMAL

## 2019-02-04 ENCOUNTER — TELEPHONE (OUTPATIENT)
Dept: INTERNAL MEDICINE | Facility: CLINIC | Age: 69
End: 2019-02-04

## 2019-02-04 RX ORDER — AMOXICILLIN AND CLAVULANATE POTASSIUM 875; 125 MG/1; MG/1
1 TABLET, FILM COATED ORAL EVERY 12 HOURS
Qty: 14 TABLET | Refills: 0 | Status: SHIPPED | OUTPATIENT
Start: 2019-02-04 | End: 2019-02-12 | Stop reason: ALTCHOICE

## 2019-02-04 NOTE — TELEPHONE ENCOUNTER
LVM with results in full detail on patient personal mailbox. Sent new RX to [facility] in [place]. Advised patient to return call with questions or concerns.    ----- Message from JAKE Blount sent at 2019  7:54 AM EST -----  Culture is showing some resistance to current antibiotic. Stop Macrobid. Switch to Augmentin 875mg Si po q12hrs for 7 days Disp #14. Leave a urine at next visit to check for clear.

## 2019-02-12 ENCOUNTER — OFFICE VISIT (OUTPATIENT)
Dept: ENDOCRINOLOGY | Age: 69
End: 2019-02-12

## 2019-02-12 VITALS
RESPIRATION RATE: 16 BRPM | BODY MASS INDEX: 33.7 KG/M2 | DIASTOLIC BLOOD PRESSURE: 72 MMHG | SYSTOLIC BLOOD PRESSURE: 138 MMHG | WEIGHT: 190.2 LBS | HEIGHT: 63 IN

## 2019-02-12 DIAGNOSIS — E55.9 VITAMIN D DEFICIENCY: ICD-10-CM

## 2019-02-12 DIAGNOSIS — K76.0 STEATOSIS OF LIVER: ICD-10-CM

## 2019-02-12 DIAGNOSIS — I10 ESSENTIAL HYPERTENSION: ICD-10-CM

## 2019-02-12 DIAGNOSIS — IMO0001 UNCONTROLLED DIABETES MELLITUS TYPE 2 WITHOUT COMPLICATIONS: Primary | ICD-10-CM

## 2019-02-12 DIAGNOSIS — E78.2 MIXED HYPERLIPIDEMIA: ICD-10-CM

## 2019-02-12 PROCEDURE — 99205 OFFICE O/P NEW HI 60 MIN: CPT | Performed by: INTERNAL MEDICINE

## 2019-02-12 RX ORDER — EMPAGLIFLOZIN 25 MG/1
1 TABLET, FILM COATED ORAL DAILY
Qty: 30 TABLET | Refills: 5 | Status: SHIPPED | OUTPATIENT
Start: 2019-02-12 | End: 2019-02-21

## 2019-02-12 RX ORDER — PIOGLITAZONEHYDROCHLORIDE 30 MG/1
30 TABLET ORAL DAILY
Qty: 30 TABLET | Refills: 11 | Status: SHIPPED | OUTPATIENT
Start: 2019-02-12 | End: 2019-05-13

## 2019-02-12 RX ORDER — BLOOD SUGAR DIAGNOSTIC
1 STRIP MISCELLANEOUS
Qty: 90 EACH | Refills: 3 | Status: SHIPPED | OUTPATIENT
Start: 2019-02-12

## 2019-02-12 RX ORDER — INSULIN GLARGINE AND LIXISENATIDE 100; 33 U/ML; UG/ML
60 INJECTION, SOLUTION SUBCUTANEOUS
Qty: 5 PEN | Refills: 5 | Status: SHIPPED | OUTPATIENT
Start: 2019-02-12 | End: 2019-04-08

## 2019-02-12 NOTE — PROGRESS NOTES
"Subjective   Amber Campos is a 68 y.o. female seen as a new patient for DM2. She states that she started weight watchers the week before lorenza. She is checking BG 2 times a day. She states that before starting weight watchers her BG was over 200 and now they are running in the 140's. She denies any problems or concerns.     History of Present Illness this is a 68-year-old female who has been referred for further evaluation and treatment of uncontrolled type II diabetes.  Additionally she has the diagnoses of hypertension and dyslipidemia and vitamin D deficiency as well as obesity and fatty liver.  Her family history is essentially is unremarkable with no incidence of diabetes that she could not remember.  She is  however has never been pregnant or given birth to a child.    /72   Resp 16   Ht 160 cm (63\")   Wt 86.3 kg (190 lb 3.2 oz)   BMI 33.69 kg/m²      Allergies   Allergen Reactions   • Adhesive Tape      EKG stickers only   • Iodinated Diagnostic Agents Photosensitivity     contrast   • Farxiga [Dapagliflozin] Diarrhea and Itching     Yeast infeciotn   • Neomycin-Bacitracin Zn-Polymyx Rash       Current Outpatient Medications:   •  budesonide-formoterol (SYMBICORT) 160-4.5 MCG/ACT inhaler, Inhale 2 puffs As Needed., Disp: , Rfl:   •  cholecalciferol (VITAMIN D3) 1000 UNITS tablet, Take 1,000 Units by mouth Daily., Disp: , Rfl:   •  Clocortolone Pivalate 0.1 % cream, Apply  topically As Needed., Disp: , Rfl:   •  DUAVEE 0.45-20 MG tablet, TAKE 1 TABLET DAILY, Disp: 90 tablet, Rfl: 3  •  glucose blood test strip, 3 (three) times a day., Disp: , Rfl:   •  Lancets (FREESTYLE) lancets, , Disp: , Rfl:   •  metFORMIN (GLUCOPHAGE) 1000 MG tablet, TAKE 1 TABLET TWICE DAILY  WITH MEALS, Disp: 180 tablet, Rfl: 3  •  mometasone (NASONEX) 50 MCG/ACT nasal spray, 2 sprays into each nostril As Needed., Disp: , Rfl:   •  Multiple Vitamins-Iron (MULTIPLE VITAMIN/IRON PO), Take 1 tablet/day by mouth., " Disp: , Rfl:   •  olmesartan (BENICAR) 40 MG tablet, TAKE 1 TABLET EVERY MORNING, Disp: 90 tablet, Rfl: 3  •  pantoprazole (PROTONIX) 40 MG EC tablet, TAKE 1 TABLET DAILY, Disp: 90 tablet, Rfl: 2  •  Spacer/Aero Chamber Mouthpiece misc, 1 each Daily., Disp: 1 each, Rfl: 0  •  venlafaxine XR (EFFEXOR-XR) 75 MG 24 hr capsule, TAKE 1 CAPSULE DAILY, Disp: 90 capsule, Rfl: 3  •  VOLTAREN 1 % gel gel, As Needed. APPLY TO AFFECTED AREA TWICE A DAY, Disp: , Rfl: 3      The following portions of the patient's history were reviewed and updated as appropriate: allergies, current medications, past family history, past medical history, past social history, past surgical history and problem list.    Review of Systems   Constitutional: Negative.    HENT: Negative.    Eyes: Negative.    Respiratory: Negative.    Cardiovascular: Negative.    Gastrointestinal: Negative.    Endocrine: Negative.    Genitourinary: Negative.    Musculoskeletal: Negative.    Skin: Negative.    Allergic/Immunologic: Negative.    Neurological: Negative.    Hematological: Negative.    Psychiatric/Behavioral: Negative.        Objective   Physical Exam   Constitutional: She is oriented to person, place, and time. She appears well-developed and well-nourished. No distress.   HENT:   Head: Normocephalic and atraumatic.   Right Ear: External ear normal.   Left Ear: External ear normal.   Nose: Nose normal.   Mouth/Throat: Oropharynx is clear and moist. No oropharyngeal exudate.   Eyes: Conjunctivae and EOM are normal. Pupils are equal, round, and reactive to light. Right eye exhibits no discharge. Left eye exhibits no discharge. No scleral icterus.   Neck: Normal range of motion. Neck supple. No JVD present. No tracheal deviation present. No thyromegaly present.   Cardiovascular: Normal rate, regular rhythm, normal heart sounds and intact distal pulses. Exam reveals no gallop and no friction rub.   No murmur heard.  Pulmonary/Chest: Effort normal and breath  sounds normal. No stridor. No respiratory distress. She has no wheezes. She has no rales. She exhibits no tenderness.   Abdominal: Soft. Bowel sounds are normal. She exhibits no distension and no mass. There is no tenderness. There is no rebound and no guarding. No hernia.   Musculoskeletal: Normal range of motion. She exhibits no edema, tenderness or deformity.   Lymphadenopathy:     She has no cervical adenopathy.   Neurological: She is alert and oriented to person, place, and time. She has normal reflexes. She displays normal reflexes. No cranial nerve deficit or sensory deficit. She exhibits normal muscle tone. Coordination normal.   Skin: Skin is warm and dry. No rash noted. She is not diaphoretic. No erythema. No pallor.   Psychiatric: She has a normal mood and affect. Her behavior is normal. Judgment and thought content normal.   Nursing note and vitals reviewed.        Assessment/Plan   Diagnoses and all orders for this visit:    Uncontrolled diabetes mellitus type 2 without complications (CMS/HCC)  -     Discontinue: Insulin Glargine-Lixisenatide (SOLIQUA) 100-33 UNT-MCG/ML solution pen-injector; Inject 60 Units under the skin into the appropriate area as directed Daily With Breakfast.  -     pioglitazone (ACTOS) 30 MG tablet; Take 1 tablet by mouth Daily.  -     T3, Free  -     T4 & TSH ([facility])  -     T4, Free  -     Uric Acid  -     Vitamin D 25 Hydroxy  -     Comprehensive Metabolic Panel  -     C-Peptide  -     Hemoglobin A1c  -     Lipid Panel  -     ACTH  -     Cortisol  -     Calcium, Ionized  -     Phosphorus  -     PTH, Intact  -     SOLIQUA 100-33 UNT-MCG/ML solution pen-injector; Inject 60 Units under the skin into the appropriate area as directed Daily With Breakfast.  -     T4 & TSH ([facility]); Future  -     Vitamin D 25 Hydroxy; Future  -     Uric Acid; Future  -     Comprehensive Metabolic Panel; Future  -     C-Peptide; Future  -     Hemoglobin A1c; Future  -     Lipid Panel; Future  -      MicroAlbumin, Urine, Random - Urine, Clean Catch; Future    Mixed hyperlipidemia  -     Discontinue: Insulin Glargine-Lixisenatide (SOLIQUA) 100-33 UNT-MCG/ML solution pen-injector; Inject 60 Units under the skin into the appropriate area as directed Daily With Breakfast.  -     pioglitazone (ACTOS) 30 MG tablet; Take 1 tablet by mouth Daily.  -     T3, Free  -     T4 & TSH ([facility])  -     T4, Free  -     Uric Acid  -     Vitamin D 25 Hydroxy  -     Comprehensive Metabolic Panel  -     C-Peptide  -     Hemoglobin A1c  -     Lipid Panel  -     ACTH  -     Cortisol  -     Calcium, Ionized  -     Phosphorus  -     PTH, Intact  -     SOLIQUA 100-33 UNT-MCG/ML solution pen-injector; Inject 60 Units under the skin into the appropriate area as directed Daily With Breakfast.  -     T4 & TSH ([facility]); Future  -     Vitamin D 25 Hydroxy; Future  -     Uric Acid; Future  -     Comprehensive Metabolic Panel; Future  -     C-Peptide; Future  -     Hemoglobin A1c; Future  -     Lipid Panel; Future  -     MicroAlbumin, Urine, Random - Urine, Clean Catch; Future    Essential hypertension  -     Discontinue: Insulin Glargine-Lixisenatide (SOLIQUA) 100-33 UNT-MCG/ML solution pen-injector; Inject 60 Units under the skin into the appropriate area as directed Daily With Breakfast.  -     pioglitazone (ACTOS) 30 MG tablet; Take 1 tablet by mouth Daily.  -     T3, Free  -     T4 & TSH ([facility])  -     T4, Free  -     Uric Acid  -     Vitamin D 25 Hydroxy  -     Comprehensive Metabolic Panel  -     C-Peptide  -     Hemoglobin A1c  -     Lipid Panel  -     ACTH  -     Cortisol  -     Calcium, Ionized  -     Phosphorus  -     PTH, Intact  -     SOLIQUA 100-33 UNT-MCG/ML solution pen-injector; Inject 60 Units under the skin into the appropriate area as directed Daily With Breakfast.  -     T4 & TSH ([facility]); Future  -     Vitamin D 25 Hydroxy; Future  -     Uric Acid; Future  -     Comprehensive Metabolic Panel; Future  -     C-Peptide;  Future  -     Hemoglobin A1c; Future  -     Lipid Panel; Future  -     MicroAlbumin, Urine, Random - Urine, Clean Catch; Future    Steatosis of liver  -     Discontinue: Insulin Glargine-Lixisenatide (SOLIQUA) 100-33 UNT-MCG/ML solution pen-injector; Inject 60 Units under the skin into the appropriate area as directed Daily With Breakfast.  -     pioglitazone (ACTOS) 30 MG tablet; Take 1 tablet by mouth Daily.  -     T3, Free  -     T4 & TSH ([facility])  -     T4, Free  -     Uric Acid  -     Vitamin D 25 Hydroxy  -     Comprehensive Metabolic Panel  -     C-Peptide  -     Hemoglobin A1c  -     Lipid Panel  -     ACTH  -     Cortisol  -     Calcium, Ionized  -     Phosphorus  -     PTH, Intact  -     SOLIQUA 100-33 UNT-MCG/ML solution pen-injector; Inject 60 Units under the skin into the appropriate area as directed Daily With Breakfast.  -     T4 & TSH ([facility]); Future  -     Vitamin D 25 Hydroxy; Future  -     Uric Acid; Future  -     Comprehensive Metabolic Panel; Future  -     C-Peptide; Future  -     Hemoglobin A1c; Future  -     Lipid Panel; Future  -     MicroAlbumin, Urine, Random - Urine, Clean Catch; Future    Vitamin D deficiency  -     Discontinue: Insulin Glargine-Lixisenatide (SOLIQUA) 100-33 UNT-MCG/ML solution pen-injector; Inject 60 Units under the skin into the appropriate area as directed Daily With Breakfast.  -     pioglitazone (ACTOS) 30 MG tablet; Take 1 tablet by mouth Daily.  -     T3, Free  -     T4 & TSH ([facility])  -     T4, Free  -     Uric Acid  -     Vitamin D 25 Hydroxy  -     Comprehensive Metabolic Panel  -     C-Peptide  -     Hemoglobin A1c  -     Lipid Panel  -     ACTH  -     Cortisol  -     Calcium, Ionized  -     Phosphorus  -     PTH, Intact  -     SOLIQUA 100-33 UNT-MCG/ML solution pen-injector; Inject 60 Units under the skin into the appropriate area as directed Daily With Breakfast.  -     T4 & TSH ([facility]); Future  -     Vitamin D 25 Hydroxy; Future  -     Uric Acid;  Future  -     Comprehensive Metabolic Panel; Future  -     C-Peptide; Future  -     Hemoglobin A1c; Future  -     Lipid Panel; Future  -     MicroAlbumin, Urine, Random - Urine, Clean Catch; Future    Other orders  -     JARDIANCE 25 MG tablet; Take 25 mg by mouth Daily.  -     Insulin Pen Needle (PEN NEEDLES) 32G X 6 MM misc; 1 each Daily With Breakfast.               In summary I saw and examined this 68-year-old female for above-mentioned problems.  I reviewed multiple laboratory evaluations and office notes from her primary care provider's office and at this time we will go ahead and order an extensive laboratory evaluation and once the results come back we will go ahead and call for any possible modification or new medications.  However for her diabetes management I will give her by mouth good result 30 mg daily and Jardiance 10 mg every morning and if after 4 weeks was able to tolerate increase the dose to 25 mg every morning.  I cautioned her to be sure to drink lots of water and after the use of toilet or sexual intercourse compulsively cleans her genital area.  She actually well, giving her and SGOT2 inhibitor due to the fact that she says when she was taking Farxiga she might have been suffering from something else and that is why she had diarrhea and no other reaction.  I also asked her to finish her current antibiotic that she is taking for UTI and then make sure she is completely free from any infection and wait a week before initiating Jardiance.  Additionally I am gonna start her on [id number] units every morning and instructed her to check her blood glucose every morning and every 4 days if her fasting blood glucose is greater than 110 increase the dose by 4 units up to 60 units every morning.  This office visit lasted 65 minutes of which 35 minutes of it was a spent on face-to-face counseling of the patient and education as well as organizing her plan of care moving forward.  She will see Ms. Shirley  Leander in 4 months or sooner if needed with laboratory evaluation prior to each office visit.

## 2019-02-13 LAB
25(OH)D3+25(OH)D2 SERPL-MCNC: 34.7 NG/ML (ref 30–100)
ACTH PLAS-MCNC: 26.9 PG/ML (ref 7.2–63.3)
ALBUMIN SERPL-MCNC: 4.6 G/DL (ref 3.5–4.8)
ALBUMIN/GLOB SERPL: 1.6 {RATIO} (ref 1.2–2.2)
ALP SERPL-CCNC: 71 IU/L (ref 39–117)
ALT SERPL-CCNC: 42 IU/L (ref 0–32)
AST SERPL-CCNC: 48 IU/L (ref 0–40)
BILIRUB SERPL-MCNC: <0.2 MG/DL (ref 0–1.2)
BUN SERPL-MCNC: 14 MG/DL (ref 8–27)
BUN/CREAT SERPL: 22 (ref 12–28)
C PEPTIDE SERPL-MCNC: 5.1 NG/ML (ref 1.1–4.4)
CA-I SERPL ISE-MCNC: 5.2 MG/DL (ref 4.5–5.6)
CALCIUM SERPL-MCNC: 9.6 MG/DL (ref 8.7–10.3)
CHLORIDE SERPL-SCNC: 99 MMOL/L (ref 96–106)
CHOLEST SERPL-MCNC: 227 MG/DL (ref 100–199)
CO2 SERPL-SCNC: 22 MMOL/L (ref 20–29)
CORTIS SERPL-MCNC: 7.7 UG/DL
CREAT SERPL-MCNC: 0.64 MG/DL (ref 0.57–1)
GLOBULIN SER CALC-MCNC: 2.8 G/DL (ref 1.5–4.5)
GLUCOSE SERPL-MCNC: 261 MG/DL (ref 65–99)
HBA1C MFR BLD: 10.4 % (ref 4.8–5.6)
HDLC SERPL-MCNC: 63 MG/DL
INTERPRETATION: NORMAL
LDLC SERPL CALC-MCNC: 91 MG/DL (ref 0–99)
Lab: NORMAL
PHOSPHATE SERPL-MCNC: 3.7 MG/DL (ref 2.5–4.5)
POTASSIUM SERPL-SCNC: 4.2 MMOL/L (ref 3.5–5.2)
PROT SERPL-MCNC: 7.4 G/DL (ref 6–8.5)
PTH-INTACT SERPL-MCNC: 36 PG/ML (ref 15–65)
SODIUM SERPL-SCNC: 139 MMOL/L (ref 134–144)
T3FREE SERPL-MCNC: 2.9 PG/ML (ref 2–4.4)
T4 FREE SERPL-MCNC: 1.19 NG/DL (ref 0.82–1.77)
T4 SERPL-MCNC: 8.1 UG/DL (ref 4.5–12)
TRIGL SERPL-MCNC: 367 MG/DL (ref 0–149)
TSH SERPL DL<=0.005 MIU/L-ACNC: 1.96 UIU/ML (ref 0.45–4.5)
URATE SERPL-MCNC: 2.5 MG/DL (ref 2.5–7.1)
VLDLC SERPL CALC-MCNC: 73 MG/DL (ref 5–40)

## 2019-02-13 RX ORDER — ICOSAPENT ETHYL 1000 MG/1
2 CAPSULE ORAL 2 TIMES DAILY WITH MEALS
Qty: 360 CAPSULE | Refills: 3 | Status: SHIPPED | OUTPATIENT
Start: 2019-02-13 | End: 2019-12-19 | Stop reason: SDUPTHER

## 2019-02-21 ENCOUNTER — RESULTS ENCOUNTER (OUTPATIENT)
Dept: INTERNAL MEDICINE | Facility: CLINIC | Age: 69
End: 2019-02-21

## 2019-02-21 ENCOUNTER — OFFICE VISIT (OUTPATIENT)
Dept: INTERNAL MEDICINE | Facility: CLINIC | Age: 69
End: 2019-02-21

## 2019-02-21 VITALS
RESPIRATION RATE: 16 BRPM | BODY MASS INDEX: 33.13 KG/M2 | SYSTOLIC BLOOD PRESSURE: 142 MMHG | HEART RATE: 112 BPM | WEIGHT: 187 LBS | HEIGHT: 63 IN | OXYGEN SATURATION: 95 % | DIASTOLIC BLOOD PRESSURE: 84 MMHG | TEMPERATURE: 98 F

## 2019-02-21 DIAGNOSIS — N39.0 RECURRENT UTI: ICD-10-CM

## 2019-02-21 DIAGNOSIS — R35.0 FREQUENCY OF URINATION: ICD-10-CM

## 2019-02-21 DIAGNOSIS — IMO0001 UNCONTROLLED DIABETES MELLITUS TYPE 2 WITHOUT COMPLICATIONS: Primary | ICD-10-CM

## 2019-02-21 LAB
BILIRUB BLD-MCNC: NEGATIVE MG/DL
CLARITY, POC: ABNORMAL
COLOR UR: YELLOW
GLUCOSE UR STRIP-MCNC: ABNORMAL MG/DL
KETONES UR QL: ABNORMAL
LEUKOCYTE EST, POC: NEGATIVE
NITRITE UR-MCNC: NEGATIVE MG/ML
PH UR: 5 [PH] (ref 5–8)
PROT UR STRIP-MCNC: NEGATIVE MG/DL
RBC # UR STRIP: NEGATIVE /UL
SP GR UR: 1.02 (ref 1–1.03)
UROBILINOGEN UR QL: NORMAL

## 2019-02-21 PROCEDURE — 99214 OFFICE O/P EST MOD 30 MIN: CPT | Performed by: NURSE PRACTITIONER

## 2019-02-21 PROCEDURE — 81003 URINALYSIS AUTO W/O SCOPE: CPT | Performed by: NURSE PRACTITIONER

## 2019-02-21 NOTE — PROGRESS NOTES
"Chief Complaint   Patient presents with   • Urinary Tract Infection     frequency-finished antibiotic-still symptomatic   • Diabetes     not happy with endo       Subjective     Amber Campos is a 68 y.o. female being seen for a follow up appointment today regarding possible DM 2 and UTI. She was given 2 rounds of antibiotics, and she wanted her urine checked. She still has urinary frequency, but no dysuria, no blood in urine.     She saw the endo Dr. Fried, and she reports \"He gave me medicines and I don't even know what they are. He sent them in and did not even tell me about them\"  She does not want to see him anymore and reports \"I am completely stressed out about this!\" She has started a Weight Watchers diet. Her fasting glucose. She reports her fasting glucose at 120-160.       History of Present Illness     Allergies   Allergen Reactions   • Adhesive Tape      EKG stickers only   • Iodinated Diagnostic Agents Photosensitivity     contrast   • Farxiga [Dapagliflozin] Diarrhea and Itching     Yeast infeciotn   • Neomycin-Bacitracin Zn-Polymyx Rash         Current Outpatient Medications:   •  budesonide-formoterol (SYMBICORT) 160-4.5 MCG/ACT inhaler, Inhale 2 puffs As Needed., Disp: , Rfl:   •  cholecalciferol (VITAMIN D3) 1000 UNITS tablet, Take 1,000 Units by mouth Daily., Disp: , Rfl:   •  Clocortolone Pivalate 0.1 % cream, Apply  topically As Needed., Disp: , Rfl:   •  DUAVEE 0.45-20 MG tablet, TAKE 1 TABLET DAILY, Disp: 90 tablet, Rfl: 3  •  glucose blood test strip, Use to check  Blood sugar  Tid   Dx   e11.9, Disp: 300 each, Rfl: 3  •  Insulin Pen Needle (PEN NEEDLES) 32G X 6 MM misc, 1 each Daily With Breakfast., Disp: 90 each, Rfl: 3  •  JARDIANCE 25 MG tablet, Take 25 mg by mouth Daily., Disp: 30 tablet, Rfl: 5  •  Lancets (FREESTYLE) lancets, , Disp: , Rfl:   •  metFORMIN (GLUCOPHAGE) 1000 MG tablet, TAKE 1 TABLET TWICE DAILY  WITH MEALS, Disp: 180 tablet, Rfl: 3  •  mometasone (NASONEX) 50 MCG/ACT " nasal spray, 2 sprays into each nostril As Needed., Disp: , Rfl:   •  Multiple Vitamins-Iron (MULTIPLE VITAMIN/IRON PO), Take 1 tablet/day by mouth., Disp: , Rfl:   •  olmesartan (BENICAR) 40 MG tablet, TAKE 1 TABLET EVERY MORNING, Disp: 90 tablet, Rfl: 3  •  pantoprazole (PROTONIX) 40 MG EC tablet, TAKE 1 TABLET DAILY, Disp: 90 tablet, Rfl: 2  •  pioglitazone (ACTOS) 30 MG tablet, Take 1 tablet by mouth Daily., Disp: 30 tablet, Rfl: 11  •  SOLIQUA 100-33 UNT-MCG/ML solution pen-injector, Inject 60 Units under the skin into the appropriate area as directed Daily With Breakfast., Disp: 5 pen, Rfl: 5  •  Spacer/Aero Chamber Mouthpiece misc, 1 each Daily., Disp: 1 each, Rfl: 0  •  VASCEPA 1 g capsule capsule, Take 2 g by mouth 2 (Two) Times a Day With Meals., Disp: 360 capsule, Rfl: 3  •  venlafaxine XR (EFFEXOR-XR) 75 MG 24 hr capsule, TAKE 1 CAPSULE DAILY, Disp: 90 capsule, Rfl: 3  •  VOLTAREN 1 % gel gel, As Needed. APPLY TO AFFECTED AREA TWICE A DAY, Disp: , Rfl: 3    The following portions of the patient's history were reviewed and updated as appropriate: allergies, current medications, past family history, past medical history, past social history, past surgical history and problem list.    Review of Systems   Constitutional: Negative.    HENT: Negative.    Eyes: Negative.    Respiratory: Negative.    Cardiovascular: Negative.    Gastrointestinal: Negative.    Endocrine: Negative.    Genitourinary: Positive for frequency.   Musculoskeletal: Negative.    Skin: Negative.    Allergic/Immunologic: Negative.    Neurological: Negative.    Hematological: Negative.    Psychiatric/Behavioral: Negative.        Assessment     Physical Exam   Constitutional: She is oriented to person, place, and time. She appears well-developed and well-nourished.   HENT:   Head: Normocephalic.   Right Ear: External ear normal.   Left Ear: External ear normal.   Nose: Nose normal.   Mouth/Throat: Oropharynx is clear and moist. No  "oropharyngeal exudate.   Neck: Neck supple.   Cardiovascular: Normal rate, regular rhythm and normal heart sounds.   No murmur heard.  Pulmonary/Chest: Effort normal and breath sounds normal. No stridor. No respiratory distress.   Abdominal: Soft. Bowel sounds are normal.   Neurological: She is alert and oriented to person, place, and time.   Skin: Skin is warm and dry.   Psychiatric: She has a normal mood and affect. Her behavior is normal.   Vitals reviewed.      Plan     Her fasting labs were reviewed with the patient from last week.     Amber was seen today for urinary tract infection and diabetes.    Diagnoses and all orders for this visit:    Uncontrolled diabetes mellitus type 2 without complications (CMS/[facility])    Frequency of urination  -     POCT urinalysis dipstick, automated    Recurrent UTI  -     POCT urinalysis dipstick, automated  -     Urine Culture - Urine, Urine, Clean Catch; Future        We spent 20 minutes on medication review. She refuses Soliqua, stating \" I won't take insulin.\" She has agreed to start Actos 30 mg daily and stay on Glucophage. She refuses Jardiance because \" I have seen taht all over the TV.\" She will come back next month for an A1c and consider Farxiga at that time.    Urinary frequency reviewed with her as symtpom of elevated glucose.    Follow up in 4 weeks  "

## 2019-02-24 LAB
BACTERIA UR CULT: NO GROWTH
BACTERIA UR CULT: NORMAL

## 2019-02-27 ENCOUNTER — TELEPHONE (OUTPATIENT)
Dept: INTERNAL MEDICINE | Facility: CLINIC | Age: 69
End: 2019-02-27

## 2019-02-27 NOTE — TELEPHONE ENCOUNTER
Unable to reach.     ----- Message from JAKE Blount sent at 2/25/2019 12:50 PM EST -----  Urine culture is negative. High glucose can cause urinary frequency

## 2019-03-21 ENCOUNTER — OFFICE VISIT (OUTPATIENT)
Dept: INTERNAL MEDICINE | Facility: CLINIC | Age: 69
End: 2019-03-21

## 2019-03-21 VITALS
HEIGHT: 63 IN | SYSTOLIC BLOOD PRESSURE: 150 MMHG | OXYGEN SATURATION: 99 % | DIASTOLIC BLOOD PRESSURE: 90 MMHG | WEIGHT: 191 LBS | TEMPERATURE: 98.4 F | BODY MASS INDEX: 33.84 KG/M2 | RESPIRATION RATE: 16 BRPM | HEART RATE: 91 BPM

## 2019-03-21 DIAGNOSIS — J01.00 ACUTE MAXILLARY SINUSITIS, RECURRENCE NOT SPECIFIED: Primary | ICD-10-CM

## 2019-03-21 DIAGNOSIS — M77.8 LEFT SHOULDER TENDONITIS: ICD-10-CM

## 2019-03-21 DIAGNOSIS — R52 GENERALIZED BODY ACHES: ICD-10-CM

## 2019-03-21 DIAGNOSIS — IMO0001 UNCONTROLLED DIABETES MELLITUS TYPE 2 WITHOUT COMPLICATIONS: ICD-10-CM

## 2019-03-21 LAB
EXPIRATION DATE: NORMAL
FLUAV AG NPH QL: NEGATIVE
FLUBV AG NPH QL: NEGATIVE
HBA1C MFR BLD: 10.4 %
INTERNAL CONTROL: NORMAL
Lab: NORMAL

## 2019-03-21 PROCEDURE — 87804 INFLUENZA ASSAY W/OPTIC: CPT | Performed by: NURSE PRACTITIONER

## 2019-03-21 PROCEDURE — 99214 OFFICE O/P EST MOD 30 MIN: CPT | Performed by: NURSE PRACTITIONER

## 2019-03-21 PROCEDURE — 83036 HEMOGLOBIN GLYCOSYLATED A1C: CPT | Performed by: NURSE PRACTITIONER

## 2019-03-21 RX ORDER — TRAMADOL HYDROCHLORIDE 50 MG/1
50 TABLET ORAL EVERY 8 HOURS PRN
Qty: 30 TABLET | Refills: 0 | Status: SHIPPED | OUTPATIENT
Start: 2019-03-21 | End: 2020-01-20

## 2019-03-21 RX ORDER — CEFDINIR 300 MG/1
300 CAPSULE ORAL 2 TIMES DAILY
Qty: 20 CAPSULE | Refills: 0 | Status: SHIPPED | OUTPATIENT
Start: 2019-03-21 | End: 2019-04-08

## 2019-03-21 NOTE — PROGRESS NOTES
"Chief Complaint   Patient presents with   • Generalized Body Aches   • Fatigue   • Chills       Subjective     Amber Campos is a 68 y.o. female being seen for a follow up appointment today regarding chills, body aches, and URI smtpoms. She reports that this started 2 days ago with sore throat, and has progressed to \"my normal bronchitis symptoms.\". She tried naproxen sodium.    She was started on Actos by endo since last visit, but she has not seen a reduction in glucose. She was also given a script for Soliqua and refuses to start it.     She is also concerned due to continued eft shoulder pain after surgery a coup,e years ago due to fracture. Aching pain that she saw the PT about. She is requesting a pain piul as needed. No change is ROM. Pain 3 of 10.     History of Present Illness     Allergies   Allergen Reactions   • Adhesive Tape      EKG stickers only   • Iodinated Diagnostic Agents Photosensitivity     contrast   • Farxiga [Dapagliflozin] Diarrhea and Itching     Yeast infeciotn   • Neomycin-Bacitracin Zn-Polymyx Rash         Current Outpatient Medications:   •  budesonide-formoterol (SYMBICORT) 160-4.5 MCG/ACT inhaler, Inhale 2 puffs As Needed., Disp: , Rfl:   •  cholecalciferol (VITAMIN D3) 1000 UNITS tablet, Take 1,000 Units by mouth Daily., Disp: , Rfl:   •  Clocortolone Pivalate 0.1 % cream, Apply  topically As Needed., Disp: , Rfl:   •  DUAVEE 0.45-20 MG tablet, TAKE 1 TABLET DAILY, Disp: 90 tablet, Rfl: 3  •  glucose blood test strip, Use to check  Blood sugar  Tid   Dx   e11.9, Disp: 300 each, Rfl: 3  •  Insulin Pen Needle (PEN NEEDLES) 32G X 6 MM misc, 1 each Daily With Breakfast., Disp: 90 each, Rfl: 3  •  Lancets (FREESTYLE) lancets, , Disp: , Rfl:   •  metFORMIN (GLUCOPHAGE) 1000 MG tablet, TAKE 1 TABLET TWICE DAILY  WITH MEALS, Disp: 180 tablet, Rfl: 3  •  mometasone (NASONEX) 50 MCG/ACT nasal spray, 2 sprays into each nostril As Needed., Disp: , Rfl:   •  Multiple Vitamins-Iron (MULTIPLE " VITAMIN/IRON PO), Take 1 tablet/day by mouth., Disp: , Rfl:   •  olmesartan (BENICAR) 40 MG tablet, TAKE 1 TABLET EVERY MORNING, Disp: 90 tablet, Rfl: 3  •  pantoprazole (PROTONIX) 40 MG EC tablet, TAKE 1 TABLET DAILY, Disp: 90 tablet, Rfl: 2  •  pioglitazone (ACTOS) 30 MG tablet, Take 1 tablet by mouth Daily., Disp: 30 tablet, Rfl: 11  •  SOLIQUA 100-33 UNT-MCG/ML solution pen-injector, Inject 60 Units under the skin into the appropriate area as directed Daily With Breakfast., Disp: 5 pen, Rfl: 5  •  Spacer/Aero Chamber Mouthpiece misc, 1 each Daily., Disp: 1 each, Rfl: 0  •  VASCEPA 1 g capsule capsule, Take 2 g by mouth 2 (Two) Times a Day With Meals., Disp: 360 capsule, Rfl: 3  •  venlafaxine XR (EFFEXOR-XR) 75 MG 24 hr capsule, TAKE 1 CAPSULE DAILY, Disp: 90 capsule, Rfl: 3  •  VOLTAREN 1 % gel gel, As Needed. APPLY TO AFFECTED AREA TWICE A DAY, Disp: , Rfl: 3    The following portions of the patient's history were reviewed and updated as appropriate: allergies, current medications, past family history, past medical history, past social history, past surgical history and problem list.    Review of Systems   Constitutional: Positive for chills, fatigue and fever.   HENT: Positive for congestion, nosebleeds, postnasal drip, rhinorrhea, sinus pressure, sinus pain, sneezing and sore throat.    Eyes: Negative.    Respiratory: Negative for shortness of breath and stridor.    Cardiovascular: Negative for chest pain, palpitations and leg swelling.   Endocrine: Negative.    Genitourinary: Positive for frequency.   Musculoskeletal: Positive for arthralgias and joint swelling. Negative for gait problem.   Allergic/Immunologic: Positive for environmental allergies.   Neurological: Negative.    Psychiatric/Behavioral: Negative.        Assessment     Physical Exam   Constitutional: She is oriented to person, place, and time. She appears well-developed and well-nourished. No distress.   HENT:   Right Ear: A middle ear  effusion is present. Decreased hearing is noted.   Left Ear: A middle ear effusion is present. Decreased hearing is noted.   Nose: Mucosal edema and rhinorrhea present. Right sinus exhibits frontal sinus tenderness. Left sinus exhibits frontal sinus tenderness.   Neck: Neck supple. No thyromegaly present.   Cardiovascular: Normal rate, regular rhythm and normal heart sounds.   No murmur heard.  Pulmonary/Chest: Effort normal and breath sounds normal. No stridor. No respiratory distress.   Musculoskeletal: She exhibits no edema.   Neurological: She is alert and oriented to person, place, and time.   Skin: Skin is warm and dry.   Psychiatric: She has a normal mood and affect. Her behavior is normal.   Vitals reviewed.      Bailey Clark was seen today for generalized body aches, fatigue and chills.    Diagnoses and all orders for this visit:    Acute maxillary sinusitis, recurrence not specified  -     HYDROcod Polst-CPM Polst ER (TUSSIONEX PENNKINETIC ER) 10-8 MG/5ML ER suspension; Take 5 mL by mouth Every 12 (Twelve) Hours As Needed for Cough.  -     cefdinir (OMNICEF) 300 MG capsule; Take 1 capsule by mouth 2 (Two) Times a Day.    Uncontrolled diabetes mellitus type 2 without complications (CMS/HCC)  -     POC Glycosylated Hemoglobin (Hb A1C)    Generalized body aches  -     POCT Influenza A/B    Left shoulder tendonitis  -     traMADol (ULTRAM) 50 MG tablet; Take 1 tablet by mouth Every 8 (Eight) Hours As Needed for Moderate Pain .    Other orders  -     Cancel: POCT urinalysis dipstick, automated      Hgb A1c 10.2 today     The patient has read and signed the [facility] Controlled Substance Contract.  I will continue to see patient for regular follow up appointments.  They are well controlled on their medication.  YOSELIN is updated every 3 months. The patient is aware of the potential for addiction and dependence.    Follow up as scheduled. Will discuss Trulicity at next appt

## 2019-03-21 NOTE — PATIENT INSTRUCTIONS
Shoulder Exercises  Ask your health care provider which exercises are safe for you. Do exercises exactly as told by your health care provider and adjust them as directed. It is normal to feel mild stretching, pulling, tightness, or discomfort as you do these exercises, but you should stop right away if you feel sudden pain or your pain gets worse. Do not begin these exercises until told by your health care provider.  RANGE OF MOTION EXERCISES  These exercises warm up your muscles and joints and improve the movement and flexibility of your shoulder. These exercises also help to relieve pain, numbness, and tingling. These exercises involve stretching your injured shoulder directly.  Exercise A: Pendulum    Stand near a wall or a surface that you can hold onto for balance.  Bend at the waist and let your left / right arm hang straight down. Use your other arm to support you. Keep your back straight and do not lock your knees.  Relax your left / right arm and shoulder muscles, and move your hips and your trunk so your left / right arm swings freely. Your arm should swing because of the motion of your body, not because you are using your arm or shoulder muscles.  Keep moving your body so your arm swings in the following directions, as told by your health care provider:  Side to side.  Forward and backward.  In clockwise and counterclockwise circles.  Continue each motion for __________ seconds, or for as long as told by your health care provider.  Slowly return to the starting position.  Repeat __________ times. Complete this exercise __________ times a day.  Exercise B: Flexion, Standing    Stand and hold a broomstick, a cane, or a similar object. Place your hands a little more than shoulder-width apart on the object. Your left / right hand should be palm-up, and your other hand should be palm-down.  Keep your elbow straight and keep your shoulder muscles relaxed. Push the stick down with your healthy arm to raise your  left / right arm in front of your body, and then over your head until you feel a stretch in your shoulder.  Avoid shrugging your shoulder while you raise your arm. Keep your shoulder blade tucked down toward the middle of your back.  Hold for __________ seconds.  Slowly return to the starting position.  Repeat __________ times. Complete this exercise __________ times a day.  Exercise C: Abduction, Standing  Stand and hold a broomstick, a cane, or a similar object. Place your hands a little more than shoulder-width apart on the object. Your left / right hand should be palm-up, and your other hand should be palm-down.  While keeping your elbow straight and your shoulder muscles relaxed, push the stick across your body toward your left / right side. Raise your left / right arm to the side of your body and then over your head until you feel a stretch in your shoulder.  Do not raise your arm above shoulder height, unless your health care provider tells you to do that.  Avoid shrugging your shoulder while you raise your arm. Keep your shoulder blade tucked down toward the middle of your back.  Hold for __________ seconds.  Slowly return to the starting position.  Repeat __________ times. Complete this exercise __________ times a day.  Exercise D: Internal Rotation    Place your left / right hand behind your back, palm-up.  Use your other hand to dangle an exercise band, a towel, or a similar object over your shoulder. Grasp the band with your left / right hand so you are holding onto both ends.  Gently pull up on the band until you feel a stretch in the front of your left / right shoulder.  Avoid shrugging your shoulder while you raise your arm. Keep your shoulder blade tucked down toward the middle of your back.  Hold for __________ seconds.  Release the stretch by letting go of the band and lowering your hands.  Repeat __________ times. Complete this exercise __________ times a day.  STRETCHING EXERCISES  These  exercises warm up your muscles and joints and improve the movement and flexibility of your shoulder. These exercises also help to relieve pain, numbness, and tingling. These exercises are done using your healthy shoulder to help stretch the muscles of your injured shoulder.  Exercise E: Corner Stretch (External Rotation and Abduction)     a doorway with one of your feet slightly in front of the other. This is called a staggered stance. If you cannot reach your forearms to the door frame, stand facing a corner of a room.  Choose one of the following positions as told by your health care provider:  Place your hands and forearms on the door frame above your head.  Place your hands and forearms on the door frame at the height of your head.  Place your hands on the door frame at the height of your elbows.  Slowly move your weight onto your front foot until you feel a stretch across your chest and in the front of your shoulders. Keep your head and chest upright and keep your abdominal muscles tight.  Hold for __________ seconds.  To release the stretch, shift your weight to your back foot.  Repeat __________ times. Complete this stretch __________ times a day.  Exercise F: Extension, Standing  Stand and hold a broomstick, a cane, or a similar object behind your back.  Your hands should be a little wider than shoulder-width apart.  Your palms should face away from your back.  Keeping your elbows straight and keeping your shoulder muscles relaxed, move the stick away from your body until you feel a stretch in your shoulder.  Avoid shrugging your shoulders while you move the stick. Keep your shoulder blade tucked down toward the middle of your back.  Hold for __________ seconds.  Slowly return to the starting position.  Repeat __________ times. Complete this exercise __________ times a day.  STRENGTHENING EXERCISES  These exercises build strength and endurance in your shoulder. Endurance is the ability to use your  muscles for a long time, even after they get tired.  Exercise G: External Rotation    Sit in a stable chair without armrests.  Secure an exercise band at elbow height on your left / right side.  Place a soft object, such as a folded towel or a small pillow, between your left / right upper arm and your body to move your elbow a few inches away (about 10 cm) from your side.  Hold the end of the band so it is tight and there is no slack.  Keeping your elbow pressed against the soft object, move your left / right forearm out, away from your abdomen. Keep your body steady so only your forearm moves.  Hold for __________ seconds.  Slowly return to the starting position.  Repeat __________ times. Complete this exercise __________ times a day.  Exercise H: Shoulder Abduction    Sit in a stable chair without armrests, or stand.  Hold a __________ weight in your left / right hand, or hold an exercise band with both hands.  Start with your arms straight down and your left / right palm facing in, toward your body.  Slowly lift your left / right hand out to your side. Do not lift your hand above shoulder height unless your health care provider tells you that this is safe.  Keep your arms straight.  Avoid shrugging your shoulder while you do this movement. Keep your shoulder blade tucked down toward the middle of your back.  Hold for __________ seconds.  Slowly lower your arm, and return to the starting position.  Repeat __________ times. Complete this exercise __________ times a day.  Exercise I: Shoulder Extension  Sit in a stable chair without armrests, or stand.  Secure an exercise band to a stable object in front of you where it is at shoulder height.  Hold one end of the exercise band in each hand. Your palms should face each other.  Straighten your elbows and lift your hands up to shoulder height.  Step back, away from the secured end of the exercise band, until the band is tight and there is no slack.  Squeeze your  "shoulder blades together as you pull your hands down to the sides of your thighs. Stop when your hands are straight down by your sides. Do not let your hands go behind your body.  Hold for __________ seconds.  Slowly return to the starting position.  Repeat __________ times. Complete this exercise __________ times a day.  Exercise J: Standing Shoulder Row  Sit in a stable chair without armrests, or stand.  Secure an exercise band to a stable object in front of you so it is at waist height.  Hold one end of the exercise band in each hand. Your palms should be in a thumbs-up position.  Bend each of your elbows to an \"L\" shape (about 90 degrees) and keep your upper arms at your sides.  Step back until the band is tight and there is no slack.  Slowly pull your elbows back behind you.  Hold for __________ seconds.  Slowly return to the starting position.  Repeat __________ times. Complete this exercise __________ times a day.  Exercise K: Shoulder Press-Ups    Sit in a stable chair that has armrests. Sit upright, with your feet flat on the floor.  Put your hands on the armrests so your elbows are bent and your fingers are pointing forward. Your hands should be about even with the sides of your body.  Push down on the armrests and use your arms to lift yourself off of the chair. Straighten your elbows and lift yourself up as much as you comfortably can.  Move your shoulder blades down, and avoid letting your shoulders move up toward your ears.  Keep your feet on the ground. As you get stronger, your feet should support less of your body weight as you lift yourself up.  Hold for __________ seconds.  Slowly lower yourself back into the chair.  Repeat __________ times. Complete this exercise __________ times a day.  Exercise L: Wall Push-Ups    Stand so you are facing a stable wall. Your feet should be about one arm-length away from the wall.  Lean forward and place your palms on the wall at shoulder height.  Keep your feet " flat on the floor as you bend your elbows and lean forward toward the wall.  Hold for __________ seconds.  Straighten your elbows to push yourself back to the starting position.  Repeat __________ times. Complete this exercise __________ times a day.  This information is not intended to replace advice given to you by your health care provider. Make sure you discuss any questions you have with your health care provider.  Document Released: 11/01/2006 Document Revised: 09/11/2017 Document Reviewed: 08/28/2016  [employer] Interactive Patient Education © 2019 [employer]  Biceps Tendon Tendinitis (Proximal) and Tenosynovitis  The proximal biceps tendon is a strong cord of tissue that connects the biceps muscle, on the front of the upper arm, to the shoulder blade. Tendinitis is inflammation of a tendon. Tenosynovitis is inflammation of the lining around the tendon (tendon sheath). These conditions often occur at the same time, and they can interfere with the ability to bend the elbow and turn the hand palm-up (supination). Proximal biceps tendon tendinitis and tenosynovitis are usually caused by overusing the shoulder joint and the biceps muscle. These conditions usually heal within 6 weeks.  Proximal biceps tendon tendinitis may include a grade 1 or grade 2 strain of the tendon. A grade 1 strain is mild, and it involves a slight pull of the tendon without any stretching or noticeable tearing of the tendon. There is usually no loss of biceps muscle strength. A grade 2 strain is moderate, and it involves a small tear in the tendon. The tendon is stretched, and biceps strength is usually decreased.  What are the causes?  This condition may be caused by:  · A sudden increase in frequency or intensity of activity that involves the shoulder and the biceps muscle.  · Overuse of the biceps muscle. This can happen when you do the same movements over and over, such as:  ? Supination.  ? Forceful straightening (hyperextension) of  the elbow.  ? Bending the elbow.  · A direct, forceful hit or injury (trauma) to the elbow. This is rare.    What increases the risk?  The following factors may make you more likely to develop this condition:  · Playing contact sports.  · Playing sports that involve throwing and overhead movements, including racket sports, gymnastics, weight lifting, or bodybuilding.  · Doing physical labor.  · Having poor strength and flexibility of the arm and shoulder.    What are the signs or symptoms?  Symptoms of this condition may include:  · Pain and inflammation in the front of the shoulder. Pain may get worse with movement, especially when you use resistance, as in weight lifting.  · A feeling of warmth in the front of the shoulder.  · Limited range of motion of the shoulder and the elbow.  · A crackling sound (crepitation) when you move or touch the shoulder or the upper arm.    In some cases, symptoms may return (recur) after treatment, and they may be long-lasting (chronic).  How is this diagnosed?  This condition is diagnosed based on your symptoms, your medical history, and a physical exam. You may have tests, including X-rays or MRIs. Your health care provider may test your range of motion by asking you to do arm movements.  How is this treated?  This condition is treated by resting and icing the injured area, and by doing physical therapy exercises. Depending on the severity of your condition, treatment may also include:  · Medicines to help relieve pain and inflammation.  · Ultrasound therapy. This is the application of sound waves to the injured area.  · Injecting medicines (corticosteroids) into your tendon sheath.  · Injecting medicines that numb the area (local anesthetics).  · Surgery to remove the damaged part of the tendon and reattach the undamaged part of the tendon to the arm bone (humerus). This is usually only done if you have symptoms that do not get better with other treatment methods.    Follow these  instructions at home:  Managing pain, stiffness, and swelling  · If directed, put ice on the injured area:  ? Put ice in a plastic bag.  ? Place a towel between your skin and the bag.  ? Leave the ice on for 20 minutes, 2-3 times a day.  · Move your fingers often to avoid stiffness and to lessen swelling.  · Raise (elevate) the injured area above the level of your heart while you are sitting or lying down.  · If directed, apply heat to the affected area before you exercise. Use the heat source that your health care provider recommends, such as a moist heat pack or a heating pad.  ? Place a towel between your skin and the heat source.  ? Leave the heat on for 20-30 minutes.  ? Remove the heat if your skin turns bright red. This is especially important if you are unable to feel pain, heat, or cold. You may have a greater risk of getting burned.  Activity  · Return to your normal activities as told by your health care provider. Ask your health care provider what activities are safe for you.  · Do not lift anything that is heavier than 10 lb (4.5 kg) until your health care provider tells you that it is safe.  · Avoid activities that cause pain or make your condition worse.  · Do exercises as told by your health care provider.  General instructions  · Take over-the-counter and prescription medicines only as told by your health care provider.  · Do not drive or operate heavy machinery while taking prescription pain medicines.  · Keep all follow-up visits as told by your health care provider. This is important.  How is this prevented?  · Warm up and stretch before being active.  · Cool down and stretch after being active.  · Give your body time to rest between periods of activity.  · Make sure any equipment that you use is fitted to you.  · Be safe and responsible while being active to avoid falls.  · Do at least 150 minutes of moderate-intensity aerobic exercise each week, such as brisk walking or water  aerobics.  · Maintain physical fitness, including:  ? Strength.  ? Flexibility.  ? Cardiovascular fitness.  ? Endurance.  Contact a health care provider if:  · You have symptoms that get worse or do not get better after 2 weeks of treatment.  · You develop new symptoms.  Get help right away if:  · You develop severe pain.  This information is not intended to replace advice given to you by your health care provider. Make sure you discuss any questions you have with your health care provider.  Document Released: 12/18/2006 Document Revised: 08/24/2017 Document Reviewed: 11/25/2016  [employer] Interactive Patient Education © 2018 [employer]

## 2019-04-01 ENCOUNTER — LAB (OUTPATIENT)
Dept: INTERNAL MEDICINE | Facility: CLINIC | Age: 69
End: 2019-04-01

## 2019-04-01 DIAGNOSIS — IMO0001 UNCONTROLLED DIABETES MELLITUS TYPE 2 WITHOUT COMPLICATIONS: ICD-10-CM

## 2019-04-01 DIAGNOSIS — E78.2 MIXED HYPERLIPIDEMIA: ICD-10-CM

## 2019-04-01 DIAGNOSIS — I10 ESSENTIAL HYPERTENSION: Primary | ICD-10-CM

## 2019-04-01 DIAGNOSIS — I10 ESSENTIAL HYPERTENSION: ICD-10-CM

## 2019-04-01 LAB
ALBUMIN SERPL-MCNC: 4 G/DL (ref 3.5–5.2)
ALBUMIN/GLOB SERPL: 1.5 G/DL
ALP SERPL-CCNC: 51 U/L (ref 39–117)
ALT SERPL-CCNC: 42 U/L (ref 1–33)
AST SERPL-CCNC: 46 U/L (ref 1–32)
BASOPHILS # BLD AUTO: 0.04 10*3/MM3 (ref 0–0.2)
BASOPHILS NFR BLD AUTO: 0.8 % (ref 0–1.5)
BILIRUB SERPL-MCNC: 0.3 MG/DL (ref 0.2–1.2)
BUN SERPL-MCNC: 9 MG/DL (ref 8–23)
BUN/CREAT SERPL: 20 (ref 7–25)
CALCIUM SERPL-MCNC: 9.1 MG/DL (ref 8.6–10.5)
CHLORIDE SERPL-SCNC: 97 MMOL/L (ref 98–107)
CHOLEST SERPL-MCNC: 205 MG/DL (ref 0–200)
CO2 SERPL-SCNC: 26.2 MMOL/L (ref 22–29)
CREAT SERPL-MCNC: 0.45 MG/DL (ref 0.57–1)
EOSINOPHIL # BLD AUTO: 0.26 10*3/MM3 (ref 0–0.4)
EOSINOPHIL NFR BLD AUTO: 4.9 % (ref 0.3–6.2)
ERYTHROCYTE [DISTWIDTH] IN BLOOD BY AUTOMATED COUNT: 14.2 % (ref 12.3–15.4)
GLOBULIN SER CALC-MCNC: 2.7 GM/DL
GLUCOSE SERPL-MCNC: 209 MG/DL (ref 65–99)
HBA1C MFR BLD: 9.52 % (ref 4.8–5.6)
HCT VFR BLD AUTO: 40.1 % (ref 34–46.6)
HDLC SERPL-MCNC: 60 MG/DL (ref 40–60)
HGB BLD-MCNC: 12.4 G/DL (ref 12–15.9)
IMM GRANULOCYTES # BLD AUTO: 0.02 10*3/MM3 (ref 0–0.05)
IMM GRANULOCYTES NFR BLD AUTO: 0.4 % (ref 0–0.5)
LDLC SERPL CALC-MCNC: 97 MG/DL (ref 0–100)
LYMPHOCYTES # BLD AUTO: 2.52 10*3/MM3 (ref 0.7–3.1)
LYMPHOCYTES NFR BLD AUTO: 47.9 % (ref 19.6–45.3)
MCH RBC QN AUTO: 27.9 PG (ref 26.6–33)
MCHC RBC AUTO-ENTMCNC: 30.9 G/DL (ref 31.5–35.7)
MCV RBC AUTO: 90.1 FL (ref 79–97)
MONOCYTES # BLD AUTO: 0.37 10*3/MM3 (ref 0.1–0.9)
MONOCYTES NFR BLD AUTO: 7 % (ref 5–12)
NEUTROPHILS # BLD AUTO: 2.05 10*3/MM3 (ref 1.4–7)
NEUTROPHILS NFR BLD AUTO: 39 % (ref 42.7–76)
NRBC BLD AUTO-RTO: 0 /100 WBC (ref 0–0)
PLATELET # BLD AUTO: 299 10*3/MM3 (ref 140–450)
POTASSIUM SERPL-SCNC: 4.6 MMOL/L (ref 3.5–5.2)
PROT SERPL-MCNC: 6.7 G/DL (ref 6–8.5)
RBC # BLD AUTO: 4.45 10*6/MM3 (ref 3.77–5.28)
SODIUM SERPL-SCNC: 139 MMOL/L (ref 136–145)
TRIGL SERPL-MCNC: 239 MG/DL (ref 0–150)
VLDLC SERPL CALC-MCNC: 47.8 MG/DL (ref 5–40)
WBC # BLD AUTO: 5.26 10*3/MM3 (ref 3.4–10.8)

## 2019-04-08 ENCOUNTER — OFFICE VISIT (OUTPATIENT)
Dept: INTERNAL MEDICINE | Facility: CLINIC | Age: 69
End: 2019-04-08

## 2019-04-08 ENCOUNTER — APPOINTMENT (OUTPATIENT)
Dept: WOMENS IMAGING | Facility: HOSPITAL | Age: 69
End: 2019-04-08

## 2019-04-08 VITALS
HEART RATE: 108 BPM | WEIGHT: 195 LBS | BODY MASS INDEX: 34.55 KG/M2 | SYSTOLIC BLOOD PRESSURE: 148 MMHG | RESPIRATION RATE: 16 BRPM | TEMPERATURE: 98.1 F | HEIGHT: 63 IN | OXYGEN SATURATION: 95 % | DIASTOLIC BLOOD PRESSURE: 72 MMHG

## 2019-04-08 DIAGNOSIS — IMO0001 UNCONTROLLED DIABETES MELLITUS TYPE 2 WITHOUT COMPLICATIONS: Primary | ICD-10-CM

## 2019-04-08 DIAGNOSIS — Z91.09 ENVIRONMENTAL ALLERGIES: ICD-10-CM

## 2019-04-08 DIAGNOSIS — I10 ESSENTIAL HYPERTENSION: ICD-10-CM

## 2019-04-08 DIAGNOSIS — E78.2 MIXED HYPERLIPIDEMIA: ICD-10-CM

## 2019-04-08 DIAGNOSIS — Z23 IMMUNIZATION DUE: ICD-10-CM

## 2019-04-08 PROBLEM — J01.00 ACUTE MAXILLARY SINUSITIS: Status: RESOLVED | Noted: 2019-03-21 | Resolved: 2019-04-08

## 2019-04-08 PROCEDURE — G0009 ADMIN PNEUMOCOCCAL VACCINE: HCPCS | Performed by: NURSE PRACTITIONER

## 2019-04-08 PROCEDURE — 90732 PPSV23 VACC 2 YRS+ SUBQ/IM: CPT | Performed by: NURSE PRACTITIONER

## 2019-04-08 PROCEDURE — 77067 SCR MAMMO BI INCL CAD: CPT | Performed by: RADIOLOGY

## 2019-04-08 PROCEDURE — 99214 OFFICE O/P EST MOD 30 MIN: CPT | Performed by: NURSE PRACTITIONER

## 2019-04-08 PROCEDURE — 77063 BREAST TOMOSYNTHESIS BI: CPT | Performed by: RADIOLOGY

## 2019-04-08 RX ORDER — MONTELUKAST SODIUM 10 MG/1
10 TABLET ORAL NIGHTLY
Qty: 30 TABLET | Refills: 1 | Status: SHIPPED | OUTPATIENT
Start: 2019-04-08 | End: 2020-01-16 | Stop reason: SDUPTHER

## 2019-04-08 NOTE — PROGRESS NOTES
Chief Complaint   Patient presents with   • Follow-up   • Diabetes       Subjective     Amber Campos is a 68 y.o. female being seen for a follow up appointment today regarding  DM 2, HTN, and hyperlipidemia. She is currently on Actos 30mg, Metformin 1000 mg BID . She has not checked her glucose lately. She denie sblurred vision, urinary frequency urgency. She started MCT oil. She admits to poor diet. She is taking her Benicar 40 mg daily for HTN and Vascepa for cholesterol control.     She is reporting some bronchial congestion since allergies are bad. She has some wheezing and coughing at night.       History of Present Illness     Allergies   Allergen Reactions   • Adhesive Tape      EKG stickers only   • Iodinated Diagnostic Agents Photosensitivity     contrast   • Farxiga [Dapagliflozin] Diarrhea and Itching     Yeast infeciotn   • Neomycin-Bacitracin Zn-Polymyx Rash         Current Outpatient Medications:   •  budesonide-formoterol (SYMBICORT) 160-4.5 MCG/ACT inhaler, Inhale 2 puffs As Needed., Disp: , Rfl:   •  cholecalciferol (VITAMIN D3) 1000 UNITS tablet, Take 1,000 Units by mouth Daily., Disp: , Rfl:   •  Clocortolone Pivalate 0.1 % cream, Apply  topically As Needed., Disp: , Rfl:   •  DUAVEE 0.45-20 MG tablet, TAKE 1 TABLET DAILY, Disp: 90 tablet, Rfl: 3  •  glucose blood test strip, Use to check  Blood sugar  Tid   Dx   e11.9, Disp: 300 each, Rfl: 3  •  Insulin Pen Needle (PEN NEEDLES) 32G X 6 MM misc, 1 each Daily With Breakfast., Disp: 90 each, Rfl: 3  •  Lancets (FREESTYLE) lancets, , Disp: , Rfl:   •  metFORMIN (GLUCOPHAGE) 1000 MG tablet, TAKE 1 TABLET TWICE DAILY  WITH MEALS, Disp: 180 tablet, Rfl: 3  •  mometasone (NASONEX) 50 MCG/ACT nasal spray, 2 sprays into each nostril As Needed., Disp: , Rfl:   •  Multiple Vitamins-Iron (MULTIPLE VITAMIN/IRON PO), Take 1 tablet/day by mouth., Disp: , Rfl:   •  olmesartan (BENICAR) 40 MG tablet, TAKE 1 TABLET EVERY MORNING, Disp: 90 tablet, Rfl: 3  •   pantoprazole (PROTONIX) 40 MG EC tablet, TAKE 1 TABLET DAILY, Disp: 90 tablet, Rfl: 2  •  pioglitazone (ACTOS) 30 MG tablet, Take 1 tablet by mouth Daily., Disp: 30 tablet, Rfl: 11  •  Spacer/Aero Chamber Mouthpiece misc, 1 each Daily., Disp: 1 each, Rfl: 0  •  traMADol (ULTRAM) 50 MG tablet, Take 1 tablet by mouth Every 8 (Eight) Hours As Needed for Moderate Pain ., Disp: 30 tablet, Rfl: 0  •  VASCEPA 1 g capsule capsule, Take 2 g by mouth 2 (Two) Times a Day With Meals., Disp: 360 capsule, Rfl: 3  •  venlafaxine XR (EFFEXOR-XR) 75 MG 24 hr capsule, TAKE 1 CAPSULE DAILY, Disp: 90 capsule, Rfl: 3  •  VOLTAREN 1 % gel gel, As Needed. APPLY TO AFFECTED AREA TWICE A DAY, Disp: , Rfl: 3    The following portions of the patient's history were reviewed and updated as appropriate: allergies, current medications, past family history, past medical history, past social history, past surgical history and problem list.    Review of Systems   Constitutional: Negative.    HENT: Negative.    Eyes: Negative.    Respiratory: Negative.    Cardiovascular: Negative.  Negative for chest pain, palpitations and leg swelling.   Gastrointestinal: Negative.    Endocrine: Negative.    Genitourinary: Negative.    Musculoskeletal: Negative.  Negative for arthralgias and back pain.   Allergic/Immunologic: Negative.  Negative for environmental allergies, food allergies and immunocompromised state.   Neurological: Negative.  Negative for dizziness and headaches.   Hematological: Negative.    Psychiatric/Behavioral: Negative.        Assessment     Physical Exam   Constitutional: She is oriented to person, place, and time. She appears well-developed and well-nourished. No distress.   HENT:   Head: Normocephalic.   Right Ear: External ear normal.   Left Ear: External ear normal.   Nose: Nose normal.   Mouth/Throat: Oropharynx is clear and moist. No oropharyngeal exudate.   Neck: Neck supple.   Cardiovascular: Normal rate, regular rhythm and normal  heart sounds.   No murmur heard.  Pulmonary/Chest: Effort normal and breath sounds normal. No stridor. No respiratory distress.   Musculoskeletal: She exhibits no edema.   Neurological: She is alert and oriented to person, place, and time.   Skin: Skin is warm and dry.   Psychiatric: She has a normal mood and affect. Her behavior is normal.   Vitals reviewed.      Plan     Her fasting labs were reviewed with the patient from last week.     Amber was seen today for follow-up and diabetes.    Diagnoses and all orders for this visit:    Uncontrolled diabetes mellitus type 2 without complications (CMS/[facility])  -     CBC & Differential; Future  -     Iron Profile  -     Hemoglobin A1c; Future  -     Dulaglutide 1.5 MG/0.5ML solution pen-injector; Inject 1.5 mg under the skin into the appropriate area as directed 1 (One) Time Per Week.    Essential hypertension  -     CBC & Differential; Future    Mixed hyperlipidemia    Environmental allergies  -     montelukast (SINGULAIR) 10 MG tablet; Take 1 tablet by mouth Every Night.        Demo on Trulicity. Reviewed side effects, mechanism of action,  and potential problems with medication    Follow up in 3 months

## 2019-04-15 ENCOUNTER — TELEPHONE (OUTPATIENT)
Dept: INTERNAL MEDICINE | Facility: CLINIC | Age: 69
End: 2019-04-15

## 2019-04-15 DIAGNOSIS — M25.559 ARTHRALGIA OF HIP, UNSPECIFIED LATERALITY: Primary | ICD-10-CM

## 2019-04-15 NOTE — TELEPHONE ENCOUNTER
Spoke with pt an informed her that she could see Ortho. She will call to schedule appointment. Referral has been placed.     ----- Message from JAKE Blount sent at 4/15/2019 12:41 PM EDT -----  Regarding: RE: R HIP PN  Contact: [phone]  Her ortho can XR her in the office, which would be the more convenient thing to do for her. She could also have a joint injection while there in the office. Would recommend an ortho eval.   ----- Message -----  From: Tuyet Morocho MA  Sent: 4/15/2019  12:21 PM  To: JAKE Blount  Subject: FW: R HIP PN                                         ----- Message -----  From: Rox Todd  Sent: 4/15/2019  11:59 AM  To: [facility] Clinical Pool  Subject: R HIP PN                                         asif    Patient says she started having R hip pain 3 days ago.  Not it is to the point where it is to painful to walk.    Asking if PCP wants her to have an x-ray, or can she/does she need a cortizone shot?    Please contact patient at above number

## 2019-04-17 ENCOUNTER — OFFICE VISIT (OUTPATIENT)
Dept: ORTHOPEDIC SURGERY | Facility: CLINIC | Age: 69
End: 2019-04-17

## 2019-04-17 VITALS
SYSTOLIC BLOOD PRESSURE: 135 MMHG | HEIGHT: 63 IN | WEIGHT: 189 LBS | DIASTOLIC BLOOD PRESSURE: 82 MMHG | HEART RATE: 92 BPM | BODY MASS INDEX: 33.49 KG/M2

## 2019-04-17 DIAGNOSIS — R52 PAIN: Primary | ICD-10-CM

## 2019-04-17 DIAGNOSIS — M70.61 GREATER TROCHANTERIC BURSITIS, RIGHT: ICD-10-CM

## 2019-04-17 PROCEDURE — 73501 X-RAY EXAM HIP UNI 1 VIEW: CPT | Performed by: NURSE PRACTITIONER

## 2019-04-17 PROCEDURE — 20610 DRAIN/INJ JOINT/BURSA W/O US: CPT | Performed by: NURSE PRACTITIONER

## 2019-04-17 PROCEDURE — 99213 OFFICE O/P EST LOW 20 MIN: CPT | Performed by: NURSE PRACTITIONER

## 2019-04-17 RX ORDER — MELOXICAM 15 MG/1
15 TABLET ORAL DAILY
Qty: 30 TABLET | Refills: 2 | Status: SHIPPED | OUTPATIENT
Start: 2019-04-17 | End: 2021-02-03

## 2019-04-17 RX ADMIN — LIDOCAINE HYDROCHLORIDE 4 ML: 10 INJECTION, SOLUTION EPIDURAL; INFILTRATION; INTRACAUDAL; PERINEURAL at 10:57

## 2019-04-17 RX ADMIN — TRIAMCINOLONE ACETONIDE 80 MG: 40 INJECTION, SUSPENSION INTRA-ARTICULAR; INTRAMUSCULAR at 10:57

## 2019-04-17 NOTE — PROGRESS NOTES
Subjective:     Patient ID: Amber Campos is a 68 y.o. female.    Chief Complaint:  Lateral right hip pain  History of Present Illness  Amber Campos is a 68-year-old female who presents with a 5-day history of pain at the lateral aspect of the right lower extremity which does radiate down to the knee.  Denies known specific injury and has been seen by her primary care provider and referred to our office.  Rates pain as 8 out of 10 at its worst aching throbbing in nature.  She has experienced one being due to the pain she is experiencing.  Positive for pain with ambulating and lying on the right side.  Denies previously experiencing similar symptoms in the past.  Denies that she is experiencing numbness or tingling radiating down the right lower extremity.  She has had surgery in the past with Dr. Barragan of the right foot and believes that she could be ambulating differently due to the surgery that was in 2015.  Pain does not radiate into the groin and again is not experiencing numbness or tingling radiating down the right lower extremity.  Denies all other concerns present at this time.       Social History     Occupational History   • Not on file   Tobacco Use   • Smoking status: Never Smoker   • Smokeless tobacco: Never Used   Substance and Sexual Activity   • Alcohol use: No   • Drug use: No   • Sexual activity: Defer      Past Medical History:   Diagnosis Date   • Basaloid carcinoma (CMS/HCC)     facial   • Diabetes mellitus (CMS/HCC)    • Gastroesophageal reflux disease 3/14/2016   • History of kidney infection    • History of mammogram    • Hypertension    • Osteopenia    • Osteoporosis    • Postmenopausal    • Seasonal allergies    • Simple renal cyst 3/14/2016   • Steatosis of liver 3/14/2016   • Type 2 diabetes mellitus (CMS/HCC)      Past Surgical History:   Procedure Laterality Date   • BUNIONECTOMY Right    • COLONOSCOPY N/A 4/25/2017    Procedure: COLONOSCOPY TO CECUM ;  Surgeon: Aidan Roca MD;   Location: [facility] ENDOSCOPY;  Service:    • EXOSTECTOMY Left     HEEL   • FOOT FUSION Right     9 screws and plate   • KNEE ARTHROSCOPY W/ MENISCAL REPAIR Right    • PAP SMEAR     • TONSILLECTOMY     • TOTAL SHOULDER REVERSE ARTHROPLASTY Right    • UMBILICAL HERNIA REPAIR         Family History   Problem Relation Age of Onset   • Other Mother         brain death   • Emphysema Mother    • Alcohol abuse Father    • Glaucoma Father    • Other Sister         fatty liver   • Heart disease Sister         valve problem, being followed (no surgery required)   • Arthritis Sister    • Other Brother         fatty liver   • Alcohol abuse Maternal Aunt    • Diabetes Maternal Aunt    • Glaucoma Maternal Grandmother    • Heart disease Maternal Grandmother          Review of Systems   Constitutional: Negative for chills, diaphoresis, fever and unexpected weight change.   HENT: Negative for hearing loss, nosebleeds, sore throat and tinnitus.    Eyes: Negative for pain and visual disturbance.   Respiratory: Negative for cough, shortness of breath and wheezing.    Cardiovascular: Negative for chest pain and palpitations.   Gastrointestinal: Negative for abdominal pain, diarrhea, nausea and vomiting.   Endocrine: Negative for cold intolerance, heat intolerance and polydipsia.   Genitourinary: Negative for difficulty urinating, dysuria and hematuria.   Musculoskeletal: Positive for arthralgias and myalgias. Negative for joint swelling.   Skin: Negative for rash and wound.   Allergic/Immunologic: Negative for environmental allergies.   Neurological: Negative for dizziness, syncope and numbness.   Hematological: Does not bruise/bleed easily.   Psychiatric/Behavioral: Negative for dysphoric mood and sleep disturbance. The patient is not nervous/anxious.            Objective:  Physical Exam    Vital signs reviewed.   General: No acute distress.  Eyes: conjunctiva clear; pupils equally round and reactive  ENT: external ears and nose  "atraumatic; oropharynx clear  CV: no peripheral edema  Resp: normal respiratory effort  Skin: no rashes or wounds; normal turgor  Psych: mood and affect appropriate; recent and remote memory intact    Vitals:    04/17/19 1005   BP: 135/82   BP Location: Right arm   Patient Position: Sitting   Cuff Size: Large Adult   Pulse: 92   Weight: 85.7 kg (189 lb)   Height: 160 cm (63\")         04/17/19  1005   Weight: 85.7 kg (189 lb)     Body mass index is 33.48 kg/m².      Right Hip Exam     Tenderness   The patient is experiencing tenderness in the greater trochanter.    Range of Motion   Abduction: 45   Adduction: 30   Extension: 0   External rotation: 50   Internal rotation: 30     Muscle Strength   Abduction: 5/5   Adduction: 5/5   Flexion: 5/5     Tests   SHRAVAN: negative  Raheem: negative  Fadir:  Negative FADIR test    Other   Erythema: absent  Scars: absent  Sensation: normal  Pulse: present    Comments:  Negative logroll exam  negative stinchfield exam                 Imaging:  Right Hip X-Ray  Indication: Pain  AP and Frog Leg views    Findings:  No fracture  No bony lesion  Normal soft tissues  Minimal hip joint narrowing noted    No prior studies were available for comparison.    Assessment:        1. Pain    2. Greater trochanteric bursitis, right           Plan:  1.  Discussed plan of care with patient.  Wishes to proceed with corticosteroid injection to the right hip, lateral greater trochanteric bursa.  2.  Will refer her onto physical therapy for strengthening.  We will plan to see her back in approximately 6 weeks to reevaluate.  Patient verbalized understanding of all information agrees with plan of care.  Denies other concerns present at this time.  Large Joint Arthrocentesis: R greater trochanteric bursa  Date/Time: 4/17/2019 10:57 AM  Consent given by: patient  Site marked: site marked  Timeout: Immediately prior to procedure a time out was called to verify the correct patient, procedure, equipment, " support staff and site/side marked as required   Supporting Documentation  Indications: pain   Procedure Details  Location: hip - R greater trochanteric bursa  Preparation: Patient was prepped and draped in the usual sterile fashion  Needle size: 22 G  Approach: lateral  Medications administered: 4 mL lidocaine PF 1% 1 %; 80 mg triamcinolone acetonide 40 MG/ML  Patient tolerance: patient tolerated the procedure well with no immediate complications          Orders:  Orders Placed This Encounter   Procedures   • Large Joint Arthrocentesis: R greater trochanteric bursa   • XR Hip With or Without Pelvis 1 View Right       I ordered and reviewed the YOSELIN today.     Dictated utilizing Dragon dictation

## 2019-04-18 DIAGNOSIS — M70.61 GREATER TROCHANTERIC BURSITIS, RIGHT: Primary | ICD-10-CM

## 2019-04-18 RX ORDER — TRIAMCINOLONE ACETONIDE 40 MG/ML
80 INJECTION, SUSPENSION INTRA-ARTICULAR; INTRAMUSCULAR
Status: COMPLETED | OUTPATIENT
Start: 2019-04-17 | End: 2019-04-17

## 2019-04-18 RX ORDER — LIDOCAINE HYDROCHLORIDE 10 MG/ML
4 INJECTION, SOLUTION EPIDURAL; INFILTRATION; INTRACAUDAL; PERINEURAL
Status: COMPLETED | OUTPATIENT
Start: 2019-04-17 | End: 2019-04-17

## 2019-04-24 ENCOUNTER — APPOINTMENT (OUTPATIENT)
Dept: PHYSICAL THERAPY | Facility: HOSPITAL | Age: 69
End: 2019-04-24

## 2019-04-29 RX ORDER — OLMESARTAN MEDOXOMIL 40 MG/1
TABLET ORAL
Qty: 90 TABLET | Refills: 3 | Status: SHIPPED | OUTPATIENT
Start: 2019-04-29 | End: 2019-05-15 | Stop reason: ALTCHOICE

## 2019-05-07 DIAGNOSIS — IMO0001 UNCONTROLLED DIABETES MELLITUS TYPE 2 WITHOUT COMPLICATIONS: ICD-10-CM

## 2019-05-07 RX ORDER — DULAGLUTIDE 1.5 MG/.5ML
INJECTION, SOLUTION SUBCUTANEOUS
Qty: 12 PEN | Refills: 3 | Status: SHIPPED | OUTPATIENT
Start: 2019-05-07 | End: 2019-07-08 | Stop reason: SINTOL

## 2019-05-08 ENCOUNTER — TELEPHONE (OUTPATIENT)
Dept: INTERNAL MEDICINE | Facility: CLINIC | Age: 69
End: 2019-05-08

## 2019-05-08 NOTE — TELEPHONE ENCOUNTER
benicar on back order.  Pt  Aware why we changed to losartan      ----- Message from Rox Todd sent at 5/8/2019  1:49 PM EDT -----  Regarding: UNKNOWN MED RECEIVED  Contact: [phone]  ZEB    LOSARTAN TAB POTASSIUM 100  MG TAB    Pt received from mail order pharmacy and she doesn't know why she has it.

## 2019-05-13 ENCOUNTER — OFFICE VISIT (OUTPATIENT)
Dept: INTERNAL MEDICINE | Facility: CLINIC | Age: 69
End: 2019-05-13

## 2019-05-13 VITALS
BODY MASS INDEX: 32.96 KG/M2 | DIASTOLIC BLOOD PRESSURE: 92 MMHG | WEIGHT: 186 LBS | OXYGEN SATURATION: 97 % | HEIGHT: 63 IN | TEMPERATURE: 97.9 F | SYSTOLIC BLOOD PRESSURE: 150 MMHG | HEART RATE: 89 BPM | RESPIRATION RATE: 16 BRPM

## 2019-05-13 DIAGNOSIS — N39.0 RECURRENT UTI: Primary | ICD-10-CM

## 2019-05-13 DIAGNOSIS — N28.1 SIMPLE RENAL CYST: ICD-10-CM

## 2019-05-13 PROBLEM — N30.00 ACUTE CYSTITIS: Status: RESOLVED | Noted: 2017-08-30 | Resolved: 2019-05-13

## 2019-05-13 LAB
BILIRUB BLD-MCNC: NEGATIVE MG/DL
CLARITY, POC: CLEAR
COLOR UR: YELLOW
GLUCOSE UR STRIP-MCNC: NEGATIVE MG/DL
KETONES UR QL: NEGATIVE
LEUKOCYTE EST, POC: NEGATIVE
NITRITE UR-MCNC: NEGATIVE MG/ML
PH UR: 5 [PH] (ref 5–8)
PROT UR STRIP-MCNC: NEGATIVE MG/DL
RBC # UR STRIP: NEGATIVE /UL
SP GR UR: 1.02 (ref 1–1.03)
UROBILINOGEN UR QL: NORMAL

## 2019-05-13 PROCEDURE — 81003 URINALYSIS AUTO W/O SCOPE: CPT | Performed by: NURSE PRACTITIONER

## 2019-05-13 PROCEDURE — 99213 OFFICE O/P EST LOW 20 MIN: CPT | Performed by: NURSE PRACTITIONER

## 2019-05-13 NOTE — PROGRESS NOTES
"Chief Complaint   Patient presents with   • Urinary Tract Infection       Subjective   Amber Campos is a 68 y.o. female is being seen for an acute appointment for possible UTI. She is having right sided back pain for a few days. She denies any urinary frequency, blood in urine, no pain with urination. She is concerned \"that I may have a kidney problem, being diabetic.\" She has history of Recurrent UTI, simple renal cyst.     History of Present Illness     Current Outpatient Medications on File Prior to Visit   Medication Sig Dispense Refill   • budesonide-formoterol (SYMBICORT) 160-4.5 MCG/ACT inhaler Inhale 2 puffs As Needed.     • cholecalciferol (VITAMIN D3) 1000 UNITS tablet Take 1,000 Units by mouth Daily.     • Clocortolone Pivalate 0.1 % cream Apply  topically As Needed.     • DUAVEE 0.45-20 MG tablet TAKE 1 TABLET DAILY 90 tablet 3   • glucose blood test strip Use to check  Blood sugar  Tid   Dx   e11.9 300 each 3   • Insulin Pen Needle (PEN NEEDLES) 32G X 6 MM misc 1 each Daily With Breakfast. 90 each 3   • Lancets (FREESTYLE) lancets      • meloxicam (MOBIC) 15 MG tablet Take 1 tablet by mouth Daily. 30 tablet 2   • metFORMIN (GLUCOPHAGE) 1000 MG tablet TAKE 1 TABLET TWICE DAILY  WITH MEALS 180 tablet 3   • mometasone (NASONEX) 50 MCG/ACT nasal spray 2 sprays into each nostril As Needed.     • montelukast (SINGULAIR) 10 MG tablet Take 1 tablet by mouth Every Night. 30 tablet 1   • Multiple Vitamins-Iron (MULTIPLE VITAMIN/IRON PO) Take 1 tablet/day by mouth.     • olmesartan (BENICAR) 40 MG tablet TAKE 1 TABLET EVERY MORNING 90 tablet 3   • pantoprazole (PROTONIX) 40 MG EC tablet TAKE 1 TABLET DAILY 90 tablet 2   • Spacer/Aero Chamber Mouthpiece misc 1 each Daily. 1 each 0   • traMADol (ULTRAM) 50 MG tablet Take 1 tablet by mouth Every 8 (Eight) Hours As Needed for Moderate Pain . 30 tablet 0   • TRULICITY 1.5 MG/0.5ML solution pen-injector INJECT 1.5 MG UNDER THE SKIN INTO THE APPROPRIATE AREA AS DIRECTED " 1 (ONE) TIME PER WEEK. 12 pen 3   • VASCEPA 1 g capsule capsule Take 2 g by mouth 2 (Two) Times a Day With Meals. 360 capsule 3   • venlafaxine XR (EFFEXOR-XR) 75 MG 24 hr capsule TAKE 1 CAPSULE DAILY 90 capsule 3   • VOLTAREN 1 % gel gel As Needed. APPLY TO AFFECTED AREA TWICE A DAY  3   • [DISCONTINUED] pioglitazone (ACTOS) 30 MG tablet Take 1 tablet by mouth Daily. 30 tablet 11     No current facility-administered medications on file prior to visit.        The following portions of the patient's history were reviewed and updated as appropriate: allergies, current medications, past family history, past medical history, past social history, past surgical history and problem list.    Review of Systems   Eyes: Negative.    Endocrine: Negative for polyphagia and polyuria.   Genitourinary: Positive for frequency. Negative for difficulty urinating, hematuria and menstrual problem.   Musculoskeletal: Positive for back pain.   Allergic/Immunologic: Positive for environmental allergies.   Neurological: Negative.    Hematological: Negative.    Psychiatric/Behavioral: Negative.        Objective   Physical Exam   Constitutional: She is oriented to person, place, and time. She appears well-developed and well-nourished.   Neck: Neck supple.   Cardiovascular: Normal rate, regular rhythm and normal heart sounds.   Pulmonary/Chest: Effort normal and breath sounds normal. No stridor. No respiratory distress.   Abdominal: Soft. Bowel sounds are normal. She exhibits no distension and no mass. There is no tenderness. There is no rebound and no guarding. No hernia.   Neurological: She is alert and oriented to person, place, and time.   Skin: Skin is warm and dry.   Psychiatric: She has a normal mood and affect. Her behavior is normal.   Vitals reviewed.      Assessment/Plan   Amber was seen today for urinary tract infection.    Diagnoses and all orders for this visit:    Recurrent UTI  -     POCT urinalysis dipstick, automated  -      Ambulatory Referral to Urology  -     US Renal Bilateral; Future    Simple renal cyst  -     Ambulatory Referral to Urology  -     US Renal Bilateral; Future    Urine is clear today    Follow up as scheduled

## 2019-05-15 RX ORDER — LOSARTAN POTASSIUM 100 MG/1
100 TABLET ORAL DAILY
Qty: 90 TABLET | Refills: 3 | OUTPATIENT
Start: 2019-05-15 | End: 2020-01-16 | Stop reason: SDUPTHER

## 2019-05-20 ENCOUNTER — TELEPHONE (OUTPATIENT)
Dept: INTERNAL MEDICINE | Facility: CLINIC | Age: 69
End: 2019-05-20

## 2019-05-20 RX ORDER — SULFAMETHOXAZOLE AND TRIMETHOPRIM 800; 160 MG/1; MG/1
1 TABLET ORAL 2 TIMES DAILY
Qty: 14 TABLET | Refills: 0 | Status: SHIPPED | OUTPATIENT
Start: 2019-05-20 | End: 2019-06-03

## 2019-05-20 NOTE — TELEPHONE ENCOUNTER
SENT TO PHARMACY  LEFT MESSAGE FOR PT    ----- Message from JAKE Blount sent at 2019 10:15 AM EDT -----   Bactrim DS  Si po q12hrs for 7 days  Disp #14  ----- Message -----  From: Tiffany Srivastava MA  Sent: 2019   9:26 AM  To: JAKE Blount        ----- Message -----  From: Naomie Noel  Sent: 2019   8:35 AM  To: [facility] Clinical Pool    Pt states she was seen last week for uti but it showed nothing. Now her urine is cloudy and sent smells like nitrate. Would like something called in to [facility] in [place]. Call back is [phone].

## 2019-05-22 ENCOUNTER — APPOINTMENT (OUTPATIENT)
Dept: ULTRASOUND IMAGING | Facility: HOSPITAL | Age: 69
End: 2019-05-22

## 2019-05-23 ENCOUNTER — HOSPITAL ENCOUNTER (OUTPATIENT)
Dept: ULTRASOUND IMAGING | Facility: HOSPITAL | Age: 69
Discharge: HOME | End: 2019-05-23
Admitting: NURSE PRACTITIONER

## 2019-05-23 ENCOUNTER — TELEPHONE (OUTPATIENT)
Dept: INTERNAL MEDICINE | Facility: CLINIC | Age: 69
End: 2019-05-23

## 2019-05-23 DIAGNOSIS — N28.1 SIMPLE RENAL CYST: ICD-10-CM

## 2019-05-23 DIAGNOSIS — Q61.02 MULTIPLE RENAL CYSTS: Primary | ICD-10-CM

## 2019-05-23 DIAGNOSIS — N39.0 RECURRENT UTI: ICD-10-CM

## 2019-05-23 PROCEDURE — 76775 US EXAM ABDO BACK WALL LIM: CPT

## 2019-05-23 NOTE — TELEPHONE ENCOUNTER
LVM with details per HIPAA and to call with any questions.     ----- Message from JAKE Blount sent at 5/23/2019  9:56 AM EDT -----  Multiple cysts in both kidneys, proceed with MRI renal protocol to assess further.

## 2019-05-28 RX ORDER — DIAZEPAM 5 MG/1
5 TABLET ORAL ONCE
Qty: 2 TABLET | Refills: 0 | Status: SHIPPED | OUTPATIENT
Start: 2019-05-28 | End: 2019-05-28

## 2019-05-28 NOTE — TELEPHONE ENCOUNTER
----- Message from JAKE Blount sent at 2019  2:41 PM EDT -----  Regarding: RE: MEDS FOR MRI - CLOSTORPHOBIC  Contact: [phone]  Valium 5mg  Si po q1hr before procedure  Disp #2  ----- Message -----  From: Ramin Hair MA  Sent: 2019   2:23 PM  To: JAKE Blount  Subject: FW: MEDS FOR MRI - CLOSTORPHOBIC                 Order was faxed today to [facility]     ----- Message -----  From: Mitch Rivera  Sent: 2019   2:06 PM  To: [facility] Jacinto[address] [place] [address]  Subject: RE: MEDS FOR MRI - CLOSTORPHOBIC                 [facility] PT    Patient called to request meds so that she can have her MRI done. She is clostorphobic. She said that nara has given her med for this before    [facility]    Thanks!  mitch

## 2019-05-29 ENCOUNTER — OFFICE VISIT (OUTPATIENT)
Dept: ORTHOPEDIC SURGERY | Facility: CLINIC | Age: 69
End: 2019-05-29

## 2019-05-29 VITALS — HEIGHT: 63 IN | BODY MASS INDEX: 32.6 KG/M2 | WEIGHT: 184 LBS

## 2019-05-29 DIAGNOSIS — M70.61 GREATER TROCHANTERIC BURSITIS, RIGHT: Primary | ICD-10-CM

## 2019-05-29 PROCEDURE — 99212 OFFICE O/P EST SF 10 MIN: CPT | Performed by: NURSE PRACTITIONER

## 2019-05-29 NOTE — PROGRESS NOTES
Subjective:     Patient ID: Amber Campos is a 68 y.o. female.    Chief Complaint:  Follow-up greater trochanteric bursitis, right  History of Present Illness  Amber Campos presents back to clinic for 6-week follow-up after receiving corticosteroid injection and completing home strengthening exercises on 4/7/2019.  She was unable to attend physical therapy secondary to has been becoming very ill however has continued with home strengthening exercises and reports 100% symptom relief at this time.  Denies that she is experiencing any pain to the lateral aspect of the right hip.  Denies that she is experiencing numbness or tingling radiating down the right lower extremity.  Denies other concerns present at this time.       Social History     Occupational History   • Not on file   Tobacco Use   • Smoking status: Never Smoker   • Smokeless tobacco: Never Used   Substance and Sexual Activity   • Alcohol use: No   • Drug use: No   • Sexual activity: Defer      Past Medical History:   Diagnosis Date   • Basaloid carcinoma (CMS/HCC)     facial   • Diabetes mellitus (CMS/HCC)    • Gastroesophageal reflux disease 3/14/2016   • History of kidney infection    • History of mammogram    • Hypertension    • Osteopenia    • Osteoporosis    • Postmenopausal    • Seasonal allergies    • Simple renal cyst 3/14/2016   • Steatosis of liver 3/14/2016   • Type 2 diabetes mellitus (CMS/HCC)      Past Surgical History:   Procedure Laterality Date   • BUNIONECTOMY Right    • COLONOSCOPY N/A 4/25/2017    Procedure: COLONOSCOPY TO CECUM ;  Surgeon: Aidan Roca MD;  Location: [facility] ENDOSCOPY;  Service:    • EXOSTECTOMY Left     HEEL   • FOOT FUSION Right     9 screws and plate   • KNEE ARTHROSCOPY W/ MENISCAL REPAIR Right    • PAP SMEAR     • TONSILLECTOMY     • TOTAL SHOULDER REVERSE ARTHROPLASTY Right    • UMBILICAL HERNIA REPAIR         Family History   Problem Relation Age of Onset   • Other Mother         brain death   • Emphysema  "Mother    • Alcohol abuse Father    • Glaucoma Father    • Other Sister         fatty liver   • Heart disease Sister         valve problem, being followed (no surgery required)   • Arthritis Sister    • Other Brother         fatty liver   • Alcohol abuse Maternal Aunt    • Diabetes Maternal Aunt    • Glaucoma Maternal Grandmother    • Heart disease Maternal Grandmother          Review of Systems   Constitutional: Negative for chills, diaphoresis, fever and unexpected weight change.   HENT: Negative for hearing loss, nosebleeds, sore throat and tinnitus.    Eyes: Negative for pain and visual disturbance.   Respiratory: Negative for cough, shortness of breath and wheezing.    Cardiovascular: Negative for chest pain and palpitations.   Gastrointestinal: Negative for abdominal pain, diarrhea, nausea and vomiting.   Endocrine: Negative for cold intolerance, heat intolerance and polydipsia.   Genitourinary: Negative for difficulty urinating, dysuria and hematuria.   Musculoskeletal: Positive for arthralgias and myalgias. Negative for joint swelling.   Skin: Negative for rash and wound.   Allergic/Immunologic: Negative for environmental allergies.   Neurological: Negative for dizziness, syncope and numbness.   Hematological: Does not bruise/bleed easily.   Psychiatric/Behavioral: Negative for dysphoric mood and sleep disturbance. The patient is not nervous/anxious.            Objective:  Physical Exam    General: No acute distress.  Eyes: conjunctiva clear; pupils equally round and reactive  ENT: external ears and nose atraumatic; oropharynx clear  CV: no peripheral edema  Resp: normal respiratory effort  Skin: no rashes or wounds; normal turgor  Psych: mood and affect appropriate; recent and remote memory intact    Vitals:    05/29/19 0830   Weight: 83.5 kg (184 lb)   Height: 160 cm (63\")         05/29/19  0830   Weight: 83.5 kg (184 lb)     Body mass index is 32.59 kg/m².      Ortho Exam       Right Hip Exam "      Tenderness   The patient is experiencing tenderness in the greater trochanter.     Range of Motion   Abduction: 45   Adduction: 30   Extension: 0   External rotation: 50   Internal rotation: 30      Muscle Strength   Abduction: 5/5   Adduction: 5/5   Flexion: 5/5      Tests   SHRAVAN: negative  Raheem: negative  Fadir:  Negative FADIR test     Other   Erythema: absent  Scars: absent  Sensation: normal  Pulse: present     Comments:  Negative logroll exam  negative stinchfield exam    Assessment:        1. Greater trochanteric bursitis, right           Plan:  1.  Discussed plan of care with patient.  We will plan to see her back in clinic as needed.  Patient verbalized understanding of all information agrees to plan of care.  Denies other concerns that she has at this time.  Orders:  No orders of the defined types were placed in this encounter.      Dictated utilizing Dragon dictation

## 2019-05-30 ENCOUNTER — LAB (OUTPATIENT)
Dept: INTERNAL MEDICINE | Facility: CLINIC | Age: 69
End: 2019-05-30

## 2019-05-30 ENCOUNTER — TELEPHONE (OUTPATIENT)
Dept: INTERNAL MEDICINE | Facility: CLINIC | Age: 69
End: 2019-05-30

## 2019-05-30 DIAGNOSIS — IMO0001 UNCONTROLLED DIABETES MELLITUS TYPE 2 WITHOUT COMPLICATIONS: ICD-10-CM

## 2019-05-30 DIAGNOSIS — Q61.02 MULTIPLE RENAL CYSTS: Primary | ICD-10-CM

## 2019-05-30 DIAGNOSIS — I10 ESSENTIAL HYPERTENSION: ICD-10-CM

## 2019-05-30 LAB
BASOPHILS # BLD AUTO: 0.04 10*3/MM3 (ref 0–0.2)
BASOPHILS NFR BLD AUTO: 0.7 % (ref 0–1.5)
EOSINOPHIL # BLD AUTO: 0.08 10*3/MM3 (ref 0–0.4)
EOSINOPHIL NFR BLD AUTO: 1.4 % (ref 0.3–6.2)
ERYTHROCYTE [DISTWIDTH] IN BLOOD BY AUTOMATED COUNT: 13.8 % (ref 12.3–15.4)
HBA1C MFR BLD: 7.8 % (ref 4.8–5.6)
HCT VFR BLD AUTO: 40.3 % (ref 34–46.6)
HGB BLD-MCNC: 13 G/DL (ref 12–15.9)
IMM GRANULOCYTES # BLD AUTO: 0.02 10*3/MM3 (ref 0–0.05)
IMM GRANULOCYTES NFR BLD AUTO: 0.3 % (ref 0–0.5)
IRON SATN MFR SERPL: 17 % (ref 20–50)
IRON SERPL-MCNC: 88 MCG/DL (ref 37–145)
LYMPHOCYTES # BLD AUTO: 2.8 10*3/MM3 (ref 0.7–3.1)
LYMPHOCYTES NFR BLD AUTO: 48.1 % (ref 19.6–45.3)
MCH RBC QN AUTO: 28.6 PG (ref 26.6–33)
MCHC RBC AUTO-ENTMCNC: 32.3 G/DL (ref 31.5–35.7)
MCV RBC AUTO: 88.6 FL (ref 79–97)
MONOCYTES # BLD AUTO: 0.41 10*3/MM3 (ref 0.1–0.9)
MONOCYTES NFR BLD AUTO: 7 % (ref 5–12)
NEUTROPHILS # BLD AUTO: 2.47 10*3/MM3 (ref 1.7–7)
NEUTROPHILS NFR BLD AUTO: 42.5 % (ref 42.7–76)
NRBC BLD AUTO-RTO: 0 /100 WBC (ref 0–0.2)
PLATELET # BLD AUTO: 366 10*3/MM3 (ref 140–450)
RBC # BLD AUTO: 4.55 10*6/MM3 (ref 3.77–5.28)
TIBC SERPL-MCNC: 515 MCG/DL
UIBC SERPL-MCNC: 427 MCG/DL (ref 112–346)
WBC # BLD AUTO: 5.82 10*3/MM3 (ref 3.4–10.8)

## 2019-05-30 NOTE — TELEPHONE ENCOUNTER
Advised Piedad @ [facility].  Faxed over corrected order to [phone].     ----- Message from JAKE Blount sent at 5/30/2019 12:33 PM EDT -----  Regarding: FW: mri order  Contact: [phone]  Do with and without contrast then  ----- Message -----  From: Tiffany Srivastava MA  Sent: 5/30/2019  12:03 PM  To: JAKE Blount  Subject: FW: mri order                                        ----- Message -----  From: Rox Todd  Sent: 5/30/2019  11:59 AM  To: Marcos García [address]  Subject: mri order                                        ZEB    Scheduling with [facility] is caller    ORDER SENT FOR MRI ABD W/CONTRAST    At this facility they only do W & W/OUT OR WITHOUT ONLY    Call back at above number choosing Option #1 for scheduling

## 2019-05-31 ENCOUNTER — RESULTS ENCOUNTER (OUTPATIENT)
Dept: ENDOCRINOLOGY | Age: 69
End: 2019-05-31

## 2019-05-31 DIAGNOSIS — I10 ESSENTIAL HYPERTENSION: ICD-10-CM

## 2019-05-31 DIAGNOSIS — E78.2 MIXED HYPERLIPIDEMIA: ICD-10-CM

## 2019-05-31 DIAGNOSIS — K76.0 STEATOSIS OF LIVER: ICD-10-CM

## 2019-05-31 DIAGNOSIS — IMO0001 UNCONTROLLED DIABETES MELLITUS TYPE 2 WITHOUT COMPLICATIONS: ICD-10-CM

## 2019-05-31 DIAGNOSIS — E55.9 VITAMIN D DEFICIENCY: ICD-10-CM

## 2019-06-03 ENCOUNTER — OFFICE VISIT (OUTPATIENT)
Dept: INTERNAL MEDICINE | Facility: CLINIC | Age: 69
End: 2019-06-03

## 2019-06-03 VITALS
OXYGEN SATURATION: 97 % | TEMPERATURE: 98.4 F | DIASTOLIC BLOOD PRESSURE: 70 MMHG | WEIGHT: 189 LBS | RESPIRATION RATE: 16 BRPM | SYSTOLIC BLOOD PRESSURE: 152 MMHG | BODY MASS INDEX: 33.49 KG/M2 | HEIGHT: 63 IN | HEART RATE: 101 BPM

## 2019-06-03 DIAGNOSIS — IMO0001 UNCONTROLLED DIABETES MELLITUS TYPE 2 WITHOUT COMPLICATIONS: Primary | ICD-10-CM

## 2019-06-03 DIAGNOSIS — K59.03 DRUG-INDUCED CONSTIPATION: ICD-10-CM

## 2019-06-03 DIAGNOSIS — E78.2 MIXED HYPERLIPIDEMIA: ICD-10-CM

## 2019-06-03 DIAGNOSIS — I10 ESSENTIAL HYPERTENSION: ICD-10-CM

## 2019-06-03 DIAGNOSIS — N28.1 SIMPLE RENAL CYST: ICD-10-CM

## 2019-06-03 PROBLEM — N39.0 FREQUENT UTI: Status: RESOLVED | Noted: 2018-10-01 | Resolved: 2019-06-03

## 2019-06-03 PROCEDURE — 99214 OFFICE O/P EST MOD 30 MIN: CPT | Performed by: NURSE PRACTITIONER

## 2019-06-03 RX ORDER — CETIRIZINE HYDROCHLORIDE 5 MG/1
TABLET ORAL DAILY
COMMUNITY
End: 2020-09-23 | Stop reason: SDUPTHER

## 2019-06-03 RX ORDER — POLYETHYLENE GLYCOL 3350 17 G/17G
17 POWDER, FOR SOLUTION ORAL DAILY
Qty: 30 EACH | Refills: 0
Start: 2019-06-03 | End: 2021-01-15

## 2019-06-03 NOTE — PROGRESS NOTES
Chief Complaint   Patient presents with   • Follow-up   • Diabetes   • Constipation       Subjective     Amber Campos is a 68 y.o. female being seen for a follow up appointment today regarding DM 2, HTN, GERD, renal cysts.  She was started on Trulicity 1.5 mg once weekly. She is having some constipation from it. She is having no issues/ problem with the injection site. She is checking fasting glucose, running 150-170s. Denies hypoglycemia, blurred vision. She does have urinary frequency.    She had an abnormal renal US, showing renal cysts. She is scheduled at [facility] for an MRI. SHe has a contrast allergy.    She does not check her BP at home. She is on Losaratn 100 mg daily. She denies CP, edema, SOA.       History of Present Illness     Allergies   Allergen Reactions   • Adhesive Tape      EKG stickers only   • Iodinated Diagnostic Agents Photosensitivity     contrast   • Farxiga [Dapagliflozin] Diarrhea and Itching     Yeast infeciotn   • Neomycin-Bacitracin Zn-Polymyx Rash         Current Outpatient Medications:   •  budesonide-formoterol (SYMBICORT) 160-4.5 MCG/ACT inhaler, Inhale 2 puffs As Needed., Disp: , Rfl:   •  Cetirizine HCl (ZYRTEC CHILDRENS ALLERGY) 5 MG/5ML solution solution, Take  by mouth Daily., Disp: , Rfl:   •  cholecalciferol (VITAMIN D3) 1000 UNITS tablet, Take 1,000 Units by mouth Daily., Disp: , Rfl:   •  Clocortolone Pivalate 0.1 % cream, Apply  topically As Needed., Disp: , Rfl:   •  DUAVEE 0.45-20 MG tablet, TAKE 1 TABLET DAILY, Disp: 90 tablet, Rfl: 3  •  glucose blood test strip, Use to check  Blood sugar  Tid   Dx   e11.9, Disp: 300 each, Rfl: 3  •  Insulin Pen Needle (PEN NEEDLES) 32G X 6 MM misc, 1 each Daily With Breakfast., Disp: 90 each, Rfl: 3  •  Lancets (FREESTYLE) lancets, , Disp: , Rfl:   •  losartan (COZAAR) 100 MG tablet, Take 1 tablet by mouth Daily., Disp: 90 tablet, Rfl: 3  •  meloxicam (MOBIC) 15 MG tablet, Take 1 tablet by mouth Daily., Disp: 30 tablet, Rfl: 2  •   metFORMIN (GLUCOPHAGE) 1000 MG tablet, TAKE 1 TABLET TWICE DAILY  WITH MEALS, Disp: 180 tablet, Rfl: 3  •  mometasone (NASONEX) 50 MCG/ACT nasal spray, 2 sprays into each nostril As Needed., Disp: , Rfl:   •  montelukast (SINGULAIR) 10 MG tablet, Take 1 tablet by mouth Every Night., Disp: 30 tablet, Rfl: 1  •  Multiple Vitamins-Iron (MULTIPLE VITAMIN/IRON PO), Take 1 tablet/day by mouth., Disp: , Rfl:   •  pantoprazole (PROTONIX) 40 MG EC tablet, TAKE 1 TABLET DAILY, Disp: 90 tablet, Rfl: 2  •  Spacer/Aero Chamber Mouthpiece misc, 1 each Daily., Disp: 1 each, Rfl: 0  •  traMADol (ULTRAM) 50 MG tablet, Take 1 tablet by mouth Every 8 (Eight) Hours As Needed for Moderate Pain ., Disp: 30 tablet, Rfl: 0  •  TRULICITY 1.5 MG/0.5ML solution pen-injector, INJECT 1.5 MG UNDER THE SKIN INTO THE APPROPRIATE AREA AS DIRECTED 1 (ONE) TIME PER WEEK., Disp: 12 pen, Rfl: 3  •  VASCEPA 1 g capsule capsule, Take 2 g by mouth 2 (Two) Times a Day With Meals., Disp: 360 capsule, Rfl: 3  •  venlafaxine XR (EFFEXOR-XR) 75 MG 24 hr capsule, TAKE 1 CAPSULE DAILY, Disp: 90 capsule, Rfl: 3  •  VOLTAREN 1 % gel gel, As Needed. APPLY TO AFFECTED AREA TWICE A DAY, Disp: , Rfl: 3    The following portions of the patient's history were reviewed and updated as appropriate: allergies, current medications, past family history, past medical history, past social history, past surgical history and problem list.    Review of Systems   Constitutional: Negative.    HENT: Negative.  Negative for congestion and dental problem.    Eyes: Negative.    Respiratory: Negative.    Cardiovascular: Negative.  Negative for chest pain, palpitations and leg swelling.   Gastrointestinal: Positive for constipation. Negative for abdominal distention and anal bleeding.   Endocrine: Negative.    Genitourinary: Positive for frequency.   Musculoskeletal: Positive for arthralgias.   Skin: Negative.    Allergic/Immunologic: Positive for environmental allergies. Negative for  food allergies.   Neurological: Negative.  Negative for dizziness, facial asymmetry, light-headedness and headaches.   Hematological: Negative.  Negative for adenopathy. Does not bruise/bleed easily.   Psychiatric/Behavioral: Negative.        Assessment     Physical Exam   Constitutional: She is oriented to person, place, and time. She appears well-developed and well-nourished. No distress.   HENT:   Head: Normocephalic.   Right Ear: External ear normal.   Left Ear: External ear normal.   Nose: Nose normal.   Mouth/Throat: Oropharynx is clear and moist. No oropharyngeal exudate.   Eyes: Pupils are equal, round, and reactive to light.   Neck: Neck supple. No thyromegaly present.   Cardiovascular: Normal rate, regular rhythm and normal heart sounds.   No murmur heard.  Pulmonary/Chest: Effort normal and breath sounds normal. No stridor. No respiratory distress.   Abdominal: Soft. Bowel sounds are normal.   Musculoskeletal: She exhibits no edema.   Neurological: She is alert and oriented to person, place, and time.   Skin: Skin is warm and dry.   Psychiatric: She has a normal mood and affect. Her behavior is normal.   Vitals reviewed.      Plan     Her fasting labs were reviewed with the patient from last week.      Diagnosis Plan   1. Uncontrolled diabetes mellitus type 2 without complications (CMS/HCC)     2. Essential hypertension     3. Mixed hyperlipidemia     4. Simple renal cyst     5. Drug-induced constipation  polyethylene glycol (MIRALAX) packet     Will continue Trulicity 1.5 weekly. She will try MIralax for constipation. Keep a glucose log. Continue a low carb diet. She admits to eating out a lot, but less portions.    Will call [facility] for allergy protocol due to upcoming test with contrast.     Follow up in 4 weeks with Hgb A1C

## 2019-06-04 ENCOUNTER — TELEPHONE (OUTPATIENT)
Dept: INTERNAL MEDICINE | Facility: CLINIC | Age: 69
End: 2019-06-04

## 2019-06-04 RX ORDER — DAPAGLIFLOZIN 5 MG/1
5 TABLET, FILM COATED ORAL DAILY
Qty: 30 TABLET | Refills: 2 | Status: SHIPPED | OUTPATIENT
Start: 2019-06-04 | End: 2019-07-08 | Stop reason: DRUGHIGH

## 2019-06-04 NOTE — TELEPHONE ENCOUNTER
Pt informed   ----- Message from JAKE Blount sent at 6/4/2019 11:29 AM EDT -----  Please let her know  ----- Message -----  From: Ramin Hair MA  Sent: 6/4/2019  11:15 AM  To: JAKE Blount    Spoke with [facility] about contrast and they stated that the contrast for the MRI is not Iodine contrast. They said it is gadolinium so she shouldn't have a reaction. They said a CT scan is Iodine contrast but MRI is not.

## 2019-06-04 NOTE — TELEPHONE ENCOUNTER
Pt calling asking if you could put her back on Farxiga. She stated that she has taken this before in the past. Pt stated that her  takes this medication and does very well on it. New medication is making her constipated. She is also taking Trulicity and wondered if this is something she can take in combination with that.

## 2019-06-18 PROBLEM — K59.03 DRUG-INDUCED CONSTIPATION: Status: ACTIVE | Noted: 2019-06-18

## 2019-06-24 RX ORDER — PANTOPRAZOLE SODIUM 40 MG/1
TABLET, DELAYED RELEASE ORAL
Qty: 90 TABLET | Refills: 2 | Status: SHIPPED | OUTPATIENT
Start: 2019-06-24 | End: 2020-01-16 | Stop reason: SDUPTHER

## 2019-07-08 ENCOUNTER — OFFICE VISIT (OUTPATIENT)
Dept: INTERNAL MEDICINE | Facility: CLINIC | Age: 69
End: 2019-07-08

## 2019-07-08 VITALS
HEART RATE: 112 BPM | HEIGHT: 63 IN | WEIGHT: 192 LBS | DIASTOLIC BLOOD PRESSURE: 60 MMHG | RESPIRATION RATE: 16 BRPM | BODY MASS INDEX: 34.02 KG/M2 | OXYGEN SATURATION: 98 % | TEMPERATURE: 99 F | SYSTOLIC BLOOD PRESSURE: 138 MMHG

## 2019-07-08 DIAGNOSIS — J40 BRONCHITIS: ICD-10-CM

## 2019-07-08 DIAGNOSIS — IMO0001 UNCONTROLLED DIABETES MELLITUS TYPE 2 WITHOUT COMPLICATIONS: Primary | ICD-10-CM

## 2019-07-08 DIAGNOSIS — E78.2 MIXED HYPERLIPIDEMIA: ICD-10-CM

## 2019-07-08 DIAGNOSIS — I10 ESSENTIAL HYPERTENSION: ICD-10-CM

## 2019-07-08 PROBLEM — K59.03 DRUG-INDUCED CONSTIPATION: Status: RESOLVED | Noted: 2019-06-18 | Resolved: 2019-07-08

## 2019-07-08 PROCEDURE — 99214 OFFICE O/P EST MOD 30 MIN: CPT | Performed by: NURSE PRACTITIONER

## 2019-07-08 PROCEDURE — 83036 HEMOGLOBIN GLYCOSYLATED A1C: CPT | Performed by: NURSE PRACTITIONER

## 2019-07-08 RX ORDER — OLMESARTAN MEDOXOMIL 5 MG/1
5 TABLET ORAL DAILY
COMMUNITY
End: 2019-09-09 | Stop reason: SDUPTHER

## 2019-07-08 RX ORDER — CEFDINIR 300 MG/1
300 CAPSULE ORAL 2 TIMES DAILY
Qty: 20 CAPSULE | Refills: 0 | Status: SHIPPED | OUTPATIENT
Start: 2019-07-08 | End: 2019-07-30

## 2019-07-08 NOTE — PROGRESS NOTES
Chief Complaint   Patient presents with   • Follow-up   • Diabetes   • Allergies   • Generalized Body Aches       Subjective     Amber Campos is a 68 y.o. female being seen for a follow up appointment today regarding diabetes 2, HTN, and hyperlipidemia. She stopped her Trulicitty due to constipation. She is still taking Farxiga. Her glucose is runnning 165-175 fasting. She admits to poor diet on vacation. Denies blurred vision, SOA.     She has a second complaint of URI. She was on a trip last week and forgot her allergy medications. She started with laryngitis, cough, chest congestion 7 days ago.    History of Present Illness     Allergies   Allergen Reactions   • Adhesive Tape      EKG stickers only   • Iodinated Diagnostic Agents Photosensitivity     contrast   • Farxiga [Dapagliflozin] Diarrhea and Itching     Yeast infeciotn   • Neomycin-Bacitracin Zn-Polymyx Rash         Current Outpatient Medications:   •  budesonide-formoterol (SYMBICORT) 160-4.5 MCG/ACT inhaler, Inhale 2 puffs As Needed., Disp: , Rfl:   •  Cetirizine HCl (ZYRTEC CHILDRENS ALLERGY) 5 MG/5ML solution solution, Take  by mouth Daily., Disp: , Rfl:   •  cholecalciferol (VITAMIN D3) 1000 UNITS tablet, Take 1,000 Units by mouth Daily., Disp: , Rfl:   •  Clocortolone Pivalate 0.1 % cream, Apply  topically As Needed., Disp: , Rfl:   •  FARXIGA 5 MG tablet tablet, Take 1 tablet by mouth Daily., Disp: 30 tablet, Rfl: 2  •  glucose blood test strip, Use to check  Blood sugar  Tid   Dx   e11.9, Disp: 300 each, Rfl: 3  •  Insulin Pen Needle (PEN NEEDLES) 32G X 6 MM misc, 1 each Daily With Breakfast., Disp: 90 each, Rfl: 3  •  Lancets (FREESTYLE) lancets, , Disp: , Rfl:   •  meloxicam (MOBIC) 15 MG tablet, Take 1 tablet by mouth Daily., Disp: 30 tablet, Rfl: 2  •  metFORMIN (GLUCOPHAGE) 1000 MG tablet, TAKE 1 TABLET TWICE DAILY  WITH MEALS, Disp: 180 tablet, Rfl: 3  •  mometasone (NASONEX) 50 MCG/ACT nasal spray, 2 sprays into each nostril As Needed.,  Disp: , Rfl:   •  montelukast (SINGULAIR) 10 MG tablet, Take 1 tablet by mouth Every Night., Disp: 30 tablet, Rfl: 1  •  Multiple Vitamins-Iron (MULTIPLE VITAMIN/IRON PO), Take 1 tablet/day by mouth., Disp: , Rfl:   •  olmesartan (BENICAR) 5 MG tablet, Take 5 mg by mouth Daily., Disp: , Rfl:   •  pantoprazole (PROTONIX) 40 MG EC tablet, TAKE 1 TABLET DAILY, Disp: 90 tablet, Rfl: 2  •  polyethylene glycol (MIRALAX) packet, Take 17 g by mouth Daily., Disp: 30 each, Rfl: 0  •  Spacer/Aero Chamber Mouthpiece misc, 1 each Daily., Disp: 1 each, Rfl: 0  •  traMADol (ULTRAM) 50 MG tablet, Take 1 tablet by mouth Every 8 (Eight) Hours As Needed for Moderate Pain ., Disp: 30 tablet, Rfl: 0  •  TRULICITY 1.5 MG/0.5ML solution pen-injector, INJECT 1.5 MG UNDER THE SKIN INTO THE APPROPRIATE AREA AS DIRECTED 1 (ONE) TIME PER WEEK., Disp: 12 pen, Rfl: 3  •  VASCEPA 1 g capsule capsule, Take 2 g by mouth 2 (Two) Times a Day With Meals., Disp: 360 capsule, Rfl: 3  •  venlafaxine XR (EFFEXOR-XR) 75 MG 24 hr capsule, TAKE 1 CAPSULE DAILY, Disp: 90 capsule, Rfl: 3  •  VOLTAREN 1 % gel gel, As Needed. APPLY TO AFFECTED AREA TWICE A DAY, Disp: , Rfl: 3  •  losartan (COZAAR) 100 MG tablet, Take 1 tablet by mouth Daily., Disp: 90 tablet, Rfl: 3    The following portions of the patient's history were reviewed and updated as appropriate: allergies, current medications, past family history, past medical history, past social history, past surgical history and problem list.    Review of Systems   Constitutional: Negative.    HENT: Positive for congestion, postnasal drip, rhinorrhea, sinus pressure, sinus pain, sneezing and sore throat. Negative for trouble swallowing.    Eyes: Negative.  Negative for visual disturbance.   Respiratory: Positive for cough. Negative for choking, chest tightness, shortness of breath, wheezing and stridor.    Cardiovascular: Negative for chest pain, palpitations and leg swelling.   Gastrointestinal: Negative.     Endocrine: Negative.    Genitourinary: Negative.    Allergic/Immunologic: Positive for environmental allergies.   Neurological: Negative.        Assessment     Physical Exam   Constitutional: She is oriented to person, place, and time. She appears well-developed and well-nourished.   HENT:   Head: Normocephalic.   Right Ear: External ear normal. A middle ear effusion is present.   Left Ear: External ear normal. A middle ear effusion is present.   Nose: Mucosal edema and rhinorrhea present. Right sinus exhibits frontal sinus tenderness. Left sinus exhibits frontal sinus tenderness.   Mouth/Throat: Oropharynx is clear and moist.   Neck: Neck supple. No thyromegaly present.   Cardiovascular: Normal rate, regular rhythm and normal heart sounds.   No murmur heard.  Pulmonary/Chest: Effort normal and breath sounds normal. No stridor. No respiratory distress.   Abdominal: Soft. Bowel sounds are normal.   Musculoskeletal: She exhibits no edema.   Neurological: She is alert and oriented to person, place, and time.   Skin: Skin is warm.   Psychiatric: She has a normal mood and affect. Her behavior is normal.   Vitals reviewed.      Plan     Her fasting labs were reviewed with the patient from last week.     Amber was seen today for follow-up, diabetes, allergies and generalized body aches.    Diagnoses and all orders for this visit:    Uncontrolled diabetes mellitus type 2 without complications (CMS/[facility])  -     POC Glycosylated Hemoglobin (Hb A1C)  -     metFORMIN (GLUCOPHAGE) 1000 MG tablet; Take 1 tablet by mouth 2 (Two) Times a Day With Meals.  -     Dapagliflozin Propanediol (FARXIGA) 10 MG tablet; Take 10 mg by mouth Daily.  -     Comprehensive metabolic panel; Future  -     Lipid panel; Future  -     Hemoglobin A1c; Future  -     CBC & Differential; Future    Essential hypertension  -     Comprehensive metabolic panel; Future  -     Lipid panel; Future  -     Hemoglobin A1c; Future  -     CBC & Differential;  Future    Mixed hyperlipidemia  -     Comprehensive metabolic panel; Future  -     Lipid panel; Future    Bronchitis    Other orders  -     cefdinir (OMNICEF) 300 MG capsule; Take 1 capsule by mouth 2 (Two) Times a Day.        Follow up in 3 months with labs

## 2019-07-09 LAB — HBA1C MFR BLD: 8.2 %

## 2019-07-13 ENCOUNTER — RESULTS ENCOUNTER (OUTPATIENT)
Dept: INTERNAL MEDICINE | Facility: CLINIC | Age: 69
End: 2019-07-13

## 2019-07-13 DIAGNOSIS — IMO0001 UNCONTROLLED DIABETES MELLITUS TYPE 2 WITHOUT COMPLICATIONS: ICD-10-CM

## 2019-07-13 DIAGNOSIS — I10 ESSENTIAL HYPERTENSION: ICD-10-CM

## 2019-07-13 DIAGNOSIS — E78.2 MIXED HYPERLIPIDEMIA: ICD-10-CM

## 2019-07-30 ENCOUNTER — OFFICE VISIT (OUTPATIENT)
Dept: INTERNAL MEDICINE | Facility: CLINIC | Age: 69
End: 2019-07-30

## 2019-07-30 VITALS
BODY MASS INDEX: 33.49 KG/M2 | HEIGHT: 63 IN | OXYGEN SATURATION: 98 % | TEMPERATURE: 98.7 F | SYSTOLIC BLOOD PRESSURE: 120 MMHG | WEIGHT: 189 LBS | DIASTOLIC BLOOD PRESSURE: 82 MMHG | HEART RATE: 101 BPM | RESPIRATION RATE: 16 BRPM

## 2019-07-30 DIAGNOSIS — J04.0 LARYNGITIS, ACUTE: ICD-10-CM

## 2019-07-30 DIAGNOSIS — M85.852 OSTEOPENIA OF LEFT HIP: ICD-10-CM

## 2019-07-30 DIAGNOSIS — N30.00 ACUTE CYSTITIS WITHOUT HEMATURIA: Primary | ICD-10-CM

## 2019-07-30 PROCEDURE — 99214 OFFICE O/P EST MOD 30 MIN: CPT | Performed by: NURSE PRACTITIONER

## 2019-07-30 PROCEDURE — 96372 THER/PROPH/DIAG INJ SC/IM: CPT | Performed by: NURSE PRACTITIONER

## 2019-07-30 PROCEDURE — 81003 URINALYSIS AUTO W/O SCOPE: CPT | Performed by: NURSE PRACTITIONER

## 2019-07-30 RX ORDER — FLUCONAZOLE 150 MG/1
150 TABLET ORAL ONCE
Qty: 1 TABLET | Refills: 0 | Status: SHIPPED | OUTPATIENT
Start: 2019-07-30 | End: 2019-07-30

## 2019-07-30 RX ORDER — TRIAMCINOLONE ACETONIDE 40 MG/ML
40 INJECTION, SUSPENSION INTRA-ARTICULAR; INTRAMUSCULAR ONCE
Status: COMPLETED | OUTPATIENT
Start: 2019-07-30 | End: 2019-07-30

## 2019-07-30 RX ORDER — CONJUGATED ESTROGENS/BAZEDOXIFENE .45; 2 MG/1; MG/1
TABLET, FILM COATED ORAL
COMMUNITY
Start: 2019-07-20 | End: 2019-08-14

## 2019-07-30 RX ORDER — METHYLPREDNISOLONE 4 MG/1
TABLET ORAL
Refills: 0 | COMMUNITY
Start: 2019-07-19 | End: 2019-07-30

## 2019-07-30 RX ORDER — AMOXICILLIN 875 MG/1
875 TABLET, COATED ORAL 2 TIMES DAILY
Qty: 14 TABLET | Refills: 0 | Status: SHIPPED | OUTPATIENT
Start: 2019-07-30 | End: 2019-08-14

## 2019-07-30 RX ADMIN — TRIAMCINOLONE ACETONIDE 40 MG: 40 INJECTION, SUSPENSION INTRA-ARTICULAR; INTRAMUSCULAR at 13:47

## 2019-07-30 NOTE — PATIENT INSTRUCTIONS
Preventing Osteoporosis, Adult  Osteoporosis is a condition that causes the bones to get weaker. With osteoporosis, the bones become thinner, and the normal spaces in bone tissue become larger. This can make the bones weak and cause them to break more easily. People who have osteoporosis are more likely to break their wrist, spine, or hip. Even a minor accident or injury can be enough to break weak bones.  Osteoporosis can occur with aging. Your body constantly replaces old bone tissue with new tissue. As you get older, you may lose bone tissue more quickly, or it may be replaced more slowly. Osteoporosis is more likely to develop if you have poor nutrition or do not get enough calcium or vitamin D. Other lifestyle factors can also play a role. By making some diet and lifestyle changes, you can help to keep your bones healthy and help to prevent osteoporosis.  What nutrition changes can be made?  Nutrition plays an important role in maintaining healthy, strong bones.  · Make sure you get enough calcium every day from food or from calcium supplements.  ? If you are age 50 or younger, aim to get 1,000 mg of calcium every day.  ? If you are older than age 50, aim to get 1,200 mg of calcium every day.  · Try to get enough vitamin D every day.  ? If you are age 70 or younger, aim to get 600 international units (IU) every day.  ? If you are older than age 70, aim to get 800 international units every day.  · Follow a healthy diet. Eat plenty of foods that contain calcium and vitamin D.  ? Calcium is in milk, cheese, yogurt, and other dairy products. Some fish and vegetables are also good sources of calcium. Many foods such as cereals and breads have had calcium added to them (are fortified). Check nutrition labels to see how much calcium is in a food or drink.  ? Foods that contain vitamin D include milk, cereals, salmon, and tuna. Your body also makes vitamin D when you are out in the sun. Bare skin exposure to the sun on  your face, arms, legs, or back for no more than 30 minutes a day, 2 times per week is more than enough. Beyond that, it is important to use sunblock to protect your skin from sunburn, which increases your risk for skin cancer.    What lifestyle changes can be made?  Making changes in your everyday life can also play an important role in preventing osteoporosis.  · Stay active and get exercise every day. Ask your health care provider what types of exercise are best for you.  · Do not use any products that contain nicotine or tobacco, such as cigarettes and e-cigarettes. If you need help quitting, ask your health care provider.  · Limit alcohol intake to no more than 1 drink a day for nonpregnant women and 2 drinks a day for men. One drink equals 12 oz of beer, 5 oz of wine, or 1½ oz of hard liquor.    Why are these changes important?  Making these nutrition and lifestyle changes can:  · Help you develop and maintain healthy, strong bones.  · Prevent loss of bone mass and the problems that are caused by that loss, such as broken bones and delayed healing.  · Make you feel better mentally and physically.    What can happen if changes are not made?  Problems that can result from osteoporosis can be very serious. These may include:  · A higher risk of broken bones that are painful and do not heal well.  · Physical malformations, such as a collapsed spine or a hunched back.  · Problems with movement.    Where to find support  If you need help making changes to prevent osteoporosis, talk with your health care provider. You can ask for a referral to a diet and nutrition specialist (dietitian) and a physical therapist.  Where to find more information  Learn more about osteoporosis from:  · NIH Osteoporosis and Related Bone Diseases National Resource Center: www.niams.nih.gov/health_info/bone/osteoporosis/osteoporosis_ff.asp  · U.S. Office on Women’s Health:  www.womenshealth.gov/publications/our-publications/fact-sheet/osteoporosis.html  · National Osteoporosis Foundation: www.nof.org/patients/what-is-osteoporosis/    Summary  · Osteoporosis is a condition that causes weak bones that are more likely to break.  · Eating a healthy diet and making sure you get enough calcium and vitamin D can help prevent osteoporosis.  · Other ways to reduce your risk of osteoporosis include getting regular exercise and avoiding alcohol and products that contain nicotine or tobacco.  This information is not intended to replace advice given to you by your health care provider. Make sure you discuss any questions you have with your health care provider.  Document Released: 01/01/2017 Document Revised: 09/25/2018 Document Reviewed: 08/28/2017  Elsevier Interactive Patient Education © 2019 [employer] Inc[employer]

## 2019-07-30 NOTE — PROGRESS NOTES
Chief Complaint   Patient presents with   • Urinary Tract Infection       Subjective   Amber Campos is a 68 y.o. female is being seen for an acute appointment for possible UTI. She thought it was farxiga causing frequency, then yesterday she went to the dentist, and was given Amoxil, which caused her symptoms to resolve.      She has had laryngitis for 4 weeks, she saw Dr. Knox (ENT) who gave her a steroid pack which she did to take due to possible side effects.     She wants to stop her Duavee. She is on Calcium with Vitamin D 200 iu daily.     History of Present Illness     Current Outpatient Medications on File Prior to Visit   Medication Sig Dispense Refill   • budesonide-formoterol (SYMBICORT) 160-4.5 MCG/ACT inhaler Inhale 2 puffs As Needed.     • Cetirizine HCl (ZYRTEC CHILDRENS ALLERGY) 5 MG/5ML solution solution Take  by mouth Daily.     • cholecalciferol (VITAMIN D3) 1000 UNITS tablet Take 1,000 Units by mouth Daily.     • Clocortolone Pivalate 0.1 % cream Apply  topically As Needed.     • Dapagliflozin Propanediol (FARXIGA) 10 MG tablet Take 10 mg by mouth Daily. 90 tablet 3   • glucose blood test strip Use to check  Blood sugar  Tid   Dx   e11.9 300 each 3   • Insulin Pen Needle (PEN NEEDLES) 32G X 6 MM misc 1 each Daily With Breakfast. 90 each 3   • Lancets (FREESTYLE) lancets      • losartan (COZAAR) 100 MG tablet Take 1 tablet by mouth Daily. 90 tablet 3   • meloxicam (MOBIC) 15 MG tablet Take 1 tablet by mouth Daily. 30 tablet 2   • metFORMIN (GLUCOPHAGE) 1000 MG tablet Take 1 tablet by mouth 2 (Two) Times a Day With Meals. 180 tablet 3   • mometasone (NASONEX) 50 MCG/ACT nasal spray 2 sprays into each nostril As Needed.     • montelukast (SINGULAIR) 10 MG tablet Take 1 tablet by mouth Every Night. 30 tablet 1   • Multiple Vitamins-Iron (MULTIPLE VITAMIN/IRON PO) Take 1 tablet/day by mouth.     • olmesartan (BENICAR) 5 MG tablet Take 5 mg by mouth Daily.     • pantoprazole (PROTONIX) 40 MG EC  tablet TAKE 1 TABLET DAILY 90 tablet 2   • polyethylene glycol (MIRALAX) packet Take 17 g by mouth Daily. 30 each 0   • Spacer/Aero Chamber Mouthpiece misc 1 each Daily. 1 each 0   • traMADol (ULTRAM) 50 MG tablet Take 1 tablet by mouth Every 8 (Eight) Hours As Needed for Moderate Pain . 30 tablet 0   • VASCEPA 1 g capsule capsule Take 2 g by mouth 2 (Two) Times a Day With Meals. 360 capsule 3   • venlafaxine XR (EFFEXOR-XR) 75 MG 24 hr capsule TAKE 1 CAPSULE DAILY 90 capsule 3   • VOLTAREN 1 % gel gel As Needed. APPLY TO AFFECTED AREA TWICE A DAY  3   • DUAVEE 0.45-20 MG tablet      • [DISCONTINUED] cefdinir (OMNICEF) 300 MG capsule Take 1 capsule by mouth 2 (Two) Times a Day. 20 capsule 0   • [DISCONTINUED] methylPREDNISolone (MEDROL, ABDOULAYE,) 4 MG tablet TAKE 6 TABLETS ON DAY 1 AS DIRECTED ON PACKAGE AND DECREASE BY 1 TAB EACH DAY FOR A TOTAL OF 6 DAYS  0     No current facility-administered medications on file prior to visit.        The following portions of the patient's history were reviewed and updated as appropriate: allergies, current medications, past family history, past medical history, past social history, past surgical history and problem list.    Review of Systems   Constitutional: Negative.    HENT: Positive for postnasal drip and rhinorrhea.    Respiratory: Negative for shortness of breath, wheezing and stridor.    Cardiovascular: Negative for chest pain, palpitations and leg swelling.   Gastrointestinal: Negative.  Negative for abdominal distention and abdominal pain.   Endocrine: Negative.    Genitourinary: Positive for frequency.   Musculoskeletal: Negative.    Allergic/Immunologic: Negative.    Neurological: Negative.    Hematological: Negative.    Psychiatric/Behavioral: Negative.        Objective   Physical Exam   Constitutional: She is oriented to person, place, and time. She appears well-developed and well-nourished.   HENT:   Head: Normocephalic.   Right Ear: External ear normal.   Left Ear:  External ear normal.   Nose: Nose normal.   Mouth/Throat: Oropharynx is clear and moist. No oropharyngeal exudate.   Neck: Neck supple. No thyromegaly present.   Cardiovascular: Normal rate, regular rhythm, normal heart sounds and intact distal pulses.   No murmur heard.  Pulmonary/Chest: Effort normal and breath sounds normal. No stridor. No respiratory distress. She has no wheezes.   Musculoskeletal: She exhibits no edema.   Neurological: She is alert and oriented to person, place, and time.   Skin: Skin is warm and dry. Capillary refill takes less than 2 seconds.   Psychiatric: She has a normal mood and affect. Her behavior is normal.   Vitals reviewed.      Assessment/Plan   Amber was seen today for urinary tract infection.    Diagnoses and all orders for this visit:    Acute cystitis without hematuria  -     amoxicillin (AMOXIL) 875 MG tablet; Take 1 tablet by mouth 2 (Two) Times a Day.    Laryngitis, acute    Osteopenia of left hip    Other orders  -     fluconazole (DIFLUCAN) 150 MG tablet; Take 1 tablet by mouth 1 (One) Time for 1 dose.      Information given on preventing osteoporosis. Add weight bearing exercise 2 times weekly    Kenalog 40mg IM once    FOllow up as scheduled

## 2019-08-14 ENCOUNTER — OFFICE VISIT (OUTPATIENT)
Dept: INTERNAL MEDICINE | Facility: CLINIC | Age: 69
End: 2019-08-14

## 2019-08-14 VITALS
TEMPERATURE: 98.1 F | HEART RATE: 94 BPM | DIASTOLIC BLOOD PRESSURE: 78 MMHG | OXYGEN SATURATION: 99 % | WEIGHT: 190 LBS | HEIGHT: 63 IN | SYSTOLIC BLOOD PRESSURE: 122 MMHG | BODY MASS INDEX: 33.66 KG/M2 | RESPIRATION RATE: 16 BRPM

## 2019-08-14 DIAGNOSIS — N39.0 RECURRENT UTI: Primary | ICD-10-CM

## 2019-08-14 DIAGNOSIS — Z91.09 ENVIRONMENTAL ALLERGIES: ICD-10-CM

## 2019-08-14 LAB
BILIRUB BLD-MCNC: NEGATIVE MG/DL
CLARITY, POC: ABNORMAL
COLOR UR: YELLOW
GLUCOSE UR STRIP-MCNC: ABNORMAL MG/DL
KETONES UR QL: NEGATIVE
LEUKOCYTE EST, POC: ABNORMAL
NITRITE UR-MCNC: POSITIVE MG/ML
PH UR: 5 [PH] (ref 5–8)
PROT UR STRIP-MCNC: NEGATIVE MG/DL
RBC # UR STRIP: NEGATIVE /UL
SP GR UR: 1 (ref 1–1.03)
UROBILINOGEN UR QL: NORMAL

## 2019-08-14 PROCEDURE — 99214 OFFICE O/P EST MOD 30 MIN: CPT | Performed by: NURSE PRACTITIONER

## 2019-08-14 PROCEDURE — 81003 URINALYSIS AUTO W/O SCOPE: CPT | Performed by: NURSE PRACTITIONER

## 2019-08-14 RX ORDER — CRANBERRY FRUIT EXTRACT 200 MG
1 CAPSULE ORAL DAILY
Qty: 90 CAPSULE | Refills: 0
Start: 2019-08-14 | End: 2020-04-30 | Stop reason: SINTOL

## 2019-08-14 RX ORDER — SULFAMETHOXAZOLE AND TRIMETHOPRIM 800; 160 MG/1; MG/1
1 TABLET ORAL 2 TIMES DAILY
Qty: 14 TABLET | Refills: 0 | Status: SHIPPED | OUTPATIENT
Start: 2019-08-14 | End: 2019-08-19

## 2019-08-14 NOTE — PROGRESS NOTES
"Chief Complaint   Patient presents with   • Sore Throat   • Urinary Tract Infection       Subjective   Amber Campos is a 68 y.o. female is being seen for an acute appointment for sore throat and possible UTI.     She has had a sore throat for several days and laryngitis for several weeks. She has severe allergies and takes allergy shots, Nasonex, and Zyrtec daily. Her symptoms are runny nose, \"needle like pain\" in throat, itchy eyes. Denies wheezing or cough.      She is complaining of odor in urine, frequency for 3-7 days. She tried increasing fluids and drinking cranberry juice. No blood in urine, no back pain.     History of Present Illness     Current Outpatient Medications on File Prior to Visit   Medication Sig Dispense Refill   • budesonide-formoterol (SYMBICORT) 160-4.5 MCG/ACT inhaler Inhale 2 puffs As Needed.     • Cetirizine HCl (ZYRTEC CHILDRENS ALLERGY) 5 MG/5ML solution solution Take  by mouth Daily.     • cholecalciferol (VITAMIN D3) 1000 UNITS tablet Take 1,000 Units by mouth Daily.     • Clocortolone Pivalate 0.1 % cream Apply  topically As Needed.     • Dapagliflozin Propanediol (FARXIGA) 10 MG tablet Take 10 mg by mouth Daily. 90 tablet 3   • glucose blood test strip Use to check  Blood sugar  Tid   Dx   e11.9 300 each 3   • Insulin Pen Needle (PEN NEEDLES) 32G X 6 MM misc 1 each Daily With Breakfast. 90 each 3   • Lancets (FREESTYLE) lancets      • losartan (COZAAR) 100 MG tablet Take 1 tablet by mouth Daily. 90 tablet 3   • meloxicam (MOBIC) 15 MG tablet Take 1 tablet by mouth Daily. 30 tablet 2   • metFORMIN (GLUCOPHAGE) 1000 MG tablet Take 1 tablet by mouth 2 (Two) Times a Day With Meals. 180 tablet 3   • mometasone (NASONEX) 50 MCG/ACT nasal spray 2 sprays into each nostril As Needed.     • montelukast (SINGULAIR) 10 MG tablet Take 1 tablet by mouth Every Night. 30 tablet 1   • Multiple Vitamins-Iron (MULTIPLE VITAMIN/IRON PO) Take 1 tablet/day by mouth.     • olmesartan (BENICAR) 5 MG " tablet Take 5 mg by mouth Daily.     • pantoprazole (PROTONIX) 40 MG EC tablet TAKE 1 TABLET DAILY 90 tablet 2   • polyethylene glycol (MIRALAX) packet Take 17 g by mouth Daily. 30 each 0   • Spacer/Aero Chamber Mouthpiece misc 1 each Daily. 1 each 0   • traMADol (ULTRAM) 50 MG tablet Take 1 tablet by mouth Every 8 (Eight) Hours As Needed for Moderate Pain . 30 tablet 0   • VASCEPA 1 g capsule capsule Take 2 g by mouth 2 (Two) Times a Day With Meals. 360 capsule 3   • venlafaxine XR (EFFEXOR-XR) 75 MG 24 hr capsule TAKE 1 CAPSULE DAILY 90 capsule 3   • VOLTAREN 1 % gel gel As Needed. APPLY TO AFFECTED AREA TWICE A DAY  3   • [DISCONTINUED] amoxicillin (AMOXIL) 875 MG tablet Take 1 tablet by mouth 2 (Two) Times a Day. 14 tablet 0   • [DISCONTINUED] DUAVEE 0.45-20 MG tablet        No current facility-administered medications on file prior to visit.        The following portions of the patient's history were reviewed and updated as appropriate: allergies, current medications, past family history, past medical history, past social history, past surgical history and problem list.    Review of Systems   Constitutional: Negative.    HENT: Negative.    Eyes: Negative.    Respiratory: Negative.    Cardiovascular: Negative.    Gastrointestinal: Negative.    Endocrine: Negative.    Genitourinary: Negative.    Musculoskeletal: Negative.  Negative for arthralgias and back pain.   Skin: Negative.    Allergic/Immunologic: Negative.    Neurological: Negative.    Hematological: Negative.    Psychiatric/Behavioral: Negative.  Negative for agitation.       Objective   Physical Exam   Constitutional: She is oriented to person, place, and time. She appears well-developed and well-nourished.   HENT:   Head: Normocephalic.   Right Ear: External ear normal.   Left Ear: External ear normal.   Nose: Mucosal edema and rhinorrhea present. Right sinus exhibits no maxillary sinus tenderness and no frontal sinus tenderness. Left sinus exhibits no  maxillary sinus tenderness and no frontal sinus tenderness.   Mouth/Throat: Oropharynx is clear and moist. No oropharyngeal exudate.   Neck: Neck supple.   Cardiovascular: Normal rate, regular rhythm and normal heart sounds.   No murmur heard.  Pulmonary/Chest: Effort normal and breath sounds normal. No stridor. No respiratory distress.   Neurological: She is alert and oriented to person, place, and time.   Skin: Skin is warm and dry.   Psychiatric: She has a normal mood and affect. Her behavior is normal.   Vitals reviewed.      Assessment/Plan   Amber was seen today for sore throat and urinary tract infection.    Diagnoses and all orders for this visit:    Recurrent UTI  -     POCT urinalysis dipstick, automated  -     Urine Culture - Urine, Urine, Clean Catch  -     sulfamethoxazole-trimethoprim (BACTRIM DS) 800-160 MG per tablet; Take 1 tablet by mouth 2 (Two) Times a Day.  -     Cranberry 200 MG capsule; Take 1 capsule by mouth Daily.    Environmental allergies      Continue immunotherapy and allergy medications as prescribed.     She will make an appointment with ENT for laryngitis.     Follow up as scheduled

## 2019-08-17 LAB
BACTERIA UR CULT: ABNORMAL
BACTERIA UR CULT: ABNORMAL
OTHER ANTIBIOTIC SUSC ISLT: ABNORMAL

## 2019-08-19 ENCOUNTER — TELEPHONE (OUTPATIENT)
Dept: INTERNAL MEDICINE | Facility: CLINIC | Age: 69
End: 2019-08-19

## 2019-08-19 RX ORDER — NITROFURANTOIN 25; 75 MG/1; MG/1
100 CAPSULE ORAL 2 TIMES DAILY
Qty: 14 CAPSULE | Refills: 0 | Status: SHIPPED | OUTPATIENT
Start: 2019-08-19 | End: 2019-09-09

## 2019-08-19 NOTE — TELEPHONE ENCOUNTER
----- Message from JAKE Blount sent at 2019  1:25 PM EDT -----  UTI shows resistance to Bactrim, D/C. Changed to Macrobid ER 100mg Si po q12hrs for 7 days disp#14

## 2019-08-30 ENCOUNTER — LAB (OUTPATIENT)
Dept: INTERNAL MEDICINE | Facility: CLINIC | Age: 69
End: 2019-08-30

## 2019-08-30 DIAGNOSIS — I10 ESSENTIAL HYPERTENSION: ICD-10-CM

## 2019-08-30 DIAGNOSIS — IMO0001 UNCONTROLLED DIABETES MELLITUS TYPE 2 WITHOUT COMPLICATIONS: ICD-10-CM

## 2019-08-30 LAB
ALBUMIN SERPL-MCNC: 4.4 G/DL (ref 3.5–5.2)
ALBUMIN/GLOB SERPL: 1.8 G/DL
ALP SERPL-CCNC: 63 U/L (ref 39–117)
ALT SERPL-CCNC: 43 U/L (ref 1–33)
AST SERPL-CCNC: 44 U/L (ref 1–32)
BASOPHILS # BLD AUTO: 0.06 10*3/MM3 (ref 0–0.2)
BASOPHILS NFR BLD AUTO: 1 % (ref 0–1.5)
BILIRUB SERPL-MCNC: 0.6 MG/DL (ref 0.2–1.2)
BUN SERPL-MCNC: 11 MG/DL (ref 8–23)
BUN/CREAT SERPL: 22.9 (ref 7–25)
CALCIUM SERPL-MCNC: 9.6 MG/DL (ref 8.6–10.5)
CHLORIDE SERPL-SCNC: 96 MMOL/L (ref 98–107)
CHOLEST SERPL-MCNC: 204 MG/DL (ref 0–200)
CO2 SERPL-SCNC: 26.5 MMOL/L (ref 22–29)
CREAT SERPL-MCNC: 0.48 MG/DL (ref 0.57–1)
EOSINOPHIL # BLD AUTO: 0.26 10*3/MM3 (ref 0–0.4)
EOSINOPHIL NFR BLD AUTO: 4.4 % (ref 0.3–6.2)
ERYTHROCYTE [DISTWIDTH] IN BLOOD BY AUTOMATED COUNT: 13.4 % (ref 12.3–15.4)
GLOBULIN SER CALC-MCNC: 2.4 GM/DL
GLUCOSE SERPL-MCNC: 170 MG/DL (ref 65–99)
HBA1C MFR BLD: 8.3 % (ref 4.8–5.6)
HCT VFR BLD AUTO: 39.4 % (ref 34–46.6)
HDLC SERPL-MCNC: 69 MG/DL (ref 40–60)
HGB BLD-MCNC: 12.3 G/DL (ref 12–15.9)
IMM GRANULOCYTES # BLD AUTO: 0.01 10*3/MM3 (ref 0–0.05)
IMM GRANULOCYTES NFR BLD AUTO: 0.2 % (ref 0–0.5)
LDLC SERPL CALC-MCNC: 93 MG/DL (ref 0–100)
LYMPHOCYTES # BLD AUTO: 2.51 10*3/MM3 (ref 0.7–3.1)
LYMPHOCYTES NFR BLD AUTO: 42.3 % (ref 19.6–45.3)
MCH RBC QN AUTO: 27.4 PG (ref 26.6–33)
MCHC RBC AUTO-ENTMCNC: 31.2 G/DL (ref 31.5–35.7)
MCV RBC AUTO: 87.8 FL (ref 79–97)
MONOCYTES # BLD AUTO: 0.42 10*3/MM3 (ref 0.1–0.9)
MONOCYTES NFR BLD AUTO: 7.1 % (ref 5–12)
NEUTROPHILS # BLD AUTO: 2.67 10*3/MM3 (ref 1.7–7)
NEUTROPHILS NFR BLD AUTO: 45 % (ref 42.7–76)
NRBC BLD AUTO-RTO: 0 /100 WBC (ref 0–0.2)
PLATELET # BLD AUTO: 364 10*3/MM3 (ref 140–450)
POTASSIUM SERPL-SCNC: 4.2 MMOL/L (ref 3.5–5.2)
PROT SERPL-MCNC: 6.8 G/DL (ref 6–8.5)
RBC # BLD AUTO: 4.49 10*6/MM3 (ref 3.77–5.28)
SODIUM SERPL-SCNC: 137 MMOL/L (ref 136–145)
TRIGL SERPL-MCNC: 209 MG/DL (ref 0–150)
VLDLC SERPL CALC-MCNC: 41.8 MG/DL
WBC # BLD AUTO: 5.93 10*3/MM3 (ref 3.4–10.8)

## 2019-09-09 ENCOUNTER — OFFICE VISIT (OUTPATIENT)
Dept: INTERNAL MEDICINE | Facility: CLINIC | Age: 69
End: 2019-09-09

## 2019-09-09 VITALS
SYSTOLIC BLOOD PRESSURE: 132 MMHG | WEIGHT: 191 LBS | HEIGHT: 63 IN | DIASTOLIC BLOOD PRESSURE: 78 MMHG | BODY MASS INDEX: 33.84 KG/M2 | TEMPERATURE: 98.1 F | RESPIRATION RATE: 16 BRPM | HEART RATE: 108 BPM | OXYGEN SATURATION: 95 %

## 2019-09-09 DIAGNOSIS — I10 ESSENTIAL HYPERTENSION: ICD-10-CM

## 2019-09-09 DIAGNOSIS — K21.9 GASTROESOPHAGEAL REFLUX DISEASE WITHOUT ESOPHAGITIS: ICD-10-CM

## 2019-09-09 DIAGNOSIS — IMO0001 UNCONTROLLED DIABETES MELLITUS TYPE 2 WITHOUT COMPLICATIONS: Primary | ICD-10-CM

## 2019-09-09 DIAGNOSIS — E78.2 MIXED HYPERLIPIDEMIA: ICD-10-CM

## 2019-09-09 PROBLEM — J04.0 LARYNGITIS, ACUTE: Status: RESOLVED | Noted: 2019-07-30 | Resolved: 2019-09-09

## 2019-09-09 PROCEDURE — 99214 OFFICE O/P EST MOD 30 MIN: CPT | Performed by: NURSE PRACTITIONER

## 2019-09-09 NOTE — PROGRESS NOTES
Chief Complaint   Patient presents with   • Follow-up   • Diabetes   • Hypertension   • Hyperlipidemia       Subjective     Amber Campos is a 68 y.o. female being seen for a follow up appointment today regarding DM 2, HTN, hyperlipidemia. She .   Is currently on Glucophage 1000 mg BID and Farixga 10 mg daily for blood glucose. She is doing yoga for diabetes and lifting weights twice a week. Her fasting glucose yesterday was 160-180. She denies urinary frequency,urgency.     She does not check her BP at home. She is taking Losartan 100mg daily for HTN. Denies edema.    She is taking her Vascepa 1 g twice daily for elevated trigs. She is tolerating it well. Her GERD is well controlled without abd pain.     History of Present Illness     Allergies   Allergen Reactions   • Adhesive Tape      EKG stickers only   • Iodinated Diagnostic Agents Photosensitivity     contrast   • Victoza [Liraglutide] Nausea Only   • Farxiga [Dapagliflozin] Diarrhea and Itching     Yeast infeciotn   • Neomycin-Bacitracin Zn-Polymyx Rash   • Trulicity [Dulaglutide] GI Intolerance         Current Outpatient Medications:   •  budesonide-formoterol (SYMBICORT) 160-4.5 MCG/ACT inhaler, Inhale 2 puffs As Needed., Disp: , Rfl:   •  Cetirizine HCl (ZYRTEC CHILDRENS ALLERGY) 5 MG/5ML solution solution, Take  by mouth Daily., Disp: , Rfl:   •  cholecalciferol (VITAMIN D3) 1000 UNITS tablet, Take 1,000 Units by mouth Daily., Disp: , Rfl:   •  Clocortolone Pivalate 0.1 % cream, Apply  topically As Needed., Disp: , Rfl:   •  Cranberry 200 MG capsule, Take 1 capsule by mouth Daily., Disp: 90 capsule, Rfl: 0  •  Dapagliflozin Propanediol (FARXIGA) 10 MG tablet, Take 10 mg by mouth Daily., Disp: 90 tablet, Rfl: 3  •  glucose blood test strip, Use to check  Blood sugar  Tid   Dx   e11.9, Disp: 300 each, Rfl: 3  •  Insulin Pen Needle (PEN NEEDLES) 32G X 6 MM misc, 1 each Daily With Breakfast., Disp: 90 each, Rfl: 3  •  Lancets (FREESTYLE) lancets, , Disp: ,  Rfl:   •  losartan (COZAAR) 100 MG tablet, Take 1 tablet by mouth Daily., Disp: 90 tablet, Rfl: 3  •  meloxicam (MOBIC) 15 MG tablet, Take 1 tablet by mouth Daily., Disp: 30 tablet, Rfl: 2  •  metFORMIN (GLUCOPHAGE) 1000 MG tablet, Take 1 tablet by mouth 2 (Two) Times a Day With Meals., Disp: 180 tablet, Rfl: 3  •  mometasone (NASONEX) 50 MCG/ACT nasal spray, 2 sprays into each nostril As Needed., Disp: , Rfl:   •  montelukast (SINGULAIR) 10 MG tablet, Take 1 tablet by mouth Every Night., Disp: 30 tablet, Rfl: 1  •  Multiple Vitamins-Iron (MULTIPLE VITAMIN/IRON PO), Take 1 tablet/day by mouth., Disp: , Rfl:   •  olmesartan (BENICAR) 5 MG tablet, Take 5 mg by mouth Daily., Disp: , Rfl:   •  pantoprazole (PROTONIX) 40 MG EC tablet, TAKE 1 TABLET DAILY, Disp: 90 tablet, Rfl: 2  •  polyethylene glycol (MIRALAX) packet, Take 17 g by mouth Daily., Disp: 30 each, Rfl: 0  •  Spacer/Aero Chamber Mouthpiece misc, 1 each Daily., Disp: 1 each, Rfl: 0  •  traMADol (ULTRAM) 50 MG tablet, Take 1 tablet by mouth Every 8 (Eight) Hours As Needed for Moderate Pain ., Disp: 30 tablet, Rfl: 0  •  VASCEPA 1 g capsule capsule, Take 2 g by mouth 2 (Two) Times a Day With Meals., Disp: 360 capsule, Rfl: 3  •  venlafaxine XR (EFFEXOR-XR) 75 MG 24 hr capsule, TAKE 1 CAPSULE DAILY, Disp: 90 capsule, Rfl: 3  •  VOLTAREN 1 % gel gel, As Needed. APPLY TO AFFECTED AREA TWICE A DAY, Disp: , Rfl: 3    The following portions of the patient's history were reviewed and updated as appropriate: allergies, current medications, past family history, past medical history, past social history, past surgical history and problem list.    Review of Systems   Constitutional: Negative.    HENT: Negative.    Eyes: Negative.    Respiratory: Negative.  Negative for wheezing and stridor.    Cardiovascular: Negative.  Negative for chest pain, palpitations and leg swelling.   Gastrointestinal: Negative.    Endocrine: Negative.    Genitourinary: Negative.     Musculoskeletal: Negative.    Skin: Negative.    Allergic/Immunologic: Negative.    Neurological: Negative.    Hematological: Negative.  Negative for adenopathy.   Psychiatric/Behavioral: Negative.        Assessment     Physical Exam   Constitutional: She is oriented to person, place, and time. She appears well-developed and well-nourished.   HENT:   Head: Normocephalic.   Right Ear: External ear normal.   Left Ear: External ear normal.   Nose: Nose normal.   Mouth/Throat: Oropharynx is clear and moist.   Neck: Neck supple. No thyromegaly present.   Cardiovascular: Normal rate, regular rhythm and normal heart sounds.   No murmur heard.  Pulmonary/Chest: Effort normal and breath sounds normal. No respiratory distress. She has no wheezes.   Neurological: She is alert and oriented to person, place, and time.   Skin: Skin is warm and dry.   Psychiatric: She has a normal mood and affect. Her behavior is normal.   Vitals reviewed.      Plan     Her fasting labs were reviewed with the patient from last week.      Diagnosis Plan   1. Uncontrolled diabetes mellitus type 2 without complications (CMS/[facility])  Comprehensive metabolic panel    Lipid panel    Hemoglobin A1c    CBC & Differential   2. Essential hypertension  Comprehensive metabolic panel    Lipid panel    Hemoglobin A1c    CBC & Differential   3. Mixed hyperlipidemia  Comprehensive metabolic panel    Lipid panel    Hemoglobin A1c    CBC & Differential   4. Gastroesophageal reflux disease without esophagitis         She will start the Whole 30 diet and look into Burn Boot camp. Discussed Ozempic, she declined another injection.     Follow up in 3 months

## 2019-09-10 RX ORDER — CIPROFLOXACIN 500 MG/1
500 TABLET, FILM COATED ORAL EVERY 12 HOURS
Qty: 10 TABLET | Refills: 0 | Status: SHIPPED | OUTPATIENT
Start: 2019-09-10 | End: 2019-09-25

## 2019-09-14 ENCOUNTER — RESULTS ENCOUNTER (OUTPATIENT)
Dept: INTERNAL MEDICINE | Facility: CLINIC | Age: 69
End: 2019-09-14

## 2019-09-14 DIAGNOSIS — I10 ESSENTIAL HYPERTENSION: ICD-10-CM

## 2019-09-14 DIAGNOSIS — E78.2 MIXED HYPERLIPIDEMIA: ICD-10-CM

## 2019-09-14 DIAGNOSIS — IMO0001 UNCONTROLLED DIABETES MELLITUS TYPE 2 WITHOUT COMPLICATIONS: ICD-10-CM

## 2019-09-20 DIAGNOSIS — IMO0001 UNCONTROLLED TYPE 2 DIABETES MELLITUS WITHOUT COMPLICATION, WITHOUT LONG-TERM CURRENT USE OF INSULIN: ICD-10-CM

## 2019-09-20 RX ORDER — VENLAFAXINE HYDROCHLORIDE 75 MG/1
CAPSULE, EXTENDED RELEASE ORAL
Qty: 90 CAPSULE | Refills: 3 | Status: SHIPPED | OUTPATIENT
Start: 2019-09-20 | End: 2020-01-16 | Stop reason: SDUPTHER

## 2019-09-25 ENCOUNTER — OFFICE VISIT (OUTPATIENT)
Dept: INTERNAL MEDICINE | Facility: CLINIC | Age: 69
End: 2019-09-25

## 2019-09-25 VITALS
BODY MASS INDEX: 33.13 KG/M2 | DIASTOLIC BLOOD PRESSURE: 74 MMHG | RESPIRATION RATE: 16 BRPM | WEIGHT: 187 LBS | OXYGEN SATURATION: 98 % | SYSTOLIC BLOOD PRESSURE: 132 MMHG | HEART RATE: 110 BPM | TEMPERATURE: 98.1 F | HEIGHT: 63 IN

## 2019-09-25 DIAGNOSIS — R82.90 ABNORMAL URINE ODOR: ICD-10-CM

## 2019-09-25 DIAGNOSIS — R39.9 UTI SYMPTOMS: Primary | ICD-10-CM

## 2019-09-25 DIAGNOSIS — R35.0 URINE FREQUENCY: ICD-10-CM

## 2019-09-25 LAB
BILIRUB BLD-MCNC: NEGATIVE MG/DL
CLARITY, POC: ABNORMAL
COLOR UR: ABNORMAL
GLUCOSE UR STRIP-MCNC: ABNORMAL MG/DL
KETONES UR QL: NEGATIVE
LEUKOCYTE EST, POC: NEGATIVE
NITRITE UR-MCNC: POSITIVE MG/ML
PH UR: 5.5 [PH] (ref 5–8)
PROT UR STRIP-MCNC: NEGATIVE MG/DL
RBC # UR STRIP: NEGATIVE /UL
SP GR UR: 1.01 (ref 1–1.03)
UROBILINOGEN UR QL: NORMAL

## 2019-09-25 PROCEDURE — 81003 URINALYSIS AUTO W/O SCOPE: CPT | Performed by: NURSE PRACTITIONER

## 2019-09-25 PROCEDURE — 99213 OFFICE O/P EST LOW 20 MIN: CPT | Performed by: NURSE PRACTITIONER

## 2019-09-25 RX ORDER — CIPROFLOXACIN 500 MG/1
500 TABLET, FILM COATED ORAL 2 TIMES DAILY
Qty: 14 TABLET | Refills: 0 | Status: SHIPPED | OUTPATIENT
Start: 2019-09-25 | End: 2019-12-19

## 2019-09-25 NOTE — PROGRESS NOTES
Chief Complaint   Patient presents with   • Urinary Tract Infection     burning, 2 x week   • Urinary Frequency       Subjective     Amber Campos is a 68 y.o. female being seen for a follow up appointment today regarding UTI. She is reporting that 2 weeks ago, she began having urinary frequency and urgency. No blood in urine. She started Cranberry capsules.  She believes these are precipitated by Poise pads use.       History of Present Illness     Allergies   Allergen Reactions   • Iodinated Diagnostic Agents Photosensitivity     contrast   • Adhesive Tape Other (See Comments)     EKG stickers only   • Farxiga [Dapagliflozin] Diarrhea and Itching     Yeast infeciotn   • Neomycin-Bacitracin Zn-Polymyx Rash   • Trulicity [Dulaglutide] GI Intolerance     Constipation   • Victoza [Liraglutide] Nausea Only         Current Outpatient Medications:   •  budesonide-formoterol (SYMBICORT) 160-4.5 MCG/ACT inhaler, Inhale 2 puffs As Needed., Disp: , Rfl:   •  Cetirizine HCl (ZYRTEC CHILDRENS ALLERGY) 5 MG/5ML solution solution, Take  by mouth Daily., Disp: , Rfl:   •  cholecalciferol (VITAMIN D3) 1000 UNITS tablet, Take 1,000 Units by mouth Daily., Disp: , Rfl:   •  Clocortolone Pivalate 0.1 % cream, Apply  topically As Needed., Disp: , Rfl:   •  Cranberry 200 MG capsule, Take 1 capsule by mouth Daily., Disp: 90 capsule, Rfl: 0  •  Dapagliflozin Propanediol (FARXIGA) 10 MG tablet, Take 10 mg by mouth Daily., Disp: 90 tablet, Rfl: 3  •  glucose blood test strip, Use to check  Blood sugar  Tid   Dx   e11.9, Disp: 300 each, Rfl: 3  •  Insulin Pen Needle (PEN NEEDLES) 32G X 6 MM misc, 1 each Daily With Breakfast., Disp: 90 each, Rfl: 3  •  Lancets (FREESTYLE) lancets, , Disp: , Rfl:   •  losartan (COZAAR) 100 MG tablet, Take 1 tablet by mouth Daily., Disp: 90 tablet, Rfl: 3  •  meloxicam (MOBIC) 15 MG tablet, Take 1 tablet by mouth Daily., Disp: 30 tablet, Rfl: 2  •  metFORMIN (GLUCOPHAGE) 1000 MG tablet, Take 1 tablet by mouth 2  (Two) Times a Day With Meals., Disp: 180 tablet, Rfl: 3  •  metFORMIN (GLUCOPHAGE) 1000 MG tablet, TAKE 1 TABLET TWICE DAILY  WITH MEALS, Disp: 180 tablet, Rfl: 3  •  mometasone (NASONEX) 50 MCG/ACT nasal spray, 2 sprays into each nostril As Needed., Disp: , Rfl:   •  montelukast (SINGULAIR) 10 MG tablet, Take 1 tablet by mouth Every Night., Disp: 30 tablet, Rfl: 1  •  Multiple Vitamins-Iron (MULTIPLE VITAMIN/IRON PO), Take 1 tablet/day by mouth., Disp: , Rfl:   •  pantoprazole (PROTONIX) 40 MG EC tablet, TAKE 1 TABLET DAILY, Disp: 90 tablet, Rfl: 2  •  polyethylene glycol (MIRALAX) packet, Take 17 g by mouth Daily., Disp: 30 each, Rfl: 0  •  Spacer/Aero Chamber Mouthpiece misc, 1 each Daily., Disp: 1 each, Rfl: 0  •  traMADol (ULTRAM) 50 MG tablet, Take 1 tablet by mouth Every 8 (Eight) Hours As Needed for Moderate Pain ., Disp: 30 tablet, Rfl: 0  •  VASCEPA 1 g capsule capsule, Take 2 g by mouth 2 (Two) Times a Day With Meals., Disp: 360 capsule, Rfl: 3  •  venlafaxine XR (EFFEXOR-XR) 75 MG 24 hr capsule, TAKE 1 CAPSULE DAILY, Disp: 90 capsule, Rfl: 3  •  VOLTAREN 1 % gel gel, As Needed. APPLY TO AFFECTED AREA TWICE A DAY, Disp: , Rfl: 3    The following portions of the patient's history were reviewed and updated as appropriate: allergies, current medications, past family history, past medical history, past social history, past surgical history and problem list.    Review of Systems   Constitutional: Negative.    Eyes: Negative.    Respiratory: Negative.    Cardiovascular: Negative.  Negative for chest pain and leg swelling.   Gastrointestinal: Negative.    Endocrine: Negative.    Genitourinary: Positive for flank pain, frequency and hematuria.   Allergic/Immunologic: Positive for environmental allergies.   Hematological: Negative.        Assessment     Physical Exam   Constitutional: She is oriented to person, place, and time. She appears well-developed and well-nourished. No distress.   Neck: Neck supple.    Cardiovascular: Normal rate, regular rhythm and normal heart sounds.   No murmur heard.  Pulmonary/Chest: Effort normal and breath sounds normal.   Abdominal: Soft. Bowel sounds are normal. She exhibits no distension. There is tenderness (epigastric).   Neurological: She is alert and oriented to person, place, and time.   Skin: Skin is warm.   Psychiatric: She has a normal mood and affect. Her behavior is normal.   Vitals reviewed.      Plan     Her fasting labs were reviewed with the patient from last week.     Amber was seen today for urinary tract infection and urinary frequency.    Diagnoses and all orders for this visit:    UTI symptoms  -     POCT urinalysis dipstick, automated  -     Urinalysis With Culture If Indicated -    Urine frequency  -     Urinalysis With Culture If Indicated -    Abnormal urine odor  -     Urinalysis With Culture If Indicated -    Other orders  -     ciprofloxacin (CIPRO) 500 MG tablet; Take 1 tablet by mouth 2 (Two) Times a Day.        Reviewed last Urine culture    Follow up as needed

## 2019-09-27 LAB
APPEARANCE UR: CLEAR
BACTERIA #/AREA URNS HPF: ABNORMAL /HPF
BACTERIA UR CULT: NORMAL
BACTERIA UR CULT: NORMAL
BILIRUB UR QL STRIP: NEGATIVE
COLOR UR: YELLOW
EPI CELLS #/AREA URNS HPF: ABNORMAL /HPF
GLUCOSE UR QL: ABNORMAL
HGB UR QL STRIP: NEGATIVE
KETONES UR QL STRIP: NEGATIVE
LEUKOCYTE ESTERASE UR QL STRIP: NEGATIVE
MICRO URNS: ABNORMAL
NITRITE UR QL STRIP: POSITIVE
PH UR STRIP: 5.5 [PH] (ref 5–7.5)
PROT UR QL STRIP: NEGATIVE
RBC #/AREA URNS HPF: ABNORMAL /HPF
SP GR UR: >=1.03 (ref 1–1.03)
URINALYSIS REFLEX: ABNORMAL
UROBILINOGEN UR STRIP-MCNC: 0.2 MG/DL (ref 0.2–1)
WBC #/AREA URNS HPF: ABNORMAL /HPF

## 2019-10-09 ENCOUNTER — CLINICAL SUPPORT (OUTPATIENT)
Dept: INTERNAL MEDICINE | Facility: CLINIC | Age: 69
End: 2019-10-09

## 2019-10-09 PROCEDURE — G0008 ADMIN INFLUENZA VIRUS VAC: HCPCS | Performed by: NURSE PRACTITIONER

## 2019-10-09 PROCEDURE — 90653 IIV ADJUVANT VACCINE IM: CPT | Performed by: NURSE PRACTITIONER

## 2019-12-04 ENCOUNTER — LAB (OUTPATIENT)
Dept: INTERNAL MEDICINE | Facility: CLINIC | Age: 69
End: 2019-12-04

## 2019-12-04 DIAGNOSIS — E78.2 MIXED HYPERLIPIDEMIA: ICD-10-CM

## 2019-12-04 DIAGNOSIS — IMO0001 UNCONTROLLED DIABETES MELLITUS TYPE 2 WITHOUT COMPLICATIONS: ICD-10-CM

## 2019-12-04 DIAGNOSIS — I10 ESSENTIAL HYPERTENSION: ICD-10-CM

## 2019-12-05 LAB
ALBUMIN SERPL-MCNC: 4.4 G/DL (ref 3.5–5.2)
ALBUMIN/GLOB SERPL: 1.7 G/DL
ALP SERPL-CCNC: 62 U/L (ref 39–117)
ALT SERPL-CCNC: 52 U/L (ref 1–33)
AST SERPL-CCNC: 51 U/L (ref 1–32)
BASOPHILS # BLD AUTO: 0.05 10*3/MM3 (ref 0–0.2)
BASOPHILS NFR BLD AUTO: 0.9 % (ref 0–1.5)
BILIRUB SERPL-MCNC: 0.3 MG/DL (ref 0.2–1.2)
BUN SERPL-MCNC: 8 MG/DL (ref 8–23)
BUN/CREAT SERPL: 14.8 (ref 7–25)
CALCIUM SERPL-MCNC: 9.1 MG/DL (ref 8.6–10.5)
CHLORIDE SERPL-SCNC: 97 MMOL/L (ref 98–107)
CHOLEST SERPL-MCNC: 214 MG/DL (ref 0–200)
CO2 SERPL-SCNC: 27.6 MMOL/L (ref 22–29)
CREAT SERPL-MCNC: 0.54 MG/DL (ref 0.57–1)
EOSINOPHIL # BLD AUTO: 0.19 10*3/MM3 (ref 0–0.4)
EOSINOPHIL NFR BLD AUTO: 3.3 % (ref 0.3–6.2)
ERYTHROCYTE [DISTWIDTH] IN BLOOD BY AUTOMATED COUNT: 14.9 % (ref 12.3–15.4)
GLOBULIN SER CALC-MCNC: 2.6 GM/DL
GLUCOSE SERPL-MCNC: 251 MG/DL (ref 65–99)
HBA1C MFR BLD: 9.8 % (ref 4.8–5.6)
HCT VFR BLD AUTO: 37.1 % (ref 34–46.6)
HDLC SERPL-MCNC: 62 MG/DL (ref 40–60)
HGB BLD-MCNC: 12.1 G/DL (ref 12–15.9)
IMM GRANULOCYTES # BLD AUTO: 0.01 10*3/MM3 (ref 0–0.05)
IMM GRANULOCYTES NFR BLD AUTO: 0.2 % (ref 0–0.5)
LDLC SERPL CALC-MCNC: 111 MG/DL (ref 0–100)
LYMPHOCYTES # BLD AUTO: 2.17 10*3/MM3 (ref 0.7–3.1)
LYMPHOCYTES NFR BLD AUTO: 37.9 % (ref 19.6–45.3)
MCH RBC QN AUTO: 26 PG (ref 26.6–33)
MCHC RBC AUTO-ENTMCNC: 32.6 G/DL (ref 31.5–35.7)
MCV RBC AUTO: 79.8 FL (ref 79–97)
MONOCYTES # BLD AUTO: 0.43 10*3/MM3 (ref 0.1–0.9)
MONOCYTES NFR BLD AUTO: 7.5 % (ref 5–12)
NEUTROPHILS # BLD AUTO: 2.87 10*3/MM3 (ref 1.7–7)
NEUTROPHILS NFR BLD AUTO: 50.2 % (ref 42.7–76)
NRBC BLD AUTO-RTO: 0.2 /100 WBC (ref 0–0.2)
PLATELET # BLD AUTO: 332 10*3/MM3 (ref 140–450)
POTASSIUM SERPL-SCNC: 4.6 MMOL/L (ref 3.5–5.2)
PROT SERPL-MCNC: 7 G/DL (ref 6–8.5)
RBC # BLD AUTO: 4.65 10*6/MM3 (ref 3.77–5.28)
SODIUM SERPL-SCNC: 139 MMOL/L (ref 136–145)
TRIGL SERPL-MCNC: 204 MG/DL (ref 0–150)
VLDLC SERPL CALC-MCNC: 40.8 MG/DL
WBC # BLD AUTO: 5.72 10*3/MM3 (ref 3.4–10.8)

## 2019-12-19 ENCOUNTER — OFFICE VISIT (OUTPATIENT)
Dept: INTERNAL MEDICINE | Facility: CLINIC | Age: 69
End: 2019-12-19

## 2019-12-19 VITALS
RESPIRATION RATE: 18 BRPM | OXYGEN SATURATION: 97 % | WEIGHT: 186 LBS | DIASTOLIC BLOOD PRESSURE: 78 MMHG | HEART RATE: 80 BPM | BODY MASS INDEX: 32.96 KG/M2 | HEIGHT: 63 IN | SYSTOLIC BLOOD PRESSURE: 136 MMHG

## 2019-12-19 DIAGNOSIS — E78.2 MIXED HYPERLIPIDEMIA: ICD-10-CM

## 2019-12-19 DIAGNOSIS — I10 ESSENTIAL HYPERTENSION: ICD-10-CM

## 2019-12-19 DIAGNOSIS — IMO0001 UNCONTROLLED DIABETES MELLITUS TYPE 2 WITHOUT COMPLICATIONS: Primary | ICD-10-CM

## 2019-12-19 PROCEDURE — 99214 OFFICE O/P EST MOD 30 MIN: CPT | Performed by: NURSE PRACTITIONER

## 2019-12-19 RX ORDER — ICOSAPENT ETHYL 1000 MG/1
2 CAPSULE ORAL 2 TIMES DAILY WITH MEALS
Qty: 360 CAPSULE | Refills: 3 | Status: SHIPPED | OUTPATIENT
Start: 2019-12-19 | End: 2020-01-16 | Stop reason: SDUPTHER

## 2019-12-19 NOTE — PROGRESS NOTES
Low up on glucose, blood pressure    Subjective     Amber Campos is a 68 y.o. female being seen for a follow up appointment today regarding uncontrolled uncontrolled DM 2, HTN, Hyperlipidemia, and GERD. She is currently on Metformin 1000 mg BID with Farxiga 10 mg daily for DM 2. She recently added Berberrine 50mg daily as well OTC. She denies blurred vision, urinary frequency. She admits to poor diet and her fasting glucose has been around 200.SHe recently got back from a cruise.      She is on Losartan 100mg daily for HTN. She does not check her BP at home. She denies CP, SOA, edema.     She takes Protonix 40 mg daily which controls her GERD symptoms well. This is controlling her GERD despite her poor diet.     She fell 12-6-2019 and she fractured her 5th MTP. She is wearing a boot.     History of Present Illness     Allergies   Allergen Reactions   • Iodinated Diagnostic Agents Photosensitivity     contrast   • Adhesive Tape Other (See Comments)     EKG stickers only   • Farxiga [Dapagliflozin] Diarrhea and Itching     Yeast infeciotn   • Neomycin-Bacitracin Zn-Polymyx Rash   • Trulicity [Dulaglutide] GI Intolerance     Constipation   • Victoza [Liraglutide] Nausea Only         Current Outpatient Medications:   •  budesonide-formoterol (SYMBICORT) 160-4.5 MCG/ACT inhaler, Inhale 2 puffs As Needed., Disp: , Rfl:   •  Cetirizine HCl (ZYRTEC CHILDRENS ALLERGY) 5 MG/5ML solution solution, Take  by mouth Daily., Disp: , Rfl:   •  cholecalciferol (VITAMIN D3) 1000 UNITS tablet, Take 1,000 Units by mouth Daily., Disp: , Rfl:   •  ciprofloxacin (CIPRO) 500 MG tablet, Take 1 tablet by mouth 2 (Two) Times a Day., Disp: 14 tablet, Rfl: 0  •  Clocortolone Pivalate 0.1 % cream, Apply  topically As Needed., Disp: , Rfl:   •  Cranberry 200 MG capsule, Take 1 capsule by mouth Daily., Disp: 90 capsule, Rfl: 0  •  Dapagliflozin Propanediol (FARXIGA) 10 MG tablet, Take 10 mg by mouth Daily., Disp: 90 tablet, Rfl: 3  •  glucose  blood test strip, Use to check  Blood sugar  Tid   Dx   e11.9, Disp: 300 each, Rfl: 3  •  Insulin Pen Needle (PEN NEEDLES) 32G X 6 MM misc, 1 each Daily With Breakfast., Disp: 90 each, Rfl: 3  •  Lancets (FREESTYLE) lancets, , Disp: , Rfl:   •  losartan (COZAAR) 100 MG tablet, Take 1 tablet by mouth Daily., Disp: 90 tablet, Rfl: 3  •  meloxicam (MOBIC) 15 MG tablet, Take 1 tablet by mouth Daily., Disp: 30 tablet, Rfl: 2  •  metFORMIN (GLUCOPHAGE) 1000 MG tablet, Take 1 tablet by mouth 2 (Two) Times a Day With Meals., Disp: 180 tablet, Rfl: 3  •  metFORMIN (GLUCOPHAGE) 1000 MG tablet, TAKE 1 TABLET TWICE DAILY  WITH MEALS, Disp: 180 tablet, Rfl: 3  •  mometasone (NASONEX) 50 MCG/ACT nasal spray, 2 sprays into each nostril As Needed., Disp: , Rfl:   •  montelukast (SINGULAIR) 10 MG tablet, Take 1 tablet by mouth Every Night., Disp: 30 tablet, Rfl: 1  •  pantoprazole (PROTONIX) 40 MG EC tablet, TAKE 1 TABLET DAILY, Disp: 90 tablet, Rfl: 2  •  polyethylene glycol (MIRALAX) packet, Take 17 g by mouth Daily., Disp: 30 each, Rfl: 0  •  Spacer/Aero Chamber Mouthpiece misc, 1 each Daily., Disp: 1 each, Rfl: 0  •  traMADol (ULTRAM) 50 MG tablet, Take 1 tablet by mouth Every 8 (Eight) Hours As Needed for Moderate Pain ., Disp: 30 tablet, Rfl: 0  •  VASCEPA 1 g capsule capsule, Take 2 g by mouth 2 (Two) Times a Day With Meals., Disp: 360 capsule, Rfl: 3  •  venlafaxine XR (EFFEXOR-XR) 75 MG 24 hr capsule, TAKE 1 CAPSULE DAILY, Disp: 90 capsule, Rfl: 3  •  VOLTAREN 1 % gel gel, As Needed. APPLY TO AFFECTED AREA TWICE A DAY, Disp: , Rfl: 3    The following portions of the patient's history were reviewed and updated as appropriate: allergies, current medications, past family history, past medical history, past social history, past surgical history and problem list.    Review of Systems   Constitutional: Negative.    HENT: Positive for congestion.    Eyes: Negative.    Respiratory: Negative.    Cardiovascular: Negative.  Negative  for chest pain, palpitations and leg swelling.   Gastrointestinal: Negative.    Endocrine: Negative.    Genitourinary: Negative.  Negative for flank pain, frequency and hematuria.   Musculoskeletal: Positive for arthralgias and gait problem (fracture left 5th toe).   Skin: Negative.    Allergic/Immunologic: Positive for environmental allergies.   Hematological: Negative.  Negative for adenopathy. Does not bruise/bleed easily.   Psychiatric/Behavioral: Negative.    All other systems reviewed and are negative.      Assessment     Physical Exam   Constitutional: She is oriented to person, place, and time. She appears well-developed and well-nourished. No distress.   HENT:   Head: Normocephalic.   Right Ear: External ear normal.   Left Ear: External ear normal.   Mouth/Throat: Oropharynx is clear and moist.   Neck: Neck supple. No thyromegaly present.   Cardiovascular: Normal rate, regular rhythm and normal heart sounds.   No murmur heard.  Pulmonary/Chest: Effort normal and breath sounds normal. No stridor. No respiratory distress.   Musculoskeletal: She exhibits no edema.   Neurological: She is alert and oriented to person, place, and time.   Skin: Skin is warm.   Psychiatric: She has a normal mood and affect. Her behavior is normal.   Vitals reviewed.      Plan     Her fasting labs were reviewed with the patient from last week.     Amber was seen today for diabetes.    Diagnoses and all orders for this visit:    Uncontrolled diabetes mellitus type 2 without complications (CMS/[facility])  -     Comprehensive metabolic panel; Future  -     Lipid panel; Future  -     Hemoglobin A1c; Future  -     CBC & Differential; Future  -     VASCEPA 1 g capsule capsule; Take 2 g by mouth 2 (Two) Times a Day With Meals.  -     SITagliptin-metFORMIN HCl ER (JANUMET XR)  MG tablet; Take 1 tablet by mouth Daily.    Essential hypertension  -     Comprehensive metabolic panel; Future  -     Lipid panel; Future    Mixed hyperlipidemia  -      Comprehensive metabolic panel; Future  -     Lipid panel; Future        Keep a glucose log and reduce carbs in diet. Switch from Metformin to Janumet XR [id number] daily.     Follow up in 4 weeks with [facility] and labs

## 2019-12-24 ENCOUNTER — RESULTS ENCOUNTER (OUTPATIENT)
Dept: INTERNAL MEDICINE | Facility: CLINIC | Age: 69
End: 2019-12-24

## 2019-12-24 DIAGNOSIS — E78.2 MIXED HYPERLIPIDEMIA: ICD-10-CM

## 2019-12-24 DIAGNOSIS — I10 ESSENTIAL HYPERTENSION: ICD-10-CM

## 2019-12-24 DIAGNOSIS — IMO0001 UNCONTROLLED DIABETES MELLITUS TYPE 2 WITHOUT COMPLICATIONS: ICD-10-CM

## 2019-12-31 ENCOUNTER — HOSPITAL ENCOUNTER (OUTPATIENT)
Dept: GENERAL RADIOLOGY | Facility: HOSPITAL | Age: 69
Discharge: HOME | End: 2019-12-31
Admitting: PODIATRIST

## 2019-12-31 ENCOUNTER — HOSPITAL ENCOUNTER (OUTPATIENT)
Dept: GENERAL RADIOLOGY | Facility: HOSPITAL | Age: 69
Discharge: HOME | End: 2019-12-31

## 2019-12-31 ENCOUNTER — TRANSCRIBE ORDERS (OUTPATIENT)
Dept: ADMINISTRATIVE | Facility: HOSPITAL | Age: 69
End: 2019-12-31

## 2019-12-31 DIAGNOSIS — M79.671 RIGHT FOOT PAIN: ICD-10-CM

## 2019-12-31 DIAGNOSIS — S92.024D CLOSED NONDISPLACED FRACTURE OF ANTERIOR PROCESS OF RIGHT CALCANEUS WITH ROUTINE HEALING: ICD-10-CM

## 2019-12-31 DIAGNOSIS — S92.355D CLOSED NONDISPLACED FRACTURE OF FIFTH METATARSAL BONE OF LEFT FOOT WITH ROUTINE HEALING: ICD-10-CM

## 2019-12-31 DIAGNOSIS — S92.155D: Primary | ICD-10-CM

## 2019-12-31 DIAGNOSIS — S92.155D: ICD-10-CM

## 2019-12-31 PROCEDURE — 73630 X-RAY EXAM OF FOOT: CPT

## 2020-01-10 ENCOUNTER — TELEPHONE (OUTPATIENT)
Dept: INTERNAL MEDICINE | Facility: CLINIC | Age: 70
End: 2020-01-10

## 2020-01-10 NOTE — TELEPHONE ENCOUNTER
PATIENT CALLED AND WANTED TO KNOW WHAT WOULD BE GOOD FOR A COUGH. SHE HAS AN APPT. WITH [facility] ON THE 13TH, BUT CANNOT SLEEP AT NIGHT DUE TO COUGHING.    PLEASE CALL PATIENT WITH SUGGESTIONS FOR COUGH.

## 2020-01-13 ENCOUNTER — LAB (OUTPATIENT)
Dept: INTERNAL MEDICINE | Facility: CLINIC | Age: 70
End: 2020-01-13

## 2020-01-13 DIAGNOSIS — IMO0001 UNCONTROLLED DIABETES MELLITUS TYPE 2 WITHOUT COMPLICATIONS: ICD-10-CM

## 2020-01-13 LAB
ALBUMIN SERPL-MCNC: 4.3 G/DL (ref 3.5–5.2)
ALBUMIN/GLOB SERPL: 1.7 G/DL
ALP SERPL-CCNC: 71 U/L (ref 39–117)
ALT SERPL-CCNC: 49 U/L (ref 1–33)
AST SERPL-CCNC: 66 U/L (ref 1–32)
BASOPHILS # BLD AUTO: 0.05 10*3/MM3 (ref 0–0.2)
BASOPHILS NFR BLD AUTO: 0.9 % (ref 0–1.5)
BILIRUB SERPL-MCNC: 0.4 MG/DL (ref 0.2–1.2)
BUN SERPL-MCNC: 10 MG/DL (ref 8–23)
BUN/CREAT SERPL: 18.5 (ref 7–25)
CALCIUM SERPL-MCNC: 9.4 MG/DL (ref 8.6–10.5)
CHLORIDE SERPL-SCNC: 91 MMOL/L (ref 98–107)
CHOLEST SERPL-MCNC: 215 MG/DL (ref 0–200)
CO2 SERPL-SCNC: 28.3 MMOL/L (ref 22–29)
CREAT SERPL-MCNC: 0.54 MG/DL (ref 0.57–1)
EOSINOPHIL # BLD AUTO: 0.26 10*3/MM3 (ref 0–0.4)
EOSINOPHIL NFR BLD AUTO: 4.9 % (ref 0.3–6.2)
ERYTHROCYTE [DISTWIDTH] IN BLOOD BY AUTOMATED COUNT: 15.1 % (ref 12.3–15.4)
GLOBULIN SER CALC-MCNC: 2.6 GM/DL
GLUCOSE SERPL-MCNC: 294 MG/DL (ref 65–99)
HBA1C MFR BLD: 10.6 % (ref 4.8–5.6)
HCT VFR BLD AUTO: 38.6 % (ref 34–46.6)
HDLC SERPL-MCNC: 56 MG/DL (ref 40–60)
HGB BLD-MCNC: 12.4 G/DL (ref 12–15.9)
IMM GRANULOCYTES # BLD AUTO: 0.02 10*3/MM3 (ref 0–0.05)
IMM GRANULOCYTES NFR BLD AUTO: 0.4 % (ref 0–0.5)
LDLC SERPL CALC-MCNC: 97 MG/DL (ref 0–100)
LYMPHOCYTES # BLD AUTO: 1.93 10*3/MM3 (ref 0.7–3.1)
LYMPHOCYTES NFR BLD AUTO: 36 % (ref 19.6–45.3)
MCH RBC QN AUTO: 25.9 PG (ref 26.6–33)
MCHC RBC AUTO-ENTMCNC: 32.1 G/DL (ref 31.5–35.7)
MCV RBC AUTO: 80.6 FL (ref 79–97)
MONOCYTES # BLD AUTO: 0.42 10*3/MM3 (ref 0.1–0.9)
MONOCYTES NFR BLD AUTO: 7.8 % (ref 5–12)
NEUTROPHILS # BLD AUTO: 2.68 10*3/MM3 (ref 1.7–7)
NEUTROPHILS NFR BLD AUTO: 50 % (ref 42.7–76)
NRBC BLD AUTO-RTO: 0 /100 WBC (ref 0–0.2)
PLATELET # BLD AUTO: 342 10*3/MM3 (ref 140–450)
POTASSIUM SERPL-SCNC: 4.3 MMOL/L (ref 3.5–5.2)
PROT SERPL-MCNC: 6.9 G/DL (ref 6–8.5)
RBC # BLD AUTO: 4.79 10*6/MM3 (ref 3.77–5.28)
SODIUM SERPL-SCNC: 134 MMOL/L (ref 136–145)
TRIGL SERPL-MCNC: 311 MG/DL (ref 0–150)
VLDLC SERPL CALC-MCNC: 62.2 MG/DL
WBC # BLD AUTO: 5.36 10*3/MM3 (ref 3.4–10.8)

## 2020-01-16 ENCOUNTER — OFFICE VISIT (OUTPATIENT)
Dept: INTERNAL MEDICINE | Facility: CLINIC | Age: 70
End: 2020-01-16

## 2020-01-16 VITALS
BODY MASS INDEX: 33.95 KG/M2 | WEIGHT: 191.6 LBS | HEART RATE: 106 BPM | SYSTOLIC BLOOD PRESSURE: 146 MMHG | HEIGHT: 63 IN | OXYGEN SATURATION: 95 % | DIASTOLIC BLOOD PRESSURE: 82 MMHG

## 2020-01-16 DIAGNOSIS — F41.8 DEPRESSION WITH ANXIETY: ICD-10-CM

## 2020-01-16 DIAGNOSIS — R35.0 URINE FREQUENCY: ICD-10-CM

## 2020-01-16 DIAGNOSIS — K21.9 GASTROESOPHAGEAL REFLUX DISEASE WITHOUT ESOPHAGITIS: ICD-10-CM

## 2020-01-16 DIAGNOSIS — IMO0001 UNCONTROLLED DIABETES MELLITUS TYPE 2 WITHOUT COMPLICATIONS: ICD-10-CM

## 2020-01-16 DIAGNOSIS — Z91.09 ENVIRONMENTAL ALLERGIES: ICD-10-CM

## 2020-01-16 DIAGNOSIS — I10 ESSENTIAL HYPERTENSION: ICD-10-CM

## 2020-01-16 DIAGNOSIS — E78.2 MIXED HYPERLIPIDEMIA: ICD-10-CM

## 2020-01-16 LAB
BILIRUB BLD-MCNC: NEGATIVE MG/DL
CLARITY, POC: CLEAR
COLOR UR: YELLOW
GLUCOSE UR STRIP-MCNC: ABNORMAL MG/DL
KETONES UR QL: ABNORMAL
LEUKOCYTE EST, POC: NEGATIVE
NITRITE UR-MCNC: NEGATIVE MG/ML
PH UR: 5 [PH] (ref 5–8)
PROT UR STRIP-MCNC: NEGATIVE MG/DL
RBC # UR STRIP: NEGATIVE /UL
SP GR UR: 1015 (ref 1–1.03)
UROBILINOGEN UR QL: NORMAL

## 2020-01-16 PROCEDURE — 99214 OFFICE O/P EST MOD 30 MIN: CPT | Performed by: NURSE PRACTITIONER

## 2020-01-16 PROCEDURE — 81003 URINALYSIS AUTO W/O SCOPE: CPT | Performed by: NURSE PRACTITIONER

## 2020-01-16 RX ORDER — VENLAFAXINE HYDROCHLORIDE 75 MG/1
75 CAPSULE, EXTENDED RELEASE ORAL DAILY
Qty: 90 CAPSULE | Refills: 3 | Status: SHIPPED | OUTPATIENT
Start: 2020-01-16 | End: 2020-12-28

## 2020-01-16 RX ORDER — PANTOPRAZOLE SODIUM 40 MG/1
40 TABLET, DELAYED RELEASE ORAL DAILY
Qty: 90 TABLET | Refills: 2 | Status: SHIPPED | OUTPATIENT
Start: 2020-01-16 | End: 2020-10-05

## 2020-01-16 RX ORDER — LOSARTAN POTASSIUM 100 MG/1
100 TABLET ORAL DAILY
Qty: 90 TABLET | Refills: 3 | Status: SHIPPED | OUTPATIENT
Start: 2020-01-16 | End: 2020-08-03

## 2020-01-16 RX ORDER — MONTELUKAST SODIUM 10 MG/1
10 TABLET ORAL NIGHTLY
Qty: 30 TABLET | Refills: 1 | Status: SHIPPED | OUTPATIENT
Start: 2020-01-16 | End: 2021-02-03

## 2020-01-16 RX ORDER — ICOSAPENT ETHYL 1000 MG/1
2 CAPSULE ORAL 2 TIMES DAILY WITH MEALS
Qty: 360 CAPSULE | Refills: 3 | Status: SHIPPED | OUTPATIENT
Start: 2020-01-16 | End: 2020-11-03

## 2020-01-16 NOTE — PROGRESS NOTES
Chief Complaint   Patient presents with   • Results     Follow Up Labs       Subjective     Amber Campos is a 69 y.o. female being seen for a follow up appointment today regarding uncontrolled DM 2, uncontrolled, HTN, Hyperlipidemia. She stopped the Farxiga, because she did not think she needed both Janumet XR and farxiga. She is going to resume weight watchers. She is not checking her glucose at home. She does report poor vision and urinary frequency. Ciaran dry mouth.     Her BP is well controlled on Losartan 100mg daily. Ciaran CP, SOA, swelling in ankles or feet.     She reports well controlled GERD. She takes her Protonix daily. Denies abd pain, n/v, indigestion, or change in bowels.     She has had more cough recently, but she is not taking her Singulair at night.     History of Present Illness     Allergies   Allergen Reactions   • Iodinated Diagnostic Agents Photosensitivity     contrast   • Adhesive Tape Other (See Comments)     EKG stickers only   • Farxiga [Dapagliflozin] Diarrhea and Itching     Yeast infeciotn   • Neomycin-Bacitracin Zn-Polymyx Rash   • Trulicity [Dulaglutide] GI Intolerance     Constipation   • Victoza [Liraglutide] Nausea Only         Current Outpatient Medications:   •  budesonide-formoterol (SYMBICORT) 160-4.5 MCG/ACT inhaler, Inhale 2 puffs As Needed., Disp: , Rfl:   •  Cetirizine HCl (ZYRTEC CHILDRENS ALLERGY) 5 MG/5ML solution solution, Take  by mouth Daily., Disp: , Rfl:   •  cholecalciferol (VITAMIN D3) 1000 UNITS tablet, Take 1,000 Units by mouth Daily., Disp: , Rfl:   •  Clocortolone Pivalate 0.1 % cream, Apply  topically As Needed., Disp: , Rfl:   •  Cranberry 200 MG capsule, Take 1 capsule by mouth Daily., Disp: 90 capsule, Rfl: 0  •  Dapagliflozin Propanediol (FARXIGA) 10 MG tablet, Take 10 mg by mouth Daily., Disp: 90 tablet, Rfl: 3  •  glucose blood test strip, Use to check  Blood sugar  Tid   Dx   e11.9, Disp: 300 each, Rfl: 3  •  Insulin Pen Needle (PEN NEEDLES) 32G X 6  MM misc, 1 each Daily With Breakfast., Disp: 90 each, Rfl: 3  •  Lancets (FREESTYLE) lancets, , Disp: , Rfl:   •  losartan (COZAAR) 100 MG tablet, Take 1 tablet by mouth Daily., Disp: 90 tablet, Rfl: 3  •  meloxicam (MOBIC) 15 MG tablet, Take 1 tablet by mouth Daily., Disp: 30 tablet, Rfl: 2  •  mometasone (NASONEX) 50 MCG/ACT nasal spray, 2 sprays into each nostril As Needed., Disp: , Rfl:   •  pantoprazole (PROTONIX) 40 MG EC tablet, TAKE 1 TABLET DAILY, Disp: 90 tablet, Rfl: 2  •  polyethylene glycol (MIRALAX) packet, Take 17 g by mouth Daily., Disp: 30 each, Rfl: 0  •  SITagliptin-metFORMIN HCl ER (JANUMET XR)  MG tablet, Take 1 tablet by mouth Daily., Disp: 30 tablet, Rfl: 0  •  Spacer/Aero Chamber Mouthpiece misc, 1 each Daily., Disp: 1 each, Rfl: 0  •  VASCEPA 1 g capsule capsule, Take 2 g by mouth 2 (Two) Times a Day With Meals., Disp: 360 capsule, Rfl: 3  •  venlafaxine XR (EFFEXOR-XR) 75 MG 24 hr capsule, TAKE 1 CAPSULE DAILY, Disp: 90 capsule, Rfl: 3  •  montelukast (SINGULAIR) 10 MG tablet, Take 1 tablet by mouth Every Night., Disp: 30 tablet, Rfl: 1  •  traMADol (ULTRAM) 50 MG tablet, Take 1 tablet by mouth Every 8 (Eight) Hours As Needed for Moderate Pain ., Disp: 30 tablet, Rfl: 0  •  VOLTAREN 1 % gel gel, As Needed. APPLY TO AFFECTED AREA TWICE A DAY, Disp: , Rfl: 3    The following portions of the patient's history were reviewed and updated as appropriate: allergies, current medications, past family history, past medical history, past social history, past surgical history and problem list.    Review of Systems   Constitutional: Negative.    HENT: Negative.    Eyes: Positive for visual disturbance.   Respiratory: Negative.  Negative for apnea and chest tightness.    Cardiovascular: Negative for chest pain, palpitations and leg swelling.   Gastrointestinal: Negative.  Negative for abdominal distention and abdominal pain.   Endocrine: Negative.    Genitourinary: Positive for flank pain and  frequency.   Musculoskeletal: Positive for arthralgias.   Skin: Negative.    Allergic/Immunologic: Negative.    Neurological: Negative.    Hematological: Negative.    Psychiatric/Behavioral: Negative.  Negative for agitation and self-injury. The patient is not nervous/anxious.        Assessment     Physical Exam   Constitutional: She is oriented to person, place, and time. She appears well-developed and well-nourished. No distress.   HENT:   Head: Normocephalic.   Right Ear: External ear normal.   Left Ear: External ear normal.   Nose: Nose normal.   Mouth/Throat: Oropharynx is clear and moist. No oropharyngeal exudate.   Neck: Neck supple.   Cardiovascular: Normal rate, regular rhythm and normal heart sounds.   No murmur heard.  Pulmonary/Chest: Effort normal and breath sounds normal. No stridor. No respiratory distress. She has no wheezes.   Musculoskeletal: She exhibits no edema.   Neurological: She is alert and oriented to person, place, and time.   Skin: Skin is warm and dry.   Psychiatric: She has a normal mood and affect. Her behavior is normal.   Vitals reviewed.      Plan     Her fasting labs were reviewed with the patient from last week.      Diagnosis Plan   1. Uncontrolled diabetes mellitus type 2 without complications (CMS/[facility])  Dapagliflozin Propanediol (FARXIGA) 10 MG tablet    VASCEPA 1 g capsule capsule   2. Urine frequency  POCT urinalysis dipstick, automated   3. Environmental allergies  montelukast (SINGULAIR) 10 MG tablet   4. Essential hypertension  losartan (COZAAR) 100 MG tablet   5. Mixed hyperlipidemia     6. Gastroesophageal reflux disease without esophagitis  pantoprazole (PROTONIX) 40 MG EC tablet   7. Depression with anxiety  venlafaxine XR (EFFEXOR-XR) 75 MG 24 hr capsule     Discussed the importance of glucose regulation for prevention of complications. She refuses injectable medications.     Return in about 2 months (around 3/16/2020) for Annual Wellness Visit, Medicare, Fasting labs  prior to appt.

## 2020-01-29 ENCOUNTER — OFFICE VISIT (OUTPATIENT)
Dept: INTERNAL MEDICINE | Facility: CLINIC | Age: 70
End: 2020-01-29

## 2020-01-29 VITALS
SYSTOLIC BLOOD PRESSURE: 132 MMHG | HEART RATE: 114 BPM | RESPIRATION RATE: 16 BRPM | DIASTOLIC BLOOD PRESSURE: 84 MMHG | HEIGHT: 63 IN | WEIGHT: 190 LBS | BODY MASS INDEX: 33.66 KG/M2 | OXYGEN SATURATION: 98 % | TEMPERATURE: 98.8 F

## 2020-01-29 DIAGNOSIS — R82.998 LEUKOCYTES IN URINE: Primary | ICD-10-CM

## 2020-01-29 DIAGNOSIS — B96.89 ACUTE BACTERIAL PHARYNGITIS: ICD-10-CM

## 2020-01-29 DIAGNOSIS — J02.8 ACUTE BACTERIAL PHARYNGITIS: ICD-10-CM

## 2020-01-29 DIAGNOSIS — J02.9 SORE THROAT: ICD-10-CM

## 2020-01-29 DIAGNOSIS — N39.0 COMPLICATED UTI (URINARY TRACT INFECTION): ICD-10-CM

## 2020-01-29 LAB
BILIRUB BLD-MCNC: NEGATIVE MG/DL
CLARITY, POC: CLEAR
COLOR UR: YELLOW
EXPIRATION DATE: NORMAL
FLUAV AG NPH QL: NEGATIVE
FLUBV AG NPH QL: NEGATIVE
GLUCOSE UR STRIP-MCNC: ABNORMAL MG/DL
INTERNAL CONTROL: NORMAL
KETONES UR QL: ABNORMAL
LEUKOCYTE EST, POC: ABNORMAL
Lab: NORMAL
NITRITE UR-MCNC: POSITIVE MG/ML
PH UR: 5.5 [PH] (ref 5–8)
PROT UR STRIP-MCNC: ABNORMAL MG/DL
RBC # UR STRIP: ABNORMAL /UL
SP GR UR: 1.01 (ref 1–1.03)
UROBILINOGEN UR QL: NORMAL

## 2020-01-29 PROCEDURE — 87804 INFLUENZA ASSAY W/OPTIC: CPT | Performed by: NURSE PRACTITIONER

## 2020-01-29 PROCEDURE — 81003 URINALYSIS AUTO W/O SCOPE: CPT | Performed by: NURSE PRACTITIONER

## 2020-01-29 PROCEDURE — 99213 OFFICE O/P EST LOW 20 MIN: CPT | Performed by: NURSE PRACTITIONER

## 2020-01-29 RX ORDER — LEVOFLOXACIN 750 MG/1
750 TABLET ORAL DAILY
Qty: 5 TABLET | Refills: 0 | Status: SHIPPED | OUTPATIENT
Start: 2020-01-29 | End: 2020-03-09

## 2020-01-29 NOTE — PROGRESS NOTES
Chief Complaint   Patient presents with   • Generalized Body Aches     X 2 weeks   • Chills   • Sore Throat       Subjective     Amber Campos is a 69 y.o. female being seen for a follow up appointment today regarding  Chills, body aches and Possible UTI. She has been having sore throat for 1 week. She is also complaining Urinary burning and frequency. She is concerned due to traveling to [facility] next week. Denies fever, SOA, wheezing.       History of Present Illness     Allergies   Allergen Reactions   • Iodinated Diagnostic Agents Photosensitivity     contrast   • Adhesive Tape Other (See Comments)     EKG stickers only   • Farxiga [Dapagliflozin] Diarrhea and Itching     Yeast infeciotn   • Neomycin-Bacitracin Zn-Polymyx Rash   • Trulicity [Dulaglutide] GI Intolerance     Constipation   • Victoza [Liraglutide] Nausea Only         Current Outpatient Medications:   •  budesonide-formoterol (SYMBICORT) 160-4.5 MCG/ACT inhaler, Inhale 2 puffs As Needed., Disp: , Rfl:   •  Cetirizine HCl (ZYRTEC CHILDRENS ALLERGY) 5 MG/5ML solution solution, Take  by mouth Daily., Disp: , Rfl:   •  cholecalciferol (VITAMIN D3) 1000 UNITS tablet, Take 1,000 Units by mouth Daily., Disp: , Rfl:   •  Clocortolone Pivalate 0.1 % cream, Apply  topically As Needed., Disp: , Rfl:   •  Cranberry 200 MG capsule, Take 1 capsule by mouth Daily., Disp: 90 capsule, Rfl: 0  •  Dapagliflozin Propanediol (FARXIGA) 10 MG tablet, Take 10 mg by mouth Daily., Disp: 90 tablet, Rfl: 3  •  glucose blood test strip, Use to check  Blood sugar  Tid   Dx   e11.9, Disp: 300 each, Rfl: 3  •  Insulin Pen Needle (PEN NEEDLES) 32G X 6 MM misc, 1 each Daily With Breakfast., Disp: 90 each, Rfl: 3  •  Lancets (FREESTYLE) lancets, , Disp: , Rfl:   •  losartan (COZAAR) 100 MG tablet, Take 1 tablet by mouth Daily., Disp: 90 tablet, Rfl: 3  •  meloxicam (MOBIC) 15 MG tablet, Take 1 tablet by mouth Daily., Disp: 30 tablet, Rfl: 2  •  mometasone (NASONEX) 50 MCG/ACT nasal  spray, 2 sprays into each nostril As Needed., Disp: , Rfl:   •  montelukast (SINGULAIR) 10 MG tablet, Take 1 tablet by mouth Every Night., Disp: 30 tablet, Rfl: 1  •  pantoprazole (PROTONIX) 40 MG EC tablet, Take 1 tablet by mouth Daily., Disp: 90 tablet, Rfl: 2  •  polyethylene glycol (MIRALAX) packet, Take 17 g by mouth Daily., Disp: 30 each, Rfl: 0  •  SITagliptin-metFORMIN HCl ER (JANUMET XR) 100-1000 MG tablet, Take 1 tablet by mouth Daily., Disp: 90 tablet, Rfl: 3  •  Spacer/Aero Chamber Mouthpiece misc, 1 each Daily., Disp: 1 each, Rfl: 0  •  VASCEPA 1 g capsule capsule, Take 2 g by mouth 2 (Two) Times a Day With Meals., Disp: 360 capsule, Rfl: 3  •  venlafaxine XR (EFFEXOR-XR) 75 MG 24 hr capsule, Take 1 capsule by mouth Daily., Disp: 90 capsule, Rfl: 3  •  VOLTAREN 1 % gel gel, As Needed. APPLY TO AFFECTED AREA TWICE A DAY, Disp: , Rfl: 3    The following portions of the patient's history were reviewed and updated as appropriate: allergies, current medications, past family history, past medical history, past social history, past surgical history and problem list.    Review of Systems   Constitutional: Positive for fatigue.   Respiratory: Positive for cough.    Genitourinary: Positive for frequency and urgency.   Musculoskeletal: Negative.    Allergic/Immunologic: Positive for environmental allergies.   Neurological: Negative.    Hematological: Negative.    Psychiatric/Behavioral: Negative.        Assessment     Physical Exam   Constitutional: She is oriented to person, place, and time. She appears well-developed and well-nourished. No distress.   HENT:   Nose: Mucosal edema present. Right sinus exhibits no maxillary sinus tenderness and no frontal sinus tenderness. Left sinus exhibits no maxillary sinus tenderness and no frontal sinus tenderness.   Mouth/Throat: Posterior oropharyngeal edema and posterior oropharyngeal erythema present. No oropharyngeal exudate. Tonsils are 2+ on the right. Tonsils are 2+  on the left. No tonsillar exudate.   Neck: Neck supple. No thyromegaly present.   Cardiovascular: Normal rate, regular rhythm and normal heart sounds.   No murmur heard.  Pulmonary/Chest: Effort normal and breath sounds normal. No stridor. No respiratory distress.   Musculoskeletal: She exhibits no edema.   Neurological: She is alert and oriented to person, place, and time.   Skin: Skin is warm and dry.   Psychiatric: She has a normal mood and affect. Her behavior is normal.   Vitals reviewed.      Plan     Her fasting labs were reviewed with the patient from last week.     Amber was seen today for generalized body aches, chills and sore throat.    Diagnoses and all orders for this visit:    Leukocytes in urine  -     POCT urinalysis dipstick, automated  -     Urine Culture - Urine, Urine, Clean Catch    Sore throat  -     POC Influenza A / B    Other orders  -     levoFLOXacin (LEVAQUIN) 750 MG tablet; Take 1 tablet by mouth Daily.       Diagnosis Plan   1. Leukocytes in urine  POCT urinalysis dipstick, automated    Urine Culture - Urine, Urine, Clean Catch   2. Sore throat  POC Influenza A / B   3. Acute bacterial pharyngitis  levoFLOXacin (LEVAQUIN) 750 MG tablet   4. Complicated UTI (urinary tract infection)  levoFLOXacin (LEVAQUIN) 750 MG tablet     Reviewed last urine culture.      Follow up as needed

## 2020-02-01 LAB
BACTERIA UR CULT: ABNORMAL
BACTERIA UR CULT: ABNORMAL
OTHER ANTIBIOTIC SUSC ISLT: ABNORMAL

## 2020-03-09 ENCOUNTER — OFFICE VISIT (OUTPATIENT)
Dept: INTERNAL MEDICINE | Facility: CLINIC | Age: 70
End: 2020-03-09

## 2020-03-09 VITALS
RESPIRATION RATE: 16 BRPM | DIASTOLIC BLOOD PRESSURE: 64 MMHG | TEMPERATURE: 98.8 F | BODY MASS INDEX: 33.31 KG/M2 | OXYGEN SATURATION: 95 % | WEIGHT: 188 LBS | HEART RATE: 112 BPM | HEIGHT: 63 IN | SYSTOLIC BLOOD PRESSURE: 150 MMHG

## 2020-03-09 DIAGNOSIS — B37.0 ORAL THRUSH: Primary | ICD-10-CM

## 2020-03-09 DIAGNOSIS — B37.31 CANDIDA VAGINITIS: ICD-10-CM

## 2020-03-09 PROCEDURE — 99213 OFFICE O/P EST LOW 20 MIN: CPT | Performed by: NURSE PRACTITIONER

## 2020-03-09 RX ORDER — FLUCONAZOLE 200 MG/1
200 TABLET ORAL DAILY
Qty: 7 TABLET | Refills: 0 | Status: SHIPPED | OUTPATIENT
Start: 2020-03-09 | End: 2020-04-24 | Stop reason: DRUGHIGH

## 2020-03-09 NOTE — PROGRESS NOTES
Chief Complaint   Patient presents with   • Vaginitis   • Thrush     In mouth   • Medication Problem     Believes that her current diabetes med is causing adverse effects to health       Subjective     Amber Campos is a 69 y.o. female being seen for an acute appointment for vaginal yeast infection and oral thrush. She recently got back from [facility], and was treated with Levqauin last month, which precipitated white spots in mouth and vaginal itching.     She believes that this was associated with Janumet and she wants to stop it.      History of Present Illness     Allergies   Allergen Reactions   • Iodinated Diagnostic Agents Photosensitivity     contrast   • Adhesive Tape Other (See Comments)     EKG stickers only   • Farxiga [Dapagliflozin] Diarrhea and Itching     Yeast infeciotn   • Neomycin-Bacitracin Zn-Polymyx Rash   • Trulicity [Dulaglutide] GI Intolerance     Constipation   • Victoza [Liraglutide] Nausea Only         Current Outpatient Medications:   •  budesonide-formoterol (SYMBICORT) 160-4.5 MCG/ACT inhaler, Inhale 2 puffs As Needed., Disp: , Rfl:   •  Cetirizine HCl (ZYRTEC CHILDRENS ALLERGY) 5 MG/5ML solution solution, Take  by mouth Daily., Disp: , Rfl:   •  cholecalciferol (VITAMIN D3) 1000 UNITS tablet, Take 1,000 Units by mouth Daily., Disp: , Rfl:   •  Clocortolone Pivalate 0.1 % cream, Apply  topically As Needed., Disp: , Rfl:   •  Cranberry 200 MG capsule, Take 1 capsule by mouth Daily., Disp: 90 capsule, Rfl: 0  •  Dapagliflozin Propanediol (FARXIGA) 10 MG tablet, Take 10 mg by mouth Daily., Disp: 90 tablet, Rfl: 3  •  glucose blood test strip, Use to check  Blood sugar  Tid   Dx   e11.9, Disp: 300 each, Rfl: 3  •  Insulin Pen Needle (PEN NEEDLES) 32G X 6 MM misc, 1 each Daily With Breakfast., Disp: 90 each, Rfl: 3  •  Lancets (FREESTYLE) lancets, , Disp: , Rfl:   •  levoFLOXacin (LEVAQUIN) 750 MG tablet, Take 1 tablet by mouth Daily., Disp: 5 tablet, Rfl: 0  •  losartan (COZAAR) 100 MG  tablet, Take 1 tablet by mouth Daily., Disp: 90 tablet, Rfl: 3  •  meloxicam (MOBIC) 15 MG tablet, Take 1 tablet by mouth Daily., Disp: 30 tablet, Rfl: 2  •  mometasone (NASONEX) 50 MCG/ACT nasal spray, 2 sprays into each nostril As Needed., Disp: , Rfl:   •  montelukast (SINGULAIR) 10 MG tablet, Take 1 tablet by mouth Every Night., Disp: 30 tablet, Rfl: 1  •  pantoprazole (PROTONIX) 40 MG EC tablet, Take 1 tablet by mouth Daily., Disp: 90 tablet, Rfl: 2  •  polyethylene glycol (MIRALAX) packet, Take 17 g by mouth Daily., Disp: 30 each, Rfl: 0  •  SITagliptin-metFORMIN HCl ER (JANUMET XR) 100-1000 MG tablet, Take 1 tablet by mouth Daily., Disp: 90 tablet, Rfl: 3  •  Spacer/Aero Chamber Mouthpiece misc, 1 each Daily., Disp: 1 each, Rfl: 0  •  VASCEPA 1 g capsule capsule, Take 2 g by mouth 2 (Two) Times a Day With Meals., Disp: 360 capsule, Rfl: 3  •  venlafaxine XR (EFFEXOR-XR) 75 MG 24 hr capsule, Take 1 capsule by mouth Daily., Disp: 90 capsule, Rfl: 3  •  VOLTAREN 1 % gel gel, As Needed. APPLY TO AFFECTED AREA TWICE A DAY, Disp: , Rfl: 3    The following portions of the patient's history were reviewed and updated as appropriate: allergies, current medications, past family history, past medical history, past social history, past surgical history and problem list.    Review of Systems   Constitutional: Negative.    HENT: Positive for congestion.    Genitourinary: Positive for menstrual problem and vaginal discharge. Negative for vaginal bleeding.       Assessment     Physical Exam   Constitutional: She is oriented to person, place, and time. She appears well-developed and well-nourished.   HENT:   Head: Normocephalic.   Buccal mucosa with whiten patches to cheeks   Cardiovascular: Normal rate, regular rhythm and normal heart sounds.   Pulmonary/Chest: Effort normal and breath sounds normal. No stridor. No respiratory distress.   Neurological: She is alert and oriented to person, place, and time.   Skin: Skin is  warm.   Vitals reviewed.      Bailey Clark was seen today for vaginitis, thrush and medication problem.    Diagnoses and all orders for this visit:    Oral thrush  -     fluconazole (DIFLUCAN) 200 MG tablet; Take 1 tablet by mouth Daily.    Candida vaginitis  -     fluconazole (DIFLUCAN) 200 MG tablet; Take 1 tablet by mouth Daily.    Other orders  -     metFORMIN (GLUCOPHAGE) 1000 MG tablet; Take 1 tablet by mouth 2 (Two) Times a Day With Meals.

## 2020-04-24 ENCOUNTER — TELEPHONE (OUTPATIENT)
Dept: INTERNAL MEDICINE | Facility: CLINIC | Age: 70
End: 2020-04-24

## 2020-04-24 RX ORDER — FLUCONAZOLE 150 MG/1
150 TABLET ORAL DAILY
Qty: 14 TABLET | Refills: 0 | Status: SHIPPED | OUTPATIENT
Start: 2020-04-24 | End: 2020-06-18

## 2020-04-24 NOTE — TELEPHONE ENCOUNTER
Pt calling to check status of request for diflucan to be sent to [facility] in [place].  Please contact pt when pcp has signed off on this script.

## 2020-04-24 NOTE — TELEPHONE ENCOUNTER
This is a duplicate, not sure where the other message is.    Diflucan 150mg   Si po daily for 14 days  Disp #14

## 2020-04-30 ENCOUNTER — TELEPHONE (OUTPATIENT)
Dept: INTERNAL MEDICINE | Facility: CLINIC | Age: 70
End: 2020-04-30

## 2020-04-30 ENCOUNTER — OFFICE VISIT (OUTPATIENT)
Dept: INTERNAL MEDICINE | Facility: CLINIC | Age: 70
End: 2020-04-30

## 2020-04-30 DIAGNOSIS — N30.00 ACUTE CYSTITIS WITHOUT HEMATURIA: Primary | ICD-10-CM

## 2020-04-30 DIAGNOSIS — IMO0001 UNCONTROLLED DIABETES MELLITUS TYPE 2 WITHOUT COMPLICATIONS: ICD-10-CM

## 2020-04-30 PROCEDURE — 99442 PR PHYS/QHP TELEPHONE EVALUATION 11-20 MIN: CPT | Performed by: NURSE PRACTITIONER

## 2020-04-30 RX ORDER — SULFAMETHOXAZOLE AND TRIMETHOPRIM 800; 160 MG/1; MG/1
1 TABLET ORAL 2 TIMES DAILY
Qty: 14 TABLET | Refills: 0 | Status: SHIPPED | OUTPATIENT
Start: 2020-04-30 | End: 2020-06-18

## 2020-04-30 RX ORDER — METFORMIN HYDROCHLORIDE 1000 MG/1
1000 TABLET, FILM COATED, EXTENDED RELEASE ORAL
Qty: 30 TABLET | Refills: 1 | Status: SHIPPED | OUTPATIENT
Start: 2020-04-30 | End: 2020-06-29

## 2020-04-30 NOTE — TELEPHONE ENCOUNTER
Provider: Ruthie JOSEPH  Caller:   Phone:   [phone] (Home Phone)   [phone] (Mobile)  Relationship to Patient: Self     Pharmacy:  [facility]/PHARMACY #[id number] [address] [address] AT [place] [address] - [phone]  - [phone]      Reason for Call:  is requesting medication for a bladder infection.She states she knows she has one due to smell of her urine. And burning when peeing. I attempted to schedule her a appointment but Joanne had no openings until 05/20/2020 and she did not want to see another provider,please advise.     When was the patient last seen: 3/9/2020  When did it start: 04/25/2020

## 2020-04-30 NOTE — PROGRESS NOTES
Acute visit for UTI    This patient was originally scheduled for a a video visit per CDC guidelines due to Covid 19 pandemic. All efforts to connect to video vitis have been exhausted; therefore, this was converted to a telephone visit. Consent was given for this type of encounter. Verbal identification completed.     Subjective     Amber Campos is a 69 y.o. female being seen for an acute appointment today regarding possible UTI. She reports Urine has a follow odor, frequency, urgency. She denies any blood in urine or back pain.    She also would like to discuss diabetes. She is reporting that she is battling a yeast infection since starting her farxiga. Her glucose is doing well, but she cannot tolerate her yeast infections from it. She is not checking her glucose regularly. She denies blurred vision, SOA, edema.       History of Present Illness     Allergies   Allergen Reactions   • Iodinated Diagnostic Agents Photosensitivity     contrast   • Adhesive Tape Other (See Comments)     EKG stickers only   • Farxiga [Dapagliflozin] Diarrhea and Itching     Yeast infeciotn   • Neomycin-Bacitracin Zn-Polymyx Rash   • Trulicity [Dulaglutide] GI Intolerance     Constipation   • Victoza [Liraglutide] Nausea Only         Current Outpatient Medications:   •  budesonide-formoterol (SYMBICORT) 160-4.5 MCG/ACT inhaler, Inhale 2 puffs As Needed., Disp: , Rfl:   •  Cetirizine HCl (YRTEC CHILDRENS ALLERGY) 5 MG/5ML solution solution, Take  by mouth Daily., Disp: , Rfl:   •  cholecalciferol (VITAMIN D3) 1000 UNITS tablet, Take 1,000 Units by mouth Daily., Disp: , Rfl:   •  Clocortolone Pivalate 0.1 % cream, Apply  topically As Needed., Disp: , Rfl:   •  Cranberry 200 MG capsule, Take 1 capsule by mouth Daily., Disp: 90 capsule, Rfl: 0  •  Dapagliflozin Propanediol (FARXIGA) 10 MG tablet, Take 10 mg by mouth Daily., Disp: 90 tablet, Rfl: 3  •  fluconazole (Diflucan) 150 MG tablet, Take 1 tablet by mouth Daily., Disp: 14 tablet,  Rfl: 0  •  glucose blood test strip, Use to check  Blood sugar  Tid   Dx   e11.9, Disp: 300 each, Rfl: 3  •  Insulin Pen Needle (PEN NEEDLES) 32G X 6 MM misc, 1 each Daily With Breakfast., Disp: 90 each, Rfl: 3  •  Lancets (FREESTYLE) lancets, , Disp: , Rfl:   •  losartan (COZAAR) 100 MG tablet, Take 1 tablet by mouth Daily., Disp: 90 tablet, Rfl: 3  •  meloxicam (MOBIC) 15 MG tablet, Take 1 tablet by mouth Daily., Disp: 30 tablet, Rfl: 2  •  metFORMIN (GLUCOPHAGE) 1000 MG tablet, Take 1 tablet by mouth 2 (Two) Times a Day With Meals., Disp: 60 tablet, Rfl: 0  •  mometasone (NASONEX) 50 MCG/ACT nasal spray, 2 sprays into each nostril As Needed., Disp: , Rfl:   •  montelukast (SINGULAIR) 10 MG tablet, Take 1 tablet by mouth Every Night., Disp: 30 tablet, Rfl: 1  •  pantoprazole (PROTONIX) 40 MG EC tablet, Take 1 tablet by mouth Daily., Disp: 90 tablet, Rfl: 2  •  polyethylene glycol (MIRALAX) packet, Take 17 g by mouth Daily., Disp: 30 each, Rfl: 0  •  Spacer/Aero Chamber Mouthpiece misc, 1 each Daily., Disp: 1 each, Rfl: 0  •  VASCEPA 1 g capsule capsule, Take 2 g by mouth 2 (Two) Times a Day With Meals., Disp: 360 capsule, Rfl: 3  •  venlafaxine XR (EFFEXOR-XR) 75 MG 24 hr capsule, Take 1 capsule by mouth Daily., Disp: 90 capsule, Rfl: 3  •  VOLTAREN 1 % gel gel, As Needed. APPLY TO AFFECTED AREA TWICE A DAY, Disp: , Rfl: 3    The following portions of the patient's history were reviewed and updated as appropriate: allergies, current medications, past family history, past medical history, past social history, past surgical history and problem list.    Review of Systems   Constitutional: Negative.    Eyes: Negative.    Respiratory: Negative.    Cardiovascular: Negative for chest pain, palpitations and leg swelling.   Endocrine: Negative.    Genitourinary: Positive for frequency and urgency. Negative for enuresis, flank pain, hematuria and menstrual problem.   Skin: Negative.    Allergic/Immunologic: Negative.     Neurological: Negative.    Hematological: Negative.    Psychiatric/Behavioral: Negative.    All other systems reviewed and are negative.      Assessment     Physical Exam   Constitutional: She is oriented to person, place, and time.   Neurological: She is alert and oriented to person, place, and time.       Plan     15 minute telephone visit    Diagnoses and all orders for this visit:    Acute cystitis without hematuria  -     sulfamethoxazole-trimethoprim (BACTRIM DS,SEPTRA DS) 800-160 MG per tablet; Take 1 tablet by mouth 2 (Two) Times a Day.    Uncontrolled diabetes mellitus type 2 without complications (CMS/HCC)  -     SITagliptin (Januvia) 50 MG tablet; Take 1 tablet by mouth Daily.  -     metFORMIN (GLUMETZA) 1000 MG (MOD) 24 hr tablet; Take 1 tablet by mouth Daily With Breakfast.        Follow up in 4 weeks

## 2020-04-30 NOTE — TELEPHONE ENCOUNTER
Pt attempted [employer] video, unable to connect. Pt asking you to call her at [phone], she is on your schedule for 11am today.

## 2020-06-12 ENCOUNTER — APPOINTMENT (OUTPATIENT)
Dept: WOMENS IMAGING | Facility: HOSPITAL | Age: 70
End: 2020-06-12

## 2020-06-12 PROCEDURE — 77067 SCR MAMMO BI INCL CAD: CPT | Performed by: RADIOLOGY

## 2020-06-12 PROCEDURE — 77063 BREAST TOMOSYNTHESIS BI: CPT | Performed by: RADIOLOGY

## 2020-06-18 ENCOUNTER — OFFICE VISIT (OUTPATIENT)
Dept: INTERNAL MEDICINE | Facility: CLINIC | Age: 70
End: 2020-06-18

## 2020-06-18 VITALS
DIASTOLIC BLOOD PRESSURE: 84 MMHG | TEMPERATURE: 98.7 F | HEART RATE: 78 BPM | BODY MASS INDEX: 32.71 KG/M2 | HEIGHT: 63 IN | WEIGHT: 184.6 LBS | OXYGEN SATURATION: 98 % | RESPIRATION RATE: 16 BRPM | SYSTOLIC BLOOD PRESSURE: 142 MMHG

## 2020-06-18 DIAGNOSIS — I10 ESSENTIAL HYPERTENSION: ICD-10-CM

## 2020-06-18 DIAGNOSIS — E08.65 DIABETES MELLITUS DUE TO UNDERLYING CONDITION, UNCONTROLLED, WITH HYPERGLYCEMIA (HCC): ICD-10-CM

## 2020-06-18 DIAGNOSIS — M85.852 OSTEOPENIA OF LEFT HIP: ICD-10-CM

## 2020-06-18 DIAGNOSIS — Z23 IMMUNIZATION DUE: ICD-10-CM

## 2020-06-18 DIAGNOSIS — E78.2 MIXED HYPERLIPIDEMIA: ICD-10-CM

## 2020-06-18 PROBLEM — M79.89 MASS OF SOFT TISSUE: Status: RESOLVED | Noted: 2017-06-19 | Resolved: 2020-06-18

## 2020-06-18 PROBLEM — B37.0 ORAL THRUSH: Status: RESOLVED | Noted: 2020-03-09 | Resolved: 2020-06-18

## 2020-06-18 LAB
BILIRUB BLD-MCNC: NEGATIVE MG/DL
CLARITY, POC: CLEAR
COLOR UR: YELLOW
GLUCOSE UR STRIP-MCNC: ABNORMAL MG/DL
KETONES UR QL: ABNORMAL
LEUKOCYTE EST, POC: NEGATIVE
NITRITE UR-MCNC: NEGATIVE MG/ML
PH UR: 5 [PH] (ref 5–8)
PROT UR STRIP-MCNC: NEGATIVE MG/DL
RBC # UR STRIP: ABNORMAL /UL
SP GR UR: 1.02 (ref 1–1.03)
UROBILINOGEN UR QL: NORMAL

## 2020-06-18 PROCEDURE — 99214 OFFICE O/P EST MOD 30 MIN: CPT | Performed by: NURSE PRACTITIONER

## 2020-06-18 PROCEDURE — 81003 URINALYSIS AUTO W/O SCOPE: CPT | Performed by: NURSE PRACTITIONER

## 2020-06-18 PROCEDURE — 90471 IMMUNIZATION ADMIN: CPT | Performed by: NURSE PRACTITIONER

## 2020-06-18 PROCEDURE — 90715 TDAP VACCINE 7 YRS/> IM: CPT | Performed by: NURSE PRACTITIONER

## 2020-06-18 NOTE — PROGRESS NOTES
"Chief Complaint   Patient presents with   • Urinary Frequency   • Follow-up     Diabetic Medicine       Subjective     Amber Campos is a 69 y.o. female being seen for a follow up appointment today regarding  DM 2, HTN, Hyperlipidemia, osteoporosis, and \"a few concerns.\"   She has been on WW and lost 7 pounds. She does not check her glucose regularly. No dry mouth, no blurred vision. She is taking Glumetza 1000 mg.     She had a a DXA scan 4-8-2019. It showed osteopenia. She has had fractures of both ankle and shoulder. She is also taking Vitamin D, but quit calcium die to risk of atherosclerosis.     She has a history of IC and wants to get a urine check \"just in case.\" Ciaran dysuria.     She would like to get her immunizations updated today. She is due TDAP. She had a Zostavax, and dose not want to get the Shingles.     History of Present Illness     Allergies   Allergen Reactions   • Iodinated Diagnostic Agents Photosensitivity     contrast   • Adhesive Tape Other (See Comments)     EKG stickers only   • Farxiga [Dapagliflozin] Diarrhea and Itching     Yeast infeciotn   • Neomycin-Bacitracin Zn-Polymyx Rash   • Trulicity [Dulaglutide] GI Intolerance     Constipation   • Victoza [Liraglutide] Nausea Only         Current Outpatient Medications:   •  budesonide-formoterol (SYMBICORT) 160-4.5 MCG/ACT inhaler, Inhale 2 puffs As Needed., Disp: , Rfl:   •  Cetirizine HCl (ZYRTEC CHILDRENS ALLERGY) 5 MG/5ML solution solution, Take  by mouth Daily., Disp: , Rfl:   •  cholecalciferol (VITAMIN D3) 1000 UNITS tablet, Take 1,000 Units by mouth Daily., Disp: , Rfl:   •  fluconazole (Diflucan) 150 MG tablet, Take 1 tablet by mouth Daily., Disp: 14 tablet, Rfl: 0  •  glucose blood test strip, Use to check  Blood sugar  Tid   Dx   e11.9, Disp: 300 each, Rfl: 3  •  Insulin Pen Needle (PEN NEEDLES) 32G X 6 MM misc, 1 each Daily With Breakfast., Disp: 90 each, Rfl: 3  •  Lancets (FREESTYLE) lancets, , Disp: , Rfl:   •  losartan " (COZAAR) 100 MG tablet, Take 1 tablet by mouth Daily., Disp: 90 tablet, Rfl: 3  •  meloxicam (MOBIC) 15 MG tablet, Take 1 tablet by mouth Daily., Disp: 30 tablet, Rfl: 2  •  metFORMIN (GLUMETZA) 1000 MG (MOD) 24 hr tablet, Take 1 tablet by mouth Daily With Breakfast., Disp: 30 tablet, Rfl: 1  •  mometasone (NASONEX) 50 MCG/ACT nasal spray, 2 sprays into each nostril As Needed., Disp: , Rfl:   •  montelukast (SINGULAIR) 10 MG tablet, Take 1 tablet by mouth Every Night., Disp: 30 tablet, Rfl: 1  •  pantoprazole (PROTONIX) 40 MG EC tablet, Take 1 tablet by mouth Daily., Disp: 90 tablet, Rfl: 2  •  polyethylene glycol (MIRALAX) packet, Take 17 g by mouth Daily., Disp: 30 each, Rfl: 0  •  SITagliptin (Januvia) 50 MG tablet, Take 1 tablet by mouth Daily., Disp: 30 tablet, Rfl: 1  •  Spacer/Aero Chamber Mouthpiece misc, 1 each Daily., Disp: 1 each, Rfl: 0  •  VASCEPA 1 g capsule capsule, Take 2 g by mouth 2 (Two) Times a Day With Meals., Disp: 360 capsule, Rfl: 3  •  venlafaxine XR (EFFEXOR-XR) 75 MG 24 hr capsule, Take 1 capsule by mouth Daily., Disp: 90 capsule, Rfl: 3  •  VOLTAREN 1 % gel gel, As Needed. APPLY TO AFFECTED AREA TWICE A DAY, Disp: , Rfl: 3    The following portions of the patient's history were reviewed and updated as appropriate: allergies, current medications, past family history, past medical history, past social history, past surgical history and problem list.    Review of Systems   Constitutional: Negative.    HENT: Negative.    Eyes: Negative.  Negative for photophobia and visual disturbance.   Respiratory: Positive for cough. Negative for shortness of breath, wheezing and stridor.    Cardiovascular: Negative for chest pain and leg swelling.   Gastrointestinal: Negative.    Endocrine: Negative.    Musculoskeletal: Positive for arthralgias.   Skin: Negative.  Negative for color change.   Allergic/Immunologic: Positive for environmental allergies.   Hematological: Negative for adenopathy. Does not  bruise/bleed easily.   Psychiatric/Behavioral: Negative.  Negative for agitation and behavioral problems.   All other systems reviewed and are negative.      Assessment     Physical Exam   Constitutional: She is oriented to person, place, and time. She appears well-developed and well-nourished. No distress.   HENT:   Head: Normocephalic.   Right Ear: External ear normal.   Left Ear: External ear normal.   Mouth/Throat: Oropharynx is clear and moist. No oropharyngeal exudate.   Neck: Neck supple. No thyromegaly present.   Cardiovascular: Normal rate, regular rhythm and normal heart sounds.   No murmur heard.  Pulmonary/Chest: Effort normal and breath sounds normal. No stridor. No respiratory distress. She has no wheezes.   Musculoskeletal: She exhibits no edema.   Neurological: She is alert and oriented to person, place, and time.   Skin: Skin is warm and dry.   Psychiatric: She has a normal mood and affect. Her behavior is normal. Thought content normal.   Vitals reviewed.      Bailey Clark was seen today for urinary frequency and follow-up.    Diagnoses and all orders for this visit:    Diabetes mellitus due to underlying condition, uncontrolled, with hyperglycemia (CMS/[facility])  -     Hemoglobin A1c  -     Comprehensive Metabolic Panel  -     CBC & Differential  -     Amylase  -     Lipase    Mixed hyperlipidemia  -     Comprehensive Metabolic Panel  -     Lipid panel  -     POC Urinalysis Dipstick, Automated    Immunization due  -     Tdap Vaccine Greater Than or Equal To 6yo IM    Essential hypertension  -     Comprehensive Metabolic Panel  -     Lipid panel    Osteopenia of left hip        Reviewed bone density test from last year, advised to start Prolia.     TDAP updated today    Continue WW diet plan. Urine sample clear today.    Follow up in 1 month

## 2020-06-19 DIAGNOSIS — E08.65 DIABETES MELLITUS DUE TO UNDERLYING CONDITION, UNCONTROLLED, WITH HYPERGLYCEMIA (HCC): Primary | ICD-10-CM

## 2020-06-19 DIAGNOSIS — E11.65 UNCONTROLLED TYPE 2 DIABETES MELLITUS WITH HYPERGLYCEMIA (HCC): ICD-10-CM

## 2020-06-19 LAB
ALBUMIN SERPL-MCNC: 4.4 G/DL (ref 3.5–5.2)
ALBUMIN/GLOB SERPL: 1.8 G/DL
ALP SERPL-CCNC: 79 U/L (ref 39–117)
ALT SERPL-CCNC: 57 U/L (ref 1–33)
AMYLASE SERPL-CCNC: 25 U/L (ref 28–100)
AST SERPL-CCNC: 72 U/L (ref 1–32)
BASOPHILS # BLD AUTO: 0.06 10*3/MM3 (ref 0–0.2)
BASOPHILS NFR BLD AUTO: 1 % (ref 0–1.5)
BILIRUB SERPL-MCNC: 0.3 MG/DL (ref 0.2–1.2)
BUN SERPL-MCNC: 11 MG/DL (ref 8–23)
BUN/CREAT SERPL: 15.5 (ref 7–25)
CALCIUM SERPL-MCNC: 9.4 MG/DL (ref 8.6–10.5)
CHLORIDE SERPL-SCNC: 98 MMOL/L (ref 98–107)
CHOLEST SERPL-MCNC: 219 MG/DL (ref 0–200)
CO2 SERPL-SCNC: 26.5 MMOL/L (ref 22–29)
CREAT SERPL-MCNC: 0.71 MG/DL (ref 0.57–1)
EOSINOPHIL # BLD AUTO: 0.29 10*3/MM3 (ref 0–0.4)
EOSINOPHIL NFR BLD AUTO: 4.7 % (ref 0.3–6.2)
ERYTHROCYTE [DISTWIDTH] IN BLOOD BY AUTOMATED COUNT: 14.3 % (ref 12.3–15.4)
GLOBULIN SER CALC-MCNC: 2.5 GM/DL
GLUCOSE SERPL-MCNC: 313 MG/DL (ref 65–99)
HBA1C MFR BLD: 10.7 % (ref 4.8–5.6)
HCT VFR BLD AUTO: 36.3 % (ref 34–46.6)
HDLC SERPL-MCNC: 61 MG/DL (ref 40–60)
HGB BLD-MCNC: 11.5 G/DL (ref 12–15.9)
IMM GRANULOCYTES # BLD AUTO: 0.01 10*3/MM3 (ref 0–0.05)
IMM GRANULOCYTES NFR BLD AUTO: 0.2 % (ref 0–0.5)
LDLC SERPL CALC-MCNC: 93 MG/DL (ref 0–100)
LIPASE SERPL-CCNC: 25 U/L (ref 13–60)
LYMPHOCYTES # BLD AUTO: 2.4 10*3/MM3 (ref 0.7–3.1)
LYMPHOCYTES NFR BLD AUTO: 38.6 % (ref 19.6–45.3)
MCH RBC QN AUTO: 25.7 PG (ref 26.6–33)
MCHC RBC AUTO-ENTMCNC: 31.7 G/DL (ref 31.5–35.7)
MCV RBC AUTO: 81.2 FL (ref 79–97)
MONOCYTES # BLD AUTO: 0.46 10*3/MM3 (ref 0.1–0.9)
MONOCYTES NFR BLD AUTO: 7.4 % (ref 5–12)
NEUTROPHILS # BLD AUTO: 2.99 10*3/MM3 (ref 1.7–7)
NEUTROPHILS NFR BLD AUTO: 48.1 % (ref 42.7–76)
NRBC BLD AUTO-RTO: 0.2 /100 WBC (ref 0–0.2)
PLATELET # BLD AUTO: 353 10*3/MM3 (ref 140–450)
POTASSIUM SERPL-SCNC: 4.1 MMOL/L (ref 3.5–5.2)
PROT SERPL-MCNC: 6.9 G/DL (ref 6–8.5)
RBC # BLD AUTO: 4.47 10*6/MM3 (ref 3.77–5.28)
SODIUM SERPL-SCNC: 135 MMOL/L (ref 136–145)
TRIGL SERPL-MCNC: 325 MG/DL (ref 0–150)
VLDLC SERPL CALC-MCNC: 65 MG/DL
WBC # BLD AUTO: 6.21 10*3/MM3 (ref 3.4–10.8)

## 2020-06-23 ENCOUNTER — TELEPHONE (OUTPATIENT)
Dept: GASTROENTEROLOGY | Facility: CLINIC | Age: 70
End: 2020-06-23

## 2020-06-23 NOTE — TELEPHONE ENCOUNTER
----- Message from Nigel Sheriff sent at 6/23/2020 12:54 PM EDT -----  Regarding: colonoscopy  Contact: [phone]  Pt left vm. She received senior care letter and wants to know if she can schedule a colonoscopy before hand.

## 2020-06-23 NOTE — TELEPHONE ENCOUNTER
Called pt back. Advised that her last c/s was 4/2017 and she will be in 4/2022. Pt verb understanding.

## 2020-06-24 ENCOUNTER — TELEPHONE (OUTPATIENT)
Dept: INTERNAL MEDICINE | Facility: CLINIC | Age: 70
End: 2020-06-24

## 2020-06-24 RX ORDER — FLUCONAZOLE 150 MG/1
TABLET ORAL
Qty: 2 TABLET | Refills: 0 | Status: SHIPPED | OUTPATIENT
Start: 2020-06-24 | End: 2020-08-03 | Stop reason: SDUPTHER

## 2020-06-24 NOTE — TELEPHONE ENCOUNTER
----- Message from JAKE Blount sent at 6/24/2020  9:38 AM EDT -----  Regarding: FW: Non-Urgent Medical Question  Contact: [phone]      ----- Message -----  From: Tiffany Srivastava MA  Sent: 6/24/2020   8:57 AM EDT  To: JAEK Blount  Subject: FW: Non-Urgent Medical Question                      ----- Message -----  From: Amber Campos  Sent: 6/24/2020   8:11 AM EDT  To: Marcos García Lagrange2 [facility]  Subject: RE: Non-Urgent Medical Question                  My BP readings have been in the  140’s /82-84.  I have now started to take my BP before I go to bed.  Also, I have another yeast infection. I wear Poise Pads almost everyday, Is it possible that could cause a yeast infection?  Have you been able to get an appointment for me with Dr. self?  If taking my BP meds at night is not the correct way let me know. I have only been doing this the last three nights.  Thank you, Amber Campos  ----- Message -----  From: AIDAN GAMBLE  Sent: 6/19/2020  4:44 PM EDT  To: Amber Campos  Subject: RE: Non-Urgent Medical Question  Good afternoon, Ms. Campos.    What are some of your blood pressure readings?    Tiffany Srivastava MA    ----- Message -----     From: Amber Campos     Sent: 6/19/2020  1:22 PM EDT       To: JAKE Blount  Subject: Non-Urgent Medical Question    Jenny I would like to get a different blood pressure medication.  My bp runs high.  I want one that will not kill me.  Let me know what you think.   Thank you

## 2020-06-29 RX ORDER — SITAGLIPTIN 50 MG/1
TABLET, FILM COATED ORAL
Qty: 30 TABLET | Refills: 1 | Status: SHIPPED | OUTPATIENT
Start: 2020-06-29 | End: 2020-08-03

## 2020-06-29 RX ORDER — METFORMIN HYDROCHLORIDE 1000 MG/1
TABLET, FILM COATED, EXTENDED RELEASE ORAL
Qty: 30 TABLET | Refills: 1 | Status: SHIPPED | OUTPATIENT
Start: 2020-06-29 | End: 2020-08-25

## 2020-07-14 ENCOUNTER — OFFICE VISIT (OUTPATIENT)
Dept: INTERNAL MEDICINE | Facility: CLINIC | Age: 70
End: 2020-07-14

## 2020-07-14 VITALS
DIASTOLIC BLOOD PRESSURE: 80 MMHG | SYSTOLIC BLOOD PRESSURE: 142 MMHG | WEIGHT: 185.2 LBS | HEART RATE: 107 BPM | TEMPERATURE: 97.8 F | OXYGEN SATURATION: 96 % | RESPIRATION RATE: 16 BRPM | HEIGHT: 63 IN | BODY MASS INDEX: 32.82 KG/M2

## 2020-07-14 DIAGNOSIS — K21.00 GASTROESOPHAGEAL REFLUX DISEASE WITH ESOPHAGITIS: ICD-10-CM

## 2020-07-14 DIAGNOSIS — R10.811 RIGHT UPPER QUADRANT ABDOMINAL TENDERNESS WITHOUT REBOUND TENDERNESS: Primary | ICD-10-CM

## 2020-07-14 PROCEDURE — 81003 URINALYSIS AUTO W/O SCOPE: CPT | Performed by: NURSE PRACTITIONER

## 2020-07-14 PROCEDURE — 99213 OFFICE O/P EST LOW 20 MIN: CPT | Performed by: NURSE PRACTITIONER

## 2020-07-14 NOTE — PROGRESS NOTES
"Chief Complaint   Patient presents with   • Abdominal Pain   • Constipation   • Dizziness       Subjective     Amber Campos is a 69 y.o. female being seen for an acute appointemnt for abd pain that started 3-4 days ago. She is describing it as a \"tight band around chestt\" pointing to her bra line and bilaterally flanks.  Associated mild constipation and nausea. She cannot identify a precipitating event. She cannot relate it to food intake or identify a timing component. She feels like eating may have helped the pain initially.   She reports that this has gotten better over the past 24 hours and denies any pain currently. Ciaran fever, weight loss, vomiting, blood in stool. She has a history of gastric ulcers and GERD, and thinks it may be related to this. Her last EGD was 4- with Aidan Roca, who is retiring. She has had a hernia repair in the past.      History of Present Illness     Allergies   Allergen Reactions   • Iodinated Diagnostic Agents Photosensitivity     contrast   • Adhesive Tape Other (See Comments)     EKG stickers only   • Farxiga [Dapagliflozin] Diarrhea and Itching     Yeast infeciotn   • Neomycin-Bacitracin Zn-Polymyx Rash   • Trulicity [Dulaglutide] GI Intolerance     Constipation   • Victoza [Liraglutide] Nausea Only         Current Outpatient Medications:   •  budesonide-formoterol (SYMBICORT) 160-4.5 MCG/ACT inhaler, Inhale 2 puffs As Needed., Disp: , Rfl:   •  Cetirizine HCl (ZYRTEC CHILDRENS ALLERGY) 5 MG/5ML solution solution, Take  by mouth Daily., Disp: , Rfl:   •  cholecalciferol (VITAMIN D3) 1000 UNITS tablet, Take 1,000 Units by mouth Daily., Disp: , Rfl:   •  fluconazole (Diflucan) 150 MG tablet, Take 1 tablet now.  May repeat in 72 hours if still symptomatic., Disp: 2 tablet, Rfl: 0  •  glucose blood test strip, Use to check  Blood sugar  Tid   Dx   e11.9, Disp: 300 each, Rfl: 3  •  Insulin Pen Needle (PEN NEEDLES) 32G X 6 MM misc, 1 each Daily With Breakfast., Disp: 90 " each, Rfl: 3  •  JANUVIA 50 MG tablet, TAKE 1 TABLET BY MOUTH EVERY DAY, Disp: 30 tablet, Rfl: 1  •  Lancets (FREESTYLE) lancets, , Disp: , Rfl:   •  losartan (COZAAR) 100 MG tablet, Take 1 tablet by mouth Daily., Disp: 90 tablet, Rfl: 3  •  meloxicam (MOBIC) 15 MG tablet, Take 1 tablet by mouth Daily., Disp: 30 tablet, Rfl: 2  •  metFORMIN (GLUMETZA) 1000 MG (MOD) 24 hr tablet, TAKE 1 TABLET BY MOUTH EVERY DAY WITH BREAKFAST, Disp: 30 tablet, Rfl: 1  •  mometasone (NASONEX) 50 MCG/ACT nasal spray, 2 sprays into each nostril As Needed., Disp: , Rfl:   •  montelukast (SINGULAIR) 10 MG tablet, Take 1 tablet by mouth Every Night., Disp: 30 tablet, Rfl: 1  •  pantoprazole (PROTONIX) 40 MG EC tablet, Take 1 tablet by mouth Daily., Disp: 90 tablet, Rfl: 2  •  polyethylene glycol (MIRALAX) packet, Take 17 g by mouth Daily., Disp: 30 each, Rfl: 0  •  Spacer/Aero Chamber Mouthpiece misc, 1 each Daily., Disp: 1 each, Rfl: 0  •  VASCEPA 1 g capsule capsule, Take 2 g by mouth 2 (Two) Times a Day With Meals., Disp: 360 capsule, Rfl: 3  •  venlafaxine XR (EFFEXOR-XR) 75 MG 24 hr capsule, Take 1 capsule by mouth Daily., Disp: 90 capsule, Rfl: 3  •  VOLTAREN 1 % gel gel, As Needed. APPLY TO AFFECTED AREA TWICE A DAY, Disp: , Rfl: 3    The following portions of the patient's history were reviewed and updated as appropriate: allergies, current medications, past family history, past medical history, past social history, past surgical history and problem list.    Review of Systems   Constitutional: Negative for fever and unexpected weight change.   HENT: Negative.    Gastrointestinal: Positive for abdominal distention, abdominal pain, constipation and nausea. Negative for anal bleeding, blood in stool, diarrhea, rectal pain and vomiting.   Endocrine: Negative.    Musculoskeletal: Positive for arthralgias.   Allergic/Immunologic: Positive for environmental allergies.   Neurological: Positive for dizziness.       Assessment     Physical  Exam   Constitutional: She is oriented to person, place, and time. She appears well-developed and well-nourished.   Cardiovascular: Normal rate, regular rhythm and normal heart sounds.   Abdominal: Normal appearance. She exhibits distension. She exhibits no fluid wave and no mass. Bowel sounds are increased. There is tenderness in the right upper quadrant and epigastric area. There is no rigidity, no rebound, no guarding, no CVA tenderness and negative Huynh's sign.   Neurological: She is alert and oriented to person, place, and time.   Skin: Skin is warm and dry.   Psychiatric: She has a normal mood and affect. Her behavior is normal.   Vitals reviewed.      Plan        Diagnosis Plan   1. Right upper quadrant abdominal tenderness without rebound tenderness  POC Urinalysis Dipstick, Automated    Comprehensive metabolic panel    CBC and Differential    Amylase    Lipase    US Gallbladder   2. Gastroesophageal reflux disease with esophagitis  Ambulatory Referral to Gastroenterology    Comprehensive metabolic panel     Will obtain a GB US and check for pancreatitis. Discussed possible choleycystitis. Refer to GI in case of hiatal hernia.    Follow up after testing completes

## 2020-07-14 NOTE — PATIENT INSTRUCTIONS
Gallbladder Eating Plan  If you have a gallbladder condition, you may have trouble digesting fats. Eating a low-fat diet can help reduce your symptoms, and may be helpful before and after having surgery to remove your gallbladder (cholecystectomy). Your health care provider may recommend that you work with a diet and nutrition specialist (dietitian) to help you reduce the amount of fat in your diet.  What are tips for following this plan?  General guidelines  · Limit your fat intake to less than 30% of your total daily calories. If you eat around 1,800 calories each day, this is less than 60 grams (g) of fat per day.  · Fat is an important part of a healthy diet. Eating a low-fat diet can make it hard to maintain a healthy body weight. Ask your dietitian how much fat, calories, and other nutrients you need each day.  · Eat small, frequent meals throughout the day instead of three large meals.  · Drink at least 8-10 cups of fluid a day. Drink enough fluid to keep your urine clear or pale yellow.  · Limit alcohol intake to no more than 1 drink a day for nonpregnant women and 2 drinks a day for men. One drink equals 12 oz of beer, 5 oz of wine, or 1½ oz of hard liquor.  Reading food labels  · Check Nutrition Facts on food labels for the amount of fat per serving. Choose foods with less than 3 grams of fat per serving.  Shopping  · Choose nonfat and low-fat healthy foods. Look for the words “nonfat,” “low fat,” or “fat free.”  · Avoid buying processed or prepackaged foods.  Cooking  · Cook using low-fat methods, such as baking, broiling, grilling, or boiling.  · Cook with small amounts of healthy fats, such as olive oil, grapeseed oil, canola oil, or sunflower oil.  What foods are recommended?    · All fresh, frozen, or canned fruits and vegetables.  · Whole grains.  · Low-fat or non-fat (skim) milk and yogurt.  · Lean meat, skinless poultry, fish, eggs, and beans.  · Low-fat protein supplement powders or  drinks.  · Spices and herbs.  What foods are not recommended?  · High-fat foods. These include baked goods, fast food, fatty cuts of meat, ice cream, french toast, sweet rolls, pizza, cheese bread, foods covered with butter, creamy sauces, or cheese.  · Fried foods. These include french fries, tempura, battered fish, breaded chicken, fried breads, and sweets.  · Foods with strong odors.  · Foods that cause bloating and gas.  Summary  · A low-fat diet can be helpful if you have a gallbladder condition, or before and after gallbladder surgery.  · Limit your fat intake to less than 30% of your total daily calories. This is about 60 g of fat if you eat 1,800 calories each day.  · Eat small, frequent meals throughout the day instead of three large meals.  This information is not intended to replace advice given to you by your health care provider. Make sure you discuss any questions you have with your health care provider.  Document Released: 12/23/2014 Document Revised: 04/09/2020 Document Reviewed: 01/25/2018  Elsevier Patient Education © 2020 [employer]

## 2020-07-15 PROBLEM — R10.811 RIGHT UPPER QUADRANT ABDOMINAL TENDERNESS WITHOUT REBOUND TENDERNESS: Status: ACTIVE | Noted: 2017-01-04

## 2020-07-15 LAB
ALBUMIN SERPL-MCNC: 4.2 G/DL (ref 3.5–5.2)
ALBUMIN/GLOB SERPL: 2.2 G/DL
ALP SERPL-CCNC: 75 U/L (ref 39–117)
ALT SERPL-CCNC: 51 U/L (ref 1–33)
AMYLASE SERPL-CCNC: 24 U/L (ref 28–100)
AST SERPL-CCNC: 53 U/L (ref 1–32)
BASOPHILS # BLD AUTO: 0.05 10*3/MM3 (ref 0–0.2)
BASOPHILS NFR BLD AUTO: 0.7 % (ref 0–1.5)
BILIRUB SERPL-MCNC: 0.2 MG/DL (ref 0–1.2)
BUN SERPL-MCNC: 17 MG/DL (ref 8–23)
BUN/CREAT SERPL: 23.6 (ref 7–25)
CALCIUM SERPL-MCNC: 9.4 MG/DL (ref 8.6–10.5)
CHLORIDE SERPL-SCNC: 96 MMOL/L (ref 98–107)
CO2 SERPL-SCNC: 26.3 MMOL/L (ref 22–29)
CREAT SERPL-MCNC: 0.72 MG/DL (ref 0.57–1)
EOSINOPHIL # BLD AUTO: 0.31 10*3/MM3 (ref 0–0.4)
EOSINOPHIL NFR BLD AUTO: 4.5 % (ref 0.3–6.2)
ERYTHROCYTE [DISTWIDTH] IN BLOOD BY AUTOMATED COUNT: 13.8 % (ref 12.3–15.4)
GLOBULIN SER CALC-MCNC: 1.9 GM/DL
GLUCOSE SERPL-MCNC: 250 MG/DL (ref 65–99)
HCT VFR BLD AUTO: 36.5 % (ref 34–46.6)
HGB BLD-MCNC: 11.9 G/DL (ref 12–15.9)
IMM GRANULOCYTES # BLD AUTO: 0.02 10*3/MM3 (ref 0–0.05)
IMM GRANULOCYTES NFR BLD AUTO: 0.3 % (ref 0–0.5)
LIPASE SERPL-CCNC: 29 U/L (ref 13–60)
LYMPHOCYTES # BLD AUTO: 2.75 10*3/MM3 (ref 0.7–3.1)
LYMPHOCYTES NFR BLD AUTO: 39.7 % (ref 19.6–45.3)
MCH RBC QN AUTO: 25.6 PG (ref 26.6–33)
MCHC RBC AUTO-ENTMCNC: 32.6 G/DL (ref 31.5–35.7)
MCV RBC AUTO: 78.5 FL (ref 79–97)
MONOCYTES # BLD AUTO: 0.46 10*3/MM3 (ref 0.1–0.9)
MONOCYTES NFR BLD AUTO: 6.6 % (ref 5–12)
NEUTROPHILS # BLD AUTO: 3.34 10*3/MM3 (ref 1.7–7)
NEUTROPHILS NFR BLD AUTO: 48.2 % (ref 42.7–76)
NRBC BLD AUTO-RTO: 0 /100 WBC (ref 0–0.2)
PLATELET # BLD AUTO: 332 10*3/MM3 (ref 140–450)
POTASSIUM SERPL-SCNC: 4 MMOL/L (ref 3.5–5.2)
PROT SERPL-MCNC: 6.1 G/DL (ref 6–8.5)
RBC # BLD AUTO: 4.65 10*6/MM3 (ref 3.77–5.28)
SODIUM SERPL-SCNC: 135 MMOL/L (ref 136–145)
WBC # BLD AUTO: 6.93 10*3/MM3 (ref 3.4–10.8)

## 2020-07-30 DIAGNOSIS — E78.2 MIXED HYPERLIPIDEMIA: Primary | ICD-10-CM

## 2020-07-30 DIAGNOSIS — E11.9 TYPE 2 DIABETES MELLITUS WITHOUT COMPLICATION, WITHOUT LONG-TERM CURRENT USE OF INSULIN (HCC): ICD-10-CM

## 2020-07-30 LAB
ALBUMIN SERPL-MCNC: 4.6 G/DL (ref 3.5–5.2)
ALBUMIN/GLOB SERPL: 2 G/DL
ALP SERPL-CCNC: 81 U/L (ref 39–117)
ALT SERPL-CCNC: 62 U/L (ref 1–33)
AST SERPL-CCNC: 80 U/L (ref 1–32)
BASOPHILS # BLD AUTO: 0.05 10*3/MM3 (ref 0–0.2)
BASOPHILS NFR BLD AUTO: 1 % (ref 0–1.5)
BILIRUB SERPL-MCNC: 0.3 MG/DL (ref 0–1.2)
BUN SERPL-MCNC: 12 MG/DL (ref 8–23)
BUN/CREAT SERPL: 22.2 (ref 7–25)
CALCIUM SERPL-MCNC: 9.1 MG/DL (ref 8.6–10.5)
CHLORIDE SERPL-SCNC: 96 MMOL/L (ref 98–107)
CHOLEST SERPL-MCNC: 231 MG/DL (ref 0–200)
CO2 SERPL-SCNC: 28.5 MMOL/L (ref 22–29)
CREAT SERPL-MCNC: 0.54 MG/DL (ref 0.57–1)
EOSINOPHIL # BLD AUTO: 0.26 10*3/MM3 (ref 0–0.4)
EOSINOPHIL NFR BLD AUTO: 5.1 % (ref 0.3–6.2)
ERYTHROCYTE [DISTWIDTH] IN BLOOD BY AUTOMATED COUNT: 13.7 % (ref 12.3–15.4)
GLOBULIN SER CALC-MCNC: 2.3 GM/DL
GLUCOSE SERPL-MCNC: 349 MG/DL (ref 65–99)
HBA1C MFR BLD: 11.8 % (ref 4.8–5.6)
HCT VFR BLD AUTO: 39.9 % (ref 34–46.6)
HDLC SERPL-MCNC: 60 MG/DL (ref 40–60)
HGB BLD-MCNC: 12.6 G/DL (ref 12–15.9)
IMM GRANULOCYTES # BLD AUTO: 0.01 10*3/MM3 (ref 0–0.05)
IMM GRANULOCYTES NFR BLD AUTO: 0.2 % (ref 0–0.5)
LDLC SERPL CALC-MCNC: 114 MG/DL (ref 0–100)
LYMPHOCYTES # BLD AUTO: 2.15 10*3/MM3 (ref 0.7–3.1)
LYMPHOCYTES NFR BLD AUTO: 42.2 % (ref 19.6–45.3)
MCH RBC QN AUTO: 25.7 PG (ref 26.6–33)
MCHC RBC AUTO-ENTMCNC: 31.6 G/DL (ref 31.5–35.7)
MCV RBC AUTO: 81.3 FL (ref 79–97)
MONOCYTES # BLD AUTO: 0.37 10*3/MM3 (ref 0.1–0.9)
MONOCYTES NFR BLD AUTO: 7.3 % (ref 5–12)
NEUTROPHILS # BLD AUTO: 2.25 10*3/MM3 (ref 1.7–7)
NEUTROPHILS NFR BLD AUTO: 44.2 % (ref 42.7–76)
NRBC BLD AUTO-RTO: 0 /100 WBC (ref 0–0.2)
PLATELET # BLD AUTO: 368 10*3/MM3 (ref 140–450)
POTASSIUM SERPL-SCNC: 4.2 MMOL/L (ref 3.5–5.2)
PROT SERPL-MCNC: 6.9 G/DL (ref 6–8.5)
RBC # BLD AUTO: 4.91 10*6/MM3 (ref 3.77–5.28)
SODIUM SERPL-SCNC: 135 MMOL/L (ref 136–145)
TRIGL SERPL-MCNC: 286 MG/DL (ref 0–150)
VLDLC SERPL CALC-MCNC: 57.2 MG/DL
WBC # BLD AUTO: 5.09 10*3/MM3 (ref 3.4–10.8)

## 2020-08-03 ENCOUNTER — OFFICE VISIT (OUTPATIENT)
Dept: INTERNAL MEDICINE | Facility: CLINIC | Age: 70
End: 2020-08-03

## 2020-08-03 VITALS
RESPIRATION RATE: 16 BRPM | DIASTOLIC BLOOD PRESSURE: 82 MMHG | TEMPERATURE: 98 F | HEIGHT: 63 IN | BODY MASS INDEX: 32.28 KG/M2 | OXYGEN SATURATION: 96 % | WEIGHT: 182.2 LBS | HEART RATE: 111 BPM | SYSTOLIC BLOOD PRESSURE: 160 MMHG

## 2020-08-03 DIAGNOSIS — E78.2 MIXED HYPERLIPIDEMIA: ICD-10-CM

## 2020-08-03 DIAGNOSIS — Z00.00 ENCOUNTER FOR MEDICARE ANNUAL WELLNESS EXAM: Primary | ICD-10-CM

## 2020-08-03 DIAGNOSIS — I10 ESSENTIAL HYPERTENSION: ICD-10-CM

## 2020-08-03 DIAGNOSIS — E08.65 DIABETES MELLITUS DUE TO UNDERLYING CONDITION, UNCONTROLLED, WITH HYPERGLYCEMIA (HCC): ICD-10-CM

## 2020-08-03 DIAGNOSIS — K21.9 GASTROESOPHAGEAL REFLUX DISEASE WITHOUT ESOPHAGITIS: ICD-10-CM

## 2020-08-03 PROBLEM — Z23 IMMUNIZATION DUE: Status: RESOLVED | Noted: 2020-06-18 | Resolved: 2020-08-03

## 2020-08-03 PROBLEM — N39.0 RECURRENT UTI: Status: RESOLVED | Noted: 2018-10-01 | Resolved: 2020-08-03

## 2020-08-03 LAB
POC CREATININE URINE: ABNORMAL
POC MICROALBUMIN URINE: ABNORMAL

## 2020-08-03 PROCEDURE — G0439 PPPS, SUBSEQ VISIT: HCPCS | Performed by: NURSE PRACTITIONER

## 2020-08-03 PROCEDURE — 93000 ELECTROCARDIOGRAM COMPLETE: CPT | Performed by: NURSE PRACTITIONER

## 2020-08-03 PROCEDURE — 99214 OFFICE O/P EST MOD 30 MIN: CPT | Performed by: NURSE PRACTITIONER

## 2020-08-03 PROCEDURE — 82044 UR ALBUMIN SEMIQUANTITATIVE: CPT | Performed by: NURSE PRACTITIONER

## 2020-08-03 PROCEDURE — 81003 URINALYSIS AUTO W/O SCOPE: CPT | Performed by: NURSE PRACTITIONER

## 2020-08-03 RX ORDER — VALSARTAN 320 MG/1
320 TABLET ORAL DAILY
Qty: 90 TABLET | Refills: 3 | Status: SHIPPED | OUTPATIENT
Start: 2020-08-03 | End: 2020-08-14 | Stop reason: SDUPTHER

## 2020-08-03 RX ORDER — FLUCONAZOLE 150 MG/1
TABLET ORAL
Qty: 2 TABLET | Refills: 0 | Status: SHIPPED | OUTPATIENT
Start: 2020-08-03 | End: 2020-11-03

## 2020-08-03 NOTE — PROGRESS NOTES
Procedure     ECG 12 Lead  Date/Time: 8/3/2020 1:07 PM  Performed by: Jenny Sun APRN  Authorized by: Jenny Sun APRN   Comparison: compared with previous ECG from 12/13/2016  Similar to previous ECG  Rhythm: sinus rhythm  Rate: normal  BPM: 98  Conduction: conduction normal  Conduction comments: UT 0.12  QRS 0.12  ST Segments: ST segments normal  T Waves: T waves normal  QRS axis: normal    Clinical impression: normal ECG

## 2020-08-03 NOTE — PROGRESS NOTES
"The ABCs of the Annual Wellness Visit  Subsequent Medicare Wellness Visit    Chief Complaint   Patient presents with   • Medicare Wellness-subsequent   • Hypertension     Having headaches and wants to look into getting a different BP med       Subjective   History of Present Illness:  Amber Campos is a 69 y.o. female who presents for a Subsequent Medicare Wellness Visit.    She is an uncontrolled Diabetic Seeing Dr. Packer in September. She is currently on Glumetza 1000mg at night and januiva 50 mg daily. She reports that \"I don't see nay improvement in glucose on Januiva.\" Her fasting glucose is running up to 333. She is walking on the treadmill 30 minutes a day. She is eating yogurt, eggs or oatmeal for breakfast, She is eating protein and 3 cups of salad.     She is taking her BP at [id number] at home since switching from Benicar due to Losartan 1000mg daily. Associted headache.       HEALTH RISK ASSESSMENT    Recent Hospitalizations:  No hospitalization(s) within the last year.    Current Medical Providers:  Patient Care Team:  Jenny Sun APRN as PCP - General  Jenny Sun APRN as PCP - Family Medicine  Jenny Sun APRN as PCP - Claims Attributed  Zaida Barragan MD as Consulting Physician (Obstetrics and Gynecology)  Naomie Osman APRN as Nurse Practitioner (Orthopedic Surgery)    Smoking Status:  Social History     Tobacco Use   Smoking Status Never Smoker   Smokeless Tobacco Never Used       Alcohol Consumption:  Social History     Substance and Sexual Activity   Alcohol Use No       Depression Screen:   PHQ-2/PHQ-9 Depression Screening 8/3/2020   Little interest or pleasure in doing things 0   Feeling down, depressed, or hopeless 0   Trouble falling or staying asleep, or sleeping too much -   Feeling tired or having little energy -   Poor appetite or overeating -   Feeling bad about yourself - or that you are a failure or have let yourself or your family down -   Trouble concentrating on " things, such as reading the newspaper or watching television -   Moving or speaking so slowly that other people could have noticed. Or the opposite - being so fidgety or restless that you have been moving around a lot more than usual -   Thoughts that you would be better off dead, or of hurting yourself in some way -   Total Score 0       Fall Risk Screen:  HONORIO Fall Risk Assessment was completed, and patient is at LOW risk for falls.Assessment completed on:8/3/2020    Health Habits and Functional and Cognitive Screening:  Functional & Cognitive Status 8/3/2020   Do you have difficulty preparing food and eating? No   Do you have difficulty bathing yourself, getting dressed or grooming yourself? No   Do you have difficulty using the toilet? No   Do you have difficulty moving around from place to place? No   Do you have trouble with steps or getting out of a bed or a chair? No   Current Diet Unhealthy Diet   Dental Exam Up to date   Eye Exam Up to date   Exercise (times per week) 0 times per week   Current Exercise Activities Include None   Do you need help using the phone?  No   Are you deaf or do you have serious difficulty hearing?  No   Do you need help with transportation? No   Do you need help shopping? No   Do you need help preparing meals?  No   Do you need help with housework?  No   Do you need help with laundry? No   Do you need help taking your medications? No   Do you need help managing money? No   Do you ever drive or ride in a car without wearing a seat belt? No   Have you felt unusual stress, anger or loneliness in the last month? Yes   Who do you live with? Spouse   If you need help, do you have trouble finding someone available to you? No   Have you been bothered in the last four weeks by sexual problems? No   Do you have difficulty concentrating, remembering or making decisions? No         Does the patient have evidence of cognitive impairment? No    Asprin use counseling:Start ASA 81 mg daily      Age-appropriate Screening Schedule:  Refer to the list below for future screening recommendations based on patient's age, sex and/or medical conditions. Orders for these recommended tests are listed in the plan section. The patient has been provided with a written plan.    Health Maintenance   Topic Date Due   • DIABETIC FOOT EXAM  02/08/2016   • DXA SCAN  08/30/2016   • URINE MICROALBUMIN  11/21/2018   • MAMMOGRAM  03/28/2019   • INFLUENZA VACCINE  08/01/2020   • ZOSTER VACCINE (2 of 3) 06/27/2021 (Originally 2/26/2009)   • DIABETIC EYE EXAM  10/15/2020   • HEMOGLOBIN A1C  01/30/2021   • LIPID PANEL  07/30/2021   • COLONOSCOPY  04/25/2022   • TDAP/TD VACCINES (2 - Td) 06/18/2030          The following portions of the patient's history were reviewed and updated as appropriate: allergies, current medications, past family history, past medical history, past social history, past surgical history and problem list.    Outpatient Medications Prior to Visit   Medication Sig Dispense Refill   • budesonide-formoterol (SYMBICORT) 160-4.5 MCG/ACT inhaler Inhale 2 puffs As Needed.     • Cetirizine HCl (ZYRTEC CHILDRENS ALLERGY) 5 MG/5ML solution solution Take  by mouth Daily.     • cholecalciferol (VITAMIN D3) 1000 UNITS tablet Take 1,000 Units by mouth Daily.     • fluconazole (Diflucan) 150 MG tablet Take 1 tablet now.  May repeat in 72 hours if still symptomatic. 2 tablet 0   • glucose blood test strip Use to check  Blood sugar  Tid   Dx   e11.9 300 each 3   • Insulin Pen Needle (PEN NEEDLES) 32G X 6 MM misc 1 each Daily With Breakfast. 90 each 3   • JANUVIA 50 MG tablet TAKE 1 TABLET BY MOUTH EVERY DAY 30 tablet 1   • Lancets (FREESTYLE) lancets      • losartan (COZAAR) 100 MG tablet Take 1 tablet by mouth Daily. 90 tablet 3   • meloxicam (MOBIC) 15 MG tablet Take 1 tablet by mouth Daily. 30 tablet 2   • metFORMIN (GLUMETZA) 1000 MG (MOD) 24 hr tablet TAKE 1 TABLET BY MOUTH EVERY DAY WITH BREAKFAST 30 tablet 1   •  mometasone (NASONEX) 50 MCG/ACT nasal spray 2 sprays into each nostril As Needed.     • montelukast (SINGULAIR) 10 MG tablet Take 1 tablet by mouth Every Night. 30 tablet 1   • pantoprazole (PROTONIX) 40 MG EC tablet Take 1 tablet by mouth Daily. 90 tablet 2   • polyethylene glycol (MIRALAX) packet Take 17 g by mouth Daily. 30 each 0   • Spacer/Aero Chamber Mouthpiece misc 1 each Daily. 1 each 0   • VASCEPA 1 g capsule capsule Take 2 g by mouth 2 (Two) Times a Day With Meals. 360 capsule 3   • venlafaxine XR (EFFEXOR-XR) 75 MG 24 hr capsule Take 1 capsule by mouth Daily. 90 capsule 3   • VOLTAREN 1 % gel gel As Needed. APPLY TO AFFECTED AREA TWICE A DAY  3     No facility-administered medications prior to visit.        Patient Active Problem List   Diagnosis   • Anxiety   • Asthma   • Simple renal cyst   • Depression with anxiety   • Menopausal symptom   • Hyperlipidemia   • Hematuria   • Steatosis of liver   • Gastroesophageal reflux disease   • Osteopenia of left hip   • Ventricular premature beats   • Uncontrolled diabetes mellitus type 2 without complications   • Candida vaginitis   • Osteoporosis   • Medicare annual wellness visit, subsequent   • Essential hypertension   • Status post replacement of left shoulder joint   • Urine frequency   • Chronic fatigue   • Acute cystitis without hematuria   • Right upper quadrant abdominal tenderness without rebound tenderness   • Environmental allergies   • Recurrent UTI   • Iron deficiency   • Encounter for screening for malignant neoplasm of colon    • Vitamin D deficiency   • Generalized body aches   • Left shoulder tendonitis   • Greater trochanteric bursitis, right   • Diabetes mellitus due to underlying condition, uncontrolled, with hyperglycemia (CMS/HCC)   • Immunization due       Advanced Care Planning:  ACP discussion was held with the patient during this visit. Patient has an advance directive in EMR which is still valid.     Review of Systems  "  Constitutional: Negative.  Negative for fatigue, fever and unexpected weight change.   Eyes: Negative.    Respiratory: Negative.    Cardiovascular: Negative.  Negative for chest pain, palpitations and leg swelling.   Gastrointestinal: Negative.    Endocrine: Negative.    Genitourinary: Negative.    Musculoskeletal: Negative.  Negative for arthralgias and back pain.   Skin: Negative.    Allergic/Immunologic: Negative.    Neurological: Positive for headaches.   Hematological: Negative.  Negative for adenopathy. Does not bruise/bleed easily.   Psychiatric/Behavioral: Negative.        Compared to one year ago, the patient feels her physical health is better.  Compared to one year ago, the patient feels her mental health is better.    Reviewed chart for potential of high risk medication in the elderly: yes  Reviewed chart for potential of harmful drug interactions in the elderly:yes    Objective         Vitals:    08/03/20 1108   BP: 160/82   Pulse: 111   Resp: 16   Temp: 98 °F (36.7 °C)   TempSrc: Oral   SpO2: 96%   Weight: 82.6 kg (182 lb 3.2 oz)   Height: 160 cm (63\")   PainSc: 0-No pain       Body mass index is 32.28 kg/m².  Discussed the patient's BMI with her. The BMI is above average; BMI management plan is completed.    Physical Exam   Constitutional: She is oriented to person, place, and time. She appears well-developed and well-nourished.   HENT:   Head: Normocephalic.   Right Ear: External ear normal.   Left Ear: External ear normal.   Mouth/Throat: Oropharynx is clear and moist. No oropharyngeal exudate.   Neck: Neck supple. No thyromegaly present.   Cardiovascular: Normal rate, regular rhythm and normal heart sounds.   No murmur heard.  Pulmonary/Chest: Effort normal and breath sounds normal. No stridor. No respiratory distress. She has no wheezes.   Abdominal: Soft. Bowel sounds are normal. She exhibits no distension. There is no tenderness. There is no guarding.   Musculoskeletal: She exhibits no edema. "   Neurological: She is alert and oriented to person, place, and time. No cranial nerve deficit. Coordination normal.   Skin: Skin is warm and dry. Capillary refill takes less than 2 seconds. No erythema.   Psychiatric: She has a normal mood and affect. Her behavior is normal.   Vitals reviewed.      Lab Results   Component Value Date     (H) 07/30/2020    CHLPL 231 (H) 07/30/2020    TRIG 286 (H) 07/30/2020    HDL 60 07/30/2020     (H) 07/30/2020    VLDL 57.2 07/30/2020    HGBA1C 11.80 (H) 07/30/2020        Assessment/Plan   Medicare Risks and Personalized Health Plan  CMS Preventative Services Quick Reference  Breast Cancer/Mammogram Screening  Cardiovascular risk  Inactivity/Sedentary  Osteoprorosis Risk  Polypharmacy    The above risks/problems have been discussed with the patient.  Pertinent information has been shared with the patient in the After Visit Summary.  Follow up plans and orders are seen below in the Assessment/Plan Section.     Diagnosis Plan   1. Encounter for Medicare annual wellness exam  POC Urinalysis Dipstick, Automated    POC Microalbumin    ECG 12 Lead   2. Diabetes mellitus due to underlying condition, uncontrolled, with hyperglycemia (CMS/[facility])  Dapagliflozin Propanediol (Farxiga) 10 MG tablet    ECG 12 Lead   3. Mixed hyperlipidemia  ECG 12 Lead   4. Essential hypertension  valsartan (DIOVAN) 320 MG tablet    ECG 12 Lead   5. Gastroesophageal reflux disease without esophagitis       We have discussed insulin, but she does not want to start. She will discuss with Dr. Packer. Her 24 hr diet recall is not consistent with current glucose readings. Dr. Funes completed foot exam in the last 6 months    Her BP is not well controlled since changing to losartan. D/C losartan 100mg. Start Valsartan 320mg daily.    Follow Up:    Follow up in 3 months    An After Visit Summary and PPPS were given to the patient.

## 2020-08-04 ENCOUNTER — TELEPHONE (OUTPATIENT)
Dept: INTERNAL MEDICINE | Facility: CLINIC | Age: 70
End: 2020-08-04

## 2020-08-04 ENCOUNTER — HOSPITAL ENCOUNTER (OUTPATIENT)
Dept: ULTRASOUND IMAGING | Facility: HOSPITAL | Age: 70
Discharge: HOME | End: 2020-08-04
Admitting: NURSE PRACTITIONER

## 2020-08-04 DIAGNOSIS — R10.811 RIGHT UPPER QUADRANT ABDOMINAL TENDERNESS WITHOUT REBOUND TENDERNESS: ICD-10-CM

## 2020-08-04 LAB
BILIRUB BLD-MCNC: NEGATIVE MG/DL
CLARITY, POC: CLEAR
COLOR UR: YELLOW
GLUCOSE UR STRIP-MCNC: NEGATIVE MG/DL
KETONES UR QL: NEGATIVE
LEUKOCYTE EST, POC: NEGATIVE
NITRITE UR-MCNC: NEGATIVE MG/ML
PH UR: 7 [PH] (ref 5–8)
PROT UR STRIP-MCNC: NEGATIVE MG/DL
RBC # UR STRIP: NEGATIVE /UL
SP GR UR: 1.02 (ref 1–1.03)
UROBILINOGEN UR QL: NORMAL

## 2020-08-04 PROCEDURE — 76705 ECHO EXAM OF ABDOMEN: CPT

## 2020-08-04 NOTE — TELEPHONE ENCOUNTER
PATIENT CALLED AND WANTS KIRK CARRINGTON TO KNOW THAT HER MEDICATION     metFORMIN (GLUMETZA) 1000 MG (MOD) 24 hr tablet    IS GOOD WITH THE RECALL. AND WANTS TO KNOW IF SHE SHOULD TAKE IT IN THE EVENING.  SHE ALSO TAKES FARXIGA IN THE MORNING.    SHE ALSO HAS AN APPOINTMENT WITH DR. BANG, ENDOCRINOLOGIST ON 9/21/2020    PLEASE CALL [phone] TOMORROW DUE TO HER HOME PHONE BEING OUT AND WILL BE ABLE TO TALK TOMORROW.

## 2020-08-04 NOTE — TELEPHONE ENCOUNTER
Problem: Aspiration:  Goal: Absence of aspiration  Description  Absence of aspiration  2019 1217 by Ivan Bhandari RN  Outcome: Met This Shift  2019 by Boyd Ramirez RN  Outcome: Ongoing     Problem: Injury - Risk of, Increased Serum Bilirubin Level:  Goal: Absence of bilirubin toxicity signs and symptoms  Description  Absence of bilirubin toxicity signs and symptoms  2019 1217 by Ivan Bhandari RN  Outcome: Met This Shift  2019 by Boyd Ramirez RN  Outcome: Ongoing  Goal: Serum bilirubin within specified parameters  Description  Serum bilirubin within specified parameters  2019 1217 by Ivan Bhandari RN  Outcome: Met This Shift  2019 by Boyd Ramirez RN  Outcome: Ongoing     Problem: Discharge Planning:  Goal: Discharged to appropriate level of care  Description  Discharged to appropriate level of care  Outcome: Ongoing     Problem:  Body Temperature - Risk of, Imbalanced:  Goal: Ability to maintain a body temperature in the normal range will improve to within specified parameters  Description  Ability to maintain a body temperature in the normal range will improve to within specified parameters  2019 1217 by Ivan Bhandari RN  Outcome: Ongoing  2019 by Boyd Ramirez RN  Outcome: Ongoing     Problem: Growth and Development - Risk of, Impaired:  Goal: Demonstration of normal  growth will improve to within specified parameters  Description  Demonstration of normal  growth will improve to within specified parameters  2019 1217 by Ivan Bhandari RN  Outcome: Ongoing  2019 by Boyd Raimrez RN  Outcome: Ongoing  Goal: Neurodevelopmental maturation within specified parameters  Description  Neurodevelopmental maturation within specified parameters  20197 by Ivan Bhandari RN  Outcome: Ongoing  2019 by Boyd Ramirez RN  Outcome: Ongoing     Problem: Nutrition Deficit:  Goal: Unable to reach pt.   glucose levels will improve to within specified parameters  Description  Ability to maintain appropriate glucose levels will improve to within specified parameters  Outcome: Completed

## 2020-08-07 ENCOUNTER — TELEPHONE (OUTPATIENT)
Dept: INTERNAL MEDICINE | Facility: CLINIC | Age: 70
End: 2020-08-07

## 2020-08-07 NOTE — TELEPHONE ENCOUNTER
PT CALLED STATING [facility] WILL BE FAXING OVER A MED REFILL REQUEST FOR 2 MEDICATIONS. PT COULD NOT PROVIDE NAMES     PLEASE ADVISE     PT CALL BACK   [phone]

## 2020-08-10 NOTE — TELEPHONE ENCOUNTER
PATIENT CALLED STATING THAT SHE CALLED LAST WEEK AND LEFT A MESSAGE (SEE TELEPHONE ENCOUNTER FROM LAST FRIDAY (8/7/20) STARTED BY FRANCESCO) REQUESTING FOR KIRK LIVINGSTONFNER TO REFILL 2 OF HER PRESCRIPTIONS, BUT SHE DID NOT KNOW THE SPECIFIC PRESCRIPTIONS THAT SHE NEEDED. THE PATIENT STATED THAT HER PHARMACY HAS NOT HEARD ANYTHING FROM KIRK CARRINGTON'S OFFICE REGARDING THIS REQUEST, SO SHE IS CALLING TO CHECK ON THE STATUS OF THE MESSAGE THAT SHE LEFT.    THE PATIENT STATED THAT ONE OF THE PRESCRIPTIONS THAT SHE IS NEEDING IS THE FREESTYLE LITE TEST STRIP. HOWEVER, THE PATIENT STATED THAT SHE CANNOT REMEMBER THE NAME OF THE PRESCRIPTION THAT SHE NEEDS REFILLED.    THE PATIENT STATED THAT SHE CHECKS HER BLOOD SUGAR LEVEL ONCE, SOMETIMES TWICE, A DAY, AND SHE CURRENTLY HAS A FULL BOTTLE (OF 50 TEST STRIPS) LEFT.    THE PATIENT STATED THAT SHE WILL REACH OUT TO HER PHARMACY TO SEE IF THEY CAN SEND A REQUEST TO KIRK CARRINGTON FOR THE OTHER PRESCRIPTION THAT SHE IS NEEDING.    I CONFIRMED THE CORRECT PHARMACY WITH THE PATIENT TO BE [facility].    IF THERE ARE ANY QUESTIONS OR CONCERNS PLEASE CALL THE PATIENT [phone] OR THE PHARMACY AT [facility] -[phone].

## 2020-08-14 DIAGNOSIS — I10 ESSENTIAL HYPERTENSION: ICD-10-CM

## 2020-08-14 RX ORDER — VALSARTAN 320 MG/1
320 TABLET ORAL DAILY
Qty: 90 TABLET | Refills: 3 | Status: SHIPPED | OUTPATIENT
Start: 2020-08-14 | End: 2021-07-22

## 2020-08-25 RX ORDER — METFORMIN HYDROCHLORIDE 1000 MG/1
TABLET, FILM COATED, EXTENDED RELEASE ORAL
Qty: 30 TABLET | Refills: 1 | Status: SHIPPED | OUTPATIENT
Start: 2020-08-25 | End: 2020-09-01

## 2020-08-25 RX ORDER — SITAGLIPTIN 50 MG/1
TABLET, FILM COATED ORAL
Qty: 30 TABLET | Refills: 1 | Status: SHIPPED | OUTPATIENT
Start: 2020-08-25 | End: 2020-11-03

## 2020-09-01 NOTE — TELEPHONE ENCOUNTER
Please update her medicine list. This is okay, but her med list needs to be updated o relfect her current meds and allergy list for Glumetza for headache/intolerance.

## 2020-09-01 NOTE — TELEPHONE ENCOUNTER
Patient IS CALLING TO LET KIRK KNOW SHE IS NO LONGER TAKING:    metFORMIN (GLUMETZA) 1000 MG (MOD) 24 hr tablet    THIS MEDICATION GIVES HER A HEADACHE.    SHE WENT WITH THE OLD DOSAGE OF METFORMIN.    PLEASE CALL HER TO LET HER KNOW IF THIS IS OKAY. AND IF SHE CAN CHANGE MEDICATIONS.    [facility]#: [phone]

## 2020-09-15 ENCOUNTER — OFFICE VISIT (OUTPATIENT)
Dept: INTERNAL MEDICINE | Facility: CLINIC | Age: 70
End: 2020-09-15

## 2020-09-15 VITALS
SYSTOLIC BLOOD PRESSURE: 134 MMHG | HEART RATE: 107 BPM | TEMPERATURE: 96.9 F | RESPIRATION RATE: 16 BRPM | BODY MASS INDEX: 31.54 KG/M2 | OXYGEN SATURATION: 98 % | WEIGHT: 178 LBS | DIASTOLIC BLOOD PRESSURE: 80 MMHG | HEIGHT: 63 IN

## 2020-09-15 DIAGNOSIS — R82.90 ABNORMAL URINE ODOR: ICD-10-CM

## 2020-09-15 DIAGNOSIS — R30.0 DYSURIA: Primary | ICD-10-CM

## 2020-09-15 DIAGNOSIS — E08.65 DIABETES MELLITUS DUE TO UNDERLYING CONDITION, UNCONTROLLED, WITH HYPERGLYCEMIA (HCC): ICD-10-CM

## 2020-09-15 DIAGNOSIS — R53.82 CHRONIC FATIGUE: ICD-10-CM

## 2020-09-15 LAB
BILIRUB BLD-MCNC: NEGATIVE MG/DL
CLARITY, POC: ABNORMAL
COLOR UR: YELLOW
GLUCOSE UR STRIP-MCNC: ABNORMAL MG/DL
KETONES UR QL: ABNORMAL
LEUKOCYTE EST, POC: NEGATIVE
NITRITE UR-MCNC: NEGATIVE MG/ML
PH UR: 5.5 [PH] (ref 5–8)
PROT UR STRIP-MCNC: NEGATIVE MG/DL
RBC # UR STRIP: NEGATIVE /UL
SP GR UR: 1.01 (ref 1–1.03)
UROBILINOGEN UR QL: NORMAL

## 2020-09-15 PROCEDURE — 81003 URINALYSIS AUTO W/O SCOPE: CPT | Performed by: NURSE PRACTITIONER

## 2020-09-15 PROCEDURE — 99213 OFFICE O/P EST LOW 20 MIN: CPT | Performed by: NURSE PRACTITIONER

## 2020-09-15 NOTE — PROGRESS NOTES
"Chief Complaint   Patient presents with   • Dysuria     chemical odor   • Fatigue       Subjective     Amber Campos is a 69 y.o. female being seen for an acute appointment today regarding  Fatigue and \"chemical smell\" in urine.  She reports a chemical smell and dark urine for 3 days. Associated frequency and pain. No fever, no back pain.     She is complaining of chewing ice for the past few weeks and feeling tired. She is asking if her iron is low..        History of Present Illness     Allergies   Allergen Reactions   • Iodinated Diagnostic Agents Photosensitivity     contrast   • Glumetza [Metformin] Headache   • Adhesive Tape Other (See Comments)     EKG stickers only   • Farxiga [Dapagliflozin] Diarrhea and Itching     Yeast infeciotn   • Neomycin-Bacitracin Zn-Polymyx Rash   • Trulicity [Dulaglutide] GI Intolerance     Constipation   • Victoza [Liraglutide] Nausea Only         Current Outpatient Medications:   •  budesonide-formoterol (SYMBICORT) 160-4.5 MCG/ACT inhaler, Inhale 2 puffs As Needed., Disp: , Rfl:   •  Cetirizine HCl (ZYRTEC CHILDRENS ALLERGY) 5 MG/5ML solution solution, Take  by mouth Daily., Disp: , Rfl:   •  cholecalciferol (VITAMIN D3) 1000 UNITS tablet, Take 1,000 Units by mouth Daily., Disp: , Rfl:   •  Dapagliflozin Propanediol (Farxiga) 10 MG tablet, Take 10 mg by mouth Daily., Disp: 90 tablet, Rfl: 3  •  fluconazole (Diflucan) 150 MG tablet, Take 1 tablet now.  May repeat in 72 hours if still symptomatic., Disp: 2 tablet, Rfl: 0  •  glucose blood (FREESTYLE LITE) test strip, USE TO CHECK BLOOD SUGAR 3 TIMES A DAY, Disp: 300 each, Rfl: 3  •  Insulin Pen Needle (PEN NEEDLES) 32G X 6 MM misc, 1 each Daily With Breakfast., Disp: 90 each, Rfl: 3  •  JANUVIA 50 MG tablet, TAKE 1 TABLET BY MOUTH EVERY DAY, Disp: 30 tablet, Rfl: 1  •  Lancets (FREESTYLE) lancets, , Disp: , Rfl:   •  meloxicam (MOBIC) 15 MG tablet, Take 1 tablet by mouth Daily., Disp: 30 tablet, Rfl: 2  •  metFORMIN (Glucophage) " 1000 MG tablet, Take 1 tablet by mouth Daily With Breakfast., Disp: 90 tablet, Rfl: 1  •  mometasone (NASONEX) 50 MCG/ACT nasal spray, 2 sprays into each nostril As Needed., Disp: , Rfl:   •  montelukast (SINGULAIR) 10 MG tablet, Take 1 tablet by mouth Every Night., Disp: 30 tablet, Rfl: 1  •  pantoprazole (PROTONIX) 40 MG EC tablet, Take 1 tablet by mouth Daily., Disp: 90 tablet, Rfl: 2  •  polyethylene glycol (MIRALAX) packet, Take 17 g by mouth Daily., Disp: 30 each, Rfl: 0  •  Spacer/Aero Chamber Mouthpiece misc, 1 each Daily., Disp: 1 each, Rfl: 0  •  valsartan (DIOVAN) 320 MG tablet, Take 1 tablet by mouth Daily., Disp: 90 tablet, Rfl: 3  •  VASCEPA 1 g capsule capsule, Take 2 g by mouth 2 (Two) Times a Day With Meals., Disp: 360 capsule, Rfl: 3  •  venlafaxine XR (EFFEXOR-XR) 75 MG 24 hr capsule, Take 1 capsule by mouth Daily., Disp: 90 capsule, Rfl: 3  •  VOLTAREN 1 % gel gel, As Needed. APPLY TO AFFECTED AREA TWICE A DAY, Disp: , Rfl: 3    The following portions of the patient's history were reviewed and updated as appropriate: allergies, current medications, past family history, past medical history, past social history, past surgical history and problem list.    Review of Systems   Constitutional: Negative.    HENT: Negative.    Eyes: Negative.    Respiratory: Negative.    Cardiovascular: Negative.  Negative for chest pain, palpitations and leg swelling.   Gastrointestinal: Negative.  Negative for abdominal distention and abdominal pain.   Endocrine: Negative.    Genitourinary: Negative.    Musculoskeletal: Positive for arthralgias.   Skin: Negative.    Allergic/Immunologic: Negative.  Negative for environmental allergies, food allergies and immunocompromised state.   Neurological: Negative.    Hematological: Negative.  Negative for adenopathy. Does not bruise/bleed easily.   Psychiatric/Behavioral: Negative.  Negative for agitation and behavioral problems.   All other systems reviewed and are  negative.      Assessment     Physical Exam  Vitals signs reviewed.   Constitutional:       General: She is not in acute distress.     Appearance: Normal appearance. She is normal weight. She is not ill-appearing.   HENT:      Right Ear: Tympanic membrane normal.      Left Ear: Tympanic membrane normal.   Cardiovascular:      Rate and Rhythm: Normal rate.      Pulses: Normal pulses.   Pulmonary:      Breath sounds: Normal breath sounds.   Abdominal:      Tenderness: There is no abdominal tenderness. There is no right CVA tenderness or left CVA tenderness.   Musculoskeletal:         General: No swelling.   Skin:     General: Skin is warm and dry.   Neurological:      General: No focal deficit present.      Mental Status: She is alert and oriented to person, place, and time.   Psychiatric:         Mood and Affect: Mood normal.         Bailey Clark was seen today for dysuria and fatigue.    Diagnoses and all orders for this visit:    Dysuria  -     POCT urinalysis dipstick, automated  -     Urine Culture - Urine, Urine, Clean Catch; Future  -     Urine Culture - Urine, Urine, Clean Catch  -     CBC w AUTO Differential  -     Iron level    Abnormal urine odor  -     Urine Culture - Urine, Urine, Clean Catch; Future  -     Urine Culture - Urine, Urine, Clean Catch  -     CBC w AUTO Differential  -     Iron level    Chronic fatigue  -     CBC w AUTO Differential  -     Iron level    Diabetes mellitus due to underlying condition, uncontrolled, with hyperglycemia (CMS/HCC)  -     CBC w AUTO Differential  -     Iron level    Hold medication until urine culture complete in 3 days      Follow up as scheduled in November

## 2020-09-16 LAB
BASOPHILS # BLD AUTO: 0.07 10*3/MM3 (ref 0–0.2)
BASOPHILS NFR BLD AUTO: 1 % (ref 0–1.5)
EOSINOPHIL # BLD AUTO: 0.29 10*3/MM3 (ref 0–0.4)
EOSINOPHIL NFR BLD AUTO: 4.2 % (ref 0.3–6.2)
ERYTHROCYTE [DISTWIDTH] IN BLOOD BY AUTOMATED COUNT: 14.7 % (ref 12.3–15.4)
HCT VFR BLD AUTO: 37.7 % (ref 34–46.6)
HGB BLD-MCNC: 12.2 G/DL (ref 12–15.9)
IMM GRANULOCYTES # BLD AUTO: 0.02 10*3/MM3 (ref 0–0.05)
IMM GRANULOCYTES NFR BLD AUTO: 0.3 % (ref 0–0.5)
IRON SERPL-MCNC: 33 MCG/DL (ref 37–145)
LYMPHOCYTES # BLD AUTO: 2.64 10*3/MM3 (ref 0.7–3.1)
LYMPHOCYTES NFR BLD AUTO: 38.5 % (ref 19.6–45.3)
MCH RBC QN AUTO: 25.9 PG (ref 26.6–33)
MCHC RBC AUTO-ENTMCNC: 32.4 G/DL (ref 31.5–35.7)
MCV RBC AUTO: 80 FL (ref 79–97)
MONOCYTES # BLD AUTO: 0.47 10*3/MM3 (ref 0.1–0.9)
MONOCYTES NFR BLD AUTO: 6.9 % (ref 5–12)
NEUTROPHILS # BLD AUTO: 3.36 10*3/MM3 (ref 1.7–7)
NEUTROPHILS NFR BLD AUTO: 49.1 % (ref 42.7–76)
NRBC BLD AUTO-RTO: 0.1 /100 WBC (ref 0–0.2)
PLATELET # BLD AUTO: 374 10*3/MM3 (ref 140–450)
RBC # BLD AUTO: 4.71 10*6/MM3 (ref 3.77–5.28)
WBC # BLD AUTO: 6.85 10*3/MM3 (ref 3.4–10.8)

## 2020-09-17 LAB
BACTERIA UR CULT: NO GROWTH
BACTERIA UR CULT: NORMAL

## 2020-09-18 ENCOUNTER — TELEPHONE (OUTPATIENT)
Dept: INTERNAL MEDICINE | Facility: CLINIC | Age: 70
End: 2020-09-18

## 2020-09-18 NOTE — TELEPHONE ENCOUNTER
Patient calling and states she got the message from Tiffany and will come in to get the kit next week. She also states she is not affiliated with Dr Mcneal but will go to the other MD you recommended. She states to get her last colonoscopy results call Dr Roberts office. She also stated she has never had a polyp before.    Patient can be reached at [phone].

## 2020-09-21 ENCOUNTER — TELEPHONE (OUTPATIENT)
Dept: INTERNAL MEDICINE | Facility: CLINIC | Age: 70
End: 2020-09-21

## 2020-09-23 ENCOUNTER — OFFICE VISIT (OUTPATIENT)
Dept: INTERNAL MEDICINE | Facility: CLINIC | Age: 70
End: 2020-09-23

## 2020-09-23 VITALS
WEIGHT: 177.6 LBS | SYSTOLIC BLOOD PRESSURE: 136 MMHG | OXYGEN SATURATION: 96 % | HEIGHT: 63 IN | HEART RATE: 98 BPM | BODY MASS INDEX: 31.47 KG/M2 | DIASTOLIC BLOOD PRESSURE: 92 MMHG

## 2020-09-23 DIAGNOSIS — L82.1 SEBORRHEIC KERATOSES: ICD-10-CM

## 2020-09-23 DIAGNOSIS — Z91.09 ENVIRONMENTAL ALLERGIES: ICD-10-CM

## 2020-09-23 DIAGNOSIS — E61.1 IRON DEFICIENCY: Primary | ICD-10-CM

## 2020-09-23 PROCEDURE — 99214 OFFICE O/P EST MOD 30 MIN: CPT | Performed by: NURSE PRACTITIONER

## 2020-09-23 RX ORDER — CETIRIZINE HYDROCHLORIDE 5 MG/1
5 TABLET ORAL DAILY
Qty: 150 ML
Start: 2020-09-23

## 2020-09-23 RX ORDER — SERTACONAZOLE NITRATE 2 %
CREAM (GRAM) TOPICAL
COMMUNITY
Start: 2020-08-04 | End: 2020-11-03

## 2020-09-23 NOTE — PROGRESS NOTES
".  Chief Complaint   Patient presents with   • Consult     Discuss Colonoscopy       Subjective     Amber Campos is a 69 y.o. female being seen for a follow up appointment today regarding iron deficiency. She was in office 9-, reporting that she had low energy and was \"constatntly chewing ice.\"  Her iron was low at 33. She was advised to complete a fecal OB kit, and then start a MVI with iron. Her last C-scope was 4- with Dr. Roca, showing diveticular disease with repeat recommended in 5 years.       She is also complaining of nasal tenderness to the inside of both nares. She took her Zyrtec allergy last night to help.     She has a new pink, scaling are to shoulder for 4 weeks. Non itchy, non tender. She is using lotion to help.     History of Present Illness     Allergies   Allergen Reactions   • Iodinated Diagnostic Agents Photosensitivity     contrast   • Glumetza [Metformin] Headache   • Adhesive Tape Other (See Comments)     EKG stickers only   • Farxiga [Dapagliflozin] Diarrhea and Itching     Yeast infeciotn   • Neomycin-Bacitracin Zn-Polymyx Rash   • Trulicity [Dulaglutide] GI Intolerance     Constipation   • Victoza [Liraglutide] Nausea Only         Current Outpatient Medications:   •  budesonide-formoterol (SYMBICORT) 160-4.5 MCG/ACT inhaler, Inhale 2 puffs As Needed., Disp: , Rfl:   •  Cetirizine HCl (ZYRTEC CHILDRENS ALLERGY) 5 MG/5ML solution solution, Take  by mouth Daily., Disp: , Rfl:   •  cholecalciferol (VITAMIN D3) 1000 UNITS tablet, Take 1,000 Units by mouth Daily., Disp: , Rfl:   •  Dapagliflozin Propanediol (Farxiga) 10 MG tablet, Take 10 mg by mouth Daily., Disp: 90 tablet, Rfl: 3  •  Ertaczo 2 % cream cream, APPLY TO AFFECTED AREA DAILY, Disp: , Rfl:   •  fluconazole (Diflucan) 150 MG tablet, Take 1 tablet now.  May repeat in 72 hours if still symptomatic., Disp: 2 tablet, Rfl: 0  •  glucose blood (FREESTYLE LITE) test strip, USE TO CHECK BLOOD SUGAR 3 TIMES A DAY, Disp: 300 " each, Rfl: 3  •  Insulin Pen Needle (PEN NEEDLES) 32G X 6 MM misc, 1 each Daily With Breakfast., Disp: 90 each, Rfl: 3  •  JANUVIA 50 MG tablet, TAKE 1 TABLET BY MOUTH EVERY DAY, Disp: 30 tablet, Rfl: 1  •  Lancets (FREESTYLE) lancets, , Disp: , Rfl:   •  meloxicam (MOBIC) 15 MG tablet, Take 1 tablet by mouth Daily., Disp: 30 tablet, Rfl: 2  •  metFORMIN (Glucophage) 1000 MG tablet, Take 1 tablet by mouth Daily With Breakfast., Disp: 90 tablet, Rfl: 1  •  mometasone (NASONEX) 50 MCG/ACT nasal spray, 2 sprays into each nostril As Needed., Disp: , Rfl:   •  montelukast (SINGULAIR) 10 MG tablet, Take 1 tablet by mouth Every Night., Disp: 30 tablet, Rfl: 1  •  pantoprazole (PROTONIX) 40 MG EC tablet, Take 1 tablet by mouth Daily., Disp: 90 tablet, Rfl: 2  •  polyethylene glycol (MIRALAX) packet, Take 17 g by mouth Daily., Disp: 30 each, Rfl: 0  •  Spacer/Aero Chamber Mouthpiece misc, 1 each Daily., Disp: 1 each, Rfl: 0  •  valsartan (DIOVAN) 320 MG tablet, Take 1 tablet by mouth Daily., Disp: 90 tablet, Rfl: 3  •  VASCEPA 1 g capsule capsule, Take 2 g by mouth 2 (Two) Times a Day With Meals., Disp: 360 capsule, Rfl: 3  •  venlafaxine XR (EFFEXOR-XR) 75 MG 24 hr capsule, Take 1 capsule by mouth Daily., Disp: 90 capsule, Rfl: 3  •  VOLTAREN 1 % gel gel, As Needed. APPLY TO AFFECTED AREA TWICE A DAY, Disp: , Rfl: 3    The following portions of the patient's history were reviewed and updated as appropriate: allergies, current medications, past family history, past medical history, past social history, past surgical history and problem list.    Review of Systems   Constitutional: Negative.    HENT: Negative.    Eyes: Negative.    Respiratory: Negative.  Negative for wheezing and stridor.    Cardiovascular: Negative for chest pain, palpitations and leg swelling.   Gastrointestinal: Negative for abdominal distention, abdominal pain, anal bleeding, blood in stool, constipation, diarrhea, nausea and rectal pain.   Endocrine:  Negative.    Genitourinary: Negative.    Musculoskeletal: Negative for arthralgias, back pain and gait problem.   Allergic/Immunologic: Positive for environmental allergies.   Neurological: Negative.    Hematological: Negative.  Negative for adenopathy. Does not bruise/bleed easily.   Psychiatric/Behavioral: Negative.  Negative for agitation.   All other systems reviewed and are negative.      Assessment     Physical Exam  Vitals signs reviewed.   Constitutional:       Appearance: Normal appearance.   HENT:      Head: Normocephalic.      Right Ear: Tympanic membrane normal.      Left Ear: Tympanic membrane normal.      Nose: Nasal tenderness, mucosal edema, congestion and rhinorrhea present.      Right Turbinates: Swollen.      Left Turbinates: Swollen.      Right Sinus: No maxillary sinus tenderness or frontal sinus tenderness.      Left Sinus: No maxillary sinus tenderness or frontal sinus tenderness.   Cardiovascular:      Rate and Rhythm: Normal rate and regular rhythm.      Heart sounds: No murmur.   Pulmonary:      Effort: Pulmonary effort is normal. No respiratory distress.      Breath sounds: Normal breath sounds. No stridor. No wheezing.   Skin:     General: Skin is warm and dry.      Capillary Refill: Capillary refill takes less than 2 seconds.      Comments: Seborrheic keratoses to right shoulder   Neurological:      General: No focal deficit present.      Mental Status: She is alert and oriented to person, place, and time.         Plan     Her labs were reviewed with the patient from last week. Iron 33. Reviewed EGD 2014 and coloscopy from 4-2017 with Dr. Roca. She will complete fecal OB as directed and bring back to office.     Amber was seen today for consult.    Diagnoses and all orders for this visit:    Iron deficiency  -     Occult Blood, Fecal By Immunoassay - Stool, Per Rectum    Environmental allergies  -     Cetirizine HCl (ZyrTEC Childrens Allergy) 5 MG/5ML solution solution; Take 5 mL by  mouth Daily.    Seborrheic keratoses    Use ocean nasal spray and Zyrtec to treat allergies.     She will follow up with Dermatology regarding spot on right shoulder for cryotherapy.    Follow up as scheduled.

## 2020-09-29 LAB — HEMOCCULT STL QL IA: NEGATIVE

## 2020-10-03 DIAGNOSIS — K21.9 GASTROESOPHAGEAL REFLUX DISEASE WITHOUT ESOPHAGITIS: ICD-10-CM

## 2020-10-05 RX ORDER — PANTOPRAZOLE SODIUM 40 MG/1
TABLET, DELAYED RELEASE ORAL
Qty: 90 TABLET | Refills: 3 | Status: SHIPPED | OUTPATIENT
Start: 2020-10-05 | End: 2021-09-28

## 2020-10-27 DIAGNOSIS — E08.65 DIABETES MELLITUS DUE TO UNDERLYING CONDITION, UNCONTROLLED, WITH HYPERGLYCEMIA (HCC): ICD-10-CM

## 2020-10-27 DIAGNOSIS — E61.1 IRON DEFICIENCY: ICD-10-CM

## 2020-10-27 DIAGNOSIS — E78.2 MIXED HYPERLIPIDEMIA: Primary | ICD-10-CM

## 2020-10-28 LAB
ALBUMIN SERPL-MCNC: 4.3 G/DL (ref 3.5–5.2)
ALBUMIN/GLOB SERPL: 1.9 G/DL
ALP SERPL-CCNC: 82 U/L (ref 39–117)
ALT SERPL-CCNC: 64 U/L (ref 1–33)
AST SERPL-CCNC: 77 U/L (ref 1–32)
BASOPHILS # BLD AUTO: 0.06 10*3/MM3 (ref 0–0.2)
BASOPHILS NFR BLD AUTO: 1.2 % (ref 0–1.5)
BILIRUB SERPL-MCNC: 0.3 MG/DL (ref 0–1.2)
BUN SERPL-MCNC: 12 MG/DL (ref 8–23)
BUN/CREAT SERPL: 26.7 (ref 7–25)
CALCIUM SERPL-MCNC: 9.4 MG/DL (ref 8.6–10.5)
CHLORIDE SERPL-SCNC: 99 MMOL/L (ref 98–107)
CHOLEST SERPL-MCNC: 211 MG/DL (ref 0–200)
CO2 SERPL-SCNC: 27.7 MMOL/L (ref 22–29)
CREAT SERPL-MCNC: 0.45 MG/DL (ref 0.57–1)
EOSINOPHIL # BLD AUTO: 0.22 10*3/MM3 (ref 0–0.4)
EOSINOPHIL NFR BLD AUTO: 4.4 % (ref 0.3–6.2)
ERYTHROCYTE [DISTWIDTH] IN BLOOD BY AUTOMATED COUNT: 14.6 % (ref 12.3–15.4)
GLOBULIN SER CALC-MCNC: 2.3 GM/DL
GLUCOSE SERPL-MCNC: 256 MG/DL (ref 65–99)
HBA1C MFR BLD: 10.6 % (ref 4.8–5.6)
HCT VFR BLD AUTO: 38.9 % (ref 34–46.6)
HDLC SERPL-MCNC: 66 MG/DL (ref 40–60)
HGB BLD-MCNC: 12.5 G/DL (ref 12–15.9)
IMM GRANULOCYTES # BLD AUTO: 0.01 10*3/MM3 (ref 0–0.05)
IMM GRANULOCYTES NFR BLD AUTO: 0.2 % (ref 0–0.5)
IRON SERPL-MCNC: 57 MCG/DL (ref 37–145)
LDLC SERPL CALC-MCNC: 102 MG/DL (ref 0–100)
LYMPHOCYTES # BLD AUTO: 2.3 10*3/MM3 (ref 0.7–3.1)
LYMPHOCYTES NFR BLD AUTO: 46.1 % (ref 19.6–45.3)
MCH RBC QN AUTO: 25.4 PG (ref 26.6–33)
MCHC RBC AUTO-ENTMCNC: 32.1 G/DL (ref 31.5–35.7)
MCV RBC AUTO: 79.1 FL (ref 79–97)
MONOCYTES # BLD AUTO: 0.41 10*3/MM3 (ref 0.1–0.9)
MONOCYTES NFR BLD AUTO: 8.2 % (ref 5–12)
NEUTROPHILS # BLD AUTO: 1.99 10*3/MM3 (ref 1.7–7)
NEUTROPHILS NFR BLD AUTO: 39.9 % (ref 42.7–76)
NRBC BLD AUTO-RTO: 0 /100 WBC (ref 0–0.2)
PLATELET # BLD AUTO: 353 10*3/MM3 (ref 140–450)
POTASSIUM SERPL-SCNC: 4.3 MMOL/L (ref 3.5–5.2)
PROT SERPL-MCNC: 6.6 G/DL (ref 6–8.5)
RBC # BLD AUTO: 4.92 10*6/MM3 (ref 3.77–5.28)
SODIUM SERPL-SCNC: 137 MMOL/L (ref 136–145)
TRIGL SERPL-MCNC: 255 MG/DL (ref 0–150)
VLDLC SERPL CALC-MCNC: 43 MG/DL (ref 5–40)
WBC # BLD AUTO: 4.99 10*3/MM3 (ref 3.4–10.8)

## 2020-11-03 ENCOUNTER — OFFICE VISIT (OUTPATIENT)
Dept: INTERNAL MEDICINE | Facility: CLINIC | Age: 70
End: 2020-11-03

## 2020-11-03 VITALS
OXYGEN SATURATION: 99 % | SYSTOLIC BLOOD PRESSURE: 136 MMHG | DIASTOLIC BLOOD PRESSURE: 80 MMHG | TEMPERATURE: 96.9 F | HEART RATE: 96 BPM | HEIGHT: 63 IN | RESPIRATION RATE: 16 BRPM | WEIGHT: 181 LBS | BODY MASS INDEX: 32.07 KG/M2

## 2020-11-03 DIAGNOSIS — E08.65 DIABETES MELLITUS DUE TO UNDERLYING CONDITION, UNCONTROLLED, WITH HYPERGLYCEMIA (HCC): Primary | ICD-10-CM

## 2020-11-03 DIAGNOSIS — I10 ESSENTIAL HYPERTENSION: ICD-10-CM

## 2020-11-03 DIAGNOSIS — E61.1 IRON DEFICIENCY: ICD-10-CM

## 2020-11-03 DIAGNOSIS — K21.00 GASTROESOPHAGEAL REFLUX DISEASE WITH ESOPHAGITIS WITHOUT HEMORRHAGE: ICD-10-CM

## 2020-11-03 DIAGNOSIS — E78.2 MIXED HYPERLIPIDEMIA: ICD-10-CM

## 2020-11-03 PROBLEM — R52 GENERALIZED BODY ACHES: Status: RESOLVED | Noted: 2019-03-21 | Resolved: 2020-11-03

## 2020-11-03 PROBLEM — R53.82 CHRONIC FATIGUE: Status: RESOLVED | Noted: 2017-01-04 | Resolved: 2020-11-03

## 2020-11-03 PROBLEM — N30.00 ACUTE CYSTITIS WITHOUT HEMATURIA: Status: RESOLVED | Noted: 2017-01-04 | Resolved: 2020-11-03

## 2020-11-03 PROBLEM — R10.811 RIGHT UPPER QUADRANT ABDOMINAL TENDERNESS WITHOUT REBOUND TENDERNESS: Status: RESOLVED | Noted: 2017-01-04 | Resolved: 2020-11-03

## 2020-11-03 PROCEDURE — 90471 IMMUNIZATION ADMIN: CPT | Performed by: NURSE PRACTITIONER

## 2020-11-03 PROCEDURE — 90632 HEPA VACCINE ADULT IM: CPT | Performed by: NURSE PRACTITIONER

## 2020-11-03 PROCEDURE — 99214 OFFICE O/P EST MOD 30 MIN: CPT | Performed by: NURSE PRACTITIONER

## 2020-11-03 NOTE — PROGRESS NOTES
Chief Complaint   Patient presents with   • Diabetes   • Hypertension   • Hyperlipidemia       Subjective     Amber Campos is a 69 y.o. female being seen for a follow up appointment today regarding DM 2, HTN, Hyperlipidemia, and GERD with JOSETTE.  She is currently on Metformin 1000mg BID and Ozempic 1 mg weekly per endo. She is tolerating it well. She is on a low carb diet 50-60 grams per meal. Her fasting glucose was 161.     She is on Diovan 320mg daily HTN. BP well controlled. She denies CP, SOA, edema.     She is on on Protonix 40 mg daily. She does have intermittent abd pain across epigastric area. She developed some JOSETTE and is planning on getting an EGD with Dr. Alaniz.       History of Present Illness     Allergies   Allergen Reactions   • Iodinated Diagnostic Agents Photosensitivity     contrast   • Glumetza [Metformin] Headache   • Adhesive Tape Other (See Comments)     EKG stickers only   • Farxiga [Dapagliflozin] Diarrhea and Itching     Yeast infeciotn   • Neomycin-Bacitracin Zn-Polymyx Rash   • Trulicity [Dulaglutide] GI Intolerance     Constipation   • Victoza [Liraglutide] Nausea Only         Current Outpatient Medications:   •  budesonide-formoterol (SYMBICORT) 160-4.5 MCG/ACT inhaler, Inhale 2 puffs As Needed., Disp: , Rfl:   •  Cetirizine HCl (ZyrTEC Childrens Allergy) 5 MG/5ML solution solution, Take 5 mL by mouth Daily., Disp: 150 mL, Rfl:   •  cholecalciferol (VITAMIN D3) 1000 UNITS tablet, Take 1,000 Units by mouth Daily., Disp: , Rfl:   •  Dapagliflozin Propanediol (Farxiga) 10 MG tablet, Take 10 mg by mouth Daily., Disp: 90 tablet, Rfl: 3  •  Ertaczo 2 % cream cream, APPLY TO AFFECTED AREA DAILY, Disp: , Rfl:   •  fluconazole (Diflucan) 150 MG tablet, Take 1 tablet now.  May repeat in 72 hours if still symptomatic., Disp: 2 tablet, Rfl: 0  •  glucose blood (FREESTYLE LITE) test strip, USE TO CHECK BLOOD SUGAR 3 TIMES A DAY, Disp: 300 each, Rfl: 3  •  Insulin Pen Needle (PEN NEEDLES) 32G X 6 MM  misc, 1 each Daily With Breakfast., Disp: 90 each, Rfl: 3  •  JANUVIA 50 MG tablet, TAKE 1 TABLET BY MOUTH EVERY DAY, Disp: 30 tablet, Rfl: 1  •  Lancets (FREESTYLE) lancets, , Disp: , Rfl:   •  meloxicam (MOBIC) 15 MG tablet, Take 1 tablet by mouth Daily., Disp: 30 tablet, Rfl: 2  •  metFORMIN (Glucophage) 1000 MG tablet, Take 1 tablet by mouth Daily With Breakfast., Disp: 90 tablet, Rfl: 1  •  mometasone (NASONEX) 50 MCG/ACT nasal spray, 2 sprays into each nostril As Needed., Disp: , Rfl:   •  montelukast (SINGULAIR) 10 MG tablet, Take 1 tablet by mouth Every Night., Disp: 30 tablet, Rfl: 1  •  pantoprazole (PROTONIX) 40 MG EC tablet, TAKE 1 TABLET DAILY, Disp: 90 tablet, Rfl: 3  •  polyethylene glycol (MIRALAX) packet, Take 17 g by mouth Daily., Disp: 30 each, Rfl: 0  •  Spacer/Aero Chamber Mouthpiece misc, 1 each Daily., Disp: 1 each, Rfl: 0  •  valsartan (DIOVAN) 320 MG tablet, Take 1 tablet by mouth Daily., Disp: 90 tablet, Rfl: 3  •  VASCEPA 1 g capsule capsule, Take 2 g by mouth 2 (Two) Times a Day With Meals., Disp: 360 capsule, Rfl: 3  •  venlafaxine XR (EFFEXOR-XR) 75 MG 24 hr capsule, Take 1 capsule by mouth Daily., Disp: 90 capsule, Rfl: 3  •  VOLTAREN 1 % gel gel, As Needed. APPLY TO AFFECTED AREA TWICE A DAY, Disp: , Rfl: 3    The following portions of the patient's history were reviewed and updated as appropriate: allergies, current medications, past family history, past medical history, past social history, past surgical history and problem list.    Review of Systems   Constitutional: Negative.  Negative for fever and unexpected weight change.   HENT: Negative.    Eyes: Negative.    Respiratory: Negative.    Cardiovascular: Negative.  Negative for chest pain, palpitations and leg swelling.   Gastrointestinal: Positive for abdominal distention. Negative for blood in stool, constipation, diarrhea, nausea, rectal pain and vomiting.   Endocrine: Negative.    Genitourinary: Negative.    Musculoskeletal:  Positive for arthralgias.   Skin: Negative.    Allergic/Immunologic: Negative.    Neurological: Negative.    Hematological: Negative.    Psychiatric/Behavioral: Negative.    All other systems reviewed and are negative.      Assessment     Physical Exam  Vitals signs reviewed.   Constitutional:       Appearance: Normal appearance. She is obese. She is not ill-appearing.   HENT:      Right Ear: Tympanic membrane normal.      Left Ear: Tympanic membrane normal.   Neck:      Musculoskeletal: Neck supple.   Cardiovascular:      Rate and Rhythm: Normal rate and regular rhythm.      Heart sounds: No murmur.   Pulmonary:      Effort: Pulmonary effort is normal.      Breath sounds: Normal breath sounds. No stridor. No rhonchi.   Abdominal:      General: Abdomen is flat. There is no distension.      Tenderness: There is abdominal tenderness (epigastric).   Musculoskeletal:         General: No swelling or tenderness.   Skin:     General: Skin is warm and dry.   Neurological:      General: No focal deficit present.      Mental Status: She is alert.   Psychiatric:         Mood and Affect: Mood normal.         Behavior: Behavior normal.         Thought Content: Thought content normal.         Judgment: Judgment normal.         Plan     Her fasting labs were reviewed with the patient from last week.      Diagnosis Plan   1. Diabetes mellitus due to underlying condition, uncontrolled, with hyperglycemia (CMS/[facility])  Semaglutide (OZEMPIC, 0.25 OR 0.5 MG/DOSE, SC)    Comprehensive metabolic panel    Lipid panel    Hemoglobin A1c   2. Essential hypertension  Comprehensive metabolic panel    Lipid panel   3. Mixed hyperlipidemia  Comprehensive metabolic panel    Lipid panel   4. Gastroesophageal reflux disease with esophagitis without hemorrhage  Comprehensive metabolic panel    CBC & Differential   5. Iron deficiency  CBC & Differential    Iron and TIBC     Set up a F/U in 3 months

## 2020-11-06 ENCOUNTER — TELEPHONE (OUTPATIENT)
Dept: INTERNAL MEDICINE | Facility: CLINIC | Age: 70
End: 2020-11-06

## 2020-11-06 NOTE — TELEPHONE ENCOUNTER
PT CALLED TO REMIND JAKE MAGANA ABOUT HER REFERRAL TO DR. CRUM FOR HER STOMACH ISSUES.    SHE HAS NOT HEARD ANYTHING ABOUT SCHEDULING HER FIRST APPOINTMENT.    CALLBACK NUMBER: [phone]

## 2020-11-08 ENCOUNTER — RESULTS ENCOUNTER (OUTPATIENT)
Dept: INTERNAL MEDICINE | Facility: CLINIC | Age: 70
End: 2020-11-08

## 2020-11-08 DIAGNOSIS — E61.1 IRON DEFICIENCY: ICD-10-CM

## 2020-11-08 DIAGNOSIS — E08.65 DIABETES MELLITUS DUE TO UNDERLYING CONDITION, UNCONTROLLED, WITH HYPERGLYCEMIA (HCC): ICD-10-CM

## 2020-11-08 DIAGNOSIS — I10 ESSENTIAL HYPERTENSION: ICD-10-CM

## 2020-11-08 DIAGNOSIS — K21.00 GASTROESOPHAGEAL REFLUX DISEASE WITH ESOPHAGITIS WITHOUT HEMORRHAGE: ICD-10-CM

## 2020-11-08 DIAGNOSIS — E78.2 MIXED HYPERLIPIDEMIA: ICD-10-CM

## 2020-11-13 ENCOUNTER — OFFICE VISIT (OUTPATIENT)
Dept: INTERNAL MEDICINE | Facility: CLINIC | Age: 70
End: 2020-11-13

## 2020-11-13 VITALS
RESPIRATION RATE: 16 BRPM | BODY MASS INDEX: 31.71 KG/M2 | SYSTOLIC BLOOD PRESSURE: 138 MMHG | DIASTOLIC BLOOD PRESSURE: 78 MMHG | TEMPERATURE: 96.9 F | HEIGHT: 63 IN | WEIGHT: 179 LBS | HEART RATE: 91 BPM | OXYGEN SATURATION: 98 %

## 2020-11-13 DIAGNOSIS — R30.0 DYSURIA: Primary | ICD-10-CM

## 2020-11-13 DIAGNOSIS — R82.998 LEUKOCYTES IN URINE: ICD-10-CM

## 2020-11-13 LAB
BILIRUB BLD-MCNC: ABNORMAL MG/DL
CLARITY, POC: ABNORMAL
COLOR UR: YELLOW
GLUCOSE UR STRIP-MCNC: NEGATIVE MG/DL
KETONES UR QL: ABNORMAL
LEUKOCYTE EST, POC: ABNORMAL
NITRITE UR-MCNC: NEGATIVE MG/ML
PH UR: 5 [PH] (ref 5–8)
PROT UR STRIP-MCNC: ABNORMAL MG/DL
RBC # UR STRIP: ABNORMAL /UL
SP GR UR: 1.03 (ref 1–1.03)
UROBILINOGEN UR QL: NORMAL

## 2020-11-13 PROCEDURE — 81003 URINALYSIS AUTO W/O SCOPE: CPT | Performed by: INTERNAL MEDICINE

## 2020-11-13 PROCEDURE — 99213 OFFICE O/P EST LOW 20 MIN: CPT | Performed by: INTERNAL MEDICINE

## 2020-11-13 RX ORDER — CEPHALEXIN 500 MG/1
500 CAPSULE ORAL 2 TIMES DAILY
Qty: 14 CAPSULE | Refills: 0 | Status: SHIPPED | OUTPATIENT
Start: 2020-11-13 | End: 2020-11-20

## 2020-11-13 NOTE — PROGRESS NOTES
Amber Campos is a 69 y.o. female, who presents with a chief complaint of   Chief Complaint   Patient presents with   • Dysuria       HPI   Pt here bc of dusyria.  She felt like urine dark and had an odor.  It felt like a hot poker in her when urinating.  She has been taking a cranberry pill to help prevent UTI's and had done well with the med. No fever.  She has had some nausea.  No v/d/c.  She also just went on ozempic and thought nausea could be related to this.        The following portions of the patient's history were reviewed and updated as appropriate: allergies, current medications, past family history, past medical history, past social history, past surgical history and problem list.    Allergies: Iodinated diagnostic agents, Glumetza [metformin], Adhesive tape, Farxiga [dapagliflozin], Neomycin-bacitracin zn-polymyx, Trulicity [dulaglutide], and Victoza [liraglutide]    Review of Systems   Constitutional: Negative.    HENT: Negative.    Eyes: Negative.    Respiratory: Negative.    Cardiovascular: Negative.    Gastrointestinal: Negative.    Endocrine: Negative.    Genitourinary: Positive for dysuria and frequency.   Musculoskeletal: Negative.    Skin: Negative.    Allergic/Immunologic: Negative.    Neurological: Negative.    Hematological: Negative.    Psychiatric/Behavioral: Negative.    All other systems reviewed and are negative.            Wt Readings from Last 3 Encounters:   11/13/20 81.2 kg (179 lb)   11/03/20 82.1 kg (181 lb)   09/23/20 80.6 kg (177 lb 9.6 oz)     Temp Readings from Last 3 Encounters:   11/13/20 96.9 °F (36.1 °C) (Temporal)   11/03/20 96.9 °F (36.1 °C) (Temporal)   09/15/20 96.9 °F (36.1 °C) (Temporal)     BP Readings from Last 3 Encounters:   11/13/20 138/78   11/03/20 136/80   09/23/20 136/92     Pulse Readings from Last 3 Encounters:   11/13/20 91   11/03/20 96   09/23/20 98     Body mass index is 31.71 kg/m².  @LASTSAO2(3)@    Physical Exam  Vitals signs and nursing note  reviewed.   Constitutional:       General: She is not in acute distress.     Appearance: She is well-developed.   HENT:      Head: Normocephalic and atraumatic.      Right Ear: External ear normal.      Left Ear: External ear normal.      Nose: Nose normal.   Eyes:      Conjunctiva/sclera: Conjunctivae normal.      Pupils: Pupils are equal, round, and reactive to light.   Neck:      Musculoskeletal: Normal range of motion and neck supple.   Cardiovascular:      Rate and Rhythm: Normal rate and regular rhythm.      Heart sounds: Normal heart sounds.   Pulmonary:      Effort: Pulmonary effort is normal. No respiratory distress.      Breath sounds: Normal breath sounds. No wheezing.   Abdominal:      Tenderness: There is no right CVA tenderness or left CVA tenderness.   Musculoskeletal: Normal range of motion.      Comments: Normal gait   Skin:     General: Skin is warm and dry.   Neurological:      Mental Status: She is alert and oriented to person, place, and time.   Psychiatric:         Mood and Affect: Mood normal.         Behavior: Behavior normal.         Thought Content: Thought content normal.         Judgment: Judgment normal.         Results for orders placed or performed in visit on 11/13/20   POCT urinalysis dipstick, automated    Specimen: Urine   Result Value Ref Range    Color Yellow Yellow, Straw, Dark Yellow, Patrizia    Clarity, UA Cloudy (A) Clear    Specific Gravity  1.030 1.005 - 1.030    pH, Urine 5.0 5.0 - 8.0    Leukocytes Small (1+) (A) Negative    Nitrite, UA Negative Negative    Protein,  mg/dL (A) Negative mg/dL    Glucose, UA Negative Negative, 1000 mg/dL (3+) mg/dL    Ketones, UA 15 mg/dL (A) Negative    Urobilinogen, UA Normal Normal    Bilirubin Small (1+) (A) Negative    Blood, UA Large (A) Negative           Diagnoses and all orders for this visit:    1. Dysuria (Primary)  -     POCT urinalysis dipstick, automated  -     cephalexin (Keflex) 500 MG capsule; Take 1 capsule by mouth 2  (Two) Times a Day for 7 days.  Dispense: 14 capsule; Refill: 0    2. Leukocytes in urine  -     Urine Culture - Urine, Urine, Clean Catch; Future  -     cephalexin (Keflex) 500 MG capsule; Take 1 capsule by mouth 2 (Two) Times a Day for 7 days.  Dispense: 14 capsule; Refill: 0  -     Urine Culture - Urine, Urine, Clean Catch          Outpatient Medications Prior to Visit   Medication Sig Dispense Refill   • budesonide-formoterol (SYMBICORT) 160-4.5 MCG/ACT inhaler Inhale 2 puffs As Needed.     • Cetirizine HCl (ZyrTEC Childrens Allergy) 5 MG/5ML solution solution Take 5 mL by mouth Daily. 150 mL    • cholecalciferol (VITAMIN D3) 1000 UNITS tablet Take 1,000 Units by mouth Daily.     • glucose blood (FREESTYLE LITE) test strip USE TO CHECK BLOOD SUGAR 3 TIMES A  each 3   • Insulin Pen Needle (PEN NEEDLES) 32G X 6 MM misc 1 each Daily With Breakfast. 90 each 3   • Lancets (FREESTYLE) lancets      • meloxicam (MOBIC) 15 MG tablet Take 1 tablet by mouth Daily. 30 tablet 2   • metFORMIN (Glucophage) 1000 MG tablet Take 1 tablet by mouth Daily With Breakfast. 90 tablet 1   • mometasone (NASONEX) 50 MCG/ACT nasal spray 2 sprays into each nostril As Needed.     • montelukast (SINGULAIR) 10 MG tablet Take 1 tablet by mouth Every Night. 30 tablet 1   • pantoprazole (PROTONIX) 40 MG EC tablet TAKE 1 TABLET DAILY 90 tablet 3   • polyethylene glycol (MIRALAX) packet Take 17 g by mouth Daily. 30 each 0   • Semaglutide (OZEMPIC, 0.25 OR 0.5 MG/DOSE, SC) Inject  under the skin into the appropriate area as directed.     • Spacer/Aero Chamber Mouthpiece misc 1 each Daily. 1 each 0   • valsartan (DIOVAN) 320 MG tablet Take 1 tablet by mouth Daily. 90 tablet 3   • venlafaxine XR (EFFEXOR-XR) 75 MG 24 hr capsule Take 1 capsule by mouth Daily. 90 capsule 3   • VOLTAREN 1 % gel gel As Needed. APPLY TO AFFECTED AREA TWICE A DAY  3     No facility-administered medications prior to visit.      New Medications Ordered This Visit    Medications   • cephalexin (Keflex) 500 MG capsule     Sig: Take 1 capsule by mouth 2 (Two) Times a Day for 7 days.     Dispense:  14 capsule     Refill:  0     [unfilled]  There are no discontinued medications.      Return if symptoms worsen or fail to improve.

## 2020-11-15 LAB
BACTERIA UR CULT: ABNORMAL
BACTERIA UR CULT: ABNORMAL
OTHER ANTIBIOTIC SUSC ISLT: ABNORMAL

## 2020-11-16 RX ORDER — CIPROFLOXACIN 500 MG/1
500 TABLET, FILM COATED ORAL 2 TIMES DAILY
Qty: 14 TABLET | Refills: 0 | Status: SHIPPED | OUTPATIENT
Start: 2020-11-16 | End: 2020-11-23

## 2020-11-18 ENCOUNTER — TELEPHONE (OUTPATIENT)
Dept: INTERNAL MEDICINE | Facility: CLINIC | Age: 70
End: 2020-11-18

## 2020-11-18 NOTE — TELEPHONE ENCOUNTER
PT STATES THAT SHE IS TAKING A MEDICATION FOR HER UTI AND WAS INFORMED BY KIRK CARRINGTON THAT THE MEDICINE IS NOT WORKING FOR PT AND SHE WOULD CALL HER SOMETHING ELSE IN.  PT STATES THAT SHE HAS ONE DAY LEFT OF THE MEDICATION THAT KIRK PRESCRIBED FIRST AND WANTS TO KNOW IF SHE IS TO FINISH IT OR START ON THE NEW MEDS THAT WERE PRESCRIBED.    PT CALL BACK  [phone]

## 2020-11-23 ENCOUNTER — TELEPHONE (OUTPATIENT)
Dept: INTERNAL MEDICINE | Facility: CLINIC | Age: 70
End: 2020-11-23

## 2020-11-23 NOTE — TELEPHONE ENCOUNTER
PATIENT IS UNABLE TO FIND LAB RESULTS FROM LATEST LAB TEST.    PATIENT IS INQUIRING ABOUT HER CHOLESTEROL, TRIGLYCERIDES, LIVER ENZYME LEVELS.    PLEASE ADVISE    PATIENT CAN BE REACHED AT  [phone]

## 2020-12-23 ENCOUNTER — LAB REQUISITION (OUTPATIENT)
Dept: LAB | Facility: HOSPITAL | Age: 70
End: 2020-12-23

## 2020-12-23 DIAGNOSIS — Z00.00 ENCOUNTER FOR GENERAL ADULT MEDICAL EXAMINATION WITHOUT ABNORMAL FINDINGS: ICD-10-CM

## 2020-12-23 PROCEDURE — U0004 COV-19 TEST NON-CDC HGH THRU: HCPCS | Performed by: INTERNAL MEDICINE

## 2020-12-24 DIAGNOSIS — F41.8 DEPRESSION WITH ANXIETY: ICD-10-CM

## 2020-12-24 LAB — SARS-COV-2 RNA RESP QL NAA+PROBE: NOT DETECTED

## 2020-12-28 RX ORDER — VENLAFAXINE HYDROCHLORIDE 75 MG/1
CAPSULE, EXTENDED RELEASE ORAL
Qty: 90 CAPSULE | Refills: 3 | Status: SHIPPED | OUTPATIENT
Start: 2020-12-28 | End: 2021-04-15

## 2020-12-29 ENCOUNTER — OFFICE (OUTPATIENT)
Dept: URBAN - METROPOLITAN AREA PATHOLOGY 4 | Facility: PATHOLOGY | Age: 70
End: 2020-12-29
Payer: MEDICARE

## 2020-12-29 ENCOUNTER — AMBULATORY SURGICAL CENTER (OUTPATIENT)
Dept: URBAN - METROPOLITAN AREA SURGERY 20 | Facility: SURGERY | Age: 70
End: 2020-12-29
Payer: COMMERCIAL

## 2020-12-29 DIAGNOSIS — K44.9 DIAPHRAGMATIC HERNIA WITHOUT OBSTRUCTION OR GANGRENE: ICD-10-CM

## 2020-12-29 DIAGNOSIS — K29.60 OTHER GASTRITIS WITHOUT BLEEDING: ICD-10-CM

## 2020-12-29 DIAGNOSIS — R10.13 EPIGASTRIC PAIN: ICD-10-CM

## 2020-12-29 DIAGNOSIS — K31.7 POLYP OF STOMACH AND DUODENUM: ICD-10-CM

## 2020-12-29 DIAGNOSIS — K29.70 GASTRITIS, UNSPECIFIED, WITHOUT BLEEDING: ICD-10-CM

## 2020-12-29 PROBLEM — K31.89 OTHER DISEASES OF STOMACH AND DUODENUM: Status: ACTIVE | Noted: 2020-12-29

## 2020-12-29 LAB
GI HISTOLOGY: A. SELECT: (no result)
GI HISTOLOGY: B. SELECT: (no result)
GI HISTOLOGY: PDF REPORT: (no result)

## 2020-12-29 PROCEDURE — 88305 TISSUE EXAM BY PATHOLOGIST: CPT | Performed by: INTERNAL MEDICINE

## 2020-12-29 PROCEDURE — 88305 TISSUE EXAM BY PATHOLOGIST: CPT | Mod: 26,TC | Performed by: INTERNAL MEDICINE

## 2020-12-29 PROCEDURE — 88305 TISSUE EXAM BY PATHOLOGIST: CPT | Mod: TC,26 | Performed by: INTERNAL MEDICINE

## 2020-12-29 PROCEDURE — 43239 EGD BIOPSY SINGLE/MULTIPLE: CPT | Performed by: INTERNAL MEDICINE

## 2020-12-29 NOTE — SERVICEHPINOTES
abdominal pain intermittent, fetal position, upper middle abd, not through to shoulder blades on  pantoprazole daily

## 2021-01-15 ENCOUNTER — OFFICE VISIT (OUTPATIENT)
Dept: INTERNAL MEDICINE | Facility: CLINIC | Age: 71
End: 2021-01-15

## 2021-01-15 VITALS
BODY MASS INDEX: 32.07 KG/M2 | DIASTOLIC BLOOD PRESSURE: 86 MMHG | HEART RATE: 122 BPM | HEIGHT: 63 IN | WEIGHT: 181 LBS | OXYGEN SATURATION: 97 % | RESPIRATION RATE: 16 BRPM | TEMPERATURE: 97.1 F | SYSTOLIC BLOOD PRESSURE: 158 MMHG

## 2021-01-15 DIAGNOSIS — R30.0 DYSURIA: Primary | ICD-10-CM

## 2021-01-15 DIAGNOSIS — R82.998 LEUKOCYTES IN URINE: Primary | ICD-10-CM

## 2021-01-15 DIAGNOSIS — R82.998 LEUKOCYTES IN URINE: ICD-10-CM

## 2021-01-15 DIAGNOSIS — M67.442 GANGLION CYST OF TENDON SHEATH OF LEFT HAND: ICD-10-CM

## 2021-01-15 DIAGNOSIS — R30.0 DYSURIA: ICD-10-CM

## 2021-01-15 DIAGNOSIS — R00.0 SINUS TACHYCARDIA: ICD-10-CM

## 2021-01-15 DIAGNOSIS — I10 ESSENTIAL HYPERTENSION: Primary | ICD-10-CM

## 2021-01-15 DIAGNOSIS — I49.3 VENTRICULAR PREMATURE BEATS: ICD-10-CM

## 2021-01-15 LAB
BILIRUB BLD-MCNC: NEGATIVE MG/DL
CLARITY, POC: ABNORMAL
COLOR UR: YELLOW
GLUCOSE UR STRIP-MCNC: ABNORMAL MG/DL
KETONES UR QL: ABNORMAL
LEUKOCYTE EST, POC: ABNORMAL
NITRITE UR-MCNC: NEGATIVE MG/ML
PH UR: 5 [PH] (ref 5–8)
PROT UR STRIP-MCNC: NEGATIVE MG/DL
RBC # UR STRIP: NEGATIVE /UL
SP GR UR: 1.02 (ref 1–1.03)
UROBILINOGEN UR QL: NORMAL

## 2021-01-15 PROCEDURE — 81003 URINALYSIS AUTO W/O SCOPE: CPT | Performed by: NURSE PRACTITIONER

## 2021-01-15 PROCEDURE — 99214 OFFICE O/P EST MOD 30 MIN: CPT | Performed by: NURSE PRACTITIONER

## 2021-01-15 RX ORDER — BISOPROLOL FUMARATE AND HYDROCHLOROTHIAZIDE 5; 6.25 MG/1; MG/1
1 TABLET ORAL DAILY
Qty: 30 TABLET | Refills: 1 | Status: SHIPPED | OUTPATIENT
Start: 2021-01-15 | End: 2021-03-11

## 2021-01-15 NOTE — PROGRESS NOTES
Chief Complaint   Patient presents with   • Hypertension   • Dysuria       Subjective     Amber Campos is a 70 y.o. female being seen for an acutee visit for HTN and dysuria. She has noticed an increase in her pulse rate over the past few weeks. Her  checks her BP and pulse at home 141/80s, 134/80s. She s on Diovan 320mg daily. She has seen Dr. Finn in 2016 for PVCs and wants another appt with her.     She is also reporting a cyst on her left hand. IT has been there for several years. It has increased in size. Concerned about opening and closing the fist.     She has had dysuria. History of IC.She was not sure is she had an infection. Denies back pain. Symptoms are dysuria, and frequency.     History of Present Illness     Allergies   Allergen Reactions   • Iodinated Diagnostic Agents Photosensitivity     contrast   • Glumetza [Metformin] Headache   • Adhesive Tape Other (See Comments)     EKG stickers only   • Farxiga [Dapagliflozin] Diarrhea and Itching     Yeast infeciotn   • Neomycin-Bacitracin Zn-Polymyx Rash   • Trulicity [Dulaglutide] GI Intolerance     Constipation   • Victoza [Liraglutide] Nausea Only         Current Outpatient Medications:   •  budesonide-formoterol (SYMBICORT) 160-4.5 MCG/ACT inhaler, Inhale 2 puffs As Needed., Disp: , Rfl:   •  Cetirizine HCl (ZyrTEC Childrens Allergy) 5 MG/5ML solution solution, Take 5 mL by mouth Daily., Disp: 150 mL, Rfl:   •  cholecalciferol (VITAMIN D3) 1000 UNITS tablet, Take 1,000 Units by mouth Daily., Disp: , Rfl:   •  glucose blood (FREESTYLE LITE) test strip, USE TO CHECK BLOOD SUGAR 3 TIMES A DAY, Disp: 300 each, Rfl: 3  •  Insulin Pen Needle (PEN NEEDLES) 32G X 6 MM misc, 1 each Daily With Breakfast., Disp: 90 each, Rfl: 3  •  Lancets (FREESTYLE) lancets, , Disp: , Rfl:   •  meloxicam (MOBIC) 15 MG tablet, Take 1 tablet by mouth Daily., Disp: 30 tablet, Rfl: 2  •  metFORMIN (Glucophage) 1000 MG tablet, Take 1 tablet by mouth Daily With Breakfast.,  Disp: 90 tablet, Rfl: 1  •  mometasone (NASONEX) 50 MCG/ACT nasal spray, 2 sprays into each nostril As Needed., Disp: , Rfl:   •  montelukast (SINGULAIR) 10 MG tablet, Take 1 tablet by mouth Every Night., Disp: 30 tablet, Rfl: 1  •  pantoprazole (PROTONIX) 40 MG EC tablet, TAKE 1 TABLET DAILY, Disp: 90 tablet, Rfl: 3  •  polyethylene glycol (MIRALAX) packet, Take 17 g by mouth Daily., Disp: 30 each, Rfl: 0  •  Semaglutide (OZEMPIC, 0.25 OR 0.5 MG/DOSE, SC), Inject  under the skin into the appropriate area as directed., Disp: , Rfl:   •  Spacer/Aero Chamber Mouthpiece misc, 1 each Daily., Disp: 1 each, Rfl: 0  •  valsartan (DIOVAN) 320 MG tablet, Take 1 tablet by mouth Daily., Disp: 90 tablet, Rfl: 3  •  venlafaxine XR (EFFEXOR-XR) 75 MG 24 hr capsule, TAKE 1 CAPSULE DAILY, Disp: 90 capsule, Rfl: 3  •  VOLTAREN 1 % gel gel, As Needed. APPLY TO AFFECTED AREA TWICE A DAY, Disp: , Rfl: 3    The following portions of the patient's history were reviewed and updated as appropriate: allergies, current medications, past family history, past medical history, past social history, past surgical history and problem list.    Review of Systems   Constitutional: Positive for fatigue.   HENT: Negative.  Negative for congestion and dental problem.    Eyes: Negative.  Negative for photophobia and visual disturbance.   Respiratory: Negative.  Negative for shortness of breath, wheezing and stridor.    Cardiovascular: Negative.  Negative for chest pain, palpitations and leg swelling.   Gastrointestinal: Negative.    Endocrine: Negative.    Genitourinary: Positive for dysuria and frequency. Negative for hematuria.   Musculoskeletal: Positive for arthralgias.   Skin: Positive for wound (hand).   Allergic/Immunologic: Negative.    Neurological: Negative.    Hematological: Negative.    Psychiatric/Behavioral: Negative.        Assessment     Physical Exam  Vitals signs reviewed.   Constitutional:       Appearance: Normal appearance. She is  obese. She is not ill-appearing.   HENT:      Nose: No rhinorrhea.      Mouth/Throat:      Mouth: Mucous membranes are dry.   Neck:      Musculoskeletal: Neck supple.   Cardiovascular:      Rate and Rhythm: Tachycardia present.      Heart sounds: No murmur.   Pulmonary:      Effort: Pulmonary effort is normal. No respiratory distress.      Breath sounds: Normal breath sounds. No wheezing or rhonchi.   Musculoskeletal:         General: No swelling.   Neurological:      General: No focal deficit present.      Mental Status: She is alert.   Psychiatric:         Mood and Affect: Mood normal.         Thought Content: Thought content normal.         Plan     Her fasting labs were reviewed with the patient from last week.     Diagnoses and all orders for this visit:    1. Essential hypertension (Primary)  Comments:  Bp is not controlled on Diovan monotherapy. Add Ziac due to Beta blockade with ST  Orders:  -     bisoprolol-hydrochlorothiazide (Ziac) 5-6.25 MG per tablet; Take 1 tablet by mouth Daily.  Dispense: 30 tablet; Refill: 1  -     Ambulatory Referral to Cardiology    2. Dysuria  Comments:  Glucosuria seen on UA today realted to uncontrolled DM 2, reviewed with her  Orders:  -     POCT urinalysis dipstick, automated    3. Ganglion cyst of tendon sheath of left hand  Comments:  Discussed risk/benefit of surgery. Due to increasing size, will refer.   Orders:  -     Ambulatory Referral to Hand Surgery    4. Ventricular premature beats  Comments:  None heard today, requests an appt with Dr. Finn to discuss  Orders:  -     Ambulatory Referral to Cardiology    5. Sinus tachycardia  Comments:  BB therapy  Orders:  -     bisoprolol-hydrochlorothiazide (Ziac) 5-6.25 MG per tablet; Take 1 tablet by mouth Daily.  Dispense: 30 tablet; Refill: 1  -     Ambulatory Referral to Cardiology        Follow up 2-3-2021

## 2021-01-17 LAB
BACTERIA UR CULT: ABNORMAL
BACTERIA UR CULT: ABNORMAL
OTHER ANTIBIOTIC SUSC ISLT: ABNORMAL

## 2021-01-17 RX ORDER — SULFAMETHOXAZOLE AND TRIMETHOPRIM 800; 160 MG/1; MG/1
1 TABLET ORAL 2 TIMES DAILY
Qty: 14 TABLET | Refills: 0 | Status: SHIPPED | OUTPATIENT
Start: 2021-01-17 | End: 2021-02-03

## 2021-01-17 NOTE — MR AVS SNAPSHOT
Amber Campos   1/4/2017 10:00 AM   Office Visit    Dept Phone:  [phone]   Encounter #:  [id number]    Provider:  JAKE Blount   Department:  [facility] INTERNAL MED AND PEDS                Your Full Care Plan              Today's Medication Changes          These changes are accurate as of: 1/4/17 10:52 AM.  If you have any questions, ask your nurse or doctor.               New Medication(s)Ordered:     ciprofloxacin 250 MG tablet   Commonly known as:  CIPRO   Take 1 tablet by mouth 2 (Two) Times a Day for 7 days.   Started by:  JAKE Blount            Where to Get Your Medications      These medications were sent to [facility]/pharmacy #[id number] - [place], [place] - [address] [address] AT INTERSECTION OF [address] [phone] [facility] [phone]   [address] [address], [facility] [address]     Phone:  [phone]     ciprofloxacin 250 MG tablet                  Your Updated Medication List          This list is accurate as of: 1/4/17 10:52 AM.  Always use your most recent med list.                albuterol (2.5 MG/3ML) 0.083% nebulizer solution   Commonly known as:  PROVENTIL       atenolol 50 MG tablet   Commonly known as:  TENORMIN   Take 1 tablet by mouth Daily.       BENICAR 40 MG tablet   Generic drug:  olmesartan   TAKE 1 TABLET DAILY       budesonide-formoterol 160-4.5 MCG/ACT inhaler   Commonly known as:  SYMBICORT       CALCIUM 500 PO       cetirizine 1 MG/ML syrup   Commonly known as:  zyrTEC       chlorhexidine 0.12 % solution   Commonly known as:  PERIDEX       ciprofloxacin 250 MG tablet   Commonly known as:  CIPRO   Take 1 tablet by mouth 2 (Two) Times a Day for 7 days.       Clocortolone Pivalate 0.1 % cream       DUAVEE 0.45-20 MG tablet   Generic drug:  Conj Estrogens-Bazedoxifene   Take 1 tablet by mouth daily.       Empagliflozin-Linagliptin 10-5 MG tablet   Commonly known as:  GLYXAMBI   Take 1 tablet by mouth Daily.       fish oil 1200 MG capsule  capsule       GLUCOSAMINE CHONDROITIN ADV PO       glucose blood test strip       metFORMIN 1000 MG tablet   Commonly known as:  GLUCOPHAGE   Take 1 tablet by mouth 2 (Two) Times a Day With Meals.       mometasone 50 MCG/ACT nasal spray   Commonly known as:  NASONEX       montelukast 10 MG tablet   Commonly known as:  SINGULAIR       MULTIPLE VITAMIN PO       pantoprazole 40 MG EC tablet   Commonly known as:  PROTONIX   TAKE 1 TABLET DAILY       urea 40 % cream   Commonly known as:  CARMOL       venlafaxine XR 75 MG 24 hr capsule   Commonly known as:  EFFEXOR-XR   Take 1 capsule by mouth daily.       Vitamin D3 38164 UNITS capsule   Take 1 capsule by mouth every 7 days.       VOLTAREN 1 % gel gel   Generic drug:  diclofenac               We Performed the Following     Ambulatory Referral to Sleep Medicine     CBC & Differential     Comprehensive Metabolic Panel     POC Urinalysis Dipstick, Automated     T4 & TSH ([facility])     Vitamin B12       You Were Diagnosed With        Codes Comments    Acute cystitis without hematuria    -  Primary ICD-10-CM: N30.00  ICD-9-CM: 595.0     Chronic fatigue     ICD-10-CM: R53.82  ICD-9-CM: 780.79     Essential hypertension     ICD-10-CM: I10  ICD-9-CM: 401.9     RUQ pain     ICD-10-CM: R10.11  ICD-9-CM: 789.01       Instructions     None    Patient Instructions History      Upcoming Appointments     Visit Type Date Time Department    ACUTE           2017 10:00 AM [employer]    FOLLOW UP 2017 12:45 PM [employer] Signup     [facility] [employer] allows you to send messages to your doctor, view your test results, renew your prescriptions, schedule appointments, and more. To sign up, go to [employer] and click on the Sign Up Now link in the New User? box. Enter your [employer] Activation Code exactly as it appears below along with the last four digits of your Social Security Number and your Date of Birth () to complete the  no "sign-up process. If you do not sign up before the expiration date, you must request a new code.    [employer] Activation Code: [email or web address]  Expires: 1/18/2017 10:52 AM    If you have questions, you can email [email or web address] or call [phone] to talk to our [employer] staff. Remember, [employer] is NOT to be used for urgent needs. For medical emergencies, dial 911.               Other Info from Your Visit           Your Appointments     Feb 28, 2017 12:45 PM EST   Follow Up with JAKE Blount   [facility] INTERNAL MED AND PEDS (--)    [address]  [place] [address] [phone]           Arrive 15 minutes prior to appointment.              Allergies     Adhesive Tape      Codeine      Iodinated Diagnostic Agents      Iodine      Neomycin-bacitracin Zn-polymyx        Reason for Visit     Urinary Tract Infection           Vital Signs     Blood Pressure Pulse Height Weight Oxygen Saturation Body Mass Index    140/80 87 63\" (160 cm) 199 lb (90.3 kg) 98% 35.25 kg/m2    Smoking Status                   Never Smoker           Problems and Diagnoses Noted     Bladder infection    Chronic fatigue    High blood pressure    Abdominal pain, right upper quadrant      Results     POC Urinalysis Dipstick, Automated      Component Value Standard Range & Units    Color Yellow Yellow, Straw, Dark Yellow, Patrizia    Clarity, UA Clear Clear    Glucose, UA Negative Negative, 1000 mg/dL (3+) mg/dL    Bilirubin Negative Negative    Ketones, UA 15 mg/dL Negative    Specific Gravity  1.020 1.005 - 1.030    Blood, UA Negative Negative    pH, Urine 5.5 5.0 - 8.0    Protein, POC Negative Negative mg/dL    Urobilinogen, UA Normal Normal    Leukocytes Negative Negative    Nitrite, UA Negative Negative                    "

## 2021-01-30 LAB
ALBUMIN SERPL-MCNC: 4.1 G/DL (ref 3.5–5.2)
ALBUMIN/GLOB SERPL: 1.6 G/DL
ALP SERPL-CCNC: 73 U/L (ref 39–117)
ALT SERPL-CCNC: 36 U/L (ref 1–33)
AST SERPL-CCNC: 36 U/L (ref 1–32)
BASOPHILS # BLD AUTO: 0.06 10*3/MM3 (ref 0–0.2)
BASOPHILS NFR BLD AUTO: 0.9 % (ref 0–1.5)
BILIRUB SERPL-MCNC: 0.2 MG/DL (ref 0–1.2)
BUN SERPL-MCNC: 16 MG/DL (ref 8–23)
BUN/CREAT SERPL: 32 (ref 7–25)
CALCIUM SERPL-MCNC: 9.5 MG/DL (ref 8.6–10.5)
CHLORIDE SERPL-SCNC: 98 MMOL/L (ref 98–107)
CHOLEST SERPL-MCNC: 206 MG/DL (ref 0–200)
CO2 SERPL-SCNC: 25.6 MMOL/L (ref 22–29)
CREAT SERPL-MCNC: 0.5 MG/DL (ref 0.57–1)
EOSINOPHIL # BLD AUTO: 0.28 10*3/MM3 (ref 0–0.4)
EOSINOPHIL NFR BLD AUTO: 4.3 % (ref 0.3–6.2)
ERYTHROCYTE [DISTWIDTH] IN BLOOD BY AUTOMATED COUNT: 14.2 % (ref 12.3–15.4)
GLOBULIN SER CALC-MCNC: 2.6 GM/DL
GLUCOSE SERPL-MCNC: 239 MG/DL (ref 65–99)
HBA1C MFR BLD: 8.7 % (ref 4.8–5.6)
HCT VFR BLD AUTO: 37.1 % (ref 34–46.6)
HDLC SERPL-MCNC: 61 MG/DL (ref 40–60)
HGB BLD-MCNC: 12.4 G/DL (ref 12–15.9)
IMM GRANULOCYTES # BLD AUTO: 0.02 10*3/MM3 (ref 0–0.05)
IMM GRANULOCYTES NFR BLD AUTO: 0.3 % (ref 0–0.5)
IRON SATN MFR SERPL: 14 % (ref 20–50)
IRON SERPL-MCNC: 73 MCG/DL (ref 37–145)
LDLC SERPL CALC-MCNC: 92 MG/DL (ref 0–100)
LYMPHOCYTES # BLD AUTO: 2.95 10*3/MM3 (ref 0.7–3.1)
LYMPHOCYTES NFR BLD AUTO: 45.4 % (ref 19.6–45.3)
MCH RBC QN AUTO: 27.4 PG (ref 26.6–33)
MCHC RBC AUTO-ENTMCNC: 33.4 G/DL (ref 31.5–35.7)
MCV RBC AUTO: 82.1 FL (ref 79–97)
MONOCYTES # BLD AUTO: 0.48 10*3/MM3 (ref 0.1–0.9)
MONOCYTES NFR BLD AUTO: 7.4 % (ref 5–12)
NEUTROPHILS # BLD AUTO: 2.71 10*3/MM3 (ref 1.7–7)
NEUTROPHILS NFR BLD AUTO: 41.7 % (ref 42.7–76)
NRBC BLD AUTO-RTO: 0 /100 WBC (ref 0–0.2)
PLATELET # BLD AUTO: 351 10*3/MM3 (ref 140–450)
POTASSIUM SERPL-SCNC: 4.4 MMOL/L (ref 3.5–5.2)
PROT SERPL-MCNC: 6.7 G/DL (ref 6–8.5)
RBC # BLD AUTO: 4.52 10*6/MM3 (ref 3.77–5.28)
SODIUM SERPL-SCNC: 135 MMOL/L (ref 136–145)
TIBC SERPL-MCNC: 534 MCG/DL
TRIGL SERPL-MCNC: 319 MG/DL (ref 0–150)
UIBC SERPL-MCNC: 461 MCG/DL (ref 112–346)
VLDLC SERPL CALC-MCNC: 53 MG/DL (ref 5–40)
WBC # BLD AUTO: 6.5 10*3/MM3 (ref 3.4–10.8)

## 2021-02-03 ENCOUNTER — OFFICE VISIT (OUTPATIENT)
Dept: INTERNAL MEDICINE | Facility: CLINIC | Age: 71
End: 2021-02-03

## 2021-02-03 VITALS
BODY MASS INDEX: 31.89 KG/M2 | HEIGHT: 63 IN | DIASTOLIC BLOOD PRESSURE: 72 MMHG | OXYGEN SATURATION: 97 % | SYSTOLIC BLOOD PRESSURE: 126 MMHG | WEIGHT: 180 LBS | TEMPERATURE: 97.7 F | HEART RATE: 94 BPM

## 2021-02-03 DIAGNOSIS — I10 ESSENTIAL HYPERTENSION: ICD-10-CM

## 2021-02-03 DIAGNOSIS — E61.1 LOW SERUM IRON: ICD-10-CM

## 2021-02-03 DIAGNOSIS — E78.2 MIXED HYPERLIPIDEMIA: ICD-10-CM

## 2021-02-03 DIAGNOSIS — K21.00 GASTROESOPHAGEAL REFLUX DISEASE WITH ESOPHAGITIS WITHOUT HEMORRHAGE: ICD-10-CM

## 2021-02-03 DIAGNOSIS — E08.65 DIABETES MELLITUS DUE TO UNDERLYING CONDITION, UNCONTROLLED, WITH HYPERGLYCEMIA (HCC): ICD-10-CM

## 2021-02-03 DIAGNOSIS — N30.01 ACUTE CYSTITIS WITH HEMATURIA: ICD-10-CM

## 2021-02-03 PROBLEM — Z00.00 ENCOUNTER FOR MEDICARE ANNUAL WELLNESS EXAM: Status: RESOLVED | Noted: 2019-01-09 | Resolved: 2021-02-03

## 2021-02-03 PROBLEM — N30.10 IC (INTERSTITIAL CYSTITIS): Status: ACTIVE | Noted: 2021-02-03

## 2021-02-03 LAB
BILIRUB BLD-MCNC: ABNORMAL MG/DL
CLARITY, POC: CLEAR
COLOR UR: YELLOW
GLUCOSE UR STRIP-MCNC: NEGATIVE MG/DL
KETONES UR QL: ABNORMAL
LEUKOCYTE EST, POC: ABNORMAL
NITRITE UR-MCNC: NEGATIVE MG/ML
PH UR: 5 [PH] (ref 5–8)
PROT UR STRIP-MCNC: NEGATIVE MG/DL
RBC # UR STRIP: ABNORMAL /UL
SP GR UR: 1.02 (ref 1–1.03)
UROBILINOGEN UR QL: NORMAL

## 2021-02-03 PROCEDURE — 81003 URINALYSIS AUTO W/O SCOPE: CPT | Performed by: NURSE PRACTITIONER

## 2021-02-03 PROCEDURE — 99214 OFFICE O/P EST MOD 30 MIN: CPT | Performed by: NURSE PRACTITIONER

## 2021-02-03 RX ORDER — ICOSAPENT ETHYL 1000 MG/1
CAPSULE ORAL 2 TIMES DAILY WITH MEALS
COMMUNITY
Start: 2020-12-11 | End: 2022-09-14

## 2021-02-03 RX ORDER — TETRACYCLINE HYDROCHLORIDE 500 MG/1
500 CAPSULE ORAL 2 TIMES DAILY
Qty: 14 CAPSULE | Refills: 0 | Status: SHIPPED | OUTPATIENT
Start: 2021-02-03 | End: 2021-03-02

## 2021-02-03 NOTE — PROGRESS NOTES
"Follow up on DM 2, HTN, GERD    Subjective     Amber Campos is a 70 y.o. female being seen for a follow up appointment today regarding DM 2, HTN, Hyperlipidemia, depression,  and GERD. She is followed by srinath.     She has Diabetes for 4 years, followed by Dr. Packer. Last visit 9-2020. She is currently on Metformin 100mg BID and Ozempic once weekly. She is not checking her glucose, but reports that she feels better since starting Ozempic.     She is taking Protonix 40mg daily for GERD.  She is followed by Dr. Alaniz for GERD, last endoscopy 12- showing gastritis. Denies indigestion or cough.     She has history of Depression, well controlled of Effexor XR 75mg daily. She denies depression symtpoms    She is complaining of last night having a \"sharp abd pain\" with blood in the urine.     History of Present Illness     Allergies   Allergen Reactions   • Iodinated Diagnostic Agents Photosensitivity     contrast   • Glumetza [Metformin] Headache   • Adhesive Tape Other (See Comments)     EKG stickers only   • Farxiga [Dapagliflozin] Diarrhea and Itching     Yeast infeciotn   • Neomycin-Bacitracin Zn-Polymyx Rash   • Trulicity [Dulaglutide] GI Intolerance     Constipation   • Victoza [Liraglutide] Nausea Only         Current Outpatient Medications:   •  bisoprolol-hydrochlorothiazide (Ziac) 5-6.25 MG per tablet, Take 1 tablet by mouth Daily., Disp: 30 tablet, Rfl: 1  •  budesonide-formoterol (SYMBICORT) 160-4.5 MCG/ACT inhaler, Inhale 2 puffs As Needed., Disp: , Rfl:   •  Cetirizine HCl (ZyrTEC Childrens Allergy) 5 MG/5ML solution solution, Take 5 mL by mouth Daily., Disp: 150 mL, Rfl:   •  cholecalciferol (VITAMIN D3) 1000 UNITS tablet, Take 1,000 Units by mouth Daily., Disp: , Rfl:   •  glucose blood (FREESTYLE LITE) test strip, USE TO CHECK BLOOD SUGAR 3 TIMES A DAY, Disp: 300 each, Rfl: 3  •  Insulin Pen Needle (PEN NEEDLES) 32G X 6 MM misc, 1 each Daily With Breakfast., Disp: 90 each, Rfl: 3  •  Lancets " (FREESTYLE) lancets, , Disp: , Rfl:   •  meloxicam (MOBIC) 15 MG tablet, Take 1 tablet by mouth Daily., Disp: 30 tablet, Rfl: 2  •  metFORMIN (Glucophage) 1000 MG tablet, Take 1 tablet by mouth Daily With Breakfast., Disp: 90 tablet, Rfl: 1  •  mometasone (NASONEX) 50 MCG/ACT nasal spray, 2 sprays into each nostril As Needed., Disp: , Rfl:   •  montelukast (SINGULAIR) 10 MG tablet, Take 1 tablet by mouth Every Night., Disp: 30 tablet, Rfl: 1  •  pantoprazole (PROTONIX) 40 MG EC tablet, TAKE 1 TABLET DAILY, Disp: 90 tablet, Rfl: 3  •  Semaglutide (OZEMPIC, 0.25 OR 0.5 MG/DOSE, SC), Inject  under the skin into the appropriate area as directed., Disp: , Rfl:   •  Spacer/Aero Chamber Mouthpiece misc, 1 each Daily., Disp: 1 each, Rfl: 0  •  sulfamethoxazole-trimethoprim (Bactrim DS) 800-160 MG per tablet, Take 1 tablet by mouth 2 (Two) Times a Day., Disp: 14 tablet, Rfl: 0  •  valsartan (DIOVAN) 320 MG tablet, Take 1 tablet by mouth Daily., Disp: 90 tablet, Rfl: 3  •  venlafaxine XR (EFFEXOR-XR) 75 MG 24 hr capsule, TAKE 1 CAPSULE DAILY, Disp: 90 capsule, Rfl: 3  •  VOLTAREN 1 % gel gel, As Needed. APPLY TO AFFECTED AREA TWICE A DAY, Disp: , Rfl: 3    The following portions of the patient's history were reviewed and updated as appropriate: allergies, current medications, past family history, past medical history, past social history, past surgical history and problem list.    Review of Systems   Constitutional: Negative.    HENT: Negative.    Eyes: Negative.    Respiratory: Negative.    Cardiovascular: Negative.  Negative for chest pain, palpitations and leg swelling.   Gastrointestinal: Negative.    Endocrine: Negative.    Genitourinary: Positive for frequency, hematuria and urgency.   Musculoskeletal: Negative.    Skin: Negative.    Allergic/Immunologic: Negative.  Negative for environmental allergies, food allergies and immunocompromised state.   Neurological: Negative.    Hematological: Negative.  Negative for  adenopathy. Does not bruise/bleed easily.   Psychiatric/Behavioral: Negative.    All other systems reviewed and are negative.      Assessment     Physical Exam  Vitals signs reviewed.   Constitutional:       Appearance: Normal appearance.   Neck:      Musculoskeletal: Neck supple.   Cardiovascular:      Rate and Rhythm: Normal rate and regular rhythm.      Pulses: Normal pulses.      Heart sounds: No murmur.   Pulmonary:      Effort: Pulmonary effort is normal.      Breath sounds: Normal breath sounds.   Abdominal:      General: There is no distension.      Tenderness: There is no abdominal tenderness.      Hernia: No hernia is present.   Musculoskeletal:         General: No swelling.   Skin:     General: Skin is warm and dry.   Neurological:      General: No focal deficit present.      Mental Status: She is alert and oriented to person, place, and time.   Psychiatric:         Mood and Affect: Mood normal.         Behavior: Behavior normal.         Thought Content: Thought content normal.         Plan     Her fasting labs were reviewed with the patient from last week.     Diagnoses and all orders for this visit:    1. Diabetes mellitus due to underlying condition, uncontrolled, with hyperglycemia (CMS/[facility])  -     Comprehensive metabolic panel; Future  -     Lipid panel; Future  -     Hemoglobin A1c; Future    2. Essential hypertension  -     Hemoglobin A1c; Future    3. Mixed hyperlipidemia  -     Comprehensive metabolic panel; Future  -     Lipid panel; Future  -     Hemoglobin A1c; Future    4. Gastroesophageal reflux disease with esophagitis without hemorrhage  -     Comprehensive metabolic panel; Future  -     CBC w AUTO Differential; Future    5. Low serum iron  -     CBC w AUTO Differential; Future  -     Iron and TIBC; Future    6. Acute cystitis with hematuria  -     POC Urinalysis Dipstick, Automated  -     tetracycline (ACHROMYCIN,SUMYCIN) 500 MG capsule; Take 1 capsule by mouth 2 (Two) Times a Day.   Dispense: 14 capsule; Refill: 0  -     Urine Culture - Urine, Urine, Clean Catch    Hgb A1c goal < 7.5, She has great improvement on labs, follow up with endo. Continue Ozempic and metformin.    Set up a urine for clear in 2 weeks, based antibiotic on last culture    Follow up in 3 months

## 2021-02-05 LAB
BACTERIA UR CULT: NORMAL
BACTERIA UR CULT: NORMAL

## 2021-03-02 ENCOUNTER — OFFICE VISIT (OUTPATIENT)
Dept: CARDIOLOGY | Facility: CLINIC | Age: 71
End: 2021-03-02

## 2021-03-02 VITALS
SYSTOLIC BLOOD PRESSURE: 118 MMHG | WEIGHT: 183.8 LBS | DIASTOLIC BLOOD PRESSURE: 64 MMHG | BODY MASS INDEX: 32.57 KG/M2 | HEIGHT: 63 IN | HEART RATE: 83 BPM

## 2021-03-02 DIAGNOSIS — R09.89 OTHER SPECIFIED SYMPTOMS AND SIGNS INVOLVING THE CIRCULATORY AND RESPIRATORY SYSTEMS: ICD-10-CM

## 2021-03-02 DIAGNOSIS — R00.0 RAPID HEART RATE: Primary | ICD-10-CM

## 2021-03-02 DIAGNOSIS — I10 ESSENTIAL HYPERTENSION: ICD-10-CM

## 2021-03-02 DIAGNOSIS — H93.A9 PULSATILE TINNITUS: ICD-10-CM

## 2021-03-02 DIAGNOSIS — I49.3 VENTRICULAR PREMATURE BEATS: ICD-10-CM

## 2021-03-02 PROBLEM — N30.01 ACUTE CYSTITIS WITH HEMATURIA: Status: RESOLVED | Noted: 2017-08-30 | Resolved: 2021-03-02

## 2021-03-02 PROCEDURE — 99204 OFFICE O/P NEW MOD 45 MIN: CPT | Performed by: INTERNAL MEDICINE

## 2021-03-02 PROCEDURE — 93000 ELECTROCARDIOGRAM COMPLETE: CPT | Performed by: INTERNAL MEDICINE

## 2021-03-02 NOTE — PROGRESS NOTES
"Date of Office Visit: 21  Encounter Provider: Jordon Finn MD  Place of Service: [facility] CARDIOLOGY  Patient Name: Amber Campos  :1950    Chief Complaint   Patient presents with   • Rapid Heart Rate   :     HPI:     Ms. Campos is 70 y.o. and presents today for re-evaluation.    I saw her in 2016 for the evaluation of PVCs.  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  An echo was relatively unremarkable. Of note, her average HR on the Holter was 100 bpm.    She checks her blood pressure at home; her SBP was consistently in the 130-140s and her pulse was consistently in the low 100s; JAKE Blount, added bisoprolol-HCTZ to her regimen and both values have improved.    She denies palpitations, chest pain, dyspnea, leg swelling, orthopnea, lightheadedness, or syncope.  She does hear a \"whooshing sound\" in her ears sometimes while lying down, but it's not consistent.     Past Medical History:   Diagnosis Date   • Basaloid carcinoma (CMS/HCC)     facial   • Gastroesophageal reflux disease 3/14/2016   • History of kidney infection    • History of mammogram    • Hypertension    • Osteopenia    • Osteoporosis    • Postmenopausal    • PVCs (premature ventricular contractions)    • Seasonal allergies    • Simple renal cyst 3/14/2016   • Steatosis of liver 3/14/2016   • Type 2 diabetes mellitus (CMS/HCC)        Past Surgical History:   Procedure Laterality Date   • BUNIONECTOMY Right    • COLONOSCOPY N/A 2017    Procedure: COLONOSCOPY TO CECUM ;  Surgeon: Aidan Roca MD;  Location: [facility] ENDOSCOPY;  Service:    • EXOSTECTOMY Left     HEEL   • FOOT FUSION Right     9 screws and plate   • KNEE ARTHROSCOPY W/ MENISCAL REPAIR Right    • PAP SMEAR     • TONSILLECTOMY     • TOTAL SHOULDER REVERSE ARTHROPLASTY Right    • UMBILICAL HERNIA REPAIR         Social History     Socioeconomic History   • " Marital status:      Spouse name: Not on file   • Number of children: Not on file   • Years of education: Not on file   • Highest education level: Not on file   Occupational History   • Occupation: retired    Tobacco Use   • Smoking status: Never Smoker   • Smokeless tobacco: Never Used   Substance and Sexual Activity   • Alcohol use: No   • Drug use: No   • Sexual activity: Defer   Social History Narrative    She is .    Pt drinks one cup of coffee daily.        Family History   Problem Relation Age of Onset   • Other Mother         brain death   • Emphysema Mother    • Alcohol abuse Father    • Glaucoma Father    • Other Sister         fatty liver   • Heart disease Sister         valve problem, being followed (no surgery required)   • Arthritis Sister    • Other Brother         fatty liver   • Alcohol abuse Maternal Aunt    • Diabetes Maternal Aunt    • Glaucoma Maternal Grandmother    • Heart disease Maternal Grandmother        Review of Systems   HENT:        As per HPI   All other systems reviewed and are negative.      Allergies   Allergen Reactions   • Iodinated Diagnostic Agents Photosensitivity     contrast   • Glumetza [Metformin] Headache   • Adhesive Tape Other (See Comments)     EKG stickers only   • Farxiga [Dapagliflozin] Diarrhea and Itching     Yeast infeciotn   • Neomycin-Bacitracin Zn-Polymyx Rash   • Trulicity [Dulaglutide] GI Intolerance     Constipation   • Victoza [Liraglutide] Nausea Only         Current Outpatient Medications:   •  bisoprolol-hydrochlorothiazide (Ziac) 5-6.25 MG per tablet, Take 1 tablet by mouth Daily., Disp: 30 tablet, Rfl: 1  •  Cetirizine HCl (ZyrTEC Childrens Allergy) 5 MG/5ML solution solution, Take 5 mL by mouth Daily., Disp: 150 mL, Rfl:   •  cholecalciferol (VITAMIN D3) 1000 UNITS tablet, Take 1,000 Units by mouth Daily., Disp: , Rfl:   •  glucose blood (FREESTYLE LITE) test strip, USE TO CHECK BLOOD SUGAR 3 TIMES A DAY, Disp: 300 each, Rfl: 3  •   "Insulin Pen Needle (PEN NEEDLES) 32G X 6 MM misc, 1 each Daily With Breakfast., Disp: 90 each, Rfl: 3  •  Lancets (FREESTYLE) lancets, , Disp: , Rfl:   •  metFORMIN (GLUCOPHAGE) 1000 MG tablet, TAKE 1 TABLET TWICE DAILY  WITH MEALS, Disp: 180 tablet, Rfl: 3  •  mometasone (NASONEX) 50 MCG/ACT nasal spray, 2 sprays into each nostril As Needed., Disp: , Rfl:   •  pantoprazole (PROTONIX) 40 MG EC tablet, TAKE 1 TABLET DAILY, Disp: 90 tablet, Rfl: 3  •  Semaglutide (OZEMPIC, 0.25 OR 0.5 MG/DOSE, SC), Inject  under the skin into the appropriate area as directed., Disp: , Rfl:   •  Spacer/Aero Chamber Mouthpiece misc, 1 each Daily., Disp: 1 each, Rfl: 0  •  valsartan (DIOVAN) 320 MG tablet, Take 1 tablet by mouth Daily., Disp: 90 tablet, Rfl: 3  •  Vascepa 1 g capsule capsule, 2 (Two) Times a Day With Meals., Disp: , Rfl:   •  venlafaxine XR (EFFEXOR-XR) 75 MG 24 hr capsule, TAKE 1 CAPSULE DAILY, Disp: 90 capsule, Rfl: 3  •  VOLTAREN 1 % gel gel, As Needed. APPLY TO AFFECTED AREA TWICE A DAY, Disp: , Rfl: 3      Objective:     Vitals:    03/02/21 1422   BP: 118/64   BP Location: Right arm   Pulse: 83   Weight: 83.4 kg (183 lb 12.8 oz)   Height: 159.9 cm (62.97\")     Body mass index is 32.59 kg/m².    Vitals signs reviewed.   Constitutional:       Appearance: Overweight.   Eyes:      Conjunctiva/sclera: Conjunctivae normal.      Pupils: Pupils are equal, round, and reactive to light.   HENT:      Head: Normocephalic.      Nose: Nose normal.   Neck:      Musculoskeletal: Normal range of motion.      Vascular: No JVD. JVD normal.      Lymphadenopathy: No cervical adenopathy.   Pulmonary:      Effort: Pulmonary effort is normal.      Breath sounds: Normal breath sounds.   Cardiovascular:      Normal rate. Regular rhythm.      Murmurs: There is no murmur.   Pulses:     Intact distal pulses.   Abdominal:      Palpations: Abdomen is soft.      Tenderness: There is no abdominal tenderness.   Musculoskeletal: Normal range of " motion.   Skin:     General: Skin is warm and dry.      Findings: No rash.   Neurological:      General: No focal deficit present.      Mental Status: Alert, oriented to person, place, and time and oriented to person, place and time.      Cranial Nerves: No cranial nerve deficit.   Psychiatric:         Behavior: Behavior normal.         Thought Content: Thought content normal.         Judgment: Judgment normal.           ECG 12 Lead    Date/Time: 3/2/2021 3:00 PM  Performed by: Jordon Finn MD  Authorized by: Jordon Finn MD   Comparison: compared with previous ECG   Similar to previous ECG  Rhythm: sinus rhythm  Conduction: conduction normal  ST Segments: ST segments normal  T Waves: T waves normal  QRS axis: normal  Other: no other findings    Clinical impression: normal ECG              Assessment:       Diagnosis Plan   1. Rapid heart rate     2. Essential hypertension     3. Ventricular premature beats     4. Pulsatile tinnitus  US Carotid Bilateral   5. Other specified symptoms and signs involving the circulatory and respiratory systems   US Carotid Bilateral          Plan:       She had noted that her blood pressure and heart rate were mildly elevated during home checks.  She was having no symptoms.  Both of these have normalized with the addition of bisoprolol.  I am going to check a TSH.  She had a normal echo in 2016 and I do not feel that it needs to be repeated.    I will get a carotid duplex to evaluate her intermittent pulsatile tinnitus.  If that is normal, I do not feel it needs further work-up as well.      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  I did not hear any on exam and I do not appreciate any on her EKG today.    Sincerely,       Jordon Finn MD

## 2021-03-04 ENCOUNTER — APPOINTMENT (OUTPATIENT)
Dept: ULTRASOUND IMAGING | Facility: HOSPITAL | Age: 71
End: 2021-03-04

## 2021-03-11 DIAGNOSIS — R00.0 SINUS TACHYCARDIA: ICD-10-CM

## 2021-03-11 DIAGNOSIS — I10 ESSENTIAL HYPERTENSION: ICD-10-CM

## 2021-03-11 RX ORDER — BISOPROLOL FUMARATE AND HYDROCHLOROTHIAZIDE 5; 6.25 MG/1; MG/1
TABLET ORAL
Qty: 90 TABLET | Refills: 1 | Status: SHIPPED | OUTPATIENT
Start: 2021-03-11 | End: 2021-05-19

## 2021-03-12 ENCOUNTER — HOSPITAL ENCOUNTER (OUTPATIENT)
Dept: ULTRASOUND IMAGING | Facility: HOSPITAL | Age: 71
Discharge: HOME | End: 2021-03-12

## 2021-03-12 ENCOUNTER — LAB (OUTPATIENT)
Dept: LAB | Facility: HOSPITAL | Age: 71
End: 2021-03-12

## 2021-03-12 DIAGNOSIS — R09.89 OTHER SPECIFIED SYMPTOMS AND SIGNS INVOLVING THE CIRCULATORY AND RESPIRATORY SYSTEMS: ICD-10-CM

## 2021-03-12 DIAGNOSIS — I10 ESSENTIAL HYPERTENSION: ICD-10-CM

## 2021-03-12 DIAGNOSIS — H93.A9 PULSATILE TINNITUS: ICD-10-CM

## 2021-03-12 DIAGNOSIS — R00.0 RAPID HEART RATE: ICD-10-CM

## 2021-03-12 LAB — TSH SERPL DL<=0.05 MIU/L-ACNC: 1.42 UIU/ML (ref 0.27–4.2)

## 2021-03-12 PROCEDURE — 93880 EXTRACRANIAL BILAT STUDY: CPT

## 2021-03-12 PROCEDURE — 84443 ASSAY THYROID STIM HORMONE: CPT

## 2021-03-12 PROCEDURE — 36415 COLL VENOUS BLD VENIPUNCTURE: CPT

## 2021-04-12 ENCOUNTER — TELEPHONE (OUTPATIENT)
Dept: INTERNAL MEDICINE | Facility: CLINIC | Age: 71
End: 2021-04-12

## 2021-04-12 DIAGNOSIS — N30.00 ACUTE CYSTITIS WITHOUT HEMATURIA: Primary | ICD-10-CM

## 2021-04-12 DIAGNOSIS — R82.998 LEUKOCYTES IN URINE: ICD-10-CM

## 2021-04-12 DIAGNOSIS — R30.0 DYSURIA: Primary | ICD-10-CM

## 2021-04-12 RX ORDER — NITROFURANTOIN 25; 75 MG/1; MG/1
100 CAPSULE ORAL 2 TIMES DAILY
Qty: 14 CAPSULE | Refills: 0 | Status: SHIPPED | OUTPATIENT
Start: 2021-04-12 | End: 2021-05-05

## 2021-04-12 NOTE — TELEPHONE ENCOUNTER
Urinalysis with culture if indicated order placed. She may come leave a sample today. Please hold an appointment in 3 days to review culture, sent to  [facility] as well .

## 2021-04-12 NOTE — TELEPHONE ENCOUNTER
Caller: Amber Campos    Relationship to patient: Self    Best call back number: [phone]    Patient is needing: PATIENT SAID THAT SHE HAS A BLADDER INFECTION AND WANTED TO KNOW IF SHE COULD JUST COME IN FOR LABS AND LEAVE A SAMPLE?    PLEASE ADVISE

## 2021-04-15 ENCOUNTER — OFFICE VISIT (OUTPATIENT)
Dept: INTERNAL MEDICINE | Facility: CLINIC | Age: 71
End: 2021-04-15

## 2021-04-15 VITALS
TEMPERATURE: 96.8 F | WEIGHT: 183 LBS | HEART RATE: 89 BPM | DIASTOLIC BLOOD PRESSURE: 76 MMHG | HEIGHT: 63 IN | RESPIRATION RATE: 16 BRPM | OXYGEN SATURATION: 98 % | SYSTOLIC BLOOD PRESSURE: 122 MMHG | BODY MASS INDEX: 32.43 KG/M2

## 2021-04-15 DIAGNOSIS — Z12.31 ENCOUNTER FOR SCREENING MAMMOGRAM FOR MALIGNANT NEOPLASM OF BREAST: ICD-10-CM

## 2021-04-15 DIAGNOSIS — M85.852 OSTEOPENIA OF LEFT HIP: ICD-10-CM

## 2021-04-15 DIAGNOSIS — Z12.39 SCREENING BREAST EXAMINATION: ICD-10-CM

## 2021-04-15 DIAGNOSIS — N30.00 ACUTE CYSTITIS WITHOUT HEMATURIA: Primary | ICD-10-CM

## 2021-04-15 DIAGNOSIS — F41.8 DEPRESSION WITH ANXIETY: ICD-10-CM

## 2021-04-15 PROBLEM — M15.9 GENERALIZED OSTEOARTHRITIS: Status: ACTIVE | Noted: 2021-04-15

## 2021-04-15 LAB
BACTERIA UR CULT: ABNORMAL
BACTERIA UR CULT: ABNORMAL
OTHER ANTIBIOTIC SUSC ISLT: ABNORMAL

## 2021-04-15 PROCEDURE — 99214 OFFICE O/P EST MOD 30 MIN: CPT | Performed by: NURSE PRACTITIONER

## 2021-04-15 RX ORDER — VENLAFAXINE HYDROCHLORIDE 37.5 MG/1
37.5 CAPSULE, EXTENDED RELEASE ORAL DAILY
Qty: 30 CAPSULE | Refills: 1 | Status: SHIPPED | OUTPATIENT
Start: 2021-04-15 | End: 2021-12-03 | Stop reason: SDUPTHER

## 2021-04-15 NOTE — PROGRESS NOTES
Chief Complaint   Patient presents with   • Urinary Tract Infection   • Fatigue       Subjective     Amber Campos is a 70 y.o. female being seen for a follow up appointment today regarding UTI. She was in the office 4- and left a Urine culture and started on Macrobid ER due to multiple allergies. Her dysuria is resolved. Denies fever, back pain, urinary frequency.     She also needs a bone density, last DXA showed osteopenia in left hip.  She would like to start getting her routine GYN care here due to low risk and snf of previous GYN.    She would like to stop her effexor as she does not feel that she need it any longer. She denies any melancholy, depression symptoms. She reports that generally she is feeling better    History of Present Illness     Allergies   Allergen Reactions   • Iodinated Diagnostic Agents Photosensitivity     contrast   • Glumetza [Metformin] Headache   • Adhesive Tape Other (See Comments)     EKG stickers only   • Farxiga [Dapagliflozin] Diarrhea and Itching     Yeast infeciotn   • Neomycin-Bacitracin Zn-Polymyx Rash   • Trulicity [Dulaglutide] GI Intolerance     Constipation   • Victoza [Liraglutide] Nausea Only         Current Outpatient Medications:   •  bisoprolol-hydrochlorothiazide (ZIAC) 5-6.25 MG per tablet, TAKE 1 TABLET BY MOUTH EVERY DAY, Disp: 90 tablet, Rfl: 1  •  cefdinir (OMNICEF) 300 MG capsule, Take 1 capsule by mouth 2 (Two) Times a Day., Disp: 14 capsule, Rfl: 0  •  Cetirizine HCl (ZyrTEC Childrens Allergy) 5 MG/5ML solution solution, Take 5 mL by mouth Daily., Disp: 150 mL, Rfl:   •  cholecalciferol (VITAMIN D3) 1000 UNITS tablet, Take 1,000 Units by mouth Daily., Disp: , Rfl:   •  glucose blood (FREESTYLE LITE) test strip, USE TO CHECK BLOOD SUGAR 3 TIMES A DAY, Disp: 300 each, Rfl: 3  •  Insulin Pen Needle (PEN NEEDLES) 32G X 6 MM misc, 1 each Daily With Breakfast., Disp: 90 each, Rfl: 3  •  Lancets (FREESTYLE) lancets, , Disp: , Rfl:   •  metFORMIN  (GLUCOPHAGE) 1000 MG tablet, TAKE 1 TABLET TWICE DAILY  WITH MEALS, Disp: 180 tablet, Rfl: 3  •  mometasone (NASONEX) 50 MCG/ACT nasal spray, 2 sprays into each nostril As Needed., Disp: , Rfl:   •  nitrofurantoin, macrocrystal-monohydrate, (Macrobid) 100 MG capsule, Take 1 capsule by mouth 2 (Two) Times a Day., Disp: 14 capsule, Rfl: 0  •  pantoprazole (PROTONIX) 40 MG EC tablet, TAKE 1 TABLET DAILY, Disp: 90 tablet, Rfl: 3  •  Semaglutide (OZEMPIC, 0.25 OR 0.5 MG/DOSE, SC), Inject  under the skin into the appropriate area as directed., Disp: , Rfl:   •  Spacer/Aero Chamber Mouthpiece misc, 1 each Daily., Disp: 1 each, Rfl: 0  •  valsartan (DIOVAN) 320 MG tablet, Take 1 tablet by mouth Daily., Disp: 90 tablet, Rfl: 3  •  Vascepa 1 g capsule capsule, 2 (Two) Times a Day With Meals., Disp: , Rfl:   •  venlafaxine XR (EFFEXOR-XR) 75 MG 24 hr capsule, TAKE 1 CAPSULE DAILY, Disp: 90 capsule, Rfl: 3  •  VOLTAREN 1 % gel gel, As Needed. APPLY TO AFFECTED AREA TWICE A DAY, Disp: , Rfl: 3    The following portions of the patient's history were reviewed and updated as appropriate: allergies, current medications, past family history, past medical history, past social history, past surgical history and problem list.    Review of Systems   Constitutional: Negative.    HENT: Negative.    Eyes: Negative.    Respiratory: Negative.    Cardiovascular: Negative.    Endocrine: Negative.    Genitourinary: Positive for frequency and pelvic pain.   Allergic/Immunologic: Negative.    Neurological: Negative.    Hematological: Negative.    All other systems reviewed and are negative.      Assessment     Physical Exam  Vitals reviewed.   Constitutional:       Appearance: Normal appearance. She is not ill-appearing.   Cardiovascular:      Rate and Rhythm: Normal rate and regular rhythm.      Pulses: Normal pulses.      Heart sounds: Normal heart sounds.   Pulmonary:      Effort: Pulmonary effort is normal.      Breath sounds: Normal breath  sounds.   Musculoskeletal:         General: No swelling.      Cervical back: Neck supple.   Skin:     General: Skin is warm and dry.   Neurological:      General: No focal deficit present.      Mental Status: She is alert and oriented to person, place, and time.         Plan     Her urine culture labs were reviewed with the patient from last week.     Diagnoses and all orders for this visit:    1. Acute cystitis without hematuria (Primary)  Comments:  Complete Macrobid ER as prescribed.     2. Depression with anxiety  -     venlafaxine XR (EFFEXOR-XR) 37.5 MG 24 hr capsule; Take 1 capsule by mouth Daily.  Dispense: 30 capsule; Refill: 1    3. Osteopenia of left hip  -     dexa bone density axial; Future    4. Screening breast examination  -     Mammo Screening Bilateral With CAD; Future    5. Encounter for screening mammogram for malignant neoplasm of breast   -     Mammo Screening Bilateral With CAD; Future      AWV needed

## 2021-04-20 ENCOUNTER — TRANSCRIBE ORDERS (OUTPATIENT)
Dept: ADMINISTRATIVE | Facility: HOSPITAL | Age: 71
End: 2021-04-20

## 2021-04-20 DIAGNOSIS — Z12.31 SCREENING MAMMOGRAM FOR HIGH-RISK PATIENT: Primary | ICD-10-CM

## 2021-04-22 ENCOUNTER — TELEPHONE (OUTPATIENT)
Dept: INTERNAL MEDICINE | Facility: CLINIC | Age: 71
End: 2021-04-22

## 2021-04-23 DIAGNOSIS — E78.2 MIXED HYPERLIPIDEMIA: ICD-10-CM

## 2021-04-23 DIAGNOSIS — K21.00 GASTROESOPHAGEAL REFLUX DISEASE WITH ESOPHAGITIS WITHOUT HEMORRHAGE: ICD-10-CM

## 2021-04-23 DIAGNOSIS — I10 ESSENTIAL HYPERTENSION: ICD-10-CM

## 2021-04-23 DIAGNOSIS — M85.852 OSTEOPENIA OF LEFT HIP: ICD-10-CM

## 2021-04-23 DIAGNOSIS — E61.1 IRON DEFICIENCY: ICD-10-CM

## 2021-04-23 DIAGNOSIS — E55.9 VITAMIN D DEFICIENCY: ICD-10-CM

## 2021-04-23 DIAGNOSIS — E11.65 UNCONTROLLED TYPE 2 DIABETES MELLITUS WITH HYPERGLYCEMIA (HCC): Primary | ICD-10-CM

## 2021-04-28 ENCOUNTER — LAB (OUTPATIENT)
Dept: INTERNAL MEDICINE | Facility: CLINIC | Age: 71
End: 2021-04-28

## 2021-04-28 DIAGNOSIS — I10 ESSENTIAL HYPERTENSION: ICD-10-CM

## 2021-04-28 DIAGNOSIS — E08.65 DIABETES MELLITUS DUE TO UNDERLYING CONDITION, UNCONTROLLED, WITH HYPERGLYCEMIA (HCC): ICD-10-CM

## 2021-04-28 DIAGNOSIS — K21.00 GASTROESOPHAGEAL REFLUX DISEASE WITH ESOPHAGITIS WITHOUT HEMORRHAGE: ICD-10-CM

## 2021-04-28 DIAGNOSIS — E78.2 MIXED HYPERLIPIDEMIA: ICD-10-CM

## 2021-04-28 DIAGNOSIS — E61.1 LOW SERUM IRON: ICD-10-CM

## 2021-04-29 LAB
ALBUMIN SERPL-MCNC: 4.5 G/DL (ref 3.5–5.2)
ALBUMIN/GLOB SERPL: 2 G/DL
ALP SERPL-CCNC: 61 U/L (ref 39–117)
ALT SERPL-CCNC: 55 U/L (ref 1–33)
AST SERPL-CCNC: 64 U/L (ref 1–32)
BASOPHILS # BLD AUTO: NORMAL 10*3/UL
BILIRUB SERPL-MCNC: 0.4 MG/DL (ref 0–1.2)
BUN SERPL-MCNC: 11 MG/DL (ref 8–23)
BUN/CREAT SERPL: 21.2 (ref 7–25)
CALCIUM SERPL-MCNC: 9.9 MG/DL (ref 8.6–10.5)
CHLORIDE SERPL-SCNC: 97 MMOL/L (ref 98–107)
CHOLEST SERPL-MCNC: 209 MG/DL (ref 0–200)
CO2 SERPL-SCNC: 26.7 MMOL/L (ref 22–29)
CREAT SERPL-MCNC: 0.52 MG/DL (ref 0.57–1)
DIFFERENTIAL COMMENT: ABNORMAL
EOSINOPHIL # BLD AUTO: NORMAL 10*3/UL
EOSINOPHIL # BLD MANUAL: 0.2 10*3/MM3 (ref 0–0.4)
EOSINOPHIL NFR BLD AUTO: NORMAL %
EOSINOPHIL NFR BLD MANUAL: 3.1 % (ref 0.3–6.2)
ERYTHROCYTE [DISTWIDTH] IN BLOOD BY AUTOMATED COUNT: 13.3 % (ref 12.3–15.4)
GLOBULIN SER CALC-MCNC: 2.3 GM/DL
GLUCOSE SERPL-MCNC: 250 MG/DL (ref 65–99)
HBA1C MFR BLD: 8.7 % (ref 4.8–5.6)
HCT VFR BLD AUTO: 40.3 % (ref 34–46.6)
HDLC SERPL-MCNC: 61 MG/DL (ref 40–60)
HGB BLD-MCNC: 12.9 G/DL (ref 12–15.9)
IRON SATN MFR SERPL: 13 % (ref 20–50)
IRON SERPL-MCNC: 67 MCG/DL (ref 37–145)
LDLC SERPL CALC-MCNC: 110 MG/DL (ref 0–100)
LYMPHOCYTES # BLD AUTO: NORMAL 10*3/UL
LYMPHOCYTES # BLD MANUAL: 2.39 10*3/MM3 (ref 0.7–3.1)
LYMPHOCYTES NFR BLD AUTO: NORMAL %
LYMPHOCYTES NFR BLD MANUAL: 34 % (ref 19.6–45.3)
MCH RBC QN AUTO: 27.2 PG (ref 26.6–33)
MCHC RBC AUTO-ENTMCNC: 32 G/DL (ref 31.5–35.7)
MCV RBC AUTO: 85 FL (ref 79–97)
MONOCYTES # BLD MANUAL: 0.26 10*3/MM3 (ref 0.1–0.9)
MONOCYTES NFR BLD AUTO: NORMAL %
MONOCYTES NFR BLD MANUAL: 4.1 % (ref 5–12)
NEUTROPHILS # BLD MANUAL: 3.59 10*3/MM3 (ref 1.7–7)
NEUTROPHILS NFR BLD AUTO: NORMAL %
NEUTROPHILS NFR BLD MANUAL: 55.7 % (ref 42.7–76)
PLATELET # BLD AUTO: 362 10*3/MM3 (ref 140–450)
PLATELET BLD QL SMEAR: ABNORMAL
POTASSIUM SERPL-SCNC: 4.5 MMOL/L (ref 3.5–5.2)
PROT SERPL-MCNC: 6.8 G/DL (ref 6–8.5)
RBC # BLD AUTO: 4.74 10*6/MM3 (ref 3.77–5.28)
RBC MORPH BLD: ABNORMAL
SODIUM SERPL-SCNC: 137 MMOL/L (ref 136–145)
TIBC SERPL-MCNC: 523 MCG/DL
TRIGL SERPL-MCNC: 221 MG/DL (ref 0–150)
UIBC SERPL-MCNC: 456 MCG/DL (ref 112–346)
VLDLC SERPL CALC-MCNC: 38 MG/DL (ref 5–40)
WBC # BLD AUTO: 6.45 10*3/MM3 (ref 3.4–10.8)

## 2021-04-30 ENCOUNTER — HOSPITAL ENCOUNTER (OUTPATIENT)
Dept: MAMMOGRAPHY | Facility: HOSPITAL | Age: 71
Discharge: HOME | End: 2021-04-30

## 2021-04-30 ENCOUNTER — APPOINTMENT (OUTPATIENT)
Dept: BONE DENSITY | Facility: HOSPITAL | Age: 71
End: 2021-04-30

## 2021-04-30 DIAGNOSIS — M85.852 OSTEOPENIA OF LEFT HIP: ICD-10-CM

## 2021-04-30 DIAGNOSIS — Z12.31 SCREENING MAMMOGRAM FOR HIGH-RISK PATIENT: ICD-10-CM

## 2021-04-30 PROCEDURE — 77080 DXA BONE DENSITY AXIAL: CPT

## 2021-05-05 ENCOUNTER — OFFICE VISIT (OUTPATIENT)
Dept: INTERNAL MEDICINE | Facility: CLINIC | Age: 71
End: 2021-05-05

## 2021-05-05 VITALS
SYSTOLIC BLOOD PRESSURE: 136 MMHG | WEIGHT: 181.8 LBS | TEMPERATURE: 96.4 F | RESPIRATION RATE: 18 BRPM | OXYGEN SATURATION: 94 % | BODY MASS INDEX: 32.21 KG/M2 | HEART RATE: 93 BPM | HEIGHT: 63 IN | DIASTOLIC BLOOD PRESSURE: 72 MMHG

## 2021-05-05 DIAGNOSIS — K21.00 GASTROESOPHAGEAL REFLUX DISEASE WITH ESOPHAGITIS WITHOUT HEMORRHAGE: ICD-10-CM

## 2021-05-05 DIAGNOSIS — M81.0 AGE-RELATED OSTEOPOROSIS WITHOUT CURRENT PATHOLOGICAL FRACTURE: ICD-10-CM

## 2021-05-05 DIAGNOSIS — E11.65 UNCONTROLLED TYPE 2 DIABETES MELLITUS WITH HYPERGLYCEMIA (HCC): Primary | ICD-10-CM

## 2021-05-05 DIAGNOSIS — E78.2 MIXED HYPERLIPIDEMIA: ICD-10-CM

## 2021-05-05 DIAGNOSIS — K76.0 STEATOSIS OF LIVER: ICD-10-CM

## 2021-05-05 DIAGNOSIS — I10 ESSENTIAL HYPERTENSION: ICD-10-CM

## 2021-05-05 DIAGNOSIS — N30.10 IC (INTERSTITIAL CYSTITIS): ICD-10-CM

## 2021-05-05 PROBLEM — E61.1 IRON DEFICIENCY: Status: RESOLVED | Noted: 2019-01-09 | Resolved: 2021-05-05

## 2021-05-05 LAB
BILIRUB BLD-MCNC: ABNORMAL MG/DL
CLARITY, POC: CLEAR
COLOR UR: YELLOW
GLUCOSE UR STRIP-MCNC: ABNORMAL MG/DL
KETONES UR QL: ABNORMAL
LEUKOCYTE EST, POC: NEGATIVE
NITRITE UR-MCNC: NEGATIVE MG/ML
PH UR: 5.5 [PH] (ref 5–8)
PROT UR STRIP-MCNC: ABNORMAL MG/DL
RBC # UR STRIP: NEGATIVE /UL
SP GR UR: 1.02 (ref 1–1.03)
UROBILINOGEN UR QL: NORMAL

## 2021-05-05 PROCEDURE — 99214 OFFICE O/P EST MOD 30 MIN: CPT | Performed by: NURSE PRACTITIONER

## 2021-05-05 PROCEDURE — 81003 URINALYSIS AUTO W/O SCOPE: CPT | Performed by: NURSE PRACTITIONER

## 2021-05-05 RX ORDER — SEMAGLUTIDE 1.34 MG/ML
1 INJECTION, SOLUTION SUBCUTANEOUS WEEKLY
Qty: 3 ML | Refills: 3 | Status: SHIPPED | OUTPATIENT
Start: 2021-05-05 | End: 2021-11-30 | Stop reason: SDUPTHER

## 2021-05-05 NOTE — PROGRESS NOTES
Chief Complaint   Patient presents with   • Essential hypertension   • Uncontrolled type 2 diabetes mellitus with hyperglycemia   • Hyperlipidemia       Subjective     Amber Campos is a 70 y.o. female being seen for a follow up appointment today regarding DM 2, HTN, Hyperlipidemia, and GERD. She is a Type 2 diabetic followed by Afshan Riojas at [facility], last visit 3-1-2021. She is currently on Ozempic 1mg weekly with Metformin 1000 mg BID with meals. She has tried an failed multiple meds due to sensitivity, see allergy list.    She is on Diovan 320 mg daily and Ziac daily for HTN. She does not check her BP at home.     She is on Pantaprozole 40 mg daily for GERD. She denies any abd pain, indigestion, CP.    History of Present Illness     Allergies   Allergen Reactions   • Iodinated Diagnostic Agents Photosensitivity     contrast   • Glumetza [Metformin] Headache   • Adhesive Tape Other (See Comments)     EKG stickers only   • Farxiga [Dapagliflozin] Diarrhea and Itching     Yeast infeciotn   • Neomycin-Bacitracin Zn-Polymyx Rash   • Trulicity [Dulaglutide] GI Intolerance     Constipation   • Victoza [Liraglutide] Nausea Only         Current Outpatient Medications:   •  bisoprolol-hydrochlorothiazide (ZIAC) 5-6.25 MG per tablet, TAKE 1 TABLET BY MOUTH EVERY DAY, Disp: 90 tablet, Rfl: 1  •  Cetirizine HCl (ZyrTEC Childrens Allergy) 5 MG/5ML solution solution, Take 5 mL by mouth Daily., Disp: 150 mL, Rfl:   •  cholecalciferol (VITAMIN D3) 1000 UNITS tablet, Take 1,000 Units by mouth Daily., Disp: , Rfl:   •  glucose blood (FREESTYLE LITE) test strip, USE TO CHECK BLOOD SUGAR 3 TIMES A DAY, Disp: 300 each, Rfl: 3  •  Insulin Pen Needle (PEN NEEDLES) 32G X 6 MM misc, 1 each Daily With Breakfast., Disp: 90 each, Rfl: 3  •  Lancets (FREESTYLE) lancets, , Disp: , Rfl:   •  metFORMIN (GLUCOPHAGE) 1000 MG tablet, TAKE 1 TABLET TWICE DAILY  WITH MEALS, Disp: 180 tablet, Rfl: 3  •  mometasone (NASONEX) 50 MCG/ACT nasal spray, 2  sprays into each nostril As Needed., Disp: , Rfl:   •  pantoprazole (PROTONIX) 40 MG EC tablet, TAKE 1 TABLET DAILY, Disp: 90 tablet, Rfl: 3  •  Semaglutide (OZEMPIC, 0.25 OR 0.5 MG/DOSE, SC), Inject  under the skin into the appropriate area as directed., Disp: , Rfl:   •  Spacer/Aero Chamber Mouthpiece misc, 1 each Daily., Disp: 1 each, Rfl: 0  •  valsartan (DIOVAN) 320 MG tablet, Take 1 tablet by mouth Daily., Disp: 90 tablet, Rfl: 3  •  Vascepa 1 g capsule capsule, 2 (Two) Times a Day With Meals., Disp: , Rfl:   •  venlafaxine XR (EFFEXOR-XR) 37.5 MG 24 hr capsule, Take 1 capsule by mouth Daily., Disp: 30 capsule, Rfl: 1  •  VOLTAREN 1 % gel gel, As Needed. APPLY TO AFFECTED AREA TWICE A DAY, Disp: , Rfl: 3  •  nitrofurantoin, macrocrystal-monohydrate, (Macrobid) 100 MG capsule, Take 1 capsule by mouth 2 (Two) Times a Day., Disp: 14 capsule, Rfl: 0    The following portions of the patient's history were reviewed and updated as appropriate: allergies, current medications, past family history, past medical history, past social history, past surgical history and problem list.    Review of Systems   Constitutional: Negative.    HENT: Positive for congestion.    Eyes: Negative.    Respiratory: Negative.  Negative for cough, shortness of breath and stridor.    Cardiovascular: Negative.  Negative for chest pain, palpitations and leg swelling.   Gastrointestinal: Negative.    Endocrine: Negative.    Musculoskeletal: Negative.    Allergic/Immunologic: Positive for environmental allergies.   Neurological: Negative.  Negative for tremors, syncope and weakness.   Hematological: Negative.    Psychiatric/Behavioral: Negative.    All other systems reviewed and are negative.      Assessment     Physical Exam  Vitals reviewed.   Constitutional:       Appearance: Normal appearance. She is obese. She is not ill-appearing.   Cardiovascular:      Rate and Rhythm: Normal rate and regular rhythm.      Pulses: Normal pulses.      Heart  sounds: Normal heart sounds.   Pulmonary:      Effort: Pulmonary effort is normal. No respiratory distress.      Breath sounds: Normal breath sounds. No stridor. No rhonchi.   Musculoskeletal:      Cervical back: Neck supple.   Skin:     General: Skin is warm.      Capillary Refill: Capillary refill takes less than 2 seconds.      Coloration: Skin is not jaundiced.   Neurological:      General: No focal deficit present.      Mental Status: She is alert.   Psychiatric:         Mood and Affect: Mood normal.         Thought Content: Thought content normal.         Plan     Her fasting labs were reviewed with the patient from last week.     Diagnoses and all orders for this visit:    1. Uncontrolled type 2 diabetes mellitus with hyperglycemia (CMS/[facility]) (Primary)  Comments:  She refused to increase the Ozempic dose to 1mg due to constipation.   Orders:  -     POC Urinalysis Dipstick, Automated  -     CBC & Differential; Future  -     Comprehensive Metabolic Panel; Future  -     Lipid Panel With / Chol / HDL Ratio; Future  -     Iron Profile; Future  -     TSH; Future  -     Hemoglobin A1c; Future    2. Essential hypertension  -     POC Urinalysis Dipstick, Automated  -     CBC & Differential; Future  -     Comprehensive Metabolic Panel; Future  -     Lipid Panel With / Chol / HDL Ratio; Future  -     Iron Profile; Future  -     TSH; Future  -     Hemoglobin A1c; Future    3. Mixed hyperlipidemia  Comments:  LDL goal < 70. REfuses statin therapy  Orders:  -     POC Urinalysis Dipstick, Automated  -     CBC & Differential; Future  -     Comprehensive Metabolic Panel; Future  -     Lipid Panel With / Chol / HDL Ratio; Future  -     Iron Profile; Future  -     TSH; Future  -     Hemoglobin A1c; Future    4. Steatosis of liver  -     CBC & Differential; Future  -     Comprehensive Metabolic Panel; Future  -     Lipid Panel With / Chol / HDL Ratio; Future  -     Iron Profile; Future  -     TSH; Future  -     Hemoglobin A1c;  Future    5. IC (interstitial cystitis)    6. Gastroesophageal reflux disease with esophagitis without hemorrhage  -     CBC & Differential; Future  -     Comprehensive Metabolic Panel; Future  -     Lipid Panel With / Chol / HDL Ratio; Future  -     Iron Profile; Future  -     TSH; Future  -     Hemoglobin A1c; Future    7. Age-related osteoporosis without current pathological fracture  Comments:  DisucssedBoniva. She is going to do a treadmill walk and total body toner 2-3 times a week.     Other orders  -     Semaglutide, 1 MG/DOSE, (Ozempic, 1 MG/DOSE,) 2 MG/1.5ML solution pen-injector; Inject 1 mg under the skin into the appropriate area as directed 1 (One) Time Per Week.  Dispense: 3 mL; Refill: 3      Follow up in 3 months with labs

## 2021-05-07 ENCOUNTER — TELEPHONE (OUTPATIENT)
Dept: INTERNAL MEDICINE | Facility: CLINIC | Age: 71
End: 2021-05-07

## 2021-05-07 NOTE — TELEPHONE ENCOUNTER
Patient is unsure why you sent this in to her local pharmacy. Her Endo prescribes this medication for her. She will be leaving on Sunday for [place] and will be back on the 15th.

## 2021-05-07 NOTE — TELEPHONE ENCOUNTER
Caller: Amber Campos    Relationship: Self    Best call back number: [phone]    What is the best time to reach you: ANY    Who are you requesting to speak with (clinical staff, provider,  specific staff member): KIRK CARRINGTON    What was the call regarding: PATIENT CALLED TO LET KIRK CARRINGTON KNOW THAT SHE PICKED UP Semaglutide, 1 MG/DOSE, (Ozempic, 1 MG/DOSE,) 2 MG/1.5ML solution pen-injector AND SHE WILL NOT BE TAKING THE 1 MG.

## 2021-05-19 ENCOUNTER — OFFICE VISIT (OUTPATIENT)
Dept: INTERNAL MEDICINE | Facility: CLINIC | Age: 71
End: 2021-05-19

## 2021-05-19 VITALS
DIASTOLIC BLOOD PRESSURE: 74 MMHG | HEIGHT: 63 IN | BODY MASS INDEX: 32.18 KG/M2 | HEART RATE: 93 BPM | TEMPERATURE: 97.3 F | OXYGEN SATURATION: 98 % | SYSTOLIC BLOOD PRESSURE: 140 MMHG | RESPIRATION RATE: 18 BRPM | WEIGHT: 181.6 LBS

## 2021-05-19 DIAGNOSIS — J45.21 MILD INTERMITTENT ASTHMA WITH ACUTE EXACERBATION: ICD-10-CM

## 2021-05-19 DIAGNOSIS — Z13.89 SCREENING FOR BLOOD OR PROTEIN IN URINE: ICD-10-CM

## 2021-05-19 DIAGNOSIS — I10 ESSENTIAL HYPERTENSION: ICD-10-CM

## 2021-05-19 DIAGNOSIS — R10.2 PELVIC PAIN IN FEMALE: ICD-10-CM

## 2021-05-19 DIAGNOSIS — N28.1 KIDNEY CYSTS: ICD-10-CM

## 2021-05-19 DIAGNOSIS — R31.0 GROSS HEMATURIA: Primary | ICD-10-CM

## 2021-05-19 LAB
BILIRUB BLD-MCNC: NEGATIVE MG/DL
CLARITY, POC: CLEAR
COLOR UR: YELLOW
GLUCOSE UR STRIP-MCNC: ABNORMAL MG/DL
KETONES UR QL: ABNORMAL
LEUKOCYTE EST, POC: ABNORMAL
NITRITE UR-MCNC: NEGATIVE MG/ML
PH UR: 5.5 [PH] (ref 5–8)
PROT UR STRIP-MCNC: ABNORMAL MG/DL
RBC # UR STRIP: ABNORMAL /UL
SP GR UR: 1.02 (ref 1–1.03)
UROBILINOGEN UR QL: NORMAL

## 2021-05-19 PROCEDURE — 81003 URINALYSIS AUTO W/O SCOPE: CPT | Performed by: NURSE PRACTITIONER

## 2021-05-19 PROCEDURE — 99214 OFFICE O/P EST MOD 30 MIN: CPT | Performed by: NURSE PRACTITIONER

## 2021-05-19 RX ORDER — BUDESONIDE AND FORMOTEROL FUMARATE DIHYDRATE 160; 4.5 UG/1; UG/1
2 AEROSOL RESPIRATORY (INHALATION)
Qty: 1 EACH | Refills: 6 | Status: SHIPPED | OUTPATIENT
Start: 2021-05-19 | End: 2022-05-09 | Stop reason: SDUPTHER

## 2021-05-19 NOTE — PROGRESS NOTES
"Chief Complaint   Patient presents with   • Blood in Urine       Subjective     Amber Campos is a 70 y.o. female being seen for [facility] visit for \"blood in urine.\"  She recently returned from a trip from [place]. She had a poise pad on and noticed blood. 2 days ago she had pelvic  \"pain that doubled me over.\"  Then, it resolved. She has a history of IC and previous lesion of left kidney with MRI abd to assess.     She has history of HTN. She is on Diovan and Ziac. She stopped her Ziac due to dizziness, and she has been monitoring BP at home.  She denies any CP, SOA, peripheral edema.    She has mild intermittent asthma with seasonal componenet. She reports that since the pollen has been up she is having a night time cough and occasional trouble with her breathing.  Ciaran fever. SHe is not currently on Symbicort    History of Present Illness     Allergies   Allergen Reactions   • Iodinated Diagnostic Agents Photosensitivity     contrast   • Glumetza [Metformin] Headache   • Adhesive Tape Other (See Comments)     EKG stickers only   • Farxiga [Dapagliflozin] Diarrhea and Itching     Yeast infeciotn   • Neomycin-Bacitracin Zn-Polymyx Rash   • Trulicity [Dulaglutide] GI Intolerance     Constipation   • Victoza [Liraglutide] Nausea Only         Current Outpatient Medications:   •  Cetirizine HCl (ZyrTEC Childrens Allergy) 5 MG/5ML solution solution, Take 5 mL by mouth Daily., Disp: 150 mL, Rfl:   •  cholecalciferol (VITAMIN D3) 1000 UNITS tablet, Take 1,000 Units by mouth Daily., Disp: , Rfl:   •  glucose blood (FREESTYLE LITE) test strip, USE TO CHECK BLOOD SUGAR 3 TIMES A DAY, Disp: 300 each, Rfl: 3  •  Insulin Pen Needle (PEN NEEDLES) 32G X 6 MM misc, 1 each Daily With Breakfast., Disp: 90 each, Rfl: 3  •  Lancets (FREESTYLE) lancets, , Disp: , Rfl:   •  metFORMIN (GLUCOPHAGE) 1000 MG tablet, TAKE 1 TABLET TWICE DAILY  WITH MEALS, Disp: 180 tablet, Rfl: 3  •  mometasone (NASONEX) 50 MCG/ACT nasal spray, 2 sprays " into each nostril As Needed., Disp: , Rfl:   •  pantoprazole (PROTONIX) 40 MG EC tablet, TAKE 1 TABLET DAILY, Disp: 90 tablet, Rfl: 3  •  Semaglutide, 1 MG/DOSE, (Ozempic, 1 MG/DOSE,) 2 MG/1.5ML solution pen-injector, Inject 1 mg under the skin into the appropriate area as directed 1 (One) Time Per Week., Disp: 3 mL, Rfl: 3  •  Spacer/Aero Chamber Mouthpiece misc, 1 each Daily., Disp: 1 each, Rfl: 0  •  valsartan (DIOVAN) 320 MG tablet, Take 1 tablet by mouth Daily., Disp: 90 tablet, Rfl: 3  •  Vascepa 1 g capsule capsule, 2 (Two) Times a Day With Meals., Disp: , Rfl:   •  venlafaxine XR (EFFEXOR-XR) 37.5 MG 24 hr capsule, Take 1 capsule by mouth Daily., Disp: 30 capsule, Rfl: 1  •  VOLTAREN 1 % gel gel, As Needed. APPLY TO AFFECTED AREA TWICE A DAY, Disp: , Rfl: 3    The following portions of the patient's history were reviewed and updated as appropriate: allergies, current medications, past family history, past medical history, past social history, past surgical history and problem list.    Review of Systems   Constitutional: Negative.    HENT: Negative.    Eyes: Negative.    Respiratory: Negative.    Cardiovascular: Negative.  Negative for chest pain.   Endocrine: Negative.    Genitourinary: Positive for frequency, hematuria, pelvic pain and urgency. Negative for decreased urine volume, dyspareunia, dysuria, flank pain, genital sores, vaginal bleeding, vaginal discharge and vaginal pain.   Musculoskeletal: Negative.    Allergic/Immunologic: Negative.    Neurological: Negative.    Hematological: Negative.  Negative for adenopathy. Does not bruise/bleed easily.   Psychiatric/Behavioral: Negative.        Assessment     Physical Exam  Vitals reviewed.   Constitutional:       Appearance: Normal appearance. She is obese. She is not ill-appearing.   HENT:      Right Ear: Tympanic membrane normal.      Left Ear: Tympanic membrane normal.   Cardiovascular:      Rate and Rhythm: Normal rate and regular rhythm.      Pulses:  Normal pulses.      Heart sounds: Normal heart sounds. No murmur heard.     Pulmonary:      Effort: Pulmonary effort is normal. No respiratory distress.      Breath sounds: Normal breath sounds. No stridor.   Musculoskeletal:         General: No swelling.      Cervical back: Neck supple.   Skin:     General: Skin is warm and dry.      Capillary Refill: Capillary refill takes less than 2 seconds.   Neurological:      General: No focal deficit present.      Mental Status: She is alert.   Psychiatric:         Mood and Affect: Mood normal.         Behavior: Behavior normal.         Thought Content: Thought content normal.         Plan     Her fasting labs were reviewed with the patient from last week.     Diagnoses and all orders for this visit:    1. Gross hematuria (Primary)  -     CT Abdomen Pelvis Stone Protocol; Future  -     POC Urinalysis Dipstick, Automated  -     Urine Culture - Urine, Urine, Clean Catch  -     Ambulatory Referral to Urology    2. Pelvic pain in female  -     CT Abdomen Pelvis Stone Protocol; Future  -     Ambulatory Referral to Urology    3. Screening for blood or protein in urine  -     POC Urinalysis Dipstick, Automated  -     Urine Culture - Urine, Urine, Clean Catch    4. Kidney cysts  Comments:  Reviewd saturnino BRIDGES and MR abd from May anf June 2019.   Orders:  -     Ambulatory Referral to Urology    5. Essential hypertension  Comments:  Hold Ziac, repeat BP readings at home, call office if > 140/90    6. Mild intermittent asthma with acute exacerbation  Comments:  Resume Symbicort 160 2 p BID for Asthma    Other orders  -     budesonide-formoterol (Symbicort) 160-4.5 MCG/ACT inhaler; Inhale 2 puffs 2 (Two) Times a Day.  Dispense: 1 each; Refill: 6        SCANNED - IMAGING (06/06/2019)  US Renal Bilateral (05/23/2019 08:50)    Follow up in 4 weeks as scheduled

## 2021-05-22 LAB
BACTERIA UR CULT: ABNORMAL
BACTERIA UR CULT: ABNORMAL
OTHER ANTIBIOTIC SUSC ISLT: ABNORMAL

## 2021-05-23 RX ORDER — CIPROFLOXACIN 500 MG/1
500 TABLET, FILM COATED ORAL 2 TIMES DAILY
Qty: 14 TABLET | Refills: 0 | Status: SHIPPED | OUTPATIENT
Start: 2021-05-23 | End: 2021-08-05

## 2021-05-27 ENCOUNTER — HOSPITAL ENCOUNTER (OUTPATIENT)
Dept: CT IMAGING | Facility: HOSPITAL | Age: 71
Discharge: HOME | End: 2021-05-27
Admitting: NURSE PRACTITIONER

## 2021-05-27 DIAGNOSIS — R31.0 GROSS HEMATURIA: ICD-10-CM

## 2021-05-27 DIAGNOSIS — R10.2 PELVIC PAIN IN FEMALE: ICD-10-CM

## 2021-05-27 PROCEDURE — 74176 CT ABD & PELVIS W/O CONTRAST: CPT

## 2021-06-02 ENCOUNTER — TELEPHONE (OUTPATIENT)
Dept: INTERNAL MEDICINE | Facility: CLINIC | Age: 71
End: 2021-06-02

## 2021-06-02 NOTE — TELEPHONE ENCOUNTER
PATIENT CALLED AND IS WANTING TO KNOW HER RESULTS OF HER CT OF ABDOMEN/PELVIS THAT WAS DONE ON 05/27/21.    PLEASE ADVISE  [phone]

## 2021-06-21 ENCOUNTER — HOSPITAL ENCOUNTER (OUTPATIENT)
Dept: MAMMOGRAPHY | Facility: HOSPITAL | Age: 71
Discharge: HOME | End: 2021-06-21
Admitting: NURSE PRACTITIONER

## 2021-06-21 PROCEDURE — 77063 BREAST TOMOSYNTHESIS BI: CPT

## 2021-06-21 PROCEDURE — 77067 SCR MAMMO BI INCL CAD: CPT

## 2021-07-22 DIAGNOSIS — I10 ESSENTIAL HYPERTENSION: ICD-10-CM

## 2021-07-22 RX ORDER — VALSARTAN 320 MG/1
TABLET ORAL
Qty: 90 TABLET | Refills: 3 | Status: SHIPPED | OUTPATIENT
Start: 2021-07-22 | End: 2021-08-05

## 2021-08-05 ENCOUNTER — OFFICE VISIT (OUTPATIENT)
Dept: INTERNAL MEDICINE | Facility: CLINIC | Age: 71
End: 2021-08-05

## 2021-08-05 VITALS
HEIGHT: 63 IN | RESPIRATION RATE: 18 BRPM | WEIGHT: 179 LBS | DIASTOLIC BLOOD PRESSURE: 80 MMHG | OXYGEN SATURATION: 96 % | BODY MASS INDEX: 31.71 KG/M2 | HEART RATE: 104 BPM | SYSTOLIC BLOOD PRESSURE: 130 MMHG | TEMPERATURE: 97.1 F

## 2021-08-05 DIAGNOSIS — I10 ESSENTIAL HYPERTENSION: ICD-10-CM

## 2021-08-05 DIAGNOSIS — E78.2 MIXED HYPERLIPIDEMIA: ICD-10-CM

## 2021-08-05 DIAGNOSIS — E11.65 UNCONTROLLED TYPE 2 DIABETES MELLITUS WITH HYPERGLYCEMIA (HCC): Primary | ICD-10-CM

## 2021-08-05 DIAGNOSIS — N30.00 ACUTE CYSTITIS WITHOUT HEMATURIA: ICD-10-CM

## 2021-08-05 DIAGNOSIS — K21.00 GASTROESOPHAGEAL REFLUX DISEASE WITH ESOPHAGITIS WITHOUT HEMORRHAGE: ICD-10-CM

## 2021-08-05 DIAGNOSIS — R41.3 MEMORY DEFICIT: ICD-10-CM

## 2021-08-05 LAB
BILIRUB BLD-MCNC: NEGATIVE MG/DL
CLARITY, POC: CLEAR
COLOR UR: YELLOW
GLUCOSE UR STRIP-MCNC: ABNORMAL MG/DL
KETONES UR QL: ABNORMAL
LEUKOCYTE EST, POC: ABNORMAL
NITRITE UR-MCNC: POSITIVE MG/ML
PH UR: 5.5 [PH] (ref 5–8)
POC CREATININE URINE: NORMAL
POC MICROALBUMIN URINE: NORMAL
PROT UR STRIP-MCNC: NEGATIVE MG/DL
RBC # UR STRIP: ABNORMAL /UL
SP GR UR: 1.02 (ref 1–1.03)
UROBILINOGEN UR QL: NORMAL

## 2021-08-05 PROCEDURE — 81003 URINALYSIS AUTO W/O SCOPE: CPT | Performed by: NURSE PRACTITIONER

## 2021-08-05 PROCEDURE — 99214 OFFICE O/P EST MOD 30 MIN: CPT | Performed by: NURSE PRACTITIONER

## 2021-08-05 PROCEDURE — 82044 UR ALBUMIN SEMIQUANTITATIVE: CPT | Performed by: NURSE PRACTITIONER

## 2021-08-05 RX ORDER — CIPROFLOXACIN 250 MG/1
250 TABLET, FILM COATED ORAL 2 TIMES DAILY
Qty: 6 TABLET | Refills: 0 | Status: SHIPPED | OUTPATIENT
Start: 2021-08-05 | End: 2021-09-01

## 2021-08-05 RX ORDER — OLMESARTAN MEDOXOMIL 40 MG/1
40 TABLET ORAL DAILY
Qty: 90 TABLET | Refills: 1 | Status: SHIPPED | OUTPATIENT
Start: 2021-08-05 | End: 2021-12-07 | Stop reason: SDUPTHER

## 2021-08-05 RX ORDER — METOPROLOL SUCCINATE 25 MG/1
25 TABLET, EXTENDED RELEASE ORAL DAILY
Qty: 30 TABLET | Refills: 1 | Status: SHIPPED | OUTPATIENT
Start: 2021-08-05 | End: 2021-10-27

## 2021-08-05 NOTE — PROGRESS NOTES
"Chief Complaint   Patient presents with   • Allergies   • Hyperlipidemia   • Hypertension   • Diabetes       Subjective     Amber Campos is a 70 y.o. female being seen for a follow up appointment today regarding DM 2, HTN, Hyperlipidemia. She is on Metformin 1000 mg and Ozempic 1 mg weekly She was seeing [facility] for 4 weeks, but she did not feel comfortable getting \"diet pills' from them. She was also evaluated by endo, but decided she did not want to see them anymore because \"they don't do anything for me.\" She is not checking her glucose regularly, she is not compliant with her diet. She denies any blurred vision, dry mouth.     She is on Diovan for HTN. She stopped her Ziac due to Dizziness. Since stopped Ziac she has been feeling better, but notices her HR is elevated. She denies CP, irregular HR.     She is complaining of UTI symptoms of dysuria, frequency and pain.     She has history of JOSETTE. She is no ton an iron supplement at this time. She does not take iron due to Constipation.    She is complaining of a gradual decrease in cognitive fucniton over the past 6 months. She is reporting word searching, and forgetfulness.       History of Present Illness     Allergies   Allergen Reactions   • Iodinated Diagnostic Agents Photosensitivity     contrast   • Glumetza [Metformin] Headache   • Adhesive Tape Other (See Comments)     EKG stickers only   • Farxiga [Dapagliflozin] Diarrhea and Itching     Yeast infeciotn   • Neomycin-Bacitracin Zn-Polymyx Rash   • Trulicity [Dulaglutide] GI Intolerance     Constipation   • Victoza [Liraglutide] Nausea Only         Current Outpatient Medications:   •  budesonide-formoterol (Symbicort) 160-4.5 MCG/ACT inhaler, Inhale 2 puffs 2 (Two) Times a Day., Disp: 1 each, Rfl: 6  •  Cetirizine HCl (ZyrTE Childrens Allergy) 5 MG/5ML solution solution, Take 5 mL by mouth Daily., Disp: 150 mL, Rfl:   •  ciprofloxacin (Cipro) 500 MG tablet, Take 1 tablet by mouth 2 (Two) Times a " Day., Disp: 14 tablet, Rfl: 0  •  glucose blood (FREESTYLE LITE) test strip, USE TO CHECK BLOOD SUGAR 3 TIMES A DAY, Disp: 300 each, Rfl: 3  •  Insulin Pen Needle (PEN NEEDLES) 32G X 6 MM misc, 1 each Daily With Breakfast., Disp: 90 each, Rfl: 3  •  Lancets (FREESTYLE) lancets, , Disp: , Rfl:   •  metFORMIN (GLUCOPHAGE) 1000 MG tablet, TAKE 1 TABLET TWICE DAILY  WITH MEALS, Disp: 180 tablet, Rfl: 3  •  pantoprazole (PROTONIX) 40 MG EC tablet, TAKE 1 TABLET DAILY, Disp: 90 tablet, Rfl: 3  •  Semaglutide, 1 MG/DOSE, (Ozempic, 1 MG/DOSE,) 2 MG/1.5ML solution pen-injector, Inject 1 mg under the skin into the appropriate area as directed 1 (One) Time Per Week., Disp: 3 mL, Rfl: 3  •  Spacer/Aero Chamber Mouthpiece misc, 1 each Daily., Disp: 1 each, Rfl: 0  •  valsartan (DIOVAN) 320 MG tablet, TAKE 1 TABLET DAILY, Disp: 90 tablet, Rfl: 3  •  Vascepa 1 g capsule capsule, 2 (Two) Times a Day With Meals., Disp: , Rfl:   •  venlafaxine XR (EFFEXOR-XR) 37.5 MG 24 hr capsule, Take 1 capsule by mouth Daily., Disp: 30 capsule, Rfl: 1  •  VOLTAREN 1 % gel gel, As Needed. APPLY TO AFFECTED AREA TWICE A DAY, Disp: , Rfl: 3  •  cholecalciferol (VITAMIN D3) 1000 UNITS tablet, Take 1,000 Units by mouth Daily., Disp: , Rfl:   •  mometasone (NASONEX) 50 MCG/ACT nasal spray, 2 sprays into each nostril As Needed., Disp: , Rfl:     The following portions of the patient's history were reviewed and updated as appropriate: allergies, current medications, past family history, past medical history, past social history, past surgical history and problem list.    Review of Systems   Constitutional: Negative.  Negative for fever and unexpected weight change.   HENT: Negative.  Negative for congestion.    Eyes: Negative.    Respiratory: Positive for wheezing. Negative for shortness of breath and stridor.    Cardiovascular: Positive for palpitations. Negative for chest pain and leg swelling.   Gastrointestinal: Positive for constipation.   Endocrine:  Negative.  Negative for polydipsia and polyphagia.   Genitourinary: Negative.  Negative for vaginal bleeding and vaginal discharge.   Musculoskeletal: Positive for arthralgias.   Allergic/Immunologic: Positive for environmental allergies. Negative for food allergies and immunocompromised state.   Neurological: Negative.  Negative for dizziness.   Hematological: Negative.  Negative for adenopathy. Does not bruise/bleed easily.   Psychiatric/Behavioral: Negative.    All other systems reviewed and are negative.      Assessment     Physical Exam  Vitals reviewed.   Constitutional:       Appearance: Normal appearance. She is obese.   HENT:      Head: Normocephalic.      Right Ear: Tympanic membrane normal.      Left Ear: Tympanic membrane normal.      Nose: Nose normal. No congestion or rhinorrhea.      Mouth/Throat:      Mouth: Mucous membranes are moist.   Cardiovascular:      Rate and Rhythm: Normal rate and regular rhythm.      Pulses: Normal pulses.      Heart sounds: Normal heart sounds.   Pulmonary:      Effort: Pulmonary effort is normal.   Abdominal:      General: Abdomen is flat.   Skin:     General: Skin is warm and dry.   Neurological:      General: No focal deficit present.      Mental Status: She is alert and oriented to person, place, and time.   Psychiatric:         Mood and Affect: Mood normal.         Behavior: Behavior normal.         Thought Content: Thought content normal.         Plan     Her fasting labs were reviewed with the patient from last week.     Diagnoses and all orders for this visit:    1. Uncontrolled type 2 diabetes mellitus with hyperglycemia (CMS/[facility]) (Primary)  -     POCT microalbumin  -     POCT urinalysis dipstick, automated  -     CBC & Differential; Future  -     Comprehensive Metabolic Panel; Future  -     Lipid Panel With / Chol / HDL Ratio; Future  -     Hemoglobin A1c; Future    2. Essential hypertension  Comments:  She is tachycardic today. Requests a change from valsartan  to Benicar. Add toprol XL to help tachycardia  Orders:  -     CBC & Differential; Future  -     Comprehensive Metabolic Panel; Future  -     Lipid Panel With / Chol / HDL Ratio; Future  -     Hemoglobin A1c; Future  -     metoprolol succinate XL (Toprol XL) 25 MG 24 hr tablet; Take 1 tablet by mouth Daily.  Dispense: 30 tablet; Refill: 1  -     olmesartan (Benicar) 40 MG tablet; Take 1 tablet by mouth Daily.  Dispense: 90 tablet; Refill: 1    3. Mixed hyperlipidemia  -     CBC & Differential; Future  -     Comprehensive Metabolic Panel; Future  -     Lipid Panel With / Chol / HDL Ratio; Future  -     Hemoglobin A1c; Future    4. Gastroesophageal reflux disease with esophagitis without hemorrhage  -     CBC & Differential; Future  -     Comprehensive Metabolic Panel; Future    5. Acute cystitis without hematuria  -     ciprofloxacin (Cipro) 250 MG tablet; Take 1 tablet by mouth 2 (Two) Times a Day.  Dispense: 6 tablet; Refill: 0    6. Memory deficit  -     Ambulatory Referral to Neuropsychology    Reminded patient to resume diet and exercise regimen for better glucose control. Multiplie medication intolerances and refusal to follow up with endo    Intergrative medicine recommended due to patient request for a dietary consult.     LDL goal < 70.     Follow up in 3 months w labs

## 2021-08-16 ENCOUNTER — TELEPHONE (OUTPATIENT)
Dept: INTERNAL MEDICINE | Facility: CLINIC | Age: 71
End: 2021-08-16

## 2021-08-16 NOTE — TELEPHONE ENCOUNTER
PATIENT IS WANTING TO KNOW HOW TO GET AN ANTIBODY TEST? SHE HAS HAD BOTH HER PFIZER VACCINES BUT IS NEEDING TO KNOW IF NEEDS A BOOSTER SHOT.    PLEASE ADVISE  [phone]

## 2021-08-16 NOTE — TELEPHONE ENCOUNTER
Pt is coming in on Wednesday to get an antiobody test. She will wait to see what that says and then see if she needs the booster.

## 2021-08-24 NOTE — TELEPHONE ENCOUNTER
Spoke to patient stated that she had a neg and pos antibody covid blood test and wanted to know about the booster shot. Spoke to lukasz and she said we dont get those results and that she can bring them into her next appointment and they will discuss it.

## 2021-08-24 NOTE — TELEPHONE ENCOUNTER
PATIENT CALLED STATING THAT HER ANTIBODY TEST CAME BACK NEGATIVE, AND SHE WOULD LIKE TO KNOW HOW TO PROCEED.    PLEASE ADVISE  [phone]

## 2021-08-31 ENCOUNTER — OFFICE VISIT (OUTPATIENT)
Dept: INTERNAL MEDICINE | Facility: CLINIC | Age: 71
End: 2021-08-31

## 2021-08-31 VITALS
RESPIRATION RATE: 18 BRPM | HEIGHT: 63 IN | SYSTOLIC BLOOD PRESSURE: 138 MMHG | BODY MASS INDEX: 32.78 KG/M2 | WEIGHT: 185 LBS | HEART RATE: 101 BPM | OXYGEN SATURATION: 99 % | DIASTOLIC BLOOD PRESSURE: 78 MMHG | TEMPERATURE: 96.9 F

## 2021-08-31 DIAGNOSIS — I10 ESSENTIAL HYPERTENSION: ICD-10-CM

## 2021-08-31 DIAGNOSIS — E11.65 UNCONTROLLED TYPE 2 DIABETES MELLITUS WITH HYPERGLYCEMIA (HCC): Primary | ICD-10-CM

## 2021-08-31 DIAGNOSIS — N30.10 IC (INTERSTITIAL CYSTITIS): ICD-10-CM

## 2021-08-31 DIAGNOSIS — R79.0 LOW IRON STORES: ICD-10-CM

## 2021-08-31 DIAGNOSIS — E78.2 MIXED HYPERLIPIDEMIA: ICD-10-CM

## 2021-08-31 LAB
BILIRUB BLD-MCNC: NEGATIVE MG/DL
CLARITY, POC: ABNORMAL
COLOR UR: ABNORMAL
GLUCOSE UR STRIP-MCNC: ABNORMAL MG/DL
KETONES UR QL: ABNORMAL
LEUKOCYTE EST, POC: NEGATIVE
NITRITE UR-MCNC: NEGATIVE MG/ML
PH UR: 5.5 [PH] (ref 5–8)
PROT UR STRIP-MCNC: NEGATIVE MG/DL
RBC # UR STRIP: ABNORMAL /UL
SP GR UR: 1.03 (ref 1–1.03)
UROBILINOGEN UR QL: NORMAL

## 2021-08-31 PROCEDURE — 81003 URINALYSIS AUTO W/O SCOPE: CPT | Performed by: NURSE PRACTITIONER

## 2021-08-31 PROCEDURE — 99214 OFFICE O/P EST MOD 30 MIN: CPT | Performed by: NURSE PRACTITIONER

## 2021-08-31 RX ORDER — KETOCONAZOLE 20 MG/ML
SHAMPOO TOPICAL
COMMUNITY
Start: 2021-08-14 | End: 2022-05-09

## 2021-08-31 RX ORDER — NYSTATIN 100000 [USP'U]/G
POWDER TOPICAL SEE ADMIN INSTRUCTIONS
COMMUNITY
Start: 2021-08-14 | End: 2022-05-09

## 2021-08-31 RX ORDER — FLUOCINONIDE TOPICAL SOLUTION USP, 0.05% 0.5 MG/ML
SOLUTION TOPICAL
Status: ON HOLD | COMMUNITY
Start: 2021-08-14 | End: 2021-11-30

## 2021-09-01 DIAGNOSIS — E78.2 MIXED HYPERLIPIDEMIA: ICD-10-CM

## 2021-09-01 DIAGNOSIS — K21.00 GASTROESOPHAGEAL REFLUX DISEASE WITH ESOPHAGITIS WITHOUT HEMORRHAGE: ICD-10-CM

## 2021-09-01 DIAGNOSIS — E55.9 VITAMIN D DEFICIENCY: ICD-10-CM

## 2021-09-01 DIAGNOSIS — E11.65 UNCONTROLLED TYPE 2 DIABETES MELLITUS WITH HYPERGLYCEMIA (HCC): Primary | ICD-10-CM

## 2021-09-01 DIAGNOSIS — I10 ESSENTIAL HYPERTENSION: ICD-10-CM

## 2021-09-01 LAB
ALBUMIN SERPL-MCNC: 4.3 G/DL (ref 3.7–4.7)
ALBUMIN/GLOB SERPL: 1.7 {RATIO} (ref 1.2–2.2)
ALP SERPL-CCNC: 75 IU/L (ref 48–121)
ALT SERPL-CCNC: 35 IU/L (ref 0–32)
AST SERPL-CCNC: 40 IU/L (ref 0–40)
BASOPHILS # BLD AUTO: 0.1 X10E3/UL (ref 0–0.2)
BASOPHILS NFR BLD AUTO: 1 %
BILIRUB SERPL-MCNC: <0.2 MG/DL (ref 0–1.2)
BUN SERPL-MCNC: 13 MG/DL (ref 8–27)
BUN/CREAT SERPL: 28 (ref 12–28)
CALCIUM SERPL-MCNC: 9.4 MG/DL (ref 8.7–10.3)
CHLORIDE SERPL-SCNC: 98 MMOL/L (ref 96–106)
CO2 SERPL-SCNC: 23 MMOL/L (ref 20–29)
CREAT SERPL-MCNC: 0.46 MG/DL (ref 0.57–1)
EOSINOPHIL # BLD AUTO: 0.3 X10E3/UL (ref 0–0.4)
EOSINOPHIL NFR BLD AUTO: 4 %
ERYTHROCYTE [DISTWIDTH] IN BLOOD BY AUTOMATED COUNT: 14.1 % (ref 11.7–15.4)
FERRITIN SERPL-MCNC: 10 NG/ML (ref 15–150)
GLOBULIN SER CALC-MCNC: 2.5 G/DL (ref 1.5–4.5)
GLUCOSE SERPL-MCNC: 227 MG/DL (ref 65–99)
HCT VFR BLD AUTO: 36.1 % (ref 34–46.6)
HGB BLD-MCNC: 11.9 G/DL (ref 11.1–15.9)
IMM GRANULOCYTES # BLD AUTO: 0 X10E3/UL (ref 0–0.1)
IMM GRANULOCYTES NFR BLD AUTO: 0 %
IRON SATN MFR SERPL: 5 % (ref 15–55)
IRON SERPL-MCNC: 21 UG/DL (ref 27–139)
LYMPHOCYTES # BLD AUTO: 2.9 X10E3/UL (ref 0.7–3.1)
LYMPHOCYTES NFR BLD AUTO: 44 %
MCH RBC QN AUTO: 26.4 PG (ref 26.6–33)
MCHC RBC AUTO-ENTMCNC: 33 G/DL (ref 31.5–35.7)
MCV RBC AUTO: 80 FL (ref 79–97)
MONOCYTES # BLD AUTO: 0.4 X10E3/UL (ref 0.1–0.9)
MONOCYTES NFR BLD AUTO: 7 %
NEUTROPHILS # BLD AUTO: 2.8 X10E3/UL (ref 1.4–7)
NEUTROPHILS NFR BLD AUTO: 44 %
PLATELET # BLD AUTO: 377 X10E3/UL (ref 150–450)
POTASSIUM SERPL-SCNC: 3.8 MMOL/L (ref 3.5–5.2)
PROT SERPL-MCNC: 6.8 G/DL (ref 6–8.5)
RBC # BLD AUTO: 4.5 X10E6/UL (ref 3.77–5.28)
SODIUM SERPL-SCNC: 135 MMOL/L (ref 134–144)
TIBC SERPL-MCNC: 445 UG/DL (ref 250–450)
UIBC SERPL-MCNC: 424 UG/DL (ref 118–369)
WBC # BLD AUTO: 6.5 X10E3/UL (ref 3.4–10.8)

## 2021-09-28 DIAGNOSIS — K21.9 GASTROESOPHAGEAL REFLUX DISEASE WITHOUT ESOPHAGITIS: ICD-10-CM

## 2021-09-28 RX ORDER — PANTOPRAZOLE SODIUM 40 MG/1
TABLET, DELAYED RELEASE ORAL
Qty: 90 TABLET | Refills: 3 | Status: SHIPPED | OUTPATIENT
Start: 2021-09-28 | End: 2022-09-23

## 2021-09-28 NOTE — TELEPHONE ENCOUNTER
Rx Refill Note  Requested Prescriptions     Pending Prescriptions Disp Refills    pantoprazole (PROTONIX) 40 MG EC tablet [Pharmacy Med Name: PANTOPRAZOLE SODIUM DR TABS 40MG] 90 tablet 3     Sig: TAKE 1 TABLET DAILY      Last office visit with prescribing clinician: 8/31/2021      Next office visit with prescribing clinician: 11/30/2021            Hien Sequeira MA  09/28/21, 08:01 EDT

## 2021-10-11 ENCOUNTER — TELEPHONE (OUTPATIENT)
Dept: INTERNAL MEDICINE | Facility: CLINIC | Age: 71
End: 2021-10-11

## 2021-10-11 NOTE — TELEPHONE ENCOUNTER
A user error has taken place: encounter opened in error, closed for administrative reasons.

## 2021-10-12 ENCOUNTER — CLINICAL SUPPORT (OUTPATIENT)
Dept: INTERNAL MEDICINE | Facility: CLINIC | Age: 71
End: 2021-10-12

## 2021-10-12 DIAGNOSIS — Z23 FLU VACCINE NEED: Primary | ICD-10-CM

## 2021-10-12 PROCEDURE — 90662 IIV NO PRSV INCREASED AG IM: CPT | Performed by: NURSE PRACTITIONER

## 2021-10-12 PROCEDURE — G0008 ADMIN INFLUENZA VIRUS VAC: HCPCS | Performed by: NURSE PRACTITIONER

## 2021-10-12 NOTE — PROGRESS NOTES
Injection  Injection performed by Myla Jackson MA . Patient tolerated the procedure well without complications.  10/12/21   Myla Jackson MA

## 2021-10-27 DIAGNOSIS — I10 ESSENTIAL HYPERTENSION: ICD-10-CM

## 2021-10-27 RX ORDER — METOPROLOL SUCCINATE 25 MG/1
TABLET, EXTENDED RELEASE ORAL
Qty: 30 TABLET | Refills: 1 | Status: SHIPPED | OUTPATIENT
Start: 2021-10-27 | End: 2021-12-07 | Stop reason: SDUPTHER

## 2021-11-05 ENCOUNTER — OFFICE VISIT (OUTPATIENT)
Dept: INTERNAL MEDICINE | Facility: CLINIC | Age: 71
End: 2021-11-05

## 2021-11-05 VITALS
RESPIRATION RATE: 20 BRPM | OXYGEN SATURATION: 97 % | HEIGHT: 63 IN | WEIGHT: 180 LBS | TEMPERATURE: 97.7 F | BODY MASS INDEX: 31.89 KG/M2 | DIASTOLIC BLOOD PRESSURE: 76 MMHG | HEART RATE: 86 BPM | SYSTOLIC BLOOD PRESSURE: 152 MMHG

## 2021-11-05 DIAGNOSIS — N30.01 ACUTE CYSTITIS WITH HEMATURIA: Primary | ICD-10-CM

## 2021-11-05 LAB
BILIRUB BLD-MCNC: NEGATIVE MG/DL
CLARITY, POC: ABNORMAL
COLOR UR: ABNORMAL
EXPIRATION DATE: ABNORMAL
GLUCOSE UR STRIP-MCNC: ABNORMAL MG/DL
KETONES UR QL: ABNORMAL
LEUKOCYTE EST, POC: ABNORMAL
Lab: ABNORMAL
NITRITE UR-MCNC: POSITIVE MG/ML
PH UR: 5.5 [PH] (ref 5–8)
PROT UR STRIP-MCNC: ABNORMAL MG/DL
RBC # UR STRIP: ABNORMAL /UL
SP GR UR: 1.03 (ref 1–1.03)
UROBILINOGEN UR QL: NORMAL

## 2021-11-05 PROCEDURE — 99213 OFFICE O/P EST LOW 20 MIN: CPT | Performed by: NURSE PRACTITIONER

## 2021-11-05 PROCEDURE — 81003 URINALYSIS AUTO W/O SCOPE: CPT | Performed by: NURSE PRACTITIONER

## 2021-11-05 RX ORDER — SULFAMETHOXAZOLE AND TRIMETHOPRIM 800; 160 MG/1; MG/1
1 TABLET ORAL 2 TIMES DAILY
Qty: 14 TABLET | Refills: 0 | Status: SHIPPED | OUTPATIENT
Start: 2021-11-05 | End: 2021-11-12

## 2021-11-05 NOTE — PROGRESS NOTES
"Amber Campos is a 70 y.o. female presenting today for   Chief Complaint   Patient presents with   • Urinary Frequency       Subjective    Urinary Frequency   This is a new problem. Episode onset: 1 wk ago. The problem has been gradually worsening. Associated symptoms include chills, frequency and urgency. Pertinent negatives include no hematuria or vomiting. She has tried nothing for the symptoms.        The following portions of the patient's history were reviewed and updated as appropriate: allergies, current medications, problem list, past medical history, past surgical history, family history, and social history.    Review of Systems   Constitutional: Positive for chills. Negative for fever.   Gastrointestinal: Negative for vomiting.   Genitourinary: Positive for dysuria, frequency and urgency. Negative for hematuria.         Objective    Vitals:    11/05/21 1054   BP: 152/76   Pulse: 86   Resp: 20   Temp: 97.7 °F (36.5 °C)   TempSrc: Temporal   SpO2: 97%   Weight: 81.6 kg (180 lb)   Height: 160 cm (62.99\")     Body mass index is 31.9 kg/m².  Nursing notes and vitals reviewed.    Physical Exam  Constitutional:       General: She is not in acute distress.     Appearance: She is well-developed.   Cardiovascular:      Rate and Rhythm: Regular rhythm.      Heart sounds: S1 normal and S2 normal.   Pulmonary:      Effort: Pulmonary effort is normal.      Breath sounds: Normal breath sounds.   Abdominal:      Tenderness: There is no right CVA tenderness or left CVA tenderness.   Neurological:      Mental Status: She is alert and oriented to person, place, and time.   Psychiatric:         Attention and Perception: She is attentive.         Behavior: Behavior normal.         Thought Content: Thought content normal.           Assessment and Plan    Diagnoses and all orders for this visit:    1. Acute cystitis with hematuria (Primary)  -     POCT urinalysis dipstick, automated  -     sulfamethoxazole-trimethoprim (Bactrim " DS) 800-160 MG per tablet; Take 1 tablet by mouth 2 (Two) Times a Day for 7 days.  Dispense: 14 tablet; Refill: 0  -     Urine Culture - , Urine, Clean Catch            Medications, including side effects, were discussed with the patient. Patient verbalized understanding.  The plan of care was discussed. All questions were answered. Patient verbalized understanding.        Return if symptoms worsen or fail to improve.

## 2021-11-10 LAB
BACTERIA UR CULT: ABNORMAL
BACTERIA UR CULT: ABNORMAL
OTHER ANTIBIOTIC SUSC ISLT: ABNORMAL

## 2021-11-23 ENCOUNTER — LAB (OUTPATIENT)
Dept: INTERNAL MEDICINE | Facility: CLINIC | Age: 71
End: 2021-11-23

## 2021-11-23 DIAGNOSIS — E78.2 MIXED HYPERLIPIDEMIA: ICD-10-CM

## 2021-11-23 DIAGNOSIS — K21.00 GASTROESOPHAGEAL REFLUX DISEASE WITH ESOPHAGITIS WITHOUT HEMORRHAGE: ICD-10-CM

## 2021-11-23 DIAGNOSIS — I10 ESSENTIAL HYPERTENSION: ICD-10-CM

## 2021-11-23 DIAGNOSIS — E11.65 UNCONTROLLED TYPE 2 DIABETES MELLITUS WITH HYPERGLYCEMIA (HCC): ICD-10-CM

## 2021-11-23 DIAGNOSIS — E55.9 VITAMIN D DEFICIENCY: ICD-10-CM

## 2021-11-24 LAB
25(OH)D3+25(OH)D2 SERPL-MCNC: 35.2 NG/ML (ref 30–100)
ALBUMIN SERPL-MCNC: 4.4 G/DL (ref 3.7–4.7)
ALBUMIN/GLOB SERPL: 1.7 {RATIO} (ref 1.2–2.2)
ALP SERPL-CCNC: 81 IU/L (ref 44–121)
ALT SERPL-CCNC: 37 IU/L (ref 0–32)
AST SERPL-CCNC: 40 IU/L (ref 0–40)
BASOPHILS # BLD AUTO: 0.1 X10E3/UL (ref 0–0.2)
BASOPHILS NFR BLD AUTO: 1 %
BILIRUB SERPL-MCNC: 0.4 MG/DL (ref 0–1.2)
BUN SERPL-MCNC: 15 MG/DL (ref 8–27)
BUN/CREAT SERPL: 24 (ref 12–28)
CALCIUM SERPL-MCNC: 9.5 MG/DL (ref 8.7–10.3)
CHLORIDE SERPL-SCNC: 96 MMOL/L (ref 96–106)
CHOLEST SERPL-MCNC: 203 MG/DL (ref 100–199)
CHOLEST/HDLC SERPL: 3.3 RATIO (ref 0–4.4)
CO2 SERPL-SCNC: 24 MMOL/L (ref 20–29)
CREAT SERPL-MCNC: 0.63 MG/DL (ref 0.57–1)
EOSINOPHIL # BLD AUTO: 0.3 X10E3/UL (ref 0–0.4)
EOSINOPHIL NFR BLD AUTO: 5 %
ERYTHROCYTE [DISTWIDTH] IN BLOOD BY AUTOMATED COUNT: 13.9 % (ref 11.7–15.4)
FERRITIN SERPL-MCNC: 13 NG/ML (ref 15–150)
GLOBULIN SER CALC-MCNC: 2.6 G/DL (ref 1.5–4.5)
GLUCOSE SERPL-MCNC: 181 MG/DL (ref 65–99)
HBA1C MFR BLD: 8.8 % (ref 4.8–5.6)
HCT VFR BLD AUTO: 37.2 % (ref 34–46.6)
HDLC SERPL-MCNC: 62 MG/DL
HGB BLD-MCNC: 12.1 G/DL (ref 11.1–15.9)
IMM GRANULOCYTES # BLD AUTO: 0 X10E3/UL (ref 0–0.1)
IMM GRANULOCYTES NFR BLD AUTO: 0 %
IRON SATN MFR SERPL: 14 % (ref 15–55)
IRON SERPL-MCNC: 59 UG/DL (ref 27–139)
LDLC SERPL CALC-MCNC: 108 MG/DL (ref 0–99)
LYMPHOCYTES # BLD AUTO: 2.5 X10E3/UL (ref 0.7–3.1)
LYMPHOCYTES NFR BLD AUTO: 42 %
MCH RBC QN AUTO: 26.1 PG (ref 26.6–33)
MCHC RBC AUTO-ENTMCNC: 32.5 G/DL (ref 31.5–35.7)
MCV RBC AUTO: 80 FL (ref 79–97)
MONOCYTES # BLD AUTO: 0.5 X10E3/UL (ref 0.1–0.9)
MONOCYTES NFR BLD AUTO: 8 %
NEUTROPHILS # BLD AUTO: 2.6 X10E3/UL (ref 1.4–7)
NEUTROPHILS NFR BLD AUTO: 44 %
PLATELET # BLD AUTO: 325 X10E3/UL (ref 150–450)
POTASSIUM SERPL-SCNC: 4.9 MMOL/L (ref 3.5–5.2)
PROT SERPL-MCNC: 7 G/DL (ref 6–8.5)
RBC # BLD AUTO: 4.63 X10E6/UL (ref 3.77–5.28)
SODIUM SERPL-SCNC: 136 MMOL/L (ref 134–144)
TIBC SERPL-MCNC: 412 UG/DL (ref 250–450)
TRIGL SERPL-MCNC: 195 MG/DL (ref 0–149)
TSH SERPL DL<=0.005 MIU/L-ACNC: 1.41 UIU/ML (ref 0.45–4.5)
UIBC SERPL-MCNC: 353 UG/DL (ref 118–369)
VLDLC SERPL CALC-MCNC: 33 MG/DL (ref 5–40)
WBC # BLD AUTO: 5.9 X10E3/UL (ref 3.4–10.8)

## 2021-11-30 ENCOUNTER — APPOINTMENT (OUTPATIENT)
Dept: GENERAL RADIOLOGY | Facility: HOSPITAL | Age: 71
End: 2021-11-30

## 2021-11-30 ENCOUNTER — OFFICE VISIT (OUTPATIENT)
Dept: INTERNAL MEDICINE | Facility: CLINIC | Age: 71
End: 2021-11-30

## 2021-11-30 ENCOUNTER — HOSPITAL ENCOUNTER (OUTPATIENT)
Facility: HOSPITAL | Age: 71
Discharge: HOME | End: 2021-12-01
Attending: EMERGENCY MEDICINE | Admitting: INTERNAL MEDICINE

## 2021-11-30 VITALS
RESPIRATION RATE: 22 BRPM | BODY MASS INDEX: 32.25 KG/M2 | HEIGHT: 63 IN | SYSTOLIC BLOOD PRESSURE: 154 MMHG | OXYGEN SATURATION: 95 % | TEMPERATURE: 98 F | WEIGHT: 182 LBS | HEART RATE: 110 BPM | DIASTOLIC BLOOD PRESSURE: 74 MMHG

## 2021-11-30 DIAGNOSIS — Z00.00 ENCOUNTER FOR ANNUAL WELLNESS VISIT (AWV) IN MEDICARE PATIENT: Primary | ICD-10-CM

## 2021-11-30 DIAGNOSIS — I10 ESSENTIAL HYPERTENSION: ICD-10-CM

## 2021-11-30 DIAGNOSIS — E78.2 MIXED HYPERLIPIDEMIA: ICD-10-CM

## 2021-11-30 DIAGNOSIS — Z00.00 HEALTHCARE MAINTENANCE: ICD-10-CM

## 2021-11-30 DIAGNOSIS — M81.0 AGE-RELATED OSTEOPOROSIS WITHOUT CURRENT PATHOLOGICAL FRACTURE: ICD-10-CM

## 2021-11-30 DIAGNOSIS — Z12.4 PAP SMEAR FOR CERVICAL CANCER SCREENING: ICD-10-CM

## 2021-11-30 DIAGNOSIS — F41.8 DEPRESSION WITH ANXIETY: ICD-10-CM

## 2021-11-30 DIAGNOSIS — E11.65 UNCONTROLLED TYPE 2 DIABETES MELLITUS WITH HYPERGLYCEMIA (HCC): ICD-10-CM

## 2021-11-30 DIAGNOSIS — S52.611G CLOSED DISPLACED FRACTURE OF STYLOID PROCESS OF RIGHT ULNA WITH DELAYED HEALING, SUBSEQUENT ENCOUNTER: ICD-10-CM

## 2021-11-30 DIAGNOSIS — E08.65 DIABETES MELLITUS DUE TO UNDERLYING CONDITION, UNCONTROLLED, WITH HYPERGLYCEMIA (HCC): ICD-10-CM

## 2021-11-30 DIAGNOSIS — K21.9 GASTROESOPHAGEAL REFLUX DISEASE WITHOUT ESOPHAGITIS: ICD-10-CM

## 2021-11-30 DIAGNOSIS — S52.501A CLOSED FRACTURE OF DISTAL END OF RIGHT RADIUS, UNSPECIFIED FRACTURE MORPHOLOGY, INITIAL ENCOUNTER: Primary | ICD-10-CM

## 2021-11-30 LAB
ANION GAP SERPL CALCULATED.3IONS-SCNC: 12.7 MMOL/L (ref 5–15)
APTT PPP: 31.6 SECONDS (ref 24.3–38.1)
BASOPHILS # BLD AUTO: 0.07 10*3/MM3 (ref 0–0.2)
BASOPHILS NFR BLD AUTO: 0.9 % (ref 0–1.5)
BILIRUB BLD-MCNC: NEGATIVE MG/DL
BUN SERPL-MCNC: 11 MG/DL (ref 8–23)
BUN/CREAT SERPL: 20.8 (ref 7–25)
CALCIUM SPEC-SCNC: 8.9 MG/DL (ref 8.6–10.5)
CHLORIDE SERPL-SCNC: 98 MMOL/L (ref 98–107)
CLARITY, POC: CLEAR
CO2 SERPL-SCNC: 23.3 MMOL/L (ref 22–29)
COLOR UR: NORMAL
CREAT SERPL-MCNC: 0.53 MG/DL (ref 0.57–1)
DEPRECATED RDW RBC AUTO: 41.4 FL (ref 37–54)
EOSINOPHIL # BLD AUTO: 0.25 10*3/MM3 (ref 0–0.4)
EOSINOPHIL NFR BLD AUTO: 3.2 % (ref 0.3–6.2)
ERYTHROCYTE [DISTWIDTH] IN BLOOD BY AUTOMATED COUNT: 13.9 % (ref 12.3–15.4)
EXPIRATION DATE: NORMAL
GFR SERPL CREATININE-BSD FRML MDRD: 113 ML/MIN/1.73
GLUCOSE SERPL-MCNC: 204 MG/DL (ref 65–99)
GLUCOSE UR STRIP-MCNC: NEGATIVE MG/DL
HCT VFR BLD AUTO: 37 % (ref 34–46.6)
HGB BLD-MCNC: 11.8 G/DL (ref 12–15.9)
IMM GRANULOCYTES # BLD AUTO: 0.02 10*3/MM3 (ref 0–0.05)
IMM GRANULOCYTES NFR BLD AUTO: 0.3 % (ref 0–0.5)
INR PPP: 0.95 (ref 0.9–1.1)
KETONES UR QL: NEGATIVE
LEUKOCYTE EST, POC: NEGATIVE
LYMPHOCYTES # BLD AUTO: 2.5 10*3/MM3 (ref 0.7–3.1)
LYMPHOCYTES NFR BLD AUTO: 31.8 % (ref 19.6–45.3)
Lab: NORMAL
MAGNESIUM SERPL-MCNC: 1.7 MG/DL (ref 1.6–2.4)
MCH RBC QN AUTO: 26.5 PG (ref 26.6–33)
MCHC RBC AUTO-ENTMCNC: 31.9 G/DL (ref 31.5–35.7)
MCV RBC AUTO: 83 FL (ref 79–97)
MONOCYTES # BLD AUTO: 0.64 10*3/MM3 (ref 0.1–0.9)
MONOCYTES NFR BLD AUTO: 8.1 % (ref 5–12)
NEUTROPHILS NFR BLD AUTO: 4.38 10*3/MM3 (ref 1.7–7)
NEUTROPHILS NFR BLD AUTO: 55.7 % (ref 42.7–76)
NITRITE UR-MCNC: NEGATIVE MG/ML
NRBC BLD AUTO-RTO: 0 /100 WBC (ref 0–0.2)
PH UR: 5.5 [PH] (ref 5–8)
PHOSPHATE SERPL-MCNC: 3.3 MG/DL (ref 2.5–4.5)
PLATELET # BLD AUTO: 337 10*3/MM3 (ref 140–450)
PMV BLD AUTO: 9.9 FL (ref 6–12)
POTASSIUM SERPL-SCNC: 4.6 MMOL/L (ref 3.5–5.2)
PROT UR STRIP-MCNC: NEGATIVE MG/DL
PROTHROMBIN TIME: 12.9 SECONDS (ref 12.1–15)
RBC # BLD AUTO: 4.46 10*6/MM3 (ref 3.77–5.28)
RBC # UR STRIP: NEGATIVE /UL
SARS-COV-2 RNA PNL SPEC NAA+PROBE: NOT DETECTED
SODIUM SERPL-SCNC: 134 MMOL/L (ref 136–145)
SP GR UR: 1.03 (ref 1–1.03)
UROBILINOGEN UR QL: NORMAL
WBC NRBC COR # BLD: 7.86 10*3/MM3 (ref 3.4–10.8)

## 2021-11-30 PROCEDURE — G0378 HOSPITAL OBSERVATION PER HR: HCPCS

## 2021-11-30 PROCEDURE — 99285 EMERGENCY DEPT VISIT HI MDM: CPT | Performed by: PHYSICIAN ASSISTANT

## 2021-11-30 PROCEDURE — 1126F AMNT PAIN NOTED NONE PRSNT: CPT | Performed by: NURSE PRACTITIONER

## 2021-11-30 PROCEDURE — 93005 ELECTROCARDIOGRAM TRACING: CPT | Performed by: PHYSICIAN ASSISTANT

## 2021-11-30 PROCEDURE — 1160F RVW MEDS BY RX/DR IN RCRD: CPT | Performed by: NURSE PRACTITIONER

## 2021-11-30 PROCEDURE — G0439 PPPS, SUBSEQ VISIT: HCPCS | Performed by: NURSE PRACTITIONER

## 2021-11-30 PROCEDURE — 84100 ASSAY OF PHOSPHORUS: CPT | Performed by: STUDENT IN AN ORGANIZED HEALTH CARE EDUCATION/TRAINING PROGRAM

## 2021-11-30 PROCEDURE — 93010 ELECTROCARDIOGRAM REPORT: CPT | Performed by: INTERNAL MEDICINE

## 2021-11-30 PROCEDURE — 85025 COMPLETE CBC W/AUTO DIFF WBC: CPT | Performed by: PHYSICIAN ASSISTANT

## 2021-11-30 PROCEDURE — 94640 AIRWAY INHALATION TREATMENT: CPT

## 2021-11-30 PROCEDURE — 83735 ASSAY OF MAGNESIUM: CPT | Performed by: STUDENT IN AN ORGANIZED HEALTH CARE EDUCATION/TRAINING PROGRAM

## 2021-11-30 PROCEDURE — 99283 EMERGENCY DEPT VISIT LOW MDM: CPT

## 2021-11-30 PROCEDURE — 73110 X-RAY EXAM OF WRIST: CPT

## 2021-11-30 PROCEDURE — 85610 PROTHROMBIN TIME: CPT | Performed by: ORTHOPAEDIC SURGERY

## 2021-11-30 PROCEDURE — C9803 HOPD COVID-19 SPEC COLLECT: HCPCS

## 2021-11-30 PROCEDURE — 80048 BASIC METABOLIC PNL TOTAL CA: CPT | Performed by: PHYSICIAN ASSISTANT

## 2021-11-30 PROCEDURE — 36415 COLL VENOUS BLD VENIPUNCTURE: CPT

## 2021-11-30 PROCEDURE — 81003 URINALYSIS AUTO W/O SCOPE: CPT | Performed by: NURSE PRACTITIONER

## 2021-11-30 PROCEDURE — 99214 OFFICE O/P EST MOD 30 MIN: CPT | Performed by: NURSE PRACTITIONER

## 2021-11-30 PROCEDURE — 1170F FXNL STATUS ASSESSED: CPT | Performed by: NURSE PRACTITIONER

## 2021-11-30 PROCEDURE — 87635 SARS-COV-2 COVID-19 AMP PRB: CPT | Performed by: EMERGENCY MEDICINE

## 2021-11-30 PROCEDURE — 99219 PR INITIAL OBSERVATION CARE/DAY 50 MINUTES: CPT | Performed by: STUDENT IN AN ORGANIZED HEALTH CARE EDUCATION/TRAINING PROGRAM

## 2021-11-30 PROCEDURE — 85730 THROMBOPLASTIN TIME PARTIAL: CPT | Performed by: ORTHOPAEDIC SURGERY

## 2021-11-30 RX ORDER — AMOXICILLIN 250 MG
2 CAPSULE ORAL 2 TIMES DAILY
Status: DISCONTINUED | OUTPATIENT
Start: 2021-11-30 | End: 2021-12-02 | Stop reason: HOSPADM

## 2021-11-30 RX ORDER — METOPROLOL SUCCINATE 25 MG/1
25 TABLET, EXTENDED RELEASE ORAL DAILY
Status: DISCONTINUED | OUTPATIENT
Start: 2021-12-01 | End: 2021-12-02 | Stop reason: HOSPADM

## 2021-11-30 RX ORDER — VENLAFAXINE HYDROCHLORIDE 37.5 MG/1
37.5 CAPSULE, EXTENDED RELEASE ORAL DAILY
Status: DISCONTINUED | OUTPATIENT
Start: 2021-12-01 | End: 2021-12-02 | Stop reason: HOSPADM

## 2021-11-30 RX ORDER — BISACODYL 5 MG/1
5 TABLET, DELAYED RELEASE ORAL DAILY PRN
Status: DISCONTINUED | OUTPATIENT
Start: 2021-11-30 | End: 2021-12-02 | Stop reason: HOSPADM

## 2021-11-30 RX ORDER — ACETAMINOPHEN 325 MG/1
650 TABLET ORAL EVERY 4 HOURS PRN
Status: DISCONTINUED | OUTPATIENT
Start: 2021-11-30 | End: 2021-12-02 | Stop reason: HOSPADM

## 2021-11-30 RX ORDER — BUDESONIDE AND FORMOTEROL FUMARATE DIHYDRATE 160; 4.5 UG/1; UG/1
2 AEROSOL RESPIRATORY (INHALATION)
Status: DISCONTINUED | OUTPATIENT
Start: 2021-11-30 | End: 2021-12-02 | Stop reason: HOSPADM

## 2021-11-30 RX ORDER — BISACODYL 10 MG
10 SUPPOSITORY, RECTAL RECTAL DAILY PRN
Status: DISCONTINUED | OUTPATIENT
Start: 2021-11-30 | End: 2021-12-02 | Stop reason: HOSPADM

## 2021-11-30 RX ORDER — ONDANSETRON 2 MG/ML
4 INJECTION INTRAMUSCULAR; INTRAVENOUS EVERY 6 HOURS PRN
Status: DISCONTINUED | OUTPATIENT
Start: 2021-11-30 | End: 2021-12-01 | Stop reason: SDUPTHER

## 2021-11-30 RX ORDER — SODIUM CHLORIDE 0.9 % (FLUSH) 0.9 %
10 SYRINGE (ML) INJECTION AS NEEDED
Status: DISCONTINUED | OUTPATIENT
Start: 2021-11-30 | End: 2021-12-02 | Stop reason: HOSPADM

## 2021-11-30 RX ORDER — OXYCODONE HYDROCHLORIDE 5 MG/1
5 TABLET ORAL EVERY 4 HOURS PRN
Status: DISCONTINUED | OUTPATIENT
Start: 2021-11-30 | End: 2021-12-02 | Stop reason: HOSPADM

## 2021-11-30 RX ORDER — FLUTICASONE PROPIONATE 50 MCG
2 SPRAY, SUSPENSION (ML) NASAL DAILY
Status: DISCONTINUED | OUTPATIENT
Start: 2021-12-01 | End: 2021-12-02 | Stop reason: HOSPADM

## 2021-11-30 RX ORDER — SODIUM CHLORIDE 9 MG/ML
100 INJECTION, SOLUTION INTRAVENOUS CONTINUOUS
Status: DISCONTINUED | OUTPATIENT
Start: 2021-12-01 | End: 2021-12-02 | Stop reason: HOSPADM

## 2021-11-30 RX ORDER — SODIUM CHLORIDE 9 MG/ML
40 INJECTION, SOLUTION INTRAVENOUS AS NEEDED
Status: DISCONTINUED | OUTPATIENT
Start: 2021-11-30 | End: 2021-12-02 | Stop reason: HOSPADM

## 2021-11-30 RX ORDER — CHOLECALCIFEROL (VITAMIN D3) 125 MCG
5 CAPSULE ORAL NIGHTLY PRN
Status: DISCONTINUED | OUTPATIENT
Start: 2021-11-30 | End: 2021-12-02 | Stop reason: HOSPADM

## 2021-11-30 RX ORDER — SODIUM CHLORIDE 0.9 % (FLUSH) 0.9 %
10 SYRINGE (ML) INJECTION EVERY 12 HOURS SCHEDULED
Status: DISCONTINUED | OUTPATIENT
Start: 2021-11-30 | End: 2021-12-02 | Stop reason: HOSPADM

## 2021-11-30 RX ORDER — POLYETHYLENE GLYCOL 3350 17 G/17G
17 POWDER, FOR SOLUTION ORAL DAILY PRN
Status: DISCONTINUED | OUTPATIENT
Start: 2021-11-30 | End: 2021-12-02 | Stop reason: HOSPADM

## 2021-11-30 RX ORDER — PANTOPRAZOLE SODIUM 40 MG/1
40 TABLET, DELAYED RELEASE ORAL DAILY
Status: DISCONTINUED | OUTPATIENT
Start: 2021-12-01 | End: 2021-12-02 | Stop reason: HOSPADM

## 2021-11-30 RX ORDER — HYDROCODONE BITARTRATE AND ACETAMINOPHEN 5; 325 MG/1; MG/1
1 TABLET ORAL ONCE
Status: COMPLETED | OUTPATIENT
Start: 2021-11-30 | End: 2021-11-30

## 2021-11-30 RX ADMIN — HYDROCODONE BITARTRATE AND ACETAMINOPHEN 1 TABLET: 5; 325 TABLET ORAL at 17:16

## 2021-11-30 RX ADMIN — SODIUM CHLORIDE, PRESERVATIVE FREE 10 ML: 5 INJECTION INTRAVENOUS at 21:58

## 2021-11-30 RX ADMIN — BUDESONIDE AND FORMOTEROL FUMARATE DIHYDRATE 2 PUFF: 160; 4.5 AEROSOL RESPIRATORY (INHALATION) at 22:06

## 2021-11-30 RX ADMIN — OXYCODONE HYDROCHLORIDE 5 MG: 5 TABLET ORAL at 21:55

## 2021-12-01 ENCOUNTER — ANESTHESIA EVENT (OUTPATIENT)
Dept: PERIOP | Facility: HOSPITAL | Age: 71
End: 2021-12-01

## 2021-12-01 ENCOUNTER — APPOINTMENT (OUTPATIENT)
Dept: GENERAL RADIOLOGY | Facility: HOSPITAL | Age: 71
End: 2021-12-01

## 2021-12-01 ENCOUNTER — ANESTHESIA (OUTPATIENT)
Dept: PERIOP | Facility: HOSPITAL | Age: 71
End: 2021-12-01

## 2021-12-01 VITALS
BODY MASS INDEX: 30.48 KG/M2 | HEART RATE: 88 BPM | RESPIRATION RATE: 16 BRPM | WEIGHT: 172 LBS | HEIGHT: 63 IN | SYSTOLIC BLOOD PRESSURE: 152 MMHG | OXYGEN SATURATION: 94 % | DIASTOLIC BLOOD PRESSURE: 63 MMHG | TEMPERATURE: 98.2 F

## 2021-12-01 DIAGNOSIS — F41.8 DEPRESSION WITH ANXIETY: ICD-10-CM

## 2021-12-01 PROBLEM — S52.501A CLOSED FRACTURE OF RIGHT DISTAL RADIUS: Status: RESOLVED | Noted: 2021-11-30 | Resolved: 2021-12-01

## 2021-12-01 LAB
ANION GAP SERPL CALCULATED.3IONS-SCNC: 10.9 MMOL/L (ref 5–15)
BASOPHILS # BLD AUTO: 0.06 10*3/MM3 (ref 0–0.2)
BASOPHILS NFR BLD AUTO: 0.8 % (ref 0–1.5)
BUN SERPL-MCNC: 9 MG/DL (ref 8–23)
BUN/CREAT SERPL: 18.4 (ref 7–25)
CALCIUM SPEC-SCNC: 8.6 MG/DL (ref 8.6–10.5)
CHLORIDE SERPL-SCNC: 101 MMOL/L (ref 98–107)
CO2 SERPL-SCNC: 25.1 MMOL/L (ref 22–29)
CREAT SERPL-MCNC: 0.49 MG/DL (ref 0.57–1)
DEPRECATED RDW RBC AUTO: 41.2 FL (ref 37–54)
EOSINOPHIL # BLD AUTO: 0.32 10*3/MM3 (ref 0–0.4)
EOSINOPHIL NFR BLD AUTO: 4.4 % (ref 0.3–6.2)
ERYTHROCYTE [DISTWIDTH] IN BLOOD BY AUTOMATED COUNT: 13.9 % (ref 12.3–15.4)
GFR SERPL CREATININE-BSD FRML MDRD: 124 ML/MIN/1.73
GLUCOSE BLDC GLUCOMTR-MCNC: 140 MG/DL (ref 70–130)
GLUCOSE BLDC GLUCOMTR-MCNC: 265 MG/DL (ref 70–130)
GLUCOSE SERPL-MCNC: 193 MG/DL (ref 65–99)
HCT VFR BLD AUTO: 33.6 % (ref 34–46.6)
HGB BLD-MCNC: 10.9 G/DL (ref 12–15.9)
IMM GRANULOCYTES # BLD AUTO: 0.01 10*3/MM3 (ref 0–0.05)
IMM GRANULOCYTES NFR BLD AUTO: 0.1 % (ref 0–0.5)
INR PPP: 0.99 (ref 0.9–1.1)
LYMPHOCYTES # BLD AUTO: 2.96 10*3/MM3 (ref 0.7–3.1)
LYMPHOCYTES NFR BLD AUTO: 40.5 % (ref 19.6–45.3)
MAGNESIUM SERPL-MCNC: 1.7 MG/DL (ref 1.6–2.4)
MCH RBC QN AUTO: 26.7 PG (ref 26.6–33)
MCHC RBC AUTO-ENTMCNC: 32.4 G/DL (ref 31.5–35.7)
MCV RBC AUTO: 82.2 FL (ref 79–97)
MONOCYTES # BLD AUTO: 0.69 10*3/MM3 (ref 0.1–0.9)
MONOCYTES NFR BLD AUTO: 9.5 % (ref 5–12)
NEUTROPHILS NFR BLD AUTO: 3.26 10*3/MM3 (ref 1.7–7)
NEUTROPHILS NFR BLD AUTO: 44.7 % (ref 42.7–76)
NRBC BLD AUTO-RTO: 0 /100 WBC (ref 0–0.2)
PHOSPHATE SERPL-MCNC: 4.3 MG/DL (ref 2.5–4.5)
PLATELET # BLD AUTO: 308 10*3/MM3 (ref 140–450)
PMV BLD AUTO: 10.1 FL (ref 6–12)
POTASSIUM SERPL-SCNC: 3.8 MMOL/L (ref 3.5–5.2)
PROTHROMBIN TIME: 13.3 SECONDS (ref 12.1–15)
QT INTERVAL: 347 MS
RBC # BLD AUTO: 4.09 10*6/MM3 (ref 3.77–5.28)
SODIUM SERPL-SCNC: 137 MMOL/L (ref 136–145)
WBC NRBC COR # BLD: 7.3 10*3/MM3 (ref 3.4–10.8)

## 2021-12-01 PROCEDURE — 80048 BASIC METABOLIC PNL TOTAL CA: CPT | Performed by: STUDENT IN AN ORGANIZED HEALTH CARE EDUCATION/TRAINING PROGRAM

## 2021-12-01 PROCEDURE — C1713 ANCHOR/SCREW BN/BN,TIS/BN: HCPCS | Performed by: ORTHOPAEDIC SURGERY

## 2021-12-01 PROCEDURE — 99214 OFFICE O/P EST MOD 30 MIN: CPT | Performed by: ORTHOPAEDIC SURGERY

## 2021-12-01 PROCEDURE — A9270 NON-COVERED ITEM OR SERVICE: HCPCS | Performed by: ORTHOPAEDIC SURGERY

## 2021-12-01 PROCEDURE — 82962 GLUCOSE BLOOD TEST: CPT

## 2021-12-01 PROCEDURE — 25010000002 PHENYLEPHRINE 10 MG/ML SOLUTION: Performed by: REGISTERED NURSE

## 2021-12-01 PROCEDURE — 63710000001 MELOXICAM 7.5 MG TABLET: Performed by: ORTHOPAEDIC SURGERY

## 2021-12-01 PROCEDURE — 63710000001 ACETAMINOPHEN 500 MG TABLET: Performed by: ORTHOPAEDIC SURGERY

## 2021-12-01 PROCEDURE — 0 BUPIVACAINE LIPOSOME 1.3 % SUSPENSION: Performed by: ORTHOPAEDIC SURGERY

## 2021-12-01 PROCEDURE — 25010000002 VANCOMYCIN 750 MG RECONSTITUTED SOLUTION 1 EACH VIAL: Performed by: ORTHOPAEDIC SURGERY

## 2021-12-01 PROCEDURE — 94799 UNLISTED PULMONARY SVC/PX: CPT

## 2021-12-01 PROCEDURE — G0378 HOSPITAL OBSERVATION PER HR: HCPCS

## 2021-12-01 PROCEDURE — 25010000002 DEXAMETHASONE PER 1 MG: Performed by: NURSE ANESTHETIST, CERTIFIED REGISTERED

## 2021-12-01 PROCEDURE — 25010000002 PROPOFOL 10 MG/ML EMULSION: Performed by: REGISTERED NURSE

## 2021-12-01 PROCEDURE — 63710000001 INSULIN ASPART PER 5 UNITS: Performed by: NURSE PRACTITIONER

## 2021-12-01 PROCEDURE — 25010000002 VANCOMYCIN PER 500 MG: Performed by: ORTHOPAEDIC SURGERY

## 2021-12-01 PROCEDURE — 73110 X-RAY EXAM OF WRIST: CPT

## 2021-12-01 PROCEDURE — 0 MEPIVACAINE HCL (PF) 1.5 % SOLUTION: Performed by: ANESTHESIOLOGY

## 2021-12-01 PROCEDURE — 85610 PROTHROMBIN TIME: CPT | Performed by: STUDENT IN AN ORGANIZED HEALTH CARE EDUCATION/TRAINING PROGRAM

## 2021-12-01 PROCEDURE — 25010000002 MIDAZOLAM PER 1MG: Performed by: ANESTHESIOLOGY

## 2021-12-01 PROCEDURE — 63710000001 SENNOSIDES-DOCUSATE 8.6-50 MG TABLET: Performed by: ORTHOPAEDIC SURGERY

## 2021-12-01 PROCEDURE — 25010000002 FENTANYL CITRATE (PF) 50 MCG/ML SOLUTION: Performed by: REGISTERED NURSE

## 2021-12-01 PROCEDURE — 63710000001: Performed by: ORTHOPAEDIC SURGERY

## 2021-12-01 PROCEDURE — 94761 N-INVAS EAR/PLS OXIMETRY MLT: CPT

## 2021-12-01 PROCEDURE — 76000 FLUOROSCOPY <1 HR PHYS/QHP: CPT

## 2021-12-01 PROCEDURE — 99217 PR OBSERVATION CARE DISCHARGE MANAGEMENT: CPT | Performed by: NURSE PRACTITIONER

## 2021-12-01 PROCEDURE — 84100 ASSAY OF PHOSPHORUS: CPT | Performed by: STUDENT IN AN ORGANIZED HEALTH CARE EDUCATION/TRAINING PROGRAM

## 2021-12-01 PROCEDURE — 25609 OPTX DST RD XART FX/EP SEP3+: CPT | Performed by: ORTHOPAEDIC SURGERY

## 2021-12-01 PROCEDURE — C1889 IMPLANT/INSERT DEVICE, NOC: HCPCS | Performed by: ORTHOPAEDIC SURGERY

## 2021-12-01 PROCEDURE — 96360 HYDRATION IV INFUSION INIT: CPT

## 2021-12-01 PROCEDURE — 83735 ASSAY OF MAGNESIUM: CPT | Performed by: STUDENT IN AN ORGANIZED HEALTH CARE EDUCATION/TRAINING PROGRAM

## 2021-12-01 PROCEDURE — 85025 COMPLETE CBC W/AUTO DIFF WBC: CPT | Performed by: STUDENT IN AN ORGANIZED HEALTH CARE EDUCATION/TRAINING PROGRAM

## 2021-12-01 PROCEDURE — 25010000002 ONDANSETRON PER 1 MG: Performed by: NURSE ANESTHETIST, CERTIFIED REGISTERED

## 2021-12-01 PROCEDURE — C9290 INJ, BUPIVACAINE LIPOSOME: HCPCS | Performed by: ORTHOPAEDIC SURGERY

## 2021-12-01 PROCEDURE — 96361 HYDRATE IV INFUSION ADD-ON: CPT

## 2021-12-01 PROCEDURE — 63710000001 PREGABALIN 75 MG CAPSULE: Performed by: ORTHOPAEDIC SURGERY

## 2021-12-01 DEVICE — SCRW DVR NL 2.7X14MM: Type: IMPLANTABLE DEVICE | Site: WRIST | Status: FUNCTIONAL

## 2021-12-01 DEVICE — SCRW DVR NL 2.7X13MM: Type: IMPLANTABLE DEVICE | Site: WRIST | Status: FUNCTIONAL

## 2021-12-01 DEVICE — SCRW DVR LK 2.7X18MM: Type: IMPLANTABLE DEVICE | Site: WRIST | Status: FUNCTIONAL

## 2021-12-01 DEVICE — DEV CONTRL TISS STRATAFIX SPIRAL MNCRYL UD 3/0 PLS 45CM: Type: IMPLANTABLE DEVICE | Site: WRIST | Status: FUNCTIONAL

## 2021-12-01 DEVICE — SCRW DVR LK 2.7X14MM: Type: IMPLANTABLE DEVICE | Site: WRIST | Status: FUNCTIONAL

## 2021-12-01 DEVICE — OPTIUM DBM PUTTY
Type: IMPLANTABLE DEVICE | Site: WRIST | Status: FUNCTIONAL
Brand: OPTIUM®

## 2021-12-01 DEVICE — SCRW DVR LK 2.7X22MM: Type: IMPLANTABLE DEVICE | Site: WRIST | Status: FUNCTIONAL

## 2021-12-01 DEVICE — PLT DVR CROSSLK NRW MINI 22X41MM RT: Type: IMPLANTABLE DEVICE | Site: WRIST | Status: FUNCTIONAL

## 2021-12-01 DEVICE — SCRW DVR LK 2.7X20MM: Type: IMPLANTABLE DEVICE | Site: WRIST | Status: FUNCTIONAL

## 2021-12-01 DEVICE — SCRW DVR M/DIR 2.7X18MM: Type: IMPLANTABLE DEVICE | Site: WRIST | Status: FUNCTIONAL

## 2021-12-01 DEVICE — SCRW DVR M/DIR 2.7X20MM: Type: IMPLANTABLE DEVICE | Site: WRIST | Status: FUNCTIONAL

## 2021-12-01 RX ORDER — KETAMINE HYDROCHLORIDE 100 MG/ML
INJECTION INTRAMUSCULAR; INTRAVENOUS AS NEEDED
Status: DISCONTINUED | OUTPATIENT
Start: 2021-12-01 | End: 2021-12-01 | Stop reason: SURG

## 2021-12-01 RX ORDER — BISACODYL 10 MG
10 SUPPOSITORY, RECTAL RECTAL DAILY PRN
Status: CANCELLED
Start: 2021-12-01

## 2021-12-01 RX ORDER — SODIUM CHLORIDE, SODIUM LACTATE, POTASSIUM CHLORIDE, CALCIUM CHLORIDE 600; 310; 30; 20 MG/100ML; MG/100ML; MG/100ML; MG/100ML
9 INJECTION, SOLUTION INTRAVENOUS CONTINUOUS PRN
Status: DISCONTINUED | OUTPATIENT
Start: 2021-12-01 | End: 2021-12-01 | Stop reason: HOSPADM

## 2021-12-01 RX ORDER — VENLAFAXINE HYDROCHLORIDE 75 MG/1
CAPSULE, EXTENDED RELEASE ORAL
Qty: 90 CAPSULE | Refills: 3 | OUTPATIENT
Start: 2021-12-01

## 2021-12-01 RX ORDER — FAMOTIDINE 10 MG/ML
20 INJECTION, SOLUTION INTRAVENOUS
Status: COMPLETED | OUTPATIENT
Start: 2021-12-01 | End: 2021-12-01

## 2021-12-01 RX ORDER — ONDANSETRON 2 MG/ML
4 INJECTION INTRAMUSCULAR; INTRAVENOUS EVERY 6 HOURS PRN
Status: DISCONTINUED | OUTPATIENT
Start: 2021-12-01 | End: 2021-12-02 | Stop reason: HOSPADM

## 2021-12-01 RX ORDER — POLYETHYLENE GLYCOL 3350 17 G/17G
17 POWDER, FOR SOLUTION ORAL DAILY PRN
Status: CANCELLED
Start: 2021-12-01

## 2021-12-01 RX ORDER — AMOXICILLIN 250 MG
2 CAPSULE ORAL 2 TIMES DAILY
Status: CANCELLED
Start: 2021-12-01

## 2021-12-01 RX ORDER — SODIUM CHLORIDE, SODIUM LACTATE, POTASSIUM CHLORIDE, CALCIUM CHLORIDE 600; 310; 30; 20 MG/100ML; MG/100ML; MG/100ML; MG/100ML
100 INJECTION, SOLUTION INTRAVENOUS CONTINUOUS
Status: DISCONTINUED | OUTPATIENT
Start: 2021-12-01 | End: 2021-12-02 | Stop reason: HOSPADM

## 2021-12-01 RX ORDER — BUPIVACAINE HYDROCHLORIDE 5 MG/ML
INJECTION, SOLUTION EPIDURAL; INTRACAUDAL
Status: COMPLETED | OUTPATIENT
Start: 2021-12-01 | End: 2021-12-01

## 2021-12-01 RX ORDER — PROPOFOL 10 MG/ML
VIAL (ML) INTRAVENOUS AS NEEDED
Status: DISCONTINUED | OUTPATIENT
Start: 2021-12-01 | End: 2021-12-01 | Stop reason: SURG

## 2021-12-01 RX ORDER — TRAMADOL HYDROCHLORIDE 50 MG/1
50-100 TABLET ORAL EVERY 4 HOURS PRN
Qty: 50 TABLET | Refills: 0 | Status: SHIPPED | OUTPATIENT
Start: 2021-12-01 | End: 2021-12-07

## 2021-12-01 RX ORDER — ONDANSETRON 2 MG/ML
4 INJECTION INTRAMUSCULAR; INTRAVENOUS ONCE AS NEEDED
Status: DISCONTINUED | OUTPATIENT
Start: 2021-12-01 | End: 2021-12-01 | Stop reason: HOSPADM

## 2021-12-01 RX ORDER — MAGNESIUM HYDROXIDE 1200 MG/15ML
LIQUID ORAL AS NEEDED
Status: DISCONTINUED | OUTPATIENT
Start: 2021-12-01 | End: 2021-12-01 | Stop reason: HOSPADM

## 2021-12-01 RX ORDER — MIDAZOLAM HYDROCHLORIDE 2 MG/2ML
0.5 INJECTION, SOLUTION INTRAMUSCULAR; INTRAVENOUS
Status: DISCONTINUED | OUTPATIENT
Start: 2021-12-01 | End: 2021-12-01 | Stop reason: HOSPADM

## 2021-12-01 RX ORDER — FENTANYL CITRATE 50 UG/ML
25 INJECTION, SOLUTION INTRAMUSCULAR; INTRAVENOUS
Status: DISCONTINUED | OUTPATIENT
Start: 2021-12-01 | End: 2021-12-01 | Stop reason: HOSPADM

## 2021-12-01 RX ORDER — ACETAMINOPHEN 500 MG
1000 TABLET ORAL ONCE
Status: COMPLETED | OUTPATIENT
Start: 2021-12-01 | End: 2021-12-01

## 2021-12-01 RX ORDER — NICOTINE POLACRILEX 4 MG
15 LOZENGE BUCCAL
Status: DISCONTINUED | OUTPATIENT
Start: 2021-12-01 | End: 2021-12-02 | Stop reason: HOSPADM

## 2021-12-01 RX ORDER — PHENYLEPHRINE HYDROCHLORIDE 10 MG/ML
INJECTION INTRAVENOUS AS NEEDED
Status: DISCONTINUED | OUTPATIENT
Start: 2021-12-01 | End: 2021-12-01 | Stop reason: SURG

## 2021-12-01 RX ORDER — SODIUM CHLORIDE 9 MG/ML
100 INJECTION, SOLUTION INTRAVENOUS CONTINUOUS
Status: DISCONTINUED | OUTPATIENT
Start: 2021-12-01 | End: 2021-12-02 | Stop reason: HOSPADM

## 2021-12-01 RX ORDER — OXYCODONE AND ACETAMINOPHEN 7.5; 325 MG/1; MG/1
1 TABLET ORAL ONCE AS NEEDED
Status: DISCONTINUED | OUTPATIENT
Start: 2021-12-01 | End: 2021-12-01 | Stop reason: HOSPADM

## 2021-12-01 RX ORDER — BISACODYL 5 MG/1
5 TABLET, DELAYED RELEASE ORAL DAILY PRN
Status: CANCELLED
Start: 2021-12-01

## 2021-12-01 RX ORDER — DEXMEDETOMIDINE HYDROCHLORIDE 100 UG/ML
INJECTION, SOLUTION INTRAVENOUS AS NEEDED
Status: DISCONTINUED | OUTPATIENT
Start: 2021-12-01 | End: 2021-12-01 | Stop reason: SURG

## 2021-12-01 RX ORDER — TRIAMCINOLONE ACETONIDE 1 MG/G
1 CREAM TOPICAL EVERY 12 HOURS SCHEDULED
Status: DISCONTINUED | OUTPATIENT
Start: 2021-12-01 | End: 2021-12-02 | Stop reason: HOSPADM

## 2021-12-01 RX ORDER — MELOXICAM 7.5 MG/1
15 TABLET ORAL ONCE
Status: COMPLETED | OUTPATIENT
Start: 2021-12-01 | End: 2021-12-01

## 2021-12-01 RX ORDER — PREGABALIN 75 MG/1
150 CAPSULE ORAL ONCE
Status: COMPLETED | OUTPATIENT
Start: 2021-12-01 | End: 2021-12-01

## 2021-12-01 RX ORDER — DEXTROSE MONOHYDRATE 25 G/50ML
25 INJECTION, SOLUTION INTRAVENOUS
Status: DISCONTINUED | OUTPATIENT
Start: 2021-12-01 | End: 2021-12-02 | Stop reason: HOSPADM

## 2021-12-01 RX ORDER — GLYCOPYRROLATE 0.2 MG/ML
INJECTION INTRAMUSCULAR; INTRAVENOUS AS NEEDED
Status: DISCONTINUED | OUTPATIENT
Start: 2021-12-01 | End: 2021-12-01 | Stop reason: SURG

## 2021-12-01 RX ORDER — DEXAMETHASONE SODIUM PHOSPHATE 4 MG/ML
8 INJECTION, SOLUTION INTRA-ARTICULAR; INTRALESIONAL; INTRAMUSCULAR; INTRAVENOUS; SOFT TISSUE ONCE AS NEEDED
Status: COMPLETED | OUTPATIENT
Start: 2021-12-01 | End: 2021-12-01

## 2021-12-01 RX ORDER — FENTANYL CITRATE 50 UG/ML
INJECTION, SOLUTION INTRAMUSCULAR; INTRAVENOUS AS NEEDED
Status: DISCONTINUED | OUTPATIENT
Start: 2021-12-01 | End: 2021-12-01 | Stop reason: SURG

## 2021-12-01 RX ORDER — ACETAMINOPHEN 325 MG/1
650 TABLET ORAL EVERY 4 HOURS PRN
Status: CANCELLED
Start: 2021-12-01

## 2021-12-01 RX ORDER — CEFAZOLIN SODIUM 2 G/50ML
2 SOLUTION INTRAVENOUS EVERY 8 HOURS
Status: DISCONTINUED | OUTPATIENT
Start: 2021-12-02 | End: 2021-12-02 | Stop reason: HOSPADM

## 2021-12-01 RX ORDER — ONDANSETRON 2 MG/ML
4 INJECTION INTRAMUSCULAR; INTRAVENOUS ONCE AS NEEDED
Status: COMPLETED | OUTPATIENT
Start: 2021-12-01 | End: 2021-12-01

## 2021-12-01 RX ADMIN — PANTOPRAZOLE SODIUM 40 MG: 40 TABLET, DELAYED RELEASE ORAL at 07:29

## 2021-12-01 RX ADMIN — SENNOSIDES AND DOCUSATE SODIUM 2 TABLET: 50; 8.6 TABLET ORAL at 21:26

## 2021-12-01 RX ADMIN — OXYCODONE HYDROCHLORIDE 5 MG: 5 TABLET ORAL at 02:52

## 2021-12-01 RX ADMIN — METOPROLOL SUCCINATE 25 MG: 25 TABLET, EXTENDED RELEASE ORAL at 07:29

## 2021-12-01 RX ADMIN — TRIAMCINOLONE ACETONIDE 1 APPLICATION: 1 CREAM TOPICAL at 21:28

## 2021-12-01 RX ADMIN — ONDANSETRON 4 MG: 2 INJECTION INTRAMUSCULAR; INTRAVENOUS at 16:38

## 2021-12-01 RX ADMIN — MELOXICAM 15 MG: 7.5 TABLET ORAL at 15:38

## 2021-12-01 RX ADMIN — SODIUM CHLORIDE 100 ML/HR: 9 INJECTION, SOLUTION INTRAVENOUS at 10:00

## 2021-12-01 RX ADMIN — KETAMINE HYDROCHLORIDE 20 MG: 100 INJECTION INTRAMUSCULAR; INTRAVENOUS at 17:24

## 2021-12-01 RX ADMIN — SODIUM CHLORIDE, POTASSIUM CHLORIDE, SODIUM LACTATE AND CALCIUM CHLORIDE 9 ML/HR: 600; 310; 30; 20 INJECTION, SOLUTION INTRAVENOUS at 15:52

## 2021-12-01 RX ADMIN — PHENYLEPHRINE HYDROCHLORIDE 100 MCG: 10 INJECTION INTRAVENOUS at 17:51

## 2021-12-01 RX ADMIN — DEXMEDETOMIDINE 4 MCG: 100 INJECTION, SOLUTION, CONCENTRATE INTRAVENOUS at 17:15

## 2021-12-01 RX ADMIN — SODIUM CHLORIDE 100 ML/HR: 9 INJECTION, SOLUTION INTRAVENOUS at 00:09

## 2021-12-01 RX ADMIN — PHENYLEPHRINE HYDROCHLORIDE 100 MCG: 10 INJECTION INTRAVENOUS at 18:19

## 2021-12-01 RX ADMIN — VENLAFAXINE HYDROCHLORIDE 37.5 MG: 37.5 CAPSULE, EXTENDED RELEASE ORAL at 07:28

## 2021-12-01 RX ADMIN — FENTANYL CITRATE 25 MCG: 50 INJECTION INTRAMUSCULAR; INTRAVENOUS at 17:15

## 2021-12-01 RX ADMIN — PROPOFOL 150 MG: 10 INJECTION, EMULSION INTRAVENOUS at 17:15

## 2021-12-01 RX ADMIN — BUDESONIDE AND FORMOTEROL FUMARATE DIHYDRATE 2 PUFF: 160; 4.5 AEROSOL RESPIRATORY (INHALATION) at 21:27

## 2021-12-01 RX ADMIN — BUPIVACAINE HYDROCHLORIDE 20 ML: 5 INJECTION, SOLUTION EPIDURAL; INTRACAUDAL; PERINEURAL at 16:55

## 2021-12-01 RX ADMIN — BUDESONIDE AND FORMOTEROL FUMARATE DIHYDRATE 2 PUFF: 160; 4.5 AEROSOL RESPIRATORY (INHALATION) at 08:33

## 2021-12-01 RX ADMIN — OXYCODONE HYDROCHLORIDE 5 MG: 5 TABLET ORAL at 07:25

## 2021-12-01 RX ADMIN — PREGABALIN 150 MG: 75 CAPSULE ORAL at 15:39

## 2021-12-01 RX ADMIN — SODIUM CHLORIDE, PRESERVATIVE FREE 10 ML: 5 INJECTION INTRAVENOUS at 21:28

## 2021-12-01 RX ADMIN — MIDAZOLAM HYDROCHLORIDE 0.5 MG: 1 INJECTION, SOLUTION INTRAMUSCULAR; INTRAVENOUS at 16:39

## 2021-12-01 RX ADMIN — MEPIVACAINE HYDROCHLORIDE 10 ML: 15 INJECTION, SOLUTION EPIDURAL; INFILTRATION at 16:55

## 2021-12-01 RX ADMIN — DEXAMETHASONE SODIUM PHOSPHATE 8 MG: 4 INJECTION, SOLUTION INTRAMUSCULAR; INTRAVENOUS at 16:38

## 2021-12-01 RX ADMIN — GLYCOPYRROLATE 0.2 MG: 0.2 INJECTION INTRAMUSCULAR; INTRAVENOUS at 17:15

## 2021-12-01 RX ADMIN — FAMOTIDINE 20 MG: 10 INJECTION, SOLUTION INTRAVENOUS at 16:38

## 2021-12-01 RX ADMIN — ACETAMINOPHEN 1000 MG: 500 TABLET, FILM COATED ORAL at 15:38

## 2021-12-01 RX ADMIN — INSULIN ASPART 2 UNITS: 100 INJECTION, SOLUTION INTRAVENOUS; SUBCUTANEOUS at 06:17

## 2021-12-01 RX ADMIN — OXYCODONE HYDROCHLORIDE 5 MG: 5 TABLET ORAL at 13:02

## 2021-12-01 RX ADMIN — VANCOMYCIN HYDROCHLORIDE 1250 MG: 500 INJECTION, POWDER, LYOPHILIZED, FOR SOLUTION INTRAVENOUS at 15:30

## 2021-12-01 RX ADMIN — KETAMINE HYDROCHLORIDE 10 MG: 100 INJECTION INTRAMUSCULAR; INTRAVENOUS at 18:30

## 2021-12-01 RX ADMIN — PHENYLEPHRINE HYDROCHLORIDE 100 MCG: 10 INJECTION INTRAVENOUS at 17:55

## 2021-12-01 NOTE — DISCHARGE SUMMARY
"Amber Campos  1950  [id number]    Hospitalists Discharge Summary    Date of Admission: 11/30/2021  Date of Discharge:  12/1/2021    History of Present Illness from [facility] on admit:   \"Ms Campos is a 70 year old female with PMH of DMT2 presenting after mechanical fall in her doctor's office this afternoon. Patient was standing up from the table and slipped and landed on her right wrist. Did not hit her head or have LOC. Denies chest pain, dyspnea, dizziness, palpitations. Hydrocodone did not help with pain. Patient describes the pain as dull and radiates to her right forearm.\"    Primary Discharge diagnoses:  Acute right distal radius fracture s/p ORIF, POD 0    Secondary Discharge Diagnoses:   DM2 in obese with hyperglycemia  Hypertension  GERD  HLD  Anxiety/depression    Hospital Course Summary:    The patient was direct admitted, her pain was controlled, she underwent repair of right distal radius fracture.     PCP  Patient Care Team:  Jenny Sun APRN as PCP - General  Jenny Sun APRN as PCP - Family Medicine  Zaida Barragan MD as Consulting Physician (Obstetrics and Gynecology)  Naomie Osman APRN as Nurse Practitioner (Orthopedic Surgery)  Zhane Saldivar MD as Consulting Physician (Ophthalmology)    Consults:   Consults     Date and Time Order Name Status Description    11/30/2021  8:33 PM Inpatient Orthopedic Surgery Consult          Operations and Procedures Performed:     XR Wrist 3+ View Right    Result Date: 11/30/2021  Narrative: CR Wrist Min 3 Vws RT INDICATION: Fall one hour ago with right wrist pain and deformity COMPARISON: None available. FINDINGS: AP, lateral, and oblique views of the right wrist.  There is an acute fracture the distal radius about centimeter proximal to the end of the bone. There are some dorsal displacement of the distal radial fragments. This may be slightly comminuted. There may be involvement of the articular surface. There is also an ulnar styloid fracture. " Carpal bones are normal. No bone erosion or destruction.  No foreign body.     Impression: Acute distal radius fracture with dorsal displacement of the distal radial fragments. There is also an ulnar styloid fracture. Signer Name: Yair Stewart MD  Signed: 11/30/2021 5:18 PM  Workstation Name: [email or web address]  [facility]    Allergies:  is allergic to iodinated diagnostic agents, adhesive tape, farxiga [dapagliflozin], neomycin-bacitracin zn-polymyx, trulicity [dulaglutide], and victoza [liraglutide].    Jared  No medications per report, reviewed by me    Discharge Medications:     Discharge Medications      Continue These Medications      Instructions Start Date   freestyle lancets   No dose, route, or frequency recorded.      Pen Needles 32G X 6 MM misc   1 each, Does not apply, Daily With Breakfast      Spacer/Aero Chamber Mouthpiece misc   1 each, Does not apply, Daily         ASK your doctor about these medications      Instructions Start Date   budesonide-formoterol 160-4.5 MCG/ACT inhaler  Commonly known as: Symbicort   2 puffs, Inhalation, 2 Times Daily - RT      Cetirizine HCl 5 MG/5ML solution solution  Commonly known as: ZyrTEC Childrens Allergy   5 mg, Oral, Daily      glucose blood test strip  Commonly known as: FREESTYLE LITE   USE TO CHECK BLOOD SUGAR 3 TIMES A DAY      ketoconazole 2 % shampoo  Commonly known as: NIZORAL   USE TWICE WEEKLY AS DIRECTED.      metFORMIN 1000 MG tablet  Commonly known as: GLUCOPHAGE   TAKE 1 TABLET TWICE DAILY  WITH MEALS      metoprolol succinate XL 25 MG 24 hr tablet  Commonly known as: TOPROL-XL   TAKE 1 TABLET BY MOUTH EVERY DAY      nystatin 957926 UNIT/GM powder  Commonly known as: MYCOSTATIN   See Admin Instructions      olmesartan 40 MG tablet  Commonly known as: Benicar   40 mg, Oral, Daily      pantoprazole 40 MG EC tablet  Commonly known as: PROTONIX   TAKE 1 TABLET DAILY      Semaglutide (1 MG/DOSE) 2 MG/1.5ML solution pen-injector  Commonly  known as: OZEMPIC   1 mg, Subcutaneous, Weekly      Vascepa 1 g capsule capsule  Generic drug: icosapent ethyl   2 Times Daily With Meals      venlafaxine XR 37.5 MG 24 hr capsule  Commonly known as: EFFEXOR-XR   37.5 mg, Oral, Daily      Voltaren 1 % gel gel  Generic drug: Diclofenac Sodium   As Needed, APPLY TO AFFECTED AREA TWICE A DAY             Last Lab Results:   Lab Results (most recent)     Procedure Component Value Units Date/Time    Protime-INR [[id number]]  (Normal) Collected: 11/30/21 1834    Specimen: Blood Updated: 11/30/21 2124     Protime 12.9 Seconds      INR 0.95    Narrative:      Therapeutic Ranges for INR: 2.0-3.0 (PT 20-30)                              2.5-3.5 (PT 25-34)    aPTT [[id number]]  (Normal) Collected: 11/30/21 1834    Specimen: Blood Updated: 11/30/21 2124     PTT 31.6 seconds     Narrative:      PTT = The equivalent PTT values for the therapeutic range of heparin levels at 0.1 to 0.7 U/ml are 53 to 110 seconds.      Phosphorus [[id number]]  (Normal) Collected: 11/30/21 1834    Specimen: Blood Updated: 11/30/21 2054     Phosphorus 3.3 mg/dL     Magnesium [[id number]]  (Normal) Collected: 11/30/21 1834    Specimen: Blood Updated: 11/30/21 2054     Magnesium 1.7 mg/dL     COVID PRE-OP / PRE-PROCEDURE SCREENING ORDER (NO ISOLATION) - Swab, Nasal Cavity [[id number]]  (Normal) Collected: 11/30/21 1834    Specimen: Swab from Nasal Cavity Updated: 11/30/21 1914    Narrative:      The following orders were created for panel order COVID PRE-OP / PRE-PROCEDURE SCREENING ORDER (NO ISOLATION) - Swab, Nasal Cavity.  Procedure                               Abnormality         Status                     ---------                               -----------         ------                     COVID-19,[facility] IN-JA...[[id number]]  Normal              Final result                 Please view results for these tests on the individual orders.    COVID-19,[facility] IN-HOUSE,Nasal Swab No Transport Media 3-4 HR  TAT - Swab, Nasal Cavity [[id number]]  (Normal) Collected: 11/30/21 1834    Specimen: Swab from Nasal Cavity Updated: 11/30/21 1914     COVID19 Not Detected    Narrative:      Fact sheet for providers: https://www.fda.gov/media/934460/download     Fact sheet for patients: https://www.fda.gov/media/199898/download    Test performed by PCR.    Consider negative results in combination with clinical observations, patient history, and epidemiological information.    Basic Metabolic Panel [[id number]]  (Abnormal) Collected: 11/30/21 1834    Specimen: Blood Updated: 11/30/21 1900     Glucose 204 mg/dL      BUN 11 mg/dL      Creatinine 0.53 mg/dL      Sodium 134 mmol/L      Potassium 4.6 mmol/L      Chloride 98 mmol/L      CO2 23.3 mmol/L      Calcium 8.9 mg/dL      eGFR Non African Amer 113 mL/min/1.73      BUN/Creatinine Ratio 20.8     Anion Gap 12.7 mmol/L     Narrative:      GFR Normal >60  Chronic Kidney Disease <60  Kidney Failure <15      CBC & Differential [[id number]]  (Abnormal) Collected: 11/30/21 1835    Specimen: Blood Updated: 11/30/21 1843    Narrative:      The following orders were created for panel order CBC & Differential.  Procedure                               Abnormality         Status                     ---------                               -----------         ------                     CBC Auto Differential[[id number]]        Abnormal            Final result                 Please view results for these tests on the individual orders.    CBC Auto Differential [[id number]]  (Abnormal) Collected: 11/30/21 1835    Specimen: Blood Updated: 11/30/21 1843     WBC 7.86 10*3/mm3      RBC 4.46 10*6/mm3      Hemoglobin 11.8 g/dL      Hematocrit 37.0 %      MCV 83.0 fL      MCH 26.5 pg      MCHC 31.9 g/dL      RDW 13.9 %      RDW-SD 41.4 fl      MPV 9.9 fL      Platelets 337 10*3/mm3      Neutrophil % 55.7 %      Lymphocyte % 31.8 %      Monocyte % 8.1 %      Eosinophil % 3.2 %      Basophil % 0.9 %       Immature Grans % 0.3 %      Neutrophils, Absolute 4.38 10*3/mm3      Lymphocytes, Absolute 2.50 10*3/mm3      Monocytes, Absolute 0.64 10*3/mm3      Eosinophils, Absolute 0.25 10*3/mm3      Basophils, Absolute 0.07 10*3/mm3      Immature Grans, Absolute 0.02 10*3/mm3      nRBC 0.0 /100 WBC         Imaging Results (Most Recent)     Procedure Component Value Units Date/Time    XR Wrist 3+ View Right [[id number]] Collected: 11/30/21 1718     Updated: 11/30/21 1720    Narrative:      CR Wrist Min 3 Vws RT    INDICATION:   Fall one hour ago with right wrist pain and deformity    COMPARISON:   None available.    FINDINGS:   AP, lateral, and oblique views of the right wrist.  There is an acute fracture the distal radius about centimeter proximal to the end of the bone. There are some dorsal displacement of the distal radial fragments. This may be slightly comminuted. There  may be involvement of the articular surface. There is also an ulnar styloid fracture. Carpal bones are normal. No bone erosion or destruction.  No foreign body.      Impression:      Acute distal radius fracture with dorsal displacement of the distal radial fragments. There is also an ulnar styloid fracture.    Signer Name: Yair Stewart MD   Signed: 11/30/2021 5:18 PM   Workstation Name: [email or web address]    [facility]          PROCEDURES      Condition on Discharge:  stable    Physical Exam at Discharge  Vital Signs  Temp:  [97 °F (36.1 °C)-98.1 °F (36.7 °C)] 97 °F (36.1 °C)  Heart Rate:  [] 93  Resp:  [18-22] 18  BP: (147-164)/(74-85) 164/74   Body mass index is 30.47 kg/m².      Physical Exam  Vitals reviewed.   Constitutional:       General: She is not in acute distress.     Appearance: She is obese. She is not ill-appearing.   HENT:      Head: Normocephalic and atraumatic.      Mouth/Throat:      Mouth: Mucous membranes are moist.   Eyes:      Extraocular Movements: Extraocular movements intact.      Pupils: Pupils are equal,  round, and reactive to light.   Cardiovascular:      Rate and Rhythm: Normal rate and regular rhythm.   Pulmonary:      Effort: Pulmonary effort is normal. No respiratory distress.      Breath sounds: Normal breath sounds. No wheezing or rales.   Abdominal:      General: Abdomen is flat. Bowel sounds are normal. There is no distension.      Palpations: Abdomen is soft.      Tenderness: There is no abdominal tenderness. There is no guarding.   Musculoskeletal:         General: No swelling.      Comments: LUE in sling, CMS intact to left hand   Skin:     General: Skin is warm and dry.      Capillary Refill: Capillary refill takes less than 2 seconds.   Neurological:      General: No focal deficit present.      Mental Status: She is alert and oriented to person, place, and time.   Psychiatric:         Mood and Affect: Mood normal.         Behavior: Behavior normal.       Discharge Disposition  Home    Visiting Nurse:    No     Home PT/OT:  No     Home Safety Evaluation:  No     DME  None new    Discharge Diet:      Dietary Orders (From admission, onward)     Start     Ordered    12/01/21 0001  NPO Diet  Diet Effective Midnight         11/30/21 2111                Activity at Discharge:  As per orthopedic surgery, no driving while on narcotics and until cleared by provider    Pre-discharge education  Diabetic, Wound Care, medications, follow up    Follow-up Appointments  No future appointments.      Test Results Pending at Discharge: NONE     Sean Plasencia DO  12/02/2021  20:50     Time: Discharge 30 min (if over 30 minutes give explanation as to why it took greater than 30 minutes)    Addendum: patient was discharged after her procedure was performed and she was cleared for discharge from Ortho.

## 2021-12-01 NOTE — ANESTHESIA PREPROCEDURE EVALUATION
Anesthesia Evaluation     Patient summary reviewed and Nursing notes reviewed   no history of anesthetic complications:  NPO Solid Status: > 8 hours  NPO Liquid Status: > 8 hours           Airway   Mallampati: II  TM distance: <3 FB  Neck ROM: full  Small opening  Dental - normal exam     Pulmonary    (+) asthma,  (-) shortness of breath, sleep apnea  Cardiovascular - normal exam  Exercise tolerance: good (4-7 METS)    ECG reviewed    (+) hypertension, hyperlipidemia,   (-) angina    ROS comment: 2015 - 1.  Normal stress nuclear perfusion study showing no evidence of  ischemia or infarct.  2.  Normal left ventricular systolic function.    2016 - · Calculated EF = 64.6%.  · All left ventricular wall segments contract normally.  · Left ventricular diastolic dysfunction (grade I) consistent with impaired relaxation.  · normal valvular structures        Neuro/Psych  (-) psychiatric history  GI/Hepatic/Renal/Endo    (+)  GERD,  renal disease, diabetes mellitus (A1c 8.8) type 2 poorly controlled,     Musculoskeletal     Abdominal   (+) obese,    Substance History      OB/GYN          Other   arthritis,    history of cancer      Other Comment: June 2016 total shoulder left                Anesthesia Plan    ASA 3     general with block     intravenous induction     Anesthetic plan, all risks, benefits, and alternatives have been provided, discussed and informed consent has been obtained with: patient.  Use of blood products discussed with patient  Consented to blood products.

## 2021-12-01 NOTE — CONSULTS
Orthopedic Consult      Patient: Amber Campos    Date of Admission: 11/30/2021  4:48 PM    YOB: 1950    Medical Record Number: [id number]    Attending Physician: Sean Plasencia DO  Consulting Physician: Dr. Salazar Amezcua      Chief Complaints: Closed fracture of distal end of right radius, unspecified fracture morphology, initial encounter [S52.501A]  Closed displaced fracture of styloid process of right ulna with delayed healing, subsequent encounter [S52.611G]      History of Present Illness: 70 y.o. female admitted to [facility] to services of Sean Plasencia DO with Closed fracture of distal end of right radius, unspecified fracture morphology, initial encounter [S52.501A]  Closed displaced fracture of styloid process of right ulna with delayed healing, subsequent encounter [S52.611G].  I was consulted for further evaluation and treatment. Onset of symptoms was yesterday afternoon when patient was at her primary care provider's office, she fell and landed on her right wrist in an outstretched position and noted immediate onset of pain and deformity at that time.  She was brought to [facility] emergency department for further evaluation and was noted to have a displaced distal radius fracture.  She denies any new numbness or tingling to her right upper extremity since the time of the injury, does note pain rated as a 7-8 out of 10 and sharp in nature exacerbated with local pressure or attempts at movement of the wrist.  Moderate improvement with splint placed in the emergency department.  Does note some radiation of pain down into her hand as well as limitation of movement of her finger secondary to pain.     Allergies   Allergen Reactions   • Iodinated Diagnostic Agents Photosensitivity     contrast   • Adhesive Tape Other (See Comments)     EKG stickers only   • Farxiga [Dapagliflozin] Diarrhea and Itching     Yeast infeciotn   • Neomycin-Bacitracin Zn-Polymyx Rash   •  Trulicity [Dulaglutide] GI Intolerance     Constipation   • Victoza [Liraglutide] Nausea Only       Medications:   Home Medications:  Medications Prior to Admission   Medication Sig Dispense Refill Last Dose   • Cetirizine HCl (ZyrTEC Childrens Allergy) 5 MG/5ML solution solution Take 5 mL by mouth Daily. 150 mL  Past Month at Unknown time   • glucose blood (FREESTYLE LITE) test strip USE TO CHECK BLOOD SUGAR 3 TIMES A  each 3 11/30/2021 at Unknown time   • Insulin Pen Needle (PEN NEEDLES) 32G X 6 MM misc 1 each Daily With Breakfast. 90 each 3 11/30/2021 at Unknown time   • ketoconazole (NIZORAL) 2 % shampoo USE TWICE WEEKLY AS DIRECTED.   Past Week at Unknown time   • Lancets (FREESTYLE) lancets    11/30/2021 at Unknown time   • metFORMIN (GLUCOPHAGE) 1000 MG tablet TAKE 1 TABLET TWICE DAILY  WITH MEALS 180 tablet 3 11/30/2021 at Unknown time   • metoprolol succinate XL (TOPROL-XL) 25 MG 24 hr tablet TAKE 1 TABLET BY MOUTH EVERY DAY (Patient taking differently: Take 25 mg by mouth Every Night.) 30 tablet 1 11/29/2021 at Unknown time   • pantoprazole (PROTONIX) 40 MG EC tablet TAKE 1 TABLET DAILY 90 tablet 3 11/30/2021 at Unknown time   • Semaglutide, 1 MG/DOSE, (OZEMPIC) 2 MG/1.5ML solution pen-injector Inject 1 mg under the skin into the appropriate area as directed 1 (One) Time Per Week.   Past Week at Unknown time   • Vascepa 1 g capsule capsule 2 (Two) Times a Day With Meals.   11/30/2021 at 0900   • venlafaxine XR (EFFEXOR-XR) 37.5 MG 24 hr capsule Take 1 capsule by mouth Daily. 30 capsule 1 11/30/2021 at 0900   • VOLTAREN 1 % gel gel As Needed. APPLY TO AFFECTED AREA TWICE A DAY  3 Past Week at Unknown time   • budesonide-formoterol (Symbicort) 160-4.5 MCG/ACT inhaler Inhale 2 puffs 2 (Two) Times a Day. 1 each 6 More than a month at Unknown time   • nystatin (MYCOSTATIN) 020704 UNIT/GM powder See Admin Instructions.   More than a month at Unknown time   • olmesartan (Benicar) 40 MG tablet Take 1 tablet  by mouth Daily. (Patient taking differently: Take  by mouth Every Night.) 90 tablet 1    • Spacer/Aero Chamber Mouthpiece misc 1 each Daily. 1 each 0 More than a month at Unknown time       Current Medications:  Scheduled Meds:[MAR Hold] budesonide-formoterol, 2 puff, Inhalation, BID - RT  bupivacaine liposome, 20 mL, Injection, Once  famotidine, 20 mg, Intravenous, 60 Min Pre-Op  [MAR Hold] fluticasone, 2 spray, Each Nare, Daily  [MAR Hold] insulin aspart, 0-7 Units, Subcutaneous, Q6H  metoprolol succinate XL, 25 mg, Oral, Daily  [MAR Hold] pantoprazole, 40 mg, Oral, Daily  senna-docusate sodium, 2 tablet, Oral, BID  sodium chloride, 10 mL, Intravenous, Q12H  [MAR Hold] triamcinolone, 1 application, Topical, Q12H  [MAR Hold] venlafaxine XR, 37.5 mg, Oral, Daily      Continuous Infusions:lactated ringers, 9 mL/hr  sodium chloride, 100 mL/hr, Last Rate: 100 mL/hr (12/01/21 1000)      PRN Meds:.•  acetaminophen  •  senna-docusate sodium **AND** polyethylene glycol **AND** bisacodyl **AND** bisacodyl  •  dexamethasone  •  [MAR Hold] dextrose  •  [MAR Hold] dextrose  •  [MAR Hold] glucagon (human recombinant)  •  lactated ringers  •  melatonin  •  ondansetron  •  ondansetron  •  oxyCODONE  •  sodium chloride  •  sodium chloride       Past Medical History:   Diagnosis Date   • Basaloid carcinoma (HCC)     facial   • Gastroesophageal reflux disease 3/14/2016   • History of kidney infection    • History of mammogram    • Hypertension    • Osteopenia    • Osteoporosis    • Postmenopausal    • PVCs (premature ventricular contractions)    • Seasonal allergies    • Simple renal cyst 3/14/2016   • Steatosis of liver 3/14/2016   • Type 2 diabetes mellitus (HCC)         Past Surgical History:   Procedure Laterality Date   • BUNIONECTOMY Right    • COLONOSCOPY N/A 4/25/2017    Procedure: COLONOSCOPY TO CECUM ;  Surgeon: Aidan Roca MD;  Location: [facility];  Service:    • EXOSTECTOMY Left     HEEL   • FOOT FUSION  Right     9 screws and plate   • KNEE ARTHROSCOPY W/ MENISCAL REPAIR Right    • PAP SMEAR     • TONSILLECTOMY     • TOTAL SHOULDER REVERSE ARTHROPLASTY Right    • UMBILICAL HERNIA REPAIR          Social History     Occupational History   • Occupation: retired    Tobacco Use   • Smoking status: Never Smoker   • Smokeless tobacco: Never Used   Substance and Sexual Activity   • Alcohol use: No   • Drug use: No   • Sexual activity: Not Currently      Social History     Social History Narrative    She is .    Pt drinks one cup of coffee daily.         Family History   Problem Relation Age of Onset   • Other Mother         brain death   • Emphysema Mother    • Alcohol abuse Father    • Glaucoma Father    • Other Sister         fatty liver   • Heart disease Sister         valve problem, being followed (no surgery required)   • Arthritis Sister    • Other Brother         fatty liver   • Alcohol abuse Maternal Aunt    • Diabetes Maternal Aunt    • Glaucoma Maternal Grandmother    • Heart disease Maternal Grandmother          Review of Systems:   HEENT: Patient denies any headaches, vision changes, change in hearing, or tinnitus, Patient denies any rhinorrhea,epistaxis, sinus pain, mouth or dental problems, sore throat or hoarseness, or dysphagia  Pulmonary: Patient denies any cough, congestion, SOA, or wheezing  Cardiovascular: Patient denies any chest pain, dyspnea, palpitations, weakness, intolerance of exercise, varicosities, swelling of extremities, known murmur  Gastrointestinal:  Patient denies nausea, vomiting, diarrhea, constipation, loss  of appetite, change in appetite, dysphagia, gas, heartburn, melena, change in bowel habits, use of laxatives or other drugs to alter the function of the gastrointestinal tract.  Genital/Urinary: Patient denies dysuria, change in color of urine, change in frequency of urination, pain with urgency, incontinence, retention, or nocturia.  Musculoskeletal: Patient denies  increased warmth; redness; or swelling of joints; limitation of function; deformity; crepitation: notes pain in right wrist as documented above in HPI.  Denies pain in low back or neck.    Neurological: Patient denies dizziness, tremor, ataxia, difficulty in speaking, change in speech, paresthesia, loss of sensation, seizures, syncope, changes in memory.  Endocrine system: Patient denies tremors, palpitations, intolerance of heat or cold, polyuria, polydipsia, polyphagia, diaphoresis, exophthalmos, or goiter.  Psychological: Patient denies thoughts/plans of harming self or other; depression, insomnia, night terrors, [place], memory loss, disorientation.  Skin: Patient denies any bruising, rashes, discoloration, pruritus, wounds, ulcers, decubiti, changes in the hair or nails  Hematopoietic: Patient denies history of spontaneous or excessive bleeding, epistaxis, hematuria, melena, fatigue, enlarged or tender lymph nodes, pallor, history of anemia.    Physical Exam: 70 y.o. female  Vitals:    12/01/21 0620 12/01/21 0833 12/01/21 0837 12/01/21 1536   BP: 164/74   168/72   BP Location: Left arm   Left arm   Patient Position: Lying   Lying   Pulse: 93 85 89 82   Resp: 18 16 16 18   Temp: 97 °F (36.1 °C)   98 °F (36.7 °C)   TempSrc: Oral   Infrared   SpO2: 96% 96% 96% 94%   Weight:       Height:         General Appearance:    Alert, cooperative, in no acute distress                      Head:    Normocephalic, without obvious abnormality, atraumatic   Eyes:            Lids and lashes normal, conjunctivae and sclerae normal, no   icterus, no pallor, corneas clear, PERRLA   Ears:    Ears appear intact with no abnormalities noted   Throat:   No oral lesions, no thrush, oral mucosa moist   Neck:   No adenopathy, supple, trachea midline, no thyromegaly, no   carotid bruit, no JVD   Back:     No kyphosis present, no scoliosis present, no skin lesions,    erythema or scars, no tenderness to percussion or palpation,   range of  motion normal   Lungs:     Clear to auscultation,respirations regular, even and                  unlabored    Heart:    Regular rhythm and normal rate, normal S1 and S2, no            murmur, no gallop, no rub, no click   Chest Wall:    No abnormalities observed   Abdomen:     Normal bowel sounds, no masses, no organomegaly, soft     nontender, nondistended, no guarding, no rebound                tenderness   Rectal:     Deferred   Extremities:   Tenderness noted at right wrist with moderate soft tissue swelling, skin wrinkles present.  Compartments are soft easily compressible, no significant pain on passive flexion or extension of fingers.  She is able to minimally flex and extend actively her fingers and thumb of the right hand with 4 out of 5 strength or limited motion, limited secondary to pain as well as swelling.  Positive sensation light touch all distributions of right hand symmetric to the left wrist cap refill all digits 1+ radial pulse.  No tenderness over medial or lateral epicondyle right elbow.  Moves all remaining extremities well, no edema, no cyanosis, no redness   Pulses:   Pulses palpable and equal bilaterally   Skin:   No bleeding, bruising or rash   Lymph nodes:   No palpable adenopathy   Neurologic:   Cranial nerves 2 - 12 grossly intact, sensation intact, DTR       present and equal bilaterally           Diagnostic Tests:  Admission on 11/30/2021   Component Date Value Ref Range Status   • Glucose 11/30/2021 204* 65 - 99 mg/dL Final   • BUN 11/30/2021 11  8 - 23 mg/dL Final   • Creatinine 11/30/2021 0.53* 0.57 - 1.00 mg/dL Final   • Sodium 11/30/2021 134* 136 - 145 mmol/L Final   • Potassium 11/30/2021 4.6  3.5 - 5.2 mmol/L Final   • Chloride 11/30/2021 98  98 - 107 mmol/L Final   • CO2 11/30/2021 23.3  22.0 - 29.0 mmol/L Final   • Calcium 11/30/2021 8.9  8.6 - 10.5 mg/dL Final   • eGFR Non African Amer 11/30/2021 113  >60 mL/min/1.73 Final   • BUN/Creatinine Ratio 11/30/2021 20.8  7.0 - 25.0  Final   • Anion Gap 11/30/2021 12.7  5.0 - 15.0 mmol/L Final   • QT Interval 11/30/2021 347  ms Final   • WBC 11/30/2021 7.86  3.40 - 10.80 10*3/mm3 Final   • RBC 11/30/2021 4.46  3.77 - 5.28 10*6/mm3 Final   • Hemoglobin 11/30/2021 11.8* 12.0 - 15.9 g/dL Final   • Hematocrit 11/30/2021 37.0  34.0 - 46.6 % Final   • MCV 11/30/2021 83.0  79.0 - 97.0 fL Final   • MCH 11/30/2021 26.5* 26.6 - 33.0 pg Final   • MCHC 11/30/2021 31.9  31.5 - 35.7 g/dL Final   • RDW 11/30/2021 13.9  12.3 - 15.4 % Final   • RDW-SD 11/30/2021 41.4  37.0 - 54.0 fl Final   • MPV 11/30/2021 9.9  6.0 - 12.0 fL Final   • Platelets 11/30/2021 337  140 - 450 10*3/mm3 Final   • Neutrophil % 11/30/2021 55.7  42.7 - 76.0 % Final   • Lymphocyte % 11/30/2021 31.8  19.6 - 45.3 % Final   • Monocyte % 11/30/2021 8.1  5.0 - 12.0 % Final   • Eosinophil % 11/30/2021 3.2  0.3 - 6.2 % Final   • Basophil % 11/30/2021 0.9  0.0 - 1.5 % Final   • Immature Grans % 11/30/2021 0.3  0.0 - 0.5 % Final   • Neutrophils, Absolute 11/30/2021 4.38  1.70 - 7.00 10*3/mm3 Final   • Lymphocytes, Absolute 11/30/2021 2.50  0.70 - 3.10 10*3/mm3 Final   • Monocytes, Absolute 11/30/2021 0.64  0.10 - 0.90 10*3/mm3 Final   • Eosinophils, Absolute 11/30/2021 0.25  0.00 - 0.40 10*3/mm3 Final   • Basophils, Absolute 11/30/2021 0.07  0.00 - 0.20 10*3/mm3 Final   • Immature Grans, Absolute 11/30/2021 0.02  0.00 - 0.05 10*3/mm3 Final   • nRBC 11/30/2021 0.0  0.0 - 0.2 /100 WBC Final   • COVID19 11/30/2021 Not Detected  Not Detected - Ref. Range Final   • Magnesium 11/30/2021 1.7  1.6 - 2.4 mg/dL Final   • Phosphorus 11/30/2021 3.3  2.5 - 4.5 mg/dL Final   • Protime 11/30/2021 12.9  12.1 - 15.0 Seconds Final   • INR 11/30/2021 0.95  0.90 - 1.10 Final   • PTT 11/30/2021 31.6  24.3 - 38.1 seconds Final   • Glucose 12/01/2021 193* 65 - 99 mg/dL Final   • BUN 12/01/2021 9  8 - 23 mg/dL Final   • Creatinine 12/01/2021 0.49* 0.57 - 1.00 mg/dL Final   • Sodium 12/01/2021 137  136 - 145 mmol/L Final    • Potassium 12/01/2021 3.8  3.5 - 5.2 mmol/L Final   • Chloride 12/01/2021 101  98 - 107 mmol/L Final   • CO2 12/01/2021 25.1  22.0 - 29.0 mmol/L Final   • Calcium 12/01/2021 8.6  8.6 - 10.5 mg/dL Final   • eGFR Non African Amer 12/01/2021 124  >60 mL/min/1.73 Final   • BUN/Creatinine Ratio 12/01/2021 18.4  7.0 - 25.0 Final   • Anion Gap 12/01/2021 10.9  5.0 - 15.0 mmol/L Final   • Magnesium 12/01/2021 1.7  1.6 - 2.4 mg/dL Final   • Phosphorus 12/01/2021 4.3  2.5 - 4.5 mg/dL Final   • Protime 12/01/2021 13.3  12.1 - 15.0 Seconds Final   • INR 12/01/2021 0.99  0.90 - 1.10 Final   • WBC 12/01/2021 7.30  3.40 - 10.80 10*3/mm3 Final   • RBC 12/01/2021 4.09  3.77 - 5.28 10*6/mm3 Final   • Hemoglobin 12/01/2021 10.9* 12.0 - 15.9 g/dL Final   • Hematocrit 12/01/2021 33.6* 34.0 - 46.6 % Final   • MCV 12/01/2021 82.2  79.0 - 97.0 fL Final   • MCH 12/01/2021 26.7  26.6 - 33.0 pg Final   • MCHC 12/01/2021 32.4  31.5 - 35.7 g/dL Final   • RDW 12/01/2021 13.9  12.3 - 15.4 % Final   • RDW-SD 12/01/2021 41.2  37.0 - 54.0 fl Final   • MPV 12/01/2021 10.1  6.0 - 12.0 fL Final   • Platelets 12/01/2021 308  140 - 450 10*3/mm3 Final   • Neutrophil % 12/01/2021 44.7  42.7 - 76.0 % Final   • Lymphocyte % 12/01/2021 40.5  19.6 - 45.3 % Final   • Monocyte % 12/01/2021 9.5  5.0 - 12.0 % Final   • Eosinophil % 12/01/2021 4.4  0.3 - 6.2 % Final   • Basophil % 12/01/2021 0.8  0.0 - 1.5 % Final   • Immature Grans % 12/01/2021 0.1  0.0 - 0.5 % Final   • Neutrophils, Absolute 12/01/2021 3.26  1.70 - 7.00 10*3/mm3 Final   • Lymphocytes, Absolute 12/01/2021 2.96  0.70 - 3.10 10*3/mm3 Final   • Monocytes, Absolute 12/01/2021 0.69  0.10 - 0.90 10*3/mm3 Final   • Eosinophils, Absolute 12/01/2021 0.32  0.00 - 0.40 10*3/mm3 Final   • Basophils, Absolute 12/01/2021 0.06  0.00 - 0.20 10*3/mm3 Final   • Immature Grans, Absolute 12/01/2021 0.01  0.00 - 0.05 10*3/mm3 Final   • nRBC 12/01/2021 0.0  0.0 - 0.2 /100 WBC Final   • Glucose 12/01/2021 140* 70 -  130 mg/dL Final    Meter: [id number] : [id number] Baljit Drake CNA   Office Visit on 11/30/2021   Component Date Value Ref Range Status   • Color 11/30/2021 Dark Yellow  Yellow, Straw, Dark Yellow, Patrizia Final   • Clarity, UA 11/30/2021 Clear  Clear Final   • Specific Gravity  11/30/2021 1.030  1.005 - 1.030 Final   • pH, Urine 11/30/2021 5.5  5.0 - 8.0 Final   • Leukocytes 11/30/2021 Negative  Negative Final   • Nitrite, UA 11/30/2021 Negative  Negative Final   • Protein, POC 11/30/2021 Negative  Negative mg/dL Final   • Glucose, UA 11/30/2021 Negative  Negative, 1000 mg/dL (3+) mg/dL Final   • Ketones, UA 11/30/2021 Negative  Negative Final   • Urobilinogen, UA 11/30/2021 Normal  Normal Final   • Bilirubin 11/30/2021 Negative  Negative Final   • Blood, UA 11/30/2021 Negative  Negative Final   • Lot Number 11/30/2021 [id number]   Final   • Expiration Date 11/30/2021 10/31/2022   Final     XR Wrist 3+ View Right    Result Date: 11/30/2021  Impression: Acute distal radius fracture with dorsal displacement of the distal radial fragments. There is also an ulnar styloid fracture. Signer Name: Yair Stewart MD  Signed: 11/30/2021 5:18 PM  Workstation Name: [facility]  [facility]    Reviewed x-rays from emergency department indicate comminuted displaced distal radius fracture with dorsal displacement of the distal segment with an associated ulnar styloid fracture.    Assessment:  Patient Active Problem List   Diagnosis   • Asthma   • Kidney cysts   • Depression with anxiety   • Menopausal symptom   • Hyperlipidemia   • Hematuria   • Steatosis of liver   • Gastroesophageal reflux disease   • Ventricular premature beats   • Uncontrolled type 2 diabetes mellitus with hyperglycemia (HCC)   • Age-related osteoporosis without current pathological fracture   • Screening for blood or protein in urine   • Essential hypertension   • Status post replacement of left shoulder joint   • Acute cystitis   •  Environmental allergies   • Vitamin D deficiency   • Left shoulder tendonitis   • Greater trochanteric bursitis, right   • Diabetes mellitus due to underlying condition, uncontrolled, with hyperglycemia (HCC)   • Seborrheic keratoses   • IC (interstitial cystitis)   • Low serum iron   • Generalized osteoarthritis   • Pelvic pain in female   • Memory deficit   • Closed fracture of right distal radius           Plan:  The patient voiced understanding of the risks, benefits, and alternative forms of treatment that were discussed and the patient consents to proceed with right wrist open reduction internal fixation including possible distal radoi-ulnar joint fixation and all associated procedures. I reviewed details of the procedure as well as risks benefits and alternatives of the procedure with the patient  with the risks including not limited to neurovascular damage, bleeding, infection, loss of motion, weakness, stiffness, instability, nonunion, malunion, chronic pain, wrist arthritis, hardware prominence, tendon rupture, loss of use of fingers, carpal tunnel syndrome, DVT, pulmonary embolus, death, stroke, complex regional pain syndrome, myocardial infarction, need for additional procedures.  Patient understood all these and had all questions answered prior to consenting to proceed with surgery.  No guarantees were given regarding results of the procedure.    Salazar Amezcua MD      Dictated utilizing Dragon dictation

## 2021-12-01 NOTE — ANESTHESIA PROCEDURE NOTES
Peripheral Block    Pre-sedation assessment completed: 12/1/2021 4:47 PM    Patient reassessed immediately prior to procedure    Patient location during procedure: pre-op  Start time: 12/1/2021 4:48 PM  Stop time: 12/1/2021 4:55 PM  Reason for block: post-op pain management and secondary anesthetic  Performed by  Anesthesiologist: Shirley Fontaine MD  Preanesthetic Checklist  Completed: patient identified, IV checked, site marked, risks and benefits discussed, surgical consent, monitors and equipment checked, pre-op evaluation and timeout performed  Prep:  Pt Position: supine  Sterile barriers:alcohol skin prep, cap, gloves, mask, partial drape and washed/disinfected hands  Prep: ChloraPrep  Patient monitoring: blood pressure monitoring, continuous pulse oximetry and EKG  Procedure    Sedation: yes  Performed under: local infiltration  Guidance:ultrasound guided    ULTRASOUND INTERPRETATION. Using ultrasound guidance a gauge needle was placed in close proximity to the brachial plexus nerve, at which point, under ultrasound guidance anesthetic was injected in the area of the nerve and spread of the anesthesia was seen on ultrasound in close proximity thereto.  There were no abnormalities seen on ultrasound; a digital image was taken; and the patient tolerated the procedure with no complications. Images:still images obtained, printed/placed on chart    Laterality:right  Block Type:axillary  Injection Technique:single-shot  Needle Type:echogenic  Needle Gauge:21 G  Resistance on Injection: none    Medications Used: bupivacaine PF (MARCAINE) injection 0.5%, 20 mL  Mepivacaine HCl (PF) (CARBOCAINE) 1.5 % injection, 10 mL  Med administered at 12/1/2021 4:55 PM      Post Assessment  Injection Assessment: negative aspiration for heme, no paresthesia on injection and incremental injection  Patient Tolerance:comfortable throughout block  Complications:no

## 2021-12-01 NOTE — H&P
[facility]     Jenny Sun APRN    CHIEF COMPLAINT: wrist    HISTORY OF PRESENT ILLNESS:    Ms Campos is a 70 year old female with PMH of DMT2 presenting after mechanical fall in her doctor's office this afternoon. Patient was standing up from the table and slipped and landed on her right wrist. Did not hit her head or have LOC. Denies chest pain, dyspnea, dizziness, palpitations. Hydrocodone did not help with pain. Patient describes the pain as dull and radiates to her right forearm.      Past Medical History:   Diagnosis Date   • Basaloid carcinoma (HCC)     facial   • Gastroesophageal reflux disease 3/14/2016   • History of kidney infection    • History of mammogram    • Hypertension    • Osteopenia    • Osteoporosis    • Postmenopausal    • PVCs (premature ventricular contractions)    • Seasonal allergies    • Simple renal cyst 3/14/2016   • Steatosis of liver 3/14/2016   • Type 2 diabetes mellitus (HCC)      Past Surgical History:   Procedure Laterality Date   • BUNIONECTOMY Right    • COLONOSCOPY N/A 4/25/2017    Procedure: COLONOSCOPY TO CECUM ;  Surgeon: Aidan Roca MD;  Location: [facility] ENDOSCOPY;  Service:    • EXOSTECTOMY Left     HEEL   • FOOT FUSION Right     9 screws and plate   • KNEE ARTHROSCOPY W/ MENISCAL REPAIR Right    • PAP SMEAR     • TONSILLECTOMY     • TOTAL SHOULDER REVERSE ARTHROPLASTY Right    • UMBILICAL HERNIA REPAIR       Family History   Problem Relation Age of Onset   • Other Mother         brain death   • Emphysema Mother    • Alcohol abuse Father    • Glaucoma Father    • Other Sister         fatty liver   • Heart disease Sister         valve problem, being followed (no surgery required)   • Arthritis Sister    • Other Brother         fatty liver   • Alcohol abuse Maternal Aunt    • Diabetes Maternal Aunt    • Glaucoma Maternal Grandmother    • Heart disease Maternal Grandmother      Social History     Tobacco Use   • Smoking status: Never Smoker  "  • Smokeless tobacco: Never Used   Substance Use Topics   • Alcohol use: No   • Drug use: No     (Not in a hospital admission)    Allergies:  Iodinated diagnostic agents, Adhesive tape, Farxiga [dapagliflozin], Neomycin-bacitracin zn-polymyx, Trulicity [dulaglutide], and Victoza [liraglutide]    Immunization History   Administered Date(s) Administered   • COVID-19 (PFIZER) 02/06/2021, 02/27/2021, 09/27/2021   • FluMist 2-49yrs 09/22/2015   • Fluad Quad 65+ 09/13/2020   • Fluzone High Dose =>65 Years ([employer] ONLY) 10/31/2016, 09/05/2018, 10/09/2019   • Fluzone High-Dose 65+yrs 10/12/2021   • Hepatitis A 05/29/2018, 11/03/2020   • Pneumococcal Polysaccharide (PPSV23) 04/08/2019   • Pneumococcal, Unspecified 03/04/2013   • Td 03/15/2010   • Tdap 06/18/2020   • Zostavax 01/01/2009       REVIEW OF SYSTEMS:  Please see the above history of present illness for pertinent positives and negatives.  The remainder of the patient's systems have been reviewed and are negative.     Vital Signs  Temp:  [98 °F (36.7 °C)-98.1 °F (36.7 °C)] 98.1 °F (36.7 °C)  Heart Rate:  [] 95  Resp:  [18-22] 18  BP: (147-154)/(74-85) 147/85  Flowsheet Rows      First Filed Value   Admission Height 160 cm (63\") Documented at 11/30/2021 1638   Admission Weight 78 kg (172 lb) Documented at 11/30/2021 1638             Physical Exam  Constitutional:       Appearance: Normal appearance. She is obese.   HENT:      Head: Normocephalic and atraumatic.      Mouth/Throat:      Mouth: Mucous membranes are dry.      Pharynx: Oropharynx is clear.   Eyes:      Extraocular Movements: Extraocular movements intact.      Pupils: Pupils are equal, round, and reactive to light.   Cardiovascular:      Rate and Rhythm: Normal rate and regular rhythm.      Pulses: Normal pulses.      Heart sounds: Normal heart sounds.   Pulmonary:      Effort: Pulmonary effort is normal. No respiratory distress.      Breath sounds: Normal breath sounds. No wheezing.   Abdominal:    "   General: Abdomen is flat. Bowel sounds are normal. There is no distension.      Palpations: Abdomen is soft. There is no mass.   Musculoskeletal:         General: Swelling, tenderness and deformity present.   Skin:     General: Skin is warm and dry.   Neurological:      General: No focal deficit present.      Mental Status: She is alert.   Psychiatric:         Mood and Affect: Mood normal.         Behavior: Behavior normal.         Thought Content: Thought content normal.            Results Review:    I reviewed the patient's new clinical results.  Lab Results (most recent)     Procedure Component Value Units Date/Time    COVID PRE-OP / PRE-PROCEDURE SCREENING ORDER (NO ISOLATION) - Swab, Nasal Cavity [[id number]]  (Normal) Collected: 11/30/21 1834    Specimen: Swab from Nasal Cavity Updated: 11/30/21 1914    Narrative:      The following orders were created for panel order COVID PRE-OP / PRE-PROCEDURE SCREENING ORDER (NO ISOLATION) - Swab, Nasal Cavity.  Procedure                               Abnormality         Status                     ---------                               -----------         ------                     COVID-19,[facility] IN-JA...[[id number]]  Normal              Final result                 Please view results for these tests on the individual orders.    COVID-19,[facility] IN-HOUSE,Nasal Swab No Transport Media 3-4 HR TAT - Swab, Nasal Cavity [[id number]]  (Normal) Collected: 11/30/21 1834    Specimen: Swab from Nasal Cavity Updated: 11/30/21 1914     COVID19 Not Detected    Narrative:      Fact sheet for providers: https://www.fda.gov/media/118718/download     Fact sheet for patients: https://www.fda.gov/media/542868/download    Test performed by PCR.    Consider negative results in combination with clinical observations, patient history, and epidemiological information.    Basic Metabolic Panel [[id number]]  (Abnormal) Collected: 11/30/21 1834    Specimen: Blood Updated: 11/30/21 1900      Glucose 204 mg/dL      BUN 11 mg/dL      Creatinine 0.53 mg/dL      Sodium 134 mmol/L      Potassium 4.6 mmol/L      Chloride 98 mmol/L      CO2 23.3 mmol/L      Calcium 8.9 mg/dL      eGFR Non African Amer 113 mL/min/1.73      BUN/Creatinine Ratio 20.8     Anion Gap 12.7 mmol/L     Narrative:      GFR Normal >60  Chronic Kidney Disease <60  Kidney Failure <15      CBC & Differential [[id number]]  (Abnormal) Collected: 11/30/21 1835    Specimen: Blood Updated: 11/30/21 1843    Narrative:      The following orders were created for panel order CBC & Differential.  Procedure                               Abnormality         Status                     ---------                               -----------         ------                     CBC Auto Differential[[id number]]        Abnormal            Final result                 Please view results for these tests on the individual orders.    CBC Auto Differential [[id number]]  (Abnormal) Collected: 11/30/21 1835    Specimen: Blood Updated: 11/30/21 1843     WBC 7.86 10*3/mm3      RBC 4.46 10*6/mm3      Hemoglobin 11.8 g/dL      Hematocrit 37.0 %      MCV 83.0 fL      MCH 26.5 pg      MCHC 31.9 g/dL      RDW 13.9 %      RDW-SD 41.4 fl      MPV 9.9 fL      Platelets 337 10*3/mm3      Neutrophil % 55.7 %      Lymphocyte % 31.8 %      Monocyte % 8.1 %      Eosinophil % 3.2 %      Basophil % 0.9 %      Immature Grans % 0.3 %      Neutrophils, Absolute 4.38 10*3/mm3      Lymphocytes, Absolute 2.50 10*3/mm3      Monocytes, Absolute 0.64 10*3/mm3      Eosinophils, Absolute 0.25 10*3/mm3      Basophils, Absolute 0.07 10*3/mm3      Immature Grans, Absolute 0.02 10*3/mm3      nRBC 0.0 /100 WBC           Imaging Results (Most Recent)     Procedure Component Value Units Date/Time    XR Wrist 3+ View Right [[id number]] Collected: 11/30/21 1718     Updated: 11/30/21 1720    Narrative:      CR Wrist Min 3 Vws RT    INDICATION:   Fall one hour ago with right wrist pain and  deformity    COMPARISON:   None available.    FINDINGS:   AP, lateral, and oblique views of the right wrist.  There is an acute fracture the distal radius about centimeter proximal to the end of the bone. There are some dorsal displacement of the distal radial fragments. This may be slightly comminuted. There  may be involvement of the articular surface. There is also an ulnar styloid fracture. Carpal bones are normal. No bone erosion or destruction.  No foreign body.      Impression:      Acute distal radius fracture with dorsal displacement of the distal radial fragments. There is also an ulnar styloid fracture.    Signer Name: Yair Stewart MD   Signed: 11/30/2021 5:18 PM   Workstation Name: "[email or web address][email or web address]    [facility]        reviewed    ECG/EMG Results (most recent)     None        reviewed    Assessment/Plan     1. Acute distal radius fracture with dorsal displacement of the distal radial fragments - ortho consulted. Plans for surgery tomorrow. Oxycodone 5mg q4h for pain. NPO after midnight except for medications    2. DMT2 not on insulin - hold home meds and start low SSI    3. HTN - holding ARB for now, continue metoprolol    4. Pre-op risk assessment: Revised Cardiac Risk Index: 0.4% estimated of rate of MI, pulmonary edema, Vfib, Cardiac arrest or complete heart block. METS > 10.  No further cardiac workup needed prior to surgery.    I discussed the patient's findings and my recommendations with patient.     Narciso Painter MD  11/30/21  19:16 EST

## 2021-12-01 NOTE — PLAN OF CARE
Goal Outcome Evaluation:  Plan of Care Reviewed With: patient        Progress: no change  Outcome Summary: arrived to unit with right arm wrapped and in an arm sling - arm elevated - oxycodone given for pain with a little relief - ortho consulted - possible sx in AM - NPO - rested well through night

## 2021-12-01 NOTE — CASE MANAGEMENT/SOCIAL WORK
Discharge Planning Assessment   Rosemary Jhaveri     Patient Name: Amber Campos  MRN: [id number]  Today's Date: 12/1/2021    Admit Date: 11/30/2021     Discharge Needs Assessment     Row Name 12/01/21 1517       Living Environment    Lives With spouse    Name(s) of Who Lives With Patient andrews Hernández    Current Living Arrangements home/apartment/condo    Potentially Unsafe Housing Conditions --  none    Primary Care Provided by self    Provides Primary Care For no one    Family Caregiver if Needed spouse    Family Caregiver Names Edgar, andrews    Quality of Family Relationships unable to assess    Able to Return to Prior Arrangements yes       Resource/Environmental Concerns    Resource/Environmental Concerns none    Transportation Concerns car, none       Transition Planning    Patient/Family Anticipates Transition to home with family    Patient/Family Anticipated Services at Transition none    Transportation Anticipated family or friend will provide       Discharge Needs Assessment    Readmission Within the Last 30 Days no previous admission in last 30 days    Current Outpatient/Agency/Support Group --  none    Equipment Currently Used at Home cane, straight    Concerns to be Addressed no discharge needs identified    Anticipated Changes Related to Illness none    Equipment Needed After Discharge none    Outpatient/Agency/Support Group Needs --  none    Discharge Facility/Level of Care Needs --  none    Provided Post Acute Provider List? Refused    Refused Provider List Comment Pt declined    Provided Post Acute Provider Quality & Resource List? Refused    Refused Quality and Resource List Comment Pt declined    Current Discharge Risk --  none               Discharge Plan     Row Name 12/01/21 1518       Plan    Plan Discharge home with family    Patient/Family in Agreement with Plan yes    Plan Comments I spoke with the patient at bedside with her permission.   I introduced myself and explained my role as case  manager.  I verified the information on the facesheet and the patient’s PCP as JAKE Blount.   The patient uses [facility] in [place].  She is able to afford her medications and can  them up.  She denied having an advanced directive and declined paperwork regarding same.  The patient lives in single story home for the past 14 years. She lives with her .  There is a ramp to enter the home and she is able to enter or maneuver around her home.  She is independent with her ADL’s, has a car and drives.   The patient has a cane with a seat that she uses as needed and denied needing any further DME at this time.  I offered the patient information regarding home health and other community resources and she declined the information. The patient will discharge home with family to assist and she is agreeable to that plan.   She denied having and further questions or concerns at this time.  CM will continue to follow for further needs.              Continued Care and Services - Admitted Since 11/30/2021    Coordination has not been started for this encounter.          Demographic Summary     Row Name 12/01/21 1516       General Information    Admission Type observation    Arrived From home    Referral Source admission list    Reason for Consult discharge planning    Preferred Language English     Used During This Interaction no       Contact Information    Permission Granted to Share Info With                Functional Status    No documentation.                Psychosocial    No documentation.                Abuse/Neglect    No documentation.                Legal    No documentation.                Substance Abuse    No documentation.                Patient Forms    No documentation.                   Rox Moore RN

## 2021-12-01 NOTE — PLAN OF CARE
Goal Outcome Evaluation:           Progress: no change  Outcome Summary: patient vss, taken to surgery this shift. fluids continued, and remained NPO until taken for surgery. blood glucose monitored. pain treated as needed

## 2021-12-01 NOTE — ANESTHESIA PROCEDURE NOTES
Airway  Urgency: elective    Date/Time: 12/1/2021 5:15 PM  Airway not difficult    General Information and Staff    Patient location during procedure: OR  CRNA: Sean Sharma CRNA    Indications and Patient Condition  Indications for airway management: airway protection    Preoxygenated: yes  MILS maintained throughout  Mask difficulty assessment: 0 - not attempted    Final Airway Details  Final airway type: supraglottic airway      Successful airway: LMA  Size 4    Number of attempts at approach: 1  Assessment: lips, teeth, and gum same as pre-op and atraumatic intubation

## 2021-12-01 NOTE — PROGRESS NOTES
"SERVICE: [facility] HOSPITALIST    CONSULTANTS:orthopedic surgery    CHIEF COMPLAINT: f/u left wrist fracture    SUBJECTIVE: The patient reports her pain is still present but better but does not want any change to pain medications. Her questions regarding insulin/surgery/medications were answered to the best of my ability. She otherwise denies f/c/cough/soa/chest pain/n/v/d/abdominal pain or other new concerns.    OBJECTIVE:    /74 (BP Location: Left arm, Patient Position: Lying)   Pulse 93   Temp 97 °F (36.1 °C) (Oral)   Resp 18   Ht 160 cm (63\")   Wt 78 kg (172 lb)   LMP  (LMP Unknown)   SpO2 96%   BMI 30.47 kg/m²     MEDS/LABS REVIEWED AND ORDERED    budesonide-formoterol, 2 puff, Inhalation, BID - RT  fluticasone, 2 spray, Each Nare, Daily  insulin aspart, 0-7 Units, Subcutaneous, Q6H  metoprolol succinate XL, 25 mg, Oral, Daily  pantoprazole, 40 mg, Oral, Daily  senna-docusate sodium, 2 tablet, Oral, BID  sodium chloride, 10 mL, Intravenous, Q12H  venlafaxine XR, 37.5 mg, Oral, Daily      Physical Exam   Vitals reviewed.   Constitutional:       General: She is not in acute distress.     Appearance: She is obese. She is not ill-appearing.   HENT:      Head: Normocephalic and atraumatic.      Mouth/Throat:      Mouth: Mucous membranes are moist.   Eyes:      Extraocular Movements: Extraocular movements intact.      Pupils: Pupils are equal, round, and reactive to light.   Cardiovascular:      Rate and Rhythm: Normal rate and regular rhythm.   Pulmonary:      Effort: Pulmonary effort is normal. No respiratory distress.      Breath sounds: Normal breath sounds. No wheezing or rales.   Abdominal:      General: Abdomen is flat. Bowel sounds are normal. There is no distension.      Palpations: Abdomen is soft.      Tenderness: There is no abdominal tenderness. There is no guarding.   Musculoskeletal:         General: No swelling.      Comments: LUE in sling, CMS intact to left hand "   Skin:     General: Skin is warm and dry.      Capillary Refill: Capillary refill takes less than 2 seconds.   Neurological:      General: No focal deficit present.      Mental Status: She is alert and oriented to person, place, and time.   Psychiatric:         Mood and Affect: Mood normal.         Behavior: Behavior normal.     LAB/DIAGNOSTICS:    Lab Results (last 24 hours)     Procedure Component Value Units Date/Time    Phosphorus [[id number]]  (Normal) Collected: 12/01/21 0414    Specimen: Blood Updated: 12/01/21 0521     Phosphorus 4.3 mg/dL     Protime-INR [[id number]]  (Normal) Collected: 12/01/21 0414    Specimen: Blood Updated: 12/01/21 0516     Protime 13.3 Seconds      INR 0.99    Narrative:      Therapeutic Ranges for INR: 2.0-3.0 (PT 20-30)                              2.5-3.5 (PT 25-34)    Basic Metabolic Panel [[id number]]  (Abnormal) Collected: 12/01/21 0414    Specimen: Blood Updated: 12/01/21 0515     Glucose 193 mg/dL      BUN 9 mg/dL      Creatinine 0.49 mg/dL      Sodium 137 mmol/L      Potassium 3.8 mmol/L      Chloride 101 mmol/L      CO2 25.1 mmol/L      Calcium 8.6 mg/dL      eGFR Non African Amer 124 mL/min/1.73      BUN/Creatinine Ratio 18.4     Anion Gap 10.9 mmol/L     Narrative:      GFR Normal >60  Chronic Kidney Disease <60  Kidney Failure <15      Magnesium [[id number]]  (Normal) Collected: 12/01/21 0414    Specimen: Blood Updated: 12/01/21 0515     Magnesium 1.7 mg/dL     CBC & Differential [[id number]]  (Abnormal) Collected: 12/01/21 0414    Specimen: Blood Updated: 12/01/21 0454    Narrative:      The following orders were created for panel order CBC & Differential.  Procedure                               Abnormality         Status                     ---------                               -----------         ------                     CBC Auto Differential[[id number]]        Abnormal            Final result                 Please view results for these tests on the individual  orders.    CBC Auto Differential [[id number]]  (Abnormal) Collected: 12/01/21 0414    Specimen: Blood Updated: 12/01/21 0454     WBC 7.30 10*3/mm3      RBC 4.09 10*6/mm3      Hemoglobin 10.9 g/dL      Hematocrit 33.6 %      MCV 82.2 fL      MCH 26.7 pg      MCHC 32.4 g/dL      RDW 13.9 %      RDW-SD 41.2 fl      MPV 10.1 fL      Platelets 308 10*3/mm3      Neutrophil % 44.7 %      Lymphocyte % 40.5 %      Monocyte % 9.5 %      Eosinophil % 4.4 %      Basophil % 0.8 %      Immature Grans % 0.1 %      Neutrophils, Absolute 3.26 10*3/mm3      Lymphocytes, Absolute 2.96 10*3/mm3      Monocytes, Absolute 0.69 10*3/mm3      Eosinophils, Absolute 0.32 10*3/mm3      Basophils, Absolute 0.06 10*3/mm3      Immature Grans, Absolute 0.01 10*3/mm3      nRBC 0.0 /100 WBC     Protime-INR [[id number]]  (Normal) Collected: 11/30/21 1834    Specimen: Blood Updated: 11/30/21 2124     Protime 12.9 Seconds      INR 0.95    Narrative:      Therapeutic Ranges for INR: 2.0-3.0 (PT 20-30)                              2.5-3.5 (PT 25-34)    aPTT [[id number]]  (Normal) Collected: 11/30/21 1834    Specimen: Blood Updated: 11/30/21 2124     PTT 31.6 seconds     Narrative:      PTT = The equivalent PTT values for the therapeutic range of heparin levels at 0.1 to 0.7 U/ml are 53 to 110 seconds.      Phosphorus [[id number]]  (Normal) Collected: 11/30/21 1834    Specimen: Blood Updated: 11/30/21 2054     Phosphorus 3.3 mg/dL     Magnesium [[id number]]  (Normal) Collected: 11/30/21 1834    Specimen: Blood Updated: 11/30/21 2054     Magnesium 1.7 mg/dL     COVID PRE-OP / PRE-PROCEDURE SCREENING ORDER (NO ISOLATION) - Swab, Nasal Cavity [[id number]]  (Normal) Collected: 11/30/21 1834    Specimen: Swab from Nasal Cavity Updated: 11/30/21 1914    Narrative:      The following orders were created for panel order COVID PRE-OP / PRE-PROCEDURE SCREENING ORDER (NO ISOLATION) - Swab, Nasal Cavity.  Procedure                               Abnormality         Status                      ---------                               -----------         ------                     COVID-19,[facility] IN-JA...[[id number]]  Normal              Final result                 Please view results for these tests on the individual orders.    COVID-19,[facility] IN-HOUSE,Nasal Swab No Transport Media 3-4 HR TAT - Swab, Nasal Cavity [[id number]]  (Normal) Collected: 11/30/21 1834    Specimen: Swab from Nasal Cavity Updated: 11/30/21 1914     COVID19 Not Detected    Narrative:      Fact sheet for providers: https://www.fda.gov/media/880320/download     Fact sheet for patients: https://www.fda.gov/media/980681/download    Test performed by PCR.    Consider negative results in combination with clinical observations, patient history, and epidemiological information.    Basic Metabolic Panel [[id number]]  (Abnormal) Collected: 11/30/21 1834    Specimen: Blood Updated: 11/30/21 1900     Glucose 204 mg/dL      BUN 11 mg/dL      Creatinine 0.53 mg/dL      Sodium 134 mmol/L      Potassium 4.6 mmol/L      Chloride 98 mmol/L      CO2 23.3 mmol/L      Calcium 8.9 mg/dL      eGFR Non African Amer 113 mL/min/1.73      BUN/Creatinine Ratio 20.8     Anion Gap 12.7 mmol/L     Narrative:      GFR Normal >60  Chronic Kidney Disease <60  Kidney Failure <15      CBC & Differential [[id number]]  (Abnormal) Collected: 11/30/21 1835    Specimen: Blood Updated: 11/30/21 1843    Narrative:      The following orders were created for panel order CBC & Differential.  Procedure                               Abnormality         Status                     ---------                               -----------         ------                     CBC Auto Differential[[id number]]        Abnormal            Final result                 Please view results for these tests on the individual orders.    CBC Auto Differential [[id number]]  (Abnormal) Collected: 11/30/21 1835    Specimen: Blood Updated: 11/30/21 1843     WBC 7.86 10*3/mm3      RBC 4.46  10*6/mm3      Hemoglobin 11.8 g/dL      Hematocrit 37.0 %      MCV 83.0 fL      MCH 26.5 pg      MCHC 31.9 g/dL      RDW 13.9 %      RDW-SD 41.4 fl      MPV 9.9 fL      Platelets 337 10*3/mm3      Neutrophil % 55.7 %      Lymphocyte % 31.8 %      Monocyte % 8.1 %      Eosinophil % 3.2 %      Basophil % 0.9 %      Immature Grans % 0.3 %      Neutrophils, Absolute 4.38 10*3/mm3      Lymphocytes, Absolute 2.50 10*3/mm3      Monocytes, Absolute 0.64 10*3/mm3      Eosinophils, Absolute 0.25 10*3/mm3      Basophils, Absolute 0.07 10*3/mm3      Immature Grans, Absolute 0.02 10*3/mm3      nRBC 0.0 /100 WBC         ECG 12 Lead   Final Result   HEART RATE= 92  bpm   RR Interval= 652  ms   WI Interval= 132  ms   P Horizontal Axis= 10  deg   P Front Axis= 71  deg   QRSD Interval= 91  ms   QT Interval= 347  ms   QRS Axis= 68  deg   T Wave Axis= 48  deg   - NORMAL ECG -   Sinus rhythm   When compared with ECG of 30-Mar-2015 10:42:02,   No significant change   Electronically Signed By: Austin Baker ([facility] 01-Dec-2021 07:12:31   Date and Time of Study: 2021-11-30 19:48:10        Results for orders placed during the hospital encounter of 12/29/16    Adult Transthoracic Echo Complete    Interpretation Summary  · Calculated EF = 64.6%.  · All left ventricular wall segments contract normally.  · Left ventricular diastolic dysfunction (grade I) consistent with impaired relaxation.  · normal valvular structures    XR Wrist 3+ View Right    Result Date: 11/30/2021  Acute distal radius fracture with dorsal displacement of the distal radial fragments. There is also an ulnar styloid fracture. Signer Name: Yair Stewart MD  Signed: 11/30/2021 5:18 PM  Workstation Name: [email or web address]  [facility]    ASSESSMENT/PLAN:  Acute right distal radius fracture: ortho surgery consulted   NPO for surgery today  Holding DVT prophy  Control pain    DM2 in obese with hyperglycemia: A1c 8.8%  Holding home oral medications, add low-dose  "SSI with Accu-Cheks every 6 hours while n.p.o.    Hypertension: BP at goal on metoprolol succinate 25 mg daily    GERD: No current acute issues on Protonix    Hyperlipidemia: No Home statin noted, no current acute issues    Anxiety/depression: No current acute issues on Effexor XR 37.5 mg daily    PLAN FOR DISPOSITION: home when able    JAKE Washington  Hospitalist, [facility]  12/01/21  07:13 EST    \"Dictated utilizing Dragon dictation\"    "

## 2021-12-02 ENCOUNTER — TELEPHONE (OUTPATIENT)
Dept: INTERNAL MEDICINE | Facility: CLINIC | Age: 71
End: 2021-12-02

## 2021-12-02 ENCOUNTER — READMISSION MANAGEMENT (OUTPATIENT)
Dept: CALL CENTER | Facility: HOSPITAL | Age: 71
End: 2021-12-02

## 2021-12-02 ENCOUNTER — TRANSITIONAL CARE MANAGEMENT TELEPHONE ENCOUNTER (OUTPATIENT)
Dept: CALL CENTER | Facility: HOSPITAL | Age: 71
End: 2021-12-02

## 2021-12-02 LAB
CONV .: NORMAL
CYTOLOGIST CVX/VAG CYTO: NORMAL
CYTOLOGY CVX/VAG DOC CYTO: NORMAL
CYTOLOGY CVX/VAG DOC THIN PREP: NORMAL
DX ICD CODE: NORMAL
HIV 1 & 2 AB SER-IMP: NORMAL
OTHER STN SPEC: NORMAL
STAT OF ADQ CVX/VAG CYTO-IMP: NORMAL

## 2021-12-02 NOTE — CASE MANAGEMENT/SOCIAL WORK
Case Management Discharge Note      Final Note: Discharged home    Provided Post Acute Provider List?: Refused  Refused Provider List Comment: Pt declined  Provided Post Acute Provider Quality & Resource List?: Refused  Refused Quality and Resource List Comment: Pt declined    Selected Continued Care - Discharged on 12/1/2021 Admission date: 11/30/2021 - Discharge disposition: Home or Self Care    Destination    No services have been selected for the patient.              Durable Medical Equipment    No services have been selected for the patient.              Dialysis/Infusion    No services have been selected for the patient.              Home Medical Care    No services have been selected for the patient.              Therapy    No services have been selected for the patient.              Community Resources    No services have been selected for the patient.              Community & DME    No services have been selected for the patient.                       Final Discharge Disposition Code: 01 - home or self-care

## 2021-12-02 NOTE — OUTREACH NOTE
Prep Survey      Responses   [facility] patient discharged from? [place]   Is LACE score < 7 ? Yes   Emergency Room discharge w/ pulse ox? No   Eligibility [facility]   Date of Admission 11/30/21   Date of Discharge 12/01/21   Discharge Disposition Home or Self Care   Discharge diagnosis WRIST OPEN REDUCTION INTERNAL FIXATION   Does the patient have one of the following disease processes/diagnoses(primary or secondary)? General Surgery   Does the patient have Home health ordered? No   Is there a DME ordered? No   Prep survey completed? Yes          Stefani Tinsley RN

## 2021-12-02 NOTE — TELEPHONE ENCOUNTER
Called and spoke with pt and she states that after leaving office she had to stay in Hospital due to fall in Office. Dr. Regalado stated that there was a breakage in three places, and she had to have a plate and 9-10 screws placed. She states that she is doing well, where she couldn't eat yesterday she states that she is very dehydrated. I advised her to make sure she pushes fluids. She is going to make her 3 month f/u when we hung up. I advised her if she needs anything to let us know and we hope that she gets to feeling better.

## 2021-12-02 NOTE — OP NOTE
Date of surgery: 12/1/2021    Preoperative diagnosis:  1.  Right displaced distal radius fracture    Postoperative diagnosis:  1.  Right displaced intra-articular distal radius fracture with greater than 2 segments    Procedure:  1.  Open reduction internal fixation right displaced intra-articular distal radius fracture with greater 2 segments    Surgeon: Seven Bhagat.: Malena Hanks CSA-retraction, irrigation, closure, dressing application    Anesthesia: MAC plus regional block    Estimated blood loss: 10 mL    Urine output: Not recorded    Specimen: None    Drains: None    Complications: None    Tourniquet Times:     Inflated: 12/1/2021  6:03 PM     Deflated: 12/1/2021  6:42 PM    Implants: Chuck distal radius locking narrow mini plate, 6 locking screws distally, 2 locking screws proximally, 2 nonlocking screws proximally.  1 cc DBM    Indications for procedure: Patient is a pleasant 70 y.o. female who fell at her primary care provider's office and landed on right outstretched wrist with immediate onset of pain.  She was evaluated in the emergency department and noted to have a displaced distal radius comminuted fracture. Given significant limitation and use of right upper extremity as well as associated pain and displacement of the fracture, patient wished to proceed with open reduction internal fixation right distal radius and all associated procedures.  Patient was explained details of the procedure as well as risks, benefits, and alternatives as documented on history and physical and had all questions answered prior to signing the operative consent form.  No guarantees were given in regards to results of surgery.    Details of procedure: Patient was seen and evaluated and cleared for surgery by anesthesia.  Patient was met in the preoperative holding area, operative site was marked and consent was reviewed, history physical was updated, and preoperative labs were reviewed.  Regional block was then  Diabetes Education Scheduling Outreach #1:    Call to patient to schedule. Left message with phone number to call to schedule.    Plan for 2nd outreach attempt within 1 week.    Lianet Alatorre  [place] [facility]  Diabetes and Nutrition Scheduling       placed per anesthesia.  Patient was then taken operative room and placed in supine position on a regular OR table with hand table to the operative side.  After appropriate sedation per anesthesia, nonsterile tourniquet was applied to right upper extremity, right arm was sterilely prepped and draped in standard fashion.    Formal timeout was completed including confirmation of history physical, operative consent, surgical site, patient identification number, and preoperative antibiotic administration.  Right arm was then exsanguinated and tourniquet inflated to 250 mmHg.  Procedure was begun with a standard 6 cm incision with an angulated distal end of the incision to cross the proximal and distal wrist creases at 45° angles through skin only with a 15 blade.  Incision was carried through subcutaneous layer with tenotomy scissor.  Flexor carpi radialis tendon was identified and its tendon sheath was opened.  FCR was then retracted ulnarly and radial artery was identified radially and retracted in that direction.  The floor of the FCR tendon sheath was then opened with the tenotomy scissor identifying pronator quadratus at this time.  Pronator quadratus was sharply elevated from the volar surface of the distal radius with a knife followed by use of a periosteal elevator.  Fracture site was thus exposed at this time, freer was used to reduce the distal fracture fragment into appropriate anatomic position.  K wire was fired in retrograde fashion from the radial styloid obliquely into the ulnar-sided cortex of the radial shaft to maintain position of the reduced fracture.  1 cc DBM bone graft was placed at the site of the residual defect from the fracture with a oval-shaped region of cortical bone noted to be missing from the volar cortex.     With fracture having been reduced at this time, an appropriately sized distal radius locking plate was chosen and placed over the volar aspect of the right distal radius.   Tentative reduction and alignment of the plate was held by hand while C-arm fluoroscopy was used to assess position of the plate as well as reduction of the fracture fragments which were noted to be in acceptable position.  Bicortical screw was then drilled and placed into the sliding hole proximally while a K wire was placed distally maintaining the reduction and obtaining appropriate placement of the hardware with alignment of the fracture.  2 central distal screws were then drilled and placed in locking fashion at this point in time followed by removal of the K wire.  Given stable alignment of fracture site as well as plate position, additional distal locking screws were drilled, measured, and placed in standard fashion this time.  Radial styloid screw was directed into appropriate trajectory with the variable angle guide and a variable angle screw was placed at this time.    2 additional cross locking screws were placed in the shaft proximally to allow for proximal stabilization of the plate.  These were placed in bicortical fashion.  Final fluoroscopic images were taken at this point in time confirming appropriate reduction of the fracture fragments as well as alignment of the plate and screws.  Wound was then thoroughly irrigated with normal saline at this point in time and tourniquet was released.  Hemostasis was obtained with bipolar electrocautery.  Attention was then turned to closure of the wound with 3-0 Vicryl for subcutaneous layer and 3-0 strata fix for subcuticular closure followed by mesh and glue, Telfa, 4 x 4 gauze, web roll, 3 x 35 sugar tong splint, and Ace wrap.     At the end of procedure all needle and sponge counts were correct ×2.  Patient had brisk cap refill to all digits of the right upper extremity and compartments were soft and easily compressible at the end of the procedure.    Disposition: Patient was appropriately recovered from anesthesia and taken to the recovery room in stable  condition.  We will plan for discharge home this evening as long as she has appropriate pain control.  Results of procedure were discussed immediately postoperatively with patient's family and they had all questions answered at that time.

## 2021-12-02 NOTE — OUTREACH NOTE
Call Center TCM Note      Responses   [facility] patient discharged from? [place]   Does the patient have one of the following disease processes/diagnoses(primary or secondary)? General Surgery   TCM attempt successful? Yes   Discharge diagnosis WRIST OPEN REDUCTION INTERNAL FIXATION   Meds reviewed with patient/caregiver? Yes   Is the patient having any side effects they believe may be caused by any medication additions or changes? No   Does the patient have all medications related to this admission filled (includes all antibiotics, pain medications, etc.) Yes   Is the patient taking all medications as directed (includes completed medication regime)? Yes   Does the patient have a follow up appointment scheduled with their surgeon? No   What is preventing the patient from scheduling follow up appointments? Haven't had time   Has the patient kept scheduled appointments due by today? N/A   Has home health visited the patient within 72 hours of discharge? N/A   Psychosocial issues? No   Did the patient receive a copy of their discharge instructions? Yes   Nursing interventions Reviewed instructions with patient   What is the patient's perception of their health status since discharge? Improving   Nursing interventions Nurse provided patient education   Is the patient /caregiver able to teach back basic post-op care? Continue use of incentive spirometry at least 1 week post discharge,  Take showers only when approved by MD-sponge bathe until then,  Do not remove steri-strips,  Lifting as instructed by MD in discharge instructions,  No tub bath, swimming, or hot tub until instructed by MD,  Practice 'cough and deep breath',  Drive as instructed by MD in discharge instructions,  Keep incision areas clean,dry and protected   Is the patient/caregiver able to teach back signs and symptoms of incisional infection? Incisional warmth,  Increased drainage or bleeding,  Fever,  Pus or odor from incision,  Increased  redness, swelling or pain at the incisonal site   Is the patient/caregiver able to teach back steps to recovery at home? Set small, achievable goals for return to baseline health,  Practice good oral hygiene,  Eat a well-balance diet,  Rest and rebuild strength, gradually increase activity,  Weigh daily,  Make a list of questions for surgeon's appointment   If the patient is a current smoker, are they able to teach back resources for cessation? Not a smoker   Is the patient/caregiver able to teach back the hierarchy of who to call/visit for symptoms/problems? PCP, Specialist, Home health nurse, Urgent Care, ED, 911 Yes   TCM call completed? Yes   Wrap up additional comments Pt managing pretty well s/p ORIF wrist after fall. Nerve block still effective, pt understands to start pain meds when she starts to feel tingling in arm as block wears off, or achiness. She will call to sched POST OP tomorrow. Pt spoke with PCP ofc today (pt took the fall in their ofc) and PCP had her sched for 3 months out so no TCM           Lady Rolon MA    12/2/2021, 14:36 EST

## 2021-12-02 NOTE — ANESTHESIA POSTPROCEDURE EVALUATION
Patient: Amber Campos    Procedure Summary     Date: 12/01/21 Room / Location:  [facility] OR 3 /  [facility]    Anesthesia Start: 1707 Anesthesia Stop: 1928    Procedure: WRIST OPEN REDUCTION INTERNAL FIXATION (Right Wrist) Diagnosis:       Closed fracture of distal end of right radius, unspecified fracture morphology, initial encounter      (Closed fracture of distal end of right radius, unspecified fracture morphology, initial encounter [S52.501A])    Surgeons: Salazar Amezcua MD Provider: Sean Sharma CRNA    Anesthesia Type: general with block ASA Status: 3          Anesthesia Type: general with block    Vitals  Vitals Value Taken Time   /65 12/01/21 1955   Temp 97.5 °F (36.4 °C) 12/01/21 1925   Pulse 92 12/01/21 1959   Resp 12 12/01/21 1955   SpO2 91 % 12/01/21 1959   Vitals shown include unvalidated device data.        Post Anesthesia Care and Evaluation    Patient location during evaluation: bedside  Patient participation: complete - patient participated  Level of consciousness: awake and alert  Pain score: 0  Pain management: adequate  Airway patency: patent  Anesthetic complications: No anesthetic complications  PONV Status: none  Cardiovascular status: acceptable  Respiratory status: acceptable  Hydration status: acceptable

## 2021-12-02 NOTE — ADDENDUM NOTE
Addendum  created 12/01/21 2332 by Sean Sharma CRNA    Review and Sign - Ready for Procedure, Review and Sign - Signed

## 2021-12-03 ENCOUNTER — TELEPHONE (OUTPATIENT)
Dept: ORTHOPEDIC SURGERY | Facility: CLINIC | Age: 71
End: 2021-12-03

## 2021-12-03 DIAGNOSIS — F41.8 DEPRESSION WITH ANXIETY: ICD-10-CM

## 2021-12-03 RX ORDER — VENLAFAXINE HYDROCHLORIDE 37.5 MG/1
37.5 CAPSULE, EXTENDED RELEASE ORAL DAILY
Qty: 30 CAPSULE | Refills: 1 | Status: SHIPPED | OUTPATIENT
Start: 2021-12-03 | End: 2022-06-03 | Stop reason: SDUPTHER

## 2021-12-03 NOTE — TELEPHONE ENCOUNTER
Caller: JANUARY AGUILAR     Relationship to patient: SELF    Best call back number: [phone]        Type of visit: POST OP         Additional notes: PT. HAD RIGHT WRIST SURGERY WITH DR. HSU ON 12/01/21 AND NEEDS TO MAKE A POST OP VISIT.  PT. STATES SHE DOESN'T KNOW WHEN HE WANTS TO SEE HER.   THE FIRST OPENING ISN'T SHOWING UNTIL 01/03/22.   PLEASE CALL PT. TO ADVISE.

## 2021-12-07 ENCOUNTER — OFFICE VISIT (OUTPATIENT)
Dept: INTERNAL MEDICINE | Facility: CLINIC | Age: 71
End: 2021-12-07

## 2021-12-07 VITALS
TEMPERATURE: 98.7 F | OXYGEN SATURATION: 98 % | HEART RATE: 88 BPM | SYSTOLIC BLOOD PRESSURE: 128 MMHG | DIASTOLIC BLOOD PRESSURE: 76 MMHG | WEIGHT: 172 LBS | BODY MASS INDEX: 30.48 KG/M2 | HEIGHT: 63 IN | RESPIRATION RATE: 20 BRPM

## 2021-12-07 DIAGNOSIS — I10 ESSENTIAL HYPERTENSION: ICD-10-CM

## 2021-12-07 DIAGNOSIS — S52.561A CLOSED BARTON'S FRACTURE OF RIGHT RADIUS, INITIAL ENCOUNTER: Primary | ICD-10-CM

## 2021-12-07 DIAGNOSIS — E11.65 UNCONTROLLED TYPE 2 DIABETES MELLITUS WITH HYPERGLYCEMIA (HCC): ICD-10-CM

## 2021-12-07 DIAGNOSIS — E78.2 MIXED HYPERLIPIDEMIA: ICD-10-CM

## 2021-12-07 DIAGNOSIS — M80.031A PATHOLOGICAL FRACTURE OF RIGHT RADIUS DUE TO AGE-RELATED OSTEOPOROSIS, INITIAL ENCOUNTER: ICD-10-CM

## 2021-12-07 PROCEDURE — 99496 TRANSJ CARE MGMT HIGH F2F 7D: CPT | Performed by: NURSE PRACTITIONER

## 2021-12-07 PROCEDURE — 1111F DSCHRG MED/CURRENT MED MERGE: CPT | Performed by: NURSE PRACTITIONER

## 2021-12-07 RX ORDER — OLMESARTAN MEDOXOMIL 40 MG/1
40 TABLET ORAL DAILY
Qty: 90 TABLET | Refills: 1 | Status: SHIPPED | OUTPATIENT
Start: 2021-12-07 | End: 2022-01-25

## 2021-12-07 RX ORDER — METOPROLOL SUCCINATE 25 MG/1
25 TABLET, EXTENDED RELEASE ORAL NIGHTLY
Qty: 90 TABLET | Refills: 3 | Status: SHIPPED | OUTPATIENT
Start: 2021-12-07 | End: 2021-12-07 | Stop reason: SDUPTHER

## 2021-12-07 RX ORDER — METOPROLOL SUCCINATE 25 MG/1
25 TABLET, EXTENDED RELEASE ORAL NIGHTLY
Qty: 90 TABLET | Refills: 3 | Status: SHIPPED | OUTPATIENT
Start: 2021-12-07 | End: 2022-01-25 | Stop reason: SINTOL

## 2021-12-07 NOTE — TELEPHONE ENCOUNTER
Caller: Amber Campos    Relationship: Self    Best call back number: [phone]     Requested Prescriptions:   Requested Prescriptions     Pending Prescriptions Disp Refills   • metoprolol succinate XL (TOPROL-XL) 25 MG 24 hr tablet 90 tablet 3     Sig: Take 1 tablet by mouth Every Night.        Pharmacy where request should be sent: [facility]ACY #[id number] - [facility] [address] [address] AT INTERSECTION OF [place] [address] - [phone]  - [phone] FX     Additional details provided by patient: PATIENT STATES SHE HAS 1 TABLET REMAINING    Does the patient have less than a 3 day supply:  [x] Yes  [] No    Nigel Cruz Rep   12/07/21 09:55 EST

## 2021-12-07 NOTE — PROGRESS NOTES
Transitional Care Follow Up Visit  Subjective     Amber Campos is a 70 y.o. female who presents for a transitional care management visit.    Within 48 business hours after discharge our office contacted her via telephone to coordinate her care and needs.      I reviewed and discussed the details of that call along with the discharge summary, hospital problems, inpatient lab results, inpatient diagnostic studies, and consultation reports with Amber.     Current outpatient and discharge medications have been reconciled for the patient.  Reviewed by: JAKE Blount      Date of TCM Phone Call 12/2/2021   [facility]   Date of Admission 11/30/2021   Date of Discharge 12/1/2021   Discharge Disposition Home or Self Care     Risk for Readmission (LACE) Score: 6 (12/1/2021  6:01 AM)      History of Present Illness   Course During Hospital Stay:  She was admitted from 11- to 12-1-2021 after a fall in the office and developing right wrist pain. Right wrist XR showed a a right distal radius wrist fracture. She had a open wrist reduction with interanl fixation on 12-1-2021. She has a Follow up with Dr. Amezcua tomorrow. Today, she reports feeling well. She denies pain. She is having some rash from the brace rubbing on her left neck, but has a topical steroid to use for this. She has had some loose stools since being discharged, but no abd pain or fevers.      The following portions of the patient's history were reviewed and updated as appropriate: allergies, current medications, past family history, past medical history, past social history, past surgical history and problem list.    Review of Systems   Constitutional: Negative.  Negative for fatigue and fever.   HENT: Negative.  Negative for congestion and dental problem.    Eyes: Negative.    Respiratory: Negative.  Negative for shortness of breath, wheezing and stridor.    Endocrine: Negative.  Negative for polyphagia and polyuria.    Musculoskeletal: Positive for arthralgias.   Allergic/Immunologic: Negative.  Negative for environmental allergies, food allergies and immunocompromised state.   Neurological: Negative.    Psychiatric/Behavioral: Negative.    All other systems reviewed and are negative.      Objective   Physical Exam  Vitals reviewed.   Constitutional:       Appearance: Normal appearance. She is obese.   Cardiovascular:      Rate and Rhythm: Normal rate and regular rhythm.      Pulses: Normal pulses.      Heart sounds: Normal heart sounds. No murmur heard.      Pulmonary:      Effort: Pulmonary effort is normal. No respiratory distress.      Breath sounds: Normal breath sounds. No stridor.   Musculoskeletal:         General: No swelling.      Comments: Right upper extremity with brace in place,. Sensation intake to fingers, trace swelling.    Skin:     General: Skin is warm and dry.   Neurological:      Mental Status: She is alert and oriented to person, place, and time.   Psychiatric:         Mood and Affect: Mood normal.         Behavior: Behavior normal.         Thought Content: Thought content normal.         Assessment/Plan   Diagnoses and all orders for this visit:    1. Closed Khalil's fracture of right radius, initial encounter (Primary)  -     Ambulatory Referral to [facility] For Infusion Treatment    2. Uncontrolled type 2 diabetes mellitus with hyperglycemia (HCC)    3. Essential hypertension  Comments:  She is tachycardic today. Requests a change from valsartan to Benicar. Add toprol XL to help tachycardia  Orders:  -     metoprolol succinate XL (TOPROL-XL) 25 MG 24 hr tablet; Take 1 tablet by mouth Every Night.  Dispense: 90 tablet; Refill: 3  -     olmesartan (Benicar) 40 MG tablet; Take 1 tablet by mouth Daily.  Dispense: 90 tablet; Refill: 1    4. Mixed hyperlipidemia    5. 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     Follow up in 3 months as scheduled

## 2021-12-08 ENCOUNTER — OFFICE VISIT (OUTPATIENT)
Dept: ORTHOPEDIC SURGERY | Facility: CLINIC | Age: 71
End: 2021-12-08

## 2021-12-08 VITALS
DIASTOLIC BLOOD PRESSURE: 85 MMHG | SYSTOLIC BLOOD PRESSURE: 160 MMHG | BODY MASS INDEX: 30.48 KG/M2 | WEIGHT: 172 LBS | HEIGHT: 63 IN | HEART RATE: 102 BPM

## 2021-12-08 DIAGNOSIS — S62.101D CLOSED FRACTURE OF RIGHT WRIST WITH ROUTINE HEALING, SUBSEQUENT ENCOUNTER: Primary | ICD-10-CM

## 2021-12-08 PROCEDURE — 73110 X-RAY EXAM OF WRIST: CPT | Performed by: ORTHOPAEDIC SURGERY

## 2021-12-08 PROCEDURE — 99024 POSTOP FOLLOW-UP VISIT: CPT | Performed by: ORTHOPAEDIC SURGERY

## 2021-12-08 NOTE — PROGRESS NOTES
CC: Follow-up status post ORIF right distal radius fracture 12/1/2021    Interval history: Patient returns to clinic today now 1 week out from surgery.  The patient states she is doing very well at this point in time. She is not on any pain medication. She has had no issues with any loss of sensation, per her report.    PE:   Right wrist-         Right volar incision is clean, dry and intact with mesh in place.    She tolerates flexion and extension of the wrist 10 degrees in each direction.     She is able to flex and extend fingers and thumb.     She is able to bring her fingertips to her palm.     Positive sensation in all distributions.     She does have moderate ecchymosis extending down into the small finger and the palm of the hand that is stable   since time of surgery.     1+ radial pulse with good cap refill to all digits of the right hand.         Imaging   3 view x-rays of right wrist including AP, lateral, and oblique views, ordered and reviewed by me, indicates stable alignment of  distal radius hardware, no evidence of fracture displacement, minimal interval healing noted.    Impression   Status post ORIF right distal radius fracture    Plan     1. Discussed treatment options at length with patient at today's visit.    2. We will place her in an Exos wrist brace today, well fitting. Recommended that she stay in that at all times except for   showering.    3. I do want her working on finger range of motion, both active and passive.  She can even do some resistance motion   as tolerated.    4. I will plan on seeing her back in 3 weeks with repeat x-rays, right wrist out of the brace and hopefully transition her out   of it intermittently to work on motion.       Transcribed from ambient dictation for Salazar Amezcua MD by Mila So.  12/08/21   14:47 EST    Patient verbalized consent to the visit recording.  I have personally performed the services described in this document as transcribed by  the above individual, and it is both accurate and complete.  Salazar Amezcua MD  12/21/2021  19:32 EST

## 2021-12-09 ENCOUNTER — TELEPHONE (OUTPATIENT)
Dept: ORTHOPEDIC SURGERY | Facility: CLINIC | Age: 71
End: 2021-12-09

## 2021-12-09 NOTE — TELEPHONE ENCOUNTER
Pt called to ask if she can her incisions wet. I explained that she could let water run over the wound, but not to scrub or submerge in water and to pat dry.   She also asked about exercising her arm. I read the instructions per Dr Amezcua's note to her and explained what each exercise meant. She expressed understanding.

## 2021-12-13 ENCOUNTER — HOSPITAL ENCOUNTER (OUTPATIENT)
Dept: INFUSION THERAPY | Facility: HOSPITAL | Age: 71
Discharge: HOME | End: 2021-12-13
Admitting: NURSE PRACTITIONER

## 2021-12-13 VITALS
HEART RATE: 84 BPM | TEMPERATURE: 96.6 F | DIASTOLIC BLOOD PRESSURE: 86 MMHG | WEIGHT: 170 LBS | OXYGEN SATURATION: 97 % | HEIGHT: 63 IN | SYSTOLIC BLOOD PRESSURE: 163 MMHG | RESPIRATION RATE: 16 BRPM | BODY MASS INDEX: 30.12 KG/M2

## 2021-12-13 DIAGNOSIS — M80.031A PATHOLOGICAL FRACTURE OF RIGHT RADIUS DUE TO AGE-RELATED OSTEOPOROSIS, INITIAL ENCOUNTER: ICD-10-CM

## 2021-12-13 DIAGNOSIS — M80.00XA AGE-RELATED OSTEOPOROSIS WITH CURRENT PATHOLOGICAL FRACTURE, INITIAL ENCOUNTER: Primary | ICD-10-CM

## 2021-12-13 PROCEDURE — 25010000002 DENOSUMAB 60 MG/ML SOLUTION PREFILLED SYRINGE: Performed by: NURSE PRACTITIONER

## 2021-12-13 PROCEDURE — 96372 THER/PROPH/DIAG INJ SC/IM: CPT

## 2021-12-13 RX ADMIN — DENOSUMAB 60 MG: 60 INJECTION SUBCUTANEOUS at 09:09

## 2021-12-13 NOTE — NURSING NOTE
NURSE PROGRESS NOTE    PT. ARRIVED @ [facility] FOR SCHEDULED INJECTION AT 0900 .  INJECTION WAS PERFORMED WITHOUT INCIDENT.  PT. DISCHARGED TO HOME AT 0935.

## 2021-12-13 NOTE — PATIENT INSTRUCTIONS
Call [facility] at [phone] Denosumab injection  What is this medicine?  DENOSUMAB (den oh luc mab) slows bone breakdown. Prolia is used to treat osteoporosis in women after menopause and in men, and in people who are taking corticosteroids for 6 months or more. Xgeva is used to treat a high calcium level due to cancer and to prevent bone fractures and other bone problems caused by multiple myeloma or cancer bone metastases. Xgeva is also used to treat giant cell tumor of the bone.  This medicine may be used for other purposes; ask your health care provider or pharmacist if you have questions.  COMMON BRAND NAME(S): Prolia, XGEVA  What should I tell my health care provider before I take this medicine?  They need to know if you have any of these conditions:  · dental disease  · having surgery or tooth extraction  · infection  · kidney disease  · low levels of calcium or Vitamin D in the blood  · malnutrition  · on hemodialysis  · skin conditions or sensitivity  · thyroid or parathyroid disease  · an unusual reaction to denosumab, other medicines, foods, dyes, or preservatives  · pregnant or trying to get pregnant  · breast-feeding  How should I use this medicine?  This medicine is for injection under the skin. It is given by a health care professional in a hospital or clinic setting.  A special MedGuide will be given to you before each treatment. Be sure to read this information carefully each time.  For Prolia, talk to your pediatrician regarding the use of this medicine in children. Special care may be needed. For Xgeva, talk to your pediatrician regarding the use of this medicine in children. While this drug may be prescribed for children as young as 13 years for selected conditions, precautions do apply.  Overdosage: If you think you have taken too much of this medicine contact a poison control center or emergency room at once.  NOTE: This medicine is only for you. Do not share  this medicine with others.  What if I miss a dose?  It is important not to miss your dose. Call your doctor or health care professional if you are unable to keep an appointment.  What may interact with this medicine?  Do not take this medicine with any of the following medications:  · other medicines containing denosumab  This medicine may also interact with the following medications:  · medicines that lower your chance of fighting infection  · steroid medicines like prednisone or cortisone  This list may not describe all possible interactions. Give your health care provider a list of all the medicines, herbs, non-prescription drugs, or dietary supplements you use. Also tell them if you smoke, drink alcohol, or use illegal drugs. Some items may interact with your medicine.  What should I watch for while using this medicine?  Visit your doctor or health care professional for regular checks on your progress. Your doctor or health care professional may order blood tests and other tests to see how you are doing.  Call your doctor or health care professional for advice if you get a fever, chills or sore throat, or other symptoms of a cold or flu. Do not treat yourself. This drug may decrease your body's ability to fight infection. Try to avoid being around people who are sick.  You should make sure you get enough calcium and vitamin D while you are taking this medicine, unless your doctor tells you not to. Discuss the foods you eat and the vitamins you take with your health care professional.  See your dentist regularly. Brush and floss your teeth as directed. Before you have any dental work done, tell your dentist you are receiving this medicine.  Do not become pregnant while taking this medicine or for 5 months after stopping it. Talk with your doctor or health care professional about your birth control options while taking this medicine. Women should inform their doctor if they wish to become pregnant or think they  "might be pregnant. There is a potential for serious side effects to an unborn child. Talk to your health care professional or pharmacist for more information.  What side effects may I notice from receiving this medicine?  Side effects that you should report to your doctor or health care professional as soon as possible:  · allergic reactions like skin rash, itching or hives, swelling of the face, lips, or tongue  · bone pain  · breathing problems  · dizziness  · jaw pain, especially after dental work  · redness, blistering, peeling of the skin  · signs and symptoms of infection like fever or chills; cough; sore throat; pain or trouble passing urine  · signs of low calcium like fast heartbeat, muscle cramps or muscle pain; pain, tingling, numbness in the hands or feet; seizures  · unusual bleeding or bruising  · unusually weak or tired  Side effects that usually do not require medical attention (report to your doctor or health care professional if they continue or are bothersome):  · constipation  · diarrhea  · headache  · joint pain  · loss of appetite  · muscle pain  · runny nose  · tiredness  · upset stomach  This list may not describe all possible side effects. Call your doctor for medical advice about side effects. You may report side effects to FDA at 7-282-FDA-1671.  Where should I keep my medicine?  This medicine is only given in a clinic, doctor's office, or other health care setting and will not be stored at home.  NOTE: This sheet is a summary. It may not cover all possible information. If you have questions about this medicine, talk to your doctor, pharmacist, or health care provider.  © 2021 [employer]vier/Gold Standard (2019-04-26 16:10:44)   if you have any problems or concerns.    We know you have a Choice in healthcare and appreciate you using [facility].  Our purpose is to provide you \"Excellent Care\".  We hope that you will always choose us in the future and continue to recommend us to your " family and friends.

## 2021-12-15 ENCOUNTER — OFFICE VISIT (OUTPATIENT)
Dept: INTERNAL MEDICINE | Facility: CLINIC | Age: 71
End: 2021-12-15

## 2021-12-15 VITALS
TEMPERATURE: 98 F | WEIGHT: 170 LBS | RESPIRATION RATE: 20 BRPM | HEART RATE: 94 BPM | OXYGEN SATURATION: 97 % | DIASTOLIC BLOOD PRESSURE: 64 MMHG | HEIGHT: 63 IN | SYSTOLIC BLOOD PRESSURE: 134 MMHG | BODY MASS INDEX: 30.12 KG/M2

## 2021-12-15 DIAGNOSIS — R35.0 FREQUENT URINATION: ICD-10-CM

## 2021-12-15 DIAGNOSIS — N30.00 ACUTE CYSTITIS WITHOUT HEMATURIA: Primary | ICD-10-CM

## 2021-12-15 LAB
BILIRUB BLD-MCNC: NEGATIVE MG/DL
CLARITY, POC: ABNORMAL
COLOR UR: YELLOW
EXPIRATION DATE: ABNORMAL
GLUCOSE UR STRIP-MCNC: ABNORMAL MG/DL
KETONES UR QL: NEGATIVE
LEUKOCYTE EST, POC: ABNORMAL
Lab: ABNORMAL
NITRITE UR-MCNC: POSITIVE MG/ML
PH UR: 6 [PH] (ref 5–8)
PROT UR STRIP-MCNC: ABNORMAL MG/DL
RBC # UR STRIP: ABNORMAL /UL
SP GR UR: 1.03 (ref 1–1.03)
UROBILINOGEN UR QL: NORMAL

## 2021-12-15 PROCEDURE — 99213 OFFICE O/P EST LOW 20 MIN: CPT | Performed by: NURSE PRACTITIONER

## 2021-12-15 PROCEDURE — 81003 URINALYSIS AUTO W/O SCOPE: CPT | Performed by: NURSE PRACTITIONER

## 2021-12-15 RX ORDER — SULFAMETHOXAZOLE AND TRIMETHOPRIM 800; 160 MG/1; MG/1
1 TABLET ORAL 2 TIMES DAILY
Qty: 14 TABLET | Refills: 0 | Status: SHIPPED | OUTPATIENT
Start: 2021-12-15 | End: 2022-01-25

## 2021-12-15 NOTE — PROGRESS NOTES
Chief Complaint   Patient presents with   • Urinary Frequency       Subjective     Amber Campos is a 70 y.o. female being seen for an acute visit form UTI. She started with symptoms of frequency and urinary frequency 2 days ago. No blood in urine, no back pain, no fever. She tried increasing water.       History of Present Illness     Allergies   Allergen Reactions   • Iodinated Diagnostic Agents Photosensitivity     contrast   • Adhesive Tape Other (See Comments)     EKG stickers only   • Farxiga [Dapagliflozin] Diarrhea and Itching     Yeast infeciotn   • Neomycin-Bacitracin Zn-Polymyx Rash   • Trulicity [Dulaglutide] GI Intolerance     Constipation   • Victoza [Liraglutide] Nausea Only         Current Outpatient Medications:   •  budesonide-formoterol (Symbicort) 160-4.5 MCG/ACT inhaler, Inhale 2 puffs 2 (Two) Times a Day., Disp: 1 each, Rfl: 6  •  Cetirizine HCl (ZyrTEC Childrens Allergy) 5 MG/5ML solution solution, Take 5 mL by mouth Daily., Disp: 150 mL, Rfl:   •  glucose blood (FREESTYLE LITE) test strip, USE TO CHECK BLOOD SUGAR 3 TIMES A DAY, Disp: 300 each, Rfl: 3  •  Insulin Pen Needle (PEN NEEDLES) 32G X 6 MM misc, 1 each Daily With Breakfast., Disp: 90 each, Rfl: 3  •  ketoconazole (NIZORAL) 2 % shampoo, USE TWICE WEEKLY AS DIRECTED., Disp: , Rfl:   •  Lancets (FREESTYLE) lancets, , Disp: , Rfl:   •  metFORMIN (GLUCOPHAGE) 1000 MG tablet, TAKE 1 TABLET TWICE DAILY  WITH MEALS, Disp: 180 tablet, Rfl: 3  •  metoprolol succinate XL (TOPROL-XL) 25 MG 24 hr tablet, Take 1 tablet by mouth Every Night., Disp: 90 tablet, Rfl: 3  •  nystatin (MYCOSTATIN) 005930 UNIT/GM powder, See Admin Instructions., Disp: , Rfl:   •  olmesartan (Benicar) 40 MG tablet, Take 1 tablet by mouth Daily., Disp: 90 tablet, Rfl: 1  •  pantoprazole (PROTONIX) 40 MG EC tablet, TAKE 1 TABLET DAILY, Disp: 90 tablet, Rfl: 3  •  Semaglutide, 1 MG/DOSE, (OZEMPIC) 2 MG/1.5ML solution pen-injector, Inject 1 mg under the skin into the  appropriate area as directed 1 (One) Time Per Week., Disp: , Rfl:   •  Spacer/Aero Chamber Mouthpiece misc, 1 each Daily., Disp: 1 each, Rfl: 0  •  Vascepa 1 g capsule capsule, 2 (Two) Times a Day With Meals., Disp: , Rfl:   •  venlafaxine XR (EFFEXOR-XR) 37.5 MG 24 hr capsule, Take 1 capsule by mouth Daily., Disp: 30 capsule, Rfl: 1    The following portions of the patient's history were reviewed and updated as appropriate: allergies, current medications, past family history, past medical history, past social history, past surgical history and problem list.    Review of Systems   Constitutional: Negative.  Negative for fatigue.   HENT: Negative.    Respiratory: Negative.    Cardiovascular: Negative.    Genitourinary: Positive for frequency and hematuria. Negative for flank pain.   Musculoskeletal: Positive for arthralgias.   Hematological: Negative.    Psychiatric/Behavioral: Negative.    All other systems reviewed and are negative.      Assessment     Physical Exam  Vitals reviewed.   Constitutional:       Appearance: Normal appearance.   Cardiovascular:      Rate and Rhythm: Normal rate and regular rhythm.      Pulses: Normal pulses.      Heart sounds: Normal heart sounds. No murmur heard.      Pulmonary:      Effort: Pulmonary effort is normal.      Breath sounds: Normal breath sounds.   Abdominal:      General: Abdomen is flat. There is no distension.      Palpations: Abdomen is soft. There is no mass.      Tenderness: There is no abdominal tenderness.   Neurological:      Mental Status: She is alert.         Plan         Diagnoses and all orders for this visit:    1. Acute cystitis without hematuria (Primary)  -     POCT urinalysis dipstick, automated  -     sulfamethoxazole-trimethoprim (Bactrim DS) 800-160 MG per tablet; Take 1 tablet by mouth 2 (Two) Times a Day.  Dispense: 14 tablet; Refill: 0  -     Urine Culture - Urine, Urine, Clean Catch; Future    2. Frequent urination  -     POCT urinalysis dipstick,  automated  -     Urine Culture - Urine, Urine, Clean Catch; Future    Follow up as needed

## 2021-12-19 LAB
BACTERIA UR CULT: ABNORMAL
BACTERIA UR CULT: ABNORMAL
OTHER ANTIBIOTIC SUSC ISLT: ABNORMAL

## 2021-12-20 ENCOUNTER — TELEPHONE (OUTPATIENT)
Dept: ORTHOPEDIC SURGERY | Facility: CLINIC | Age: 71
End: 2021-12-20

## 2021-12-20 NOTE — TELEPHONE ENCOUNTER
Patient called, she had a question about vacation plans pertaining to her wrist injury. She has a cruise planned for the beginning of February 2022 and is concerned that she may not be able to go on the cruise due to her wrist fractures. She is wondering if you would be willing fill out some paperwork, certifying her injury and stating that she cannot go on the cruise due to her injury so that she can try to get her money back. Is this something you feel you could do?

## 2022-01-03 ENCOUNTER — OFFICE VISIT (OUTPATIENT)
Dept: ORTHOPEDIC SURGERY | Facility: CLINIC | Age: 72
End: 2022-01-03

## 2022-01-03 VITALS — HEIGHT: 63 IN | BODY MASS INDEX: 30.3 KG/M2 | WEIGHT: 171 LBS

## 2022-01-03 DIAGNOSIS — S62.101D CLOSED FRACTURE OF RIGHT WRIST WITH ROUTINE HEALING, SUBSEQUENT ENCOUNTER: Primary | ICD-10-CM

## 2022-01-03 PROCEDURE — 73110 X-RAY EXAM OF WRIST: CPT | Performed by: ORTHOPAEDIC SURGERY

## 2022-01-03 PROCEDURE — 99024 POSTOP FOLLOW-UP VISIT: CPT | Performed by: ORTHOPAEDIC SURGERY

## 2022-01-03 NOTE — PROGRESS NOTES
"The patient has consented to being recorded using [facility].    CC: Follow-up status post ORIF right distal radius fracture 12/1/2021     Interval history: Patient returns to clinic today now 1 week out from surgery.  The patient states she is doing well overall, but noticed raised \"bumps\" on her right arm. She notes she called into the office on 01/01/2022, when she spoke to Dr. De La O. He advised her to \"air\" out her arm and monitor it. She states on 01/02/2022 she took off her brace, and the \"bumps\" cleared up. The patient denies redness around the raised areas. She describes them as \"white\" and denies any purulent drainage.    The patient is accompanied by an adult male who contributes in her medical history.   PE:              Right wrist-                                                     Right volar incision is well-healed.  Flexion- 45 degrees  Extension- 45 degrees  Radial Deviation- 10 degrees   Ulnar Deviation- 10 degrees     Brisk cap refill all digits, 1+ radial pulse right wrist                 Positive sensation light touch all distributions right hand symmetric to the left     Imaging              3 view x-rays of right wrist including AP, lateral, and oblique views, ordered and reviewed by me, indicates stable alignment of distal radius hardware, no evidence of fracture displacement, good interval healing noted particular the periosteal regions both posteriorly and along the ulnar side of the distal radius cortex.     Impression              Status post ORIF right distal radius fracture     Plan  1. Discussed treatment options at length with patient at today's visit.   2. Advised patient to discontinue brace as-tolerated.   3. Follow-up in 4 weeks with repeat x-ray, at the time if she is still experiencing residual stiffness may consider referral to physical therapy.     Scribed for Salazar Amezcua MD by Rosamaria Jauregui.  1/10/2022  11:07 EST       "

## 2022-01-25 ENCOUNTER — OFFICE VISIT (OUTPATIENT)
Dept: INTERNAL MEDICINE | Facility: CLINIC | Age: 72
End: 2022-01-25

## 2022-01-25 VITALS
BODY MASS INDEX: 31.82 KG/M2 | HEIGHT: 63 IN | WEIGHT: 179.6 LBS | HEART RATE: 113 BPM | DIASTOLIC BLOOD PRESSURE: 72 MMHG | SYSTOLIC BLOOD PRESSURE: 150 MMHG | OXYGEN SATURATION: 98 % | RESPIRATION RATE: 20 BRPM | TEMPERATURE: 98.2 F

## 2022-01-25 DIAGNOSIS — E11.65 UNCONTROLLED TYPE 2 DIABETES MELLITUS WITH HYPERGLYCEMIA: Primary | ICD-10-CM

## 2022-01-25 DIAGNOSIS — I10 ESSENTIAL HYPERTENSION: ICD-10-CM

## 2022-01-25 DIAGNOSIS — G89.29 CHRONIC BILATERAL THORACIC BACK PAIN: ICD-10-CM

## 2022-01-25 DIAGNOSIS — R53.82 CHRONIC FATIGUE: ICD-10-CM

## 2022-01-25 DIAGNOSIS — N30.90 BLADDER INFECTION: ICD-10-CM

## 2022-01-25 DIAGNOSIS — M54.6 CHRONIC BILATERAL THORACIC BACK PAIN: ICD-10-CM

## 2022-01-25 DIAGNOSIS — N39.0 CHRONIC UTI: ICD-10-CM

## 2022-01-25 DIAGNOSIS — M54.50 CHRONIC BILATERAL LOW BACK PAIN WITHOUT SCIATICA: ICD-10-CM

## 2022-01-25 DIAGNOSIS — Z87.440 HISTORY OF RECURRENT UTIS: ICD-10-CM

## 2022-01-25 DIAGNOSIS — G89.29 CHRONIC BILATERAL LOW BACK PAIN WITHOUT SCIATICA: ICD-10-CM

## 2022-01-25 LAB
BILIRUB BLD-MCNC: NEGATIVE MG/DL
CLARITY, POC: ABNORMAL
COLOR UR: ABNORMAL
EXPIRATION DATE: ABNORMAL
GLUCOSE UR STRIP-MCNC: NEGATIVE MG/DL
KETONES UR QL: NEGATIVE
LEUKOCYTE EST, POC: ABNORMAL
Lab: ABNORMAL
NITRITE UR-MCNC: POSITIVE MG/ML
PH UR: 6 [PH] (ref 5–8)
PROT UR STRIP-MCNC: ABNORMAL MG/DL
RBC # UR STRIP: ABNORMAL /UL
SP GR UR: 1.03 (ref 1–1.03)
UROBILINOGEN UR QL: NORMAL

## 2022-01-25 PROCEDURE — 81003 URINALYSIS AUTO W/O SCOPE: CPT | Performed by: NURSE PRACTITIONER

## 2022-01-25 PROCEDURE — 99214 OFFICE O/P EST MOD 30 MIN: CPT | Performed by: NURSE PRACTITIONER

## 2022-01-25 RX ORDER — SULFAMETHOXAZOLE AND TRIMETHOPRIM 800; 160 MG/1; MG/1
1 TABLET ORAL 2 TIMES DAILY
Qty: 14 TABLET | Refills: 0 | Status: SHIPPED | OUTPATIENT
Start: 2022-01-25 | End: 2022-03-07

## 2022-01-25 RX ORDER — AMLODIPINE AND OLMESARTAN MEDOXOMIL 5; 40 MG/1; MG/1
1 TABLET ORAL DAILY
Qty: 30 TABLET | Refills: 1 | Status: SHIPPED | OUTPATIENT
Start: 2022-01-25 | End: 2022-03-07 | Stop reason: SDUPTHER

## 2022-01-25 NOTE — PROGRESS NOTES
"Chief Complaint   Patient presents with   • Cystitis       Subjective     Amber Campos is a 71 y.o. female being seen for a follow up appointment today regarding uncontrolled DM 2, HTN, Hyperlipidemia, IC. She is on Metformin 1000 mg and Ozempic 1 mg weekly. She was evaluated by endo, but decided she did not want to see them anymore because \"they don't do anything for me.\" She is not checking her glucose regularly, she is not compliant with her diet. She denies any blurred vision, dry mouth.      She is on Benicar and Toprol XL 25mg for HTN. She stopped her Ziac due to Dizziness. She was switched from Diovan to Benicar and Toprol XL 25mg was added. The Toprol XL has caused dizziness .      She is complaining of UTI symptoms of dysuria, frequency and pain. History IC and has had recurrent UTIs, and recently treated with Macrobid.      She has history of JOSETTE. She Has been evaluated with both a C-scope and EGD with Dr. Roca. She is on an oral iron supplement.    She has chronic low and mid back pain for which she has been seeing a chiro in [place]. Her symptoms seem to be worsening over the past few months. Described as mid and lower back aching, pain 4 of 10. No radiation of pain.       History of Present Illness     Allergies   Allergen Reactions   • Iodinated Diagnostic Agents Photosensitivity     contrast   • Adhesive Tape Other (See Comments)     EKG stickers only   • Farxiga [Dapagliflozin] Diarrhea and Itching     Yeast infeciotn   • Neomycin-Bacitracin Zn-Polymyx Rash   • Trulicity [Dulaglutide] GI Intolerance     Constipation   • Victoza [Liraglutide] Nausea Only         Current Outpatient Medications:   •  budesonide-formoterol (Symbicort) 160-4.5 MCG/ACT inhaler, Inhale 2 puffs 2 (Two) Times a Day., Disp: 1 each, Rfl: 6  •  Cetirizine HCl (ZyrTEC Childrens Allergy) 5 MG/5ML solution solution, Take 5 mL by mouth Daily., Disp: 150 mL, Rfl:   •  glucose blood (FREESTYLE LITE) test strip, USE TO CHECK BLOOD " SUGAR 3 TIMES A DAY, Disp: 300 each, Rfl: 3  •  Insulin Pen Needle (PEN NEEDLES) 32G X 6 MM misc, 1 each Daily With Breakfast., Disp: 90 each, Rfl: 3  •  ketoconazole (NIZORAL) 2 % shampoo, USE TWICE WEEKLY AS DIRECTED., Disp: , Rfl:   •  Lancets (FREESTYLE) lancets, , Disp: , Rfl:   •  metFORMIN (GLUCOPHAGE) 1000 MG tablet, TAKE 1 TABLET TWICE DAILY  WITH MEALS, Disp: 180 tablet, Rfl: 3  •  metoprolol succinate XL (TOPROL-XL) 25 MG 24 hr tablet, Take 1 tablet by mouth Every Night., Disp: 90 tablet, Rfl: 3  •  nystatin (MYCOSTATIN) 319924 UNIT/GM powder, See Admin Instructions., Disp: , Rfl:   •  olmesartan (Benicar) 40 MG tablet, Take 1 tablet by mouth Daily., Disp: 90 tablet, Rfl: 1  •  pantoprazole (PROTONIX) 40 MG EC tablet, TAKE 1 TABLET DAILY, Disp: 90 tablet, Rfl: 3  •  phenazopyridine (Pyridium) 100 MG tablet, Take 1 tablet by mouth 3 (Three) Times a Day As Needed for Bladder Spasms (UTI symptoms)., Disp: 6 tablet, Rfl: 0  •  Semaglutide, 1 MG/DOSE, (OZEMPIC) 2 MG/1.5ML solution pen-injector, Inject 1 mg under the skin into the appropriate area as directed 1 (One) Time Per Week., Disp: , Rfl:   •  Spacer/Aero Chamber Mouthpiece misc, 1 each Daily., Disp: 1 each, Rfl: 0  •  sulfamethoxazole-trimethoprim (Bactrim DS) 800-160 MG per tablet, Take 1 tablet by mouth 2 (Two) Times a Day., Disp: 14 tablet, Rfl: 0  •  Vascepa 1 g capsule capsule, 2 (Two) Times a Day With Meals., Disp: , Rfl:   •  venlafaxine XR (EFFEXOR-XR) 37.5 MG 24 hr capsule, Take 1 capsule by mouth Daily., Disp: 30 capsule, Rfl: 1    The following portions of the patient's history were reviewed and updated as appropriate: allergies, current medications, past family history, past medical history, past social history, past surgical history and problem list.    Review of Systems   Constitutional: Negative.    HENT: Negative.    Respiratory: Negative for cough and shortness of breath.    Cardiovascular: Negative for chest pain and leg swelling.    Genitourinary: Positive for dysuria, frequency and urgency.   Musculoskeletal: Positive for arthralgias.   Allergic/Immunologic: Positive for environmental allergies.   Neurological: Negative.    Hematological: Negative.    Psychiatric/Behavioral: Positive for dysphoric mood.   All other systems reviewed and are negative.      Assessment     Physical Exam  Vitals reviewed.   Constitutional:       Appearance: Normal appearance. She is obese.   HENT:      Head: Normocephalic.   Cardiovascular:      Rate and Rhythm: Normal rate and regular rhythm.      Pulses: Normal pulses.      Heart sounds: Normal heart sounds. No murmur heard.      Pulmonary:      Effort: Pulmonary effort is normal. No respiratory distress.      Breath sounds: Normal breath sounds. No stridor.   Musculoskeletal:      Right lower leg: No edema.      Left lower leg: No edema.      Comments: Right hand weakness and poor fine motor control (due to recent fracture)   Skin:     General: Skin is warm and dry.   Neurological:      General: No focal deficit present.      Mental Status: She is alert and oriented to person, place, and time.   Psychiatric:         Mood and Affect: Mood normal.         Behavior: Behavior normal.         Thought Content: Thought content normal.         Plan     Her fasting labs were reviewed with the patient from last week.     Diagnoses and all orders for this visit:    1. Uncontrolled type 2 diabetes mellitus with hyperglycemia (HCC) (Primary)  Comments:  Poorly controlled    2. Essential hypertension  -     CBC w AUTO Differential  -     amlodipine-olmesartan (Susannah) 5-40 MG per tablet; Take 1 tablet by mouth Daily.  Dispense: 30 tablet; Refill: 1    3. Bladder infection  -     POCT urinalysis dipstick, automated    4. Chronic UTI  -     sulfamethoxazole-trimethoprim (Bactrim DS) 800-160 MG per tablet; Take 1 tablet by mouth 2 (Two) Times a Day.  Dispense: 14 tablet; Refill: 0  -     Ambulatory Referral to Urology  -      Urine Culture - Urine, Urine, Clean Catch; Future    5. History of recurrent UTIs    6. Chronic fatigue  -     Thyroid Peroxidase Antibody  -     TSH  -     T4, free    7. Chronic bilateral low back pain without sciatica  -     XR Spine Lumbar 2 or 3 View; Future    8. Chronic bilateral thoracic back pain  -     XR spine thoracic 2 vw; Future        She will discuss OT/PT with ortho for improving hand strength.    Follow up in 3 months w labs

## 2022-01-26 PROBLEM — G89.29 CHRONIC BILATERAL THORACIC BACK PAIN: Status: ACTIVE | Noted: 2022-01-26

## 2022-01-26 PROBLEM — M54.6 CHRONIC BILATERAL THORACIC BACK PAIN: Status: ACTIVE | Noted: 2022-01-26

## 2022-01-26 PROBLEM — Z87.440 HISTORY OF RECURRENT UTIS: Status: ACTIVE | Noted: 2022-01-26

## 2022-01-26 LAB
BASOPHILS # BLD AUTO: 0.1 X10E3/UL (ref 0–0.2)
BASOPHILS NFR BLD AUTO: 1 %
EOSINOPHIL # BLD AUTO: 0.3 X10E3/UL (ref 0–0.4)
EOSINOPHIL NFR BLD AUTO: 5 %
ERYTHROCYTE [DISTWIDTH] IN BLOOD BY AUTOMATED COUNT: 14.2 % (ref 11.7–15.4)
HCT VFR BLD AUTO: 38.6 % (ref 34–46.6)
HGB BLD-MCNC: 12.4 G/DL (ref 11.1–15.9)
IMM GRANULOCYTES # BLD AUTO: 0 X10E3/UL (ref 0–0.1)
IMM GRANULOCYTES NFR BLD AUTO: 0 %
LYMPHOCYTES # BLD AUTO: 3 X10E3/UL (ref 0.7–3.1)
LYMPHOCYTES NFR BLD AUTO: 43 %
MCH RBC QN AUTO: 25.9 PG (ref 26.6–33)
MCHC RBC AUTO-ENTMCNC: 32.1 G/DL (ref 31.5–35.7)
MCV RBC AUTO: 81 FL (ref 79–97)
MONOCYTES # BLD AUTO: 0.6 X10E3/UL (ref 0.1–0.9)
MONOCYTES NFR BLD AUTO: 9 %
NEUTROPHILS # BLD AUTO: 2.8 X10E3/UL (ref 1.4–7)
NEUTROPHILS NFR BLD AUTO: 42 %
PLATELET # BLD AUTO: 369 X10E3/UL (ref 150–450)
RBC # BLD AUTO: 4.78 X10E6/UL (ref 3.77–5.28)
T4 FREE SERPL-MCNC: 1.06 NG/DL (ref 0.82–1.77)
THYROPEROXIDASE AB SERPL-ACNC: <8 IU/ML (ref 0–34)
TSH SERPL-ACNC: 1.71 UIU/ML (ref 0.45–4.5)
WBC # BLD AUTO: 6.7 X10E3/UL (ref 3.4–10.8)

## 2022-01-28 ENCOUNTER — TELEPHONE (OUTPATIENT)
Dept: INTERNAL MEDICINE | Facility: CLINIC | Age: 72
End: 2022-01-28

## 2022-01-28 NOTE — TELEPHONE ENCOUNTER
Caller: Amber Campos    Relationship: Self    Best call back number: [phone]    What is the best time to reach you: ANYTIME    Who are you requesting to speak with (clinical staff, provider,  specific staff member): CLINICAL    Do you know the name of the person who called: UNKNOWN    What was the call regarding: UTI RESULTS    Do you require a callback: YES

## 2022-01-28 NOTE — TELEPHONE ENCOUNTER
Called pt and let her know that we haven't received those results yet and she states understanding

## 2022-01-31 ENCOUNTER — OFFICE VISIT (OUTPATIENT)
Dept: ORTHOPEDIC SURGERY | Facility: CLINIC | Age: 72
End: 2022-01-31

## 2022-01-31 VITALS — WEIGHT: 176.9 LBS | BODY MASS INDEX: 31.34 KG/M2 | HEIGHT: 63 IN

## 2022-01-31 DIAGNOSIS — S62.101D CLOSED FRACTURE OF RIGHT WRIST WITH ROUTINE HEALING, SUBSEQUENT ENCOUNTER: Primary | ICD-10-CM

## 2022-01-31 PROCEDURE — 99024 POSTOP FOLLOW-UP VISIT: CPT | Performed by: ORTHOPAEDIC SURGERY

## 2022-01-31 PROCEDURE — 73110 X-RAY EXAM OF WRIST: CPT | Performed by: ORTHOPAEDIC SURGERY

## 2022-02-04 ENCOUNTER — APPOINTMENT (OUTPATIENT)
Dept: OCCUPATIONAL THERAPY | Facility: HOSPITAL | Age: 72
End: 2022-02-04

## 2022-02-09 ENCOUNTER — HOSPITAL ENCOUNTER (OUTPATIENT)
Dept: OCCUPATIONAL THERAPY | Facility: HOSPITAL | Age: 72
Setting detail: THERAPIES SERIES
Discharge: HOME | End: 2022-02-09

## 2022-02-09 DIAGNOSIS — S62.101D CLOSED FRACTURE OF RIGHT WRIST WITH ROUTINE HEALING, SUBSEQUENT ENCOUNTER: ICD-10-CM

## 2022-02-09 DIAGNOSIS — M25.531 RIGHT WRIST PAIN: Primary | ICD-10-CM

## 2022-02-09 PROCEDURE — 97165 OT EVAL LOW COMPLEX 30 MIN: CPT

## 2022-02-09 NOTE — THERAPY EVALUATION
Outpatient Occupational Therapy Ortho Initial Evaluation   [facility]     Patient Name: Amber Campos  : 1950  MRN: [id number]  Today's Date: 2022      Visit Date: 2022    Patient Active Problem List   Diagnosis   • Asthma   • Kidney cysts   • Depression with anxiety   • Menopausal symptom   • Hyperlipidemia   • Hematuria   • Steatosis of liver   • Gastroesophageal reflux disease   • Ventricular premature beats   • Uncontrolled type 2 diabetes mellitus with hyperglycemia (HCC)   • Chronic UTI   • Age-related osteoporosis without current pathological fracture   • Screening for blood or protein in urine   • Essential hypertension   • Status post replacement of left shoulder joint   • Chronic fatigue   • Acute cystitis   • Environmental allergies   • Vitamin D deficiency   • Left shoulder tendonitis   • Greater trochanteric bursitis, right   • Diabetes mellitus due to underlying condition, uncontrolled, with hyperglycemia (HCC)   • Seborrheic keratoses   • IC (interstitial cystitis)   • Low serum iron   • Generalized osteoarthritis   • Pelvic pain in female   • Memory deficit   • Pathological fracture of right radius due to age-related osteoporosis   • Bladder infection   • Chronic bilateral low back pain without sciatica   • History of recurrent UTIs   • Chronic bilateral thoracic back pain        Past Medical History:   Diagnosis Date   • Basaloid carcinoma (HCC)     facial   • Gastroesophageal reflux disease 3/14/2016   • History of kidney infection    • History of mammogram    • Hypertension    • Osteopenia    • Osteoporosis    • Postmenopausal    • PVCs (premature ventricular contractions)    • Seasonal allergies    • Simple renal cyst 3/14/2016   • Steatosis of liver 3/14/2016   • Type 2 diabetes mellitus (HCC)         Past Surgical History:   Procedure Laterality Date   • BUNIONECTOMY Right    • COLONOSCOPY N/A 2017    Procedure: COLONOSCOPY TO CECUM ;  Surgeon: Aidan Roca MD;   Location:  [facility];  Service:    • EXOSTECTOMY Left     HEEL   • FOOT FUSION Right     9 screws and plate   • KNEE ARTHROSCOPY W/ MENISCAL REPAIR Right    • ORIF WRIST FRACTURE Right 12/1/2021    Procedure: WRIST OPEN REDUCTION INTERNAL FIXATION;  Surgeon: Salazar Amezcua MD;  Location:  [facility];  Service: Orthopedics;  Laterality: Right;   • PAP SMEAR     • TONSILLECTOMY     • TOTAL SHOULDER REVERSE ARTHROPLASTY Right    • UMBILICAL HERNIA REPAIR           Visit Dx:    ICD-10-CM ICD-9-CM   1. Right wrist pain  M25.531 719.43   2. Closed fracture of right wrist with routine healing, subsequent encounter  S62.101D V54.19        Patient History     Row Name 02/09/22 0900             History    Chief Complaint Muscle weakness; Swelling; Difficulty with daily activities; Joint stiffness; Pain; Numbness  -SD      Type of Pain Wrist pain  -SD      Date Current Problem(s) Began 11/30/21  -SD      Brief Description of Current Complaint Pt fell on 11-3-21 resulting in a right distal radius fracture.Pt with ORIF of right distal radius on 12-1-21. Pt has been referred to therapy by Dr. Amezcua  -SD      Patient/Caregiver Goals Return to prior level of function; Improve strength  -SD      Current Tobacco Use no  -SD      Patient's Rating of General Health Good  -SD      Hand Dominance left-handed  -SD      Occupation/sports/leisure activities Pt enjoys [place]  -SD      Patient seeing anyone else for problem(s)? Dr. Amezcua  -SD      How has patient tried to help current problem? Pt has been doing exercises at home  -SD      What clinical tests have you had for this problem? X-ray  -SD      Results of Clinical Tests revealed distal radius fracture  -SD              Pain     Pain Location Hand  -SD      Pain at Present 2  -SD      Pain at Best 1  -SD      Pain at Worst 3  -SD      Pain Frequency Constant/continuous  -SD      Pain Description Aching  -SD      What Performance Factors Make the Current Problem(s) WORSE?  use of hand  -SD      What Performance Factors Make the Current Problem(s) BETTER? rest, OTC medication  -SD      Is your sleep disturbed? No  -SD      Difficulties with ADL's? Pt reports weakness in her left hand impacts daily tasks  -SD              Fall Risk Assessment    Any falls in the past year: Yes  -SD      Number of falls reported in the last 12 months 2  -SD      Factors that contributed to the fall: Tripped; Other (comment)  one fall slipped with socks and other toe caught on rug  -SD      Does patient have a fear of falling Yes (comment)  pt states she is very attentive and uses rails with stairs  -SD              Services    Prior Rehab/Home Health Experiences Yes  -SD      When was the prior experience with Rehab/Home Health June 2016 after shoulder replacement  -SD      Are you currently receiving Home Health services No  -SD      Do you plan to receive Home Health services in the near future No  -SD              Daily Activities    Primary Language English  -SD      Are you able to read Yes  -SD      Are you able to write Yes  -SD      How does patient learn best? Reading  -SD      Teaching needs identified Home Exercise Program; Management of Condition  -SD      Patient is concerned about/has problems with Difficulty with self care (i.e. bathing, dressing, toileting:; Performing home management (household chores, shopping, care of dependents); Grasping objects lifting  -SD      Does patient have problems with the following? None  -SD      Pt Participated in POC and Goals Yes  -SD              Safety    Are you being hurt, hit, or frightened by anyone at home or in your life? No  -SD      Are you being neglected by a caregiver No  -SD      Have you had any of the following issues with N/A  -SD            User Key  (r) = Recorded By, (t) = Taken By, (c) = Cosigned By    Initials Name Provider Type    Parminder Farris OTR Occupational Therapist                 OT Ortho     Row Name 02/09/22 0934  "            Subjective Comments    Subjective Comments Pt reports weakness in her right hand following her wrist injury/surgery. \"I'm not able to bring my fingers together and my thumb is very weak.\". Pt reports use of her right hand is limited following her injury.  -SD              Precautions and Contraindications    Precautions/Limitations other (see comments)  \"No heavy lifting\" per pt after follow up with physician. No weight limit provided  -SD              Subjective Pain    Able to rate subjective pain? yes  -SD      Pre-Treatment Pain Level 2  -SD      Post-Treatment Pain Level 2  -SD              Sensation    Additional Comments impaired sensation along ulnar nerve distribution of right digit 4. impaired at 3.61. otherwise sensation intact  -SD              Right Upper Ext    Rt Elbow Extension/Flexion AROM wfl  -SD      Rt Elbow Supination AROM wfl  -SD      Rt Elbow Pronation AROM wfl  -SD      Rt Wrist Flexion AROM 58  -SD      Rt Wrist Extension AROM 60  -SD      Rt  Ulnar Deviation AROM 28  -SD      Rt  Radial Deviation AROM 11  -SD              Left Upper Ext    Lt Elbow Extension/Flexion AROM wfl  -SD      Lt Elbow Supination AROM wfl  -SD      Lt Elbow Pronation AROM wfl  -SD      Lt Wrist Flexion AROM 75  -SD      Lt Wrist Extension AROM 66  -SD      Lt  Ulnar Deviation AROM 30  -SD      Lt  Radial Deviation AROM 20  -SD              MMT (Manual Muscle Testing)    General MMT Comments Right elbow 4/5, forearm 3+/5 and wrist 3-/5, LUE 5/5  -SD              RUE Quick Girth (cm)    Wrist crease 16.8 cm  -SD              LUE Quick Girth (cm)    Wrist crease 15.5 cm  -SD            User Key  (r) = Recorded By, (t) = Taken By, (c) = Cosigned By    Initials Name Provider Type    Parminder Farris, OTR Occupational Therapist                Hand Therapy (last 24 hours)     Hand Eval     Row Name 02/09/22 0900             Hand ROM Tested?    Hand ROM Tested? --  Pt with limited adduction of digit 4 and " 5 of right hand  -SD              Left Extension AROM    II- MP AROM 0  -SD      II- PIP AROM 0  -SD      II- DIP AROM 0  -SD      II- GASTON Left Extension AROM 0  -SD      III- MP AROM 0  -SD      III- PIP AROM 0  -SD      III- DIP AROM 0  -SD      III- GASTON Left Extension AROM 0  -SD      IV- MP AROM 0  -SD      IV- PIP AROM -5  -SD      IV- DIP AROM 0  -SD      IV- GASTON Left Extension AROM -5  -SD      V- MP AROM 0  -SD      V- PIP AROM -5  -SD      V- DIP AROM 0  -SD      V- GASTON Left Extension AROM -5  -SD              Right Extension AROM    II- MP AROM 0  -SD      II- PIP AROM 0  -SD      II- DIP AROM 0  -SD      II- GASTON Right Extension AROM 0  -SD      III- MP AROM 0  -SD      III- PIP AROM -14  -SD      III- DIP AROM 0  -SD      III- GASTON Right Extension AROM -14  -SD      IV- MP AROM 0  -SD      IV- PIP AROM -20  -SD      IV- DIP AROM 0  -SD      IV- GASTON Right Extension AROM -20  -SD      V- MP AROM 0  -SD      V- PIP AROM -24  -SD      V- DIP AROM 0  -SD      V- GASTON Right Extension AROM -24  -SD              Right Thumb AROM    MP Flexion - AROM 55  -SD      IP Flexion - AROM 64  -SD      CMC Opposition (cm)- AROM 0  -SD              Left Thumb AROM    MP Flexion - AROM 55  -SD      IP Flexion - AROM 61  -SD      CMC Opposition (cm)- AROM -1  -SD               Strength Right    # Reps 1  -SD      Right Rung 2  -SD      Right  Test 1 20  -SD       Strength Average Right 20  -SD               Strength Left    # Reps 1  -SD      Left Rung 2  -SD      Left  Test 1 50  -SD       Strength Average Left 50  -SD              Hand Functional Test    Other - Right pt reports diffiuculty with holding utensils/cutting with a knife with right hand secondary to weakness  -SD              Right Hand Strength - Pinch (lbs)    Lateral 12 lbs  -SD              Left Hand Strength - Pinch (lbs)    Lateral 5 lbs  -SD            User Key  (r) = Recorded By, (t) = Taken By, (c) = Cosigned By    Initials Name  Provider Type    Parminder Farris OTNAILA Occupational Therapist                    Therapy Education  Education Details: Education regarding edema management, pain management, sensory re-education, rom program, hand strengthening and compensatory strategies for adl management secondary to weakness.  Given: HEP, Symptoms/condition management, Pain management, Edema management  Program: New  How Provided: Verbal, Demonstration, Written (written handout for wrist/hand rom program)  Provided to: Patient  Level of Understanding: Teach back education performed, Verbalized, Demonstrated     OT Goals     Row Name 02/09/22 1300          OT Short Term Goals    STG Date to Achieve 02/23/22  -SD     STG 1 Pt to be independent with home program for edema management, sensory re-education and rom program.  -SD     STG 1 Progress New  -SD     STG 2 Pt to improve right wrist flexion by 10 degrees to allow for increased adl function.  -SD     STG 2 Progress New  -SD     STG 2 Progress Comments Initial right wrist flexion 58 degrees  -SD            Long Term Goals    LTG Date to Achieve 03/11/22  -SD     LTG 1 Pt to be independent with distal RUE strengthening program to improve function for daily tasks.  -SD     LTG 1 Progress New  -SD     LTG 2 Pt to improve right  strength by 10# to improve ability to hold utensils/cut with a knife.  -SD     LTG 2 Progress New  -SD     LTG 2 Progress Comments Initial right  20#  -SD           User Key  (r) = Recorded By, (t) = Taken By, (c) = Cosigned By    Initials Name Provider Type    Parminder Farris OTR Occupational Therapist                 OT Assessment/Plan     Row Name 02/09/22 1307          OT Assessment    Functional Limitations Limitation in home management; Performance in self-care ADL  -SD     Impairments Range of motion; Pain; Muscle strength; Sensation; Coordination; Edema  -SD     Assessment Comments Pt presents with min edema at right wrist/hand, min pain, ulnar  nerve involvement (as noted by + Froments sign, impaired sensation/hypersensitivity of digit 4, limited digit 4/5 and thumb add and hand weakness), decreased rom, limited strength and function of right UE for daily tasks. Education provided regarding edema management (arom, elevation and retrograde edema massage), pain management (ue of MH), senesory re-education program, rom program (arom of wrist flex/ext/UD/RD and hand rom -composite and isolated tendon glides, digit add and thumb add/opp), fmc activity, hand strengthening with light resistive theraputty and compensatory strategies to improve hand function as strength improves.  -SD     Please refer to paper survey for additional self-reported information Yes  -SD     OT Diagnosis pain, joint stiffness, weakness  -SD     OT Rehab Potential Good  -SD     Patient/caregiver participated in establishment of treatment plan and goals Yes  -SD     Patient would benefit from skilled therapy intervention Yes  -SD            OT Plan    OT Frequency 1x/week  -SD     Predicted Duration of Therapy Intervention (OT) 4 weeks  -SD     Planned CPT's? OT EVAL LOW COMPLEXITY: 91590; OT THER ACT EA 15 MIN: [id number]; OT HOT/COLD PACK  -SD     Planned Therapy Interventions (Optional Details) home exercise program; patient/family education; ROM (Range of Motion); strengthening; other (see comments)  edema management, pain management, sensory re-education  -SD     OT Plan Comments Rec OT weekly x 4 weeks for edema management, pain management, sensory re-education, rom program and functional strengthening to allow for improved function for daily tasks.  -SD           User Key  (r) = Recorded By, (t) = Taken By, (c) = Cosigned By    Initials Name Provider Type    Parminder Farris OTR Occupational Therapist                  OT Exercises     Row Name 02/09/22 0900             Exercise 1    Exercise Name 1 Education with right wrist rom program (arom for flex/ext/UD/RD)  -SD      Cueing  1 Verbal; Tactile; Demo  -SD              Exercise 2    Exercise Name 2 Education with right hand rom program (composite and isolated tendon glides, digit abd/add and thumb add)  -SD      Cueing 2 Verbal; Tactile; Demo  -SD              Exercise 3    Exercise Name 3 Education with hand strengthening program  -SD      Cueing 3 Verbal; Tactile; Demo  -SD      Equipment 3 Theraputty  -SD      Resistance 3 Yellow  -SD              Exercise 4    Exercise Name 4 Education regarding compensatory strategies for adl management secondary to right hand weakness  -SD            User Key  (r) = Recorded By, (t) = Taken By, (c) = Cosigned By    Initials Name Provider Type    Parminder Farris, OTR Occupational Therapist                  Outcome Measure Options: 9 Hole Peg, Quick DASH  9 Hole Peg  9-Hole Peg Left: 19.55  9-Hole Peg Right: 24.4  Quick DASH  Open a tight or new jar.: Mild Difficulty  Do heavy household chores (e.g., wash walls, wash floors): No Difficulty  Carry a shopping bag or briefcase: No Difficulty  Wash your back: No Difficulty  Use a knife to cut food: Moderate Difficulty  During the past week, to what extent has your arm, shoulder, or hand problem interfered with your normal social activites with family, friends, neighbors or groups?: Not at all  During the past week, were you limited in your work or other regular daily activities as a result of your arm, shoulder or hand problem?: Not limited at all  Arm, Shoulder, or hand pain: Moderate  Tingling (pins and needles) in your arm, shoulder, or hand: Mild  During the past week, how much difficulty have you had sleeping because of the pain in your arm, shoulder or hand?: No difficulty  Number of Questions Answered: 10  Quick DASH Score: 15         Time Calculation:    OT Start Time: 0900  OT Stop Time: 1005  OT Time Calculation (min): 65 min  Untimed Charges  OT Eval/Re-eval Minutes: 65  Total Minutes  Untimed Charges Total Minutes: 65   Total Minutes: 65      Therapy Charges for Today     Code Description Service Date Service Provider Modifiers Qty    [id number] HC OT EVAL LOW COMPLEXITY 4 2/9/2022 Parminder Cantrell, OTR GO 1                  LV Hughes  2/9/2022

## 2022-02-21 ENCOUNTER — HOSPITAL ENCOUNTER (OUTPATIENT)
Dept: OCCUPATIONAL THERAPY | Facility: HOSPITAL | Age: 72
Setting detail: THERAPIES SERIES
Discharge: HOME | End: 2022-02-21

## 2022-02-21 DIAGNOSIS — M25.531 RIGHT WRIST PAIN: Primary | ICD-10-CM

## 2022-02-21 DIAGNOSIS — S62.101D CLOSED FRACTURE OF RIGHT WRIST WITH ROUTINE HEALING, SUBSEQUENT ENCOUNTER: ICD-10-CM

## 2022-02-21 PROCEDURE — 97530 THERAPEUTIC ACTIVITIES: CPT

## 2022-02-21 NOTE — THERAPY TREATMENT NOTE
Outpatient Occupational Therapy Ortho Treatment Note  [place] Taylor     Patient Name: Amber Campos  : 1950  MRN: [id number]  Today's Date: 2022        Visit Date: 2022    Patient Active Problem List   Diagnosis   • Asthma   • Kidney cysts   • Depression with anxiety   • Menopausal symptom   • Hyperlipidemia   • Hematuria   • Steatosis of liver   • Gastroesophageal reflux disease   • Ventricular premature beats   • Uncontrolled type 2 diabetes mellitus with hyperglycemia (HCC)   • Chronic UTI   • Age-related osteoporosis without current pathological fracture   • Screening for blood or protein in urine   • Essential hypertension   • Status post replacement of left shoulder joint   • Chronic fatigue   • Acute cystitis   • Environmental allergies   • Vitamin D deficiency   • Left shoulder tendonitis   • Greater trochanteric bursitis, right   • Diabetes mellitus due to underlying condition, uncontrolled, with hyperglycemia (HCC)   • Seborrheic keratoses   • IC (interstitial cystitis)   • Low serum iron   • Generalized osteoarthritis   • Pelvic pain in female   • Memory deficit   • Pathological fracture of right radius due to age-related osteoporosis   • Bladder infection   • Chronic bilateral low back pain without sciatica   • History of recurrent UTIs   • Chronic bilateral thoracic back pain        Past Medical History:   Diagnosis Date   • Basaloid carcinoma (HCC)     facial   • Gastroesophageal reflux disease 3/14/2016   • History of kidney infection    • History of mammogram    • Hypertension    • Osteopenia    • Osteoporosis    • Postmenopausal    • PVCs (premature ventricular contractions)    • Seasonal allergies    • Simple renal cyst 3/14/2016   • Steatosis of liver 3/14/2016   • Type 2 diabetes mellitus (HCC)         Past Surgical History:   Procedure Laterality Date   • BUNIONECTOMY Right    • COLONOSCOPY N/A 2017    Procedure: COLONOSCOPY TO CECUM ;  Surgeon: Aidan Roca MD;   Location:  [facility];  Service:    • EXOSTECTOMY Left     HEEL   • FOOT FUSION Right     9 screws and plate   • KNEE ARTHROSCOPY W/ MENISCAL REPAIR Right    • ORIF WRIST FRACTURE Right 12/1/2021    Procedure: WRIST OPEN REDUCTION INTERNAL FIXATION;  Surgeon: Salazar Amezcua MD;  Location:  [facility];  Service: Orthopedics;  Laterality: Right;   • PAP SMEAR     • TONSILLECTOMY     • TOTAL SHOULDER REVERSE ARTHROPLASTY Right    • UMBILICAL HERNIA REPAIR           Visit Dx:    ICD-10-CM ICD-9-CM   1. Right wrist pain  M25.531 719.43   2. Closed fracture of right wrist with routine healing, subsequent encounter  S62.101D V54.19        OT Ortho     Row Name 02/21/22 0900             Right Upper Ext    Rt Wrist Flexion AROM 70  -SD      Rt Wrist Extension AROM 66  -SD      Rt  Ulnar Deviation AROM 30  -SD      Rt  Radial Deviation AROM 20  -SD              RUE Quick Girth (cm)    Wrist crease 16.4 cm  -SD            User Key  (r) = Recorded By, (t) = Taken By, (c) = Cosigned By    Initials Name Provider Type    Parminder Farris, OTR Occupational Therapist                Hand Therapy (last 24 hours)     Hand Eval     Row Name 02/21/22 0900             Right Extension AROM    III- PIP AROM -5  -SD      IV- MP AROM -6  -SD      V- PIP AROM -5  -SD              Right Thumb AROM    CMC Opposition (cm)- AROM 1  error on eval (transposed left vs. right) pt lacked -1cm right thumb opposition at time of eval  -SD              Left Thumb AROM    CMC Opposition (cm)- AROM --  error on eval. Pt had full left thumb opposition.  -SD               Strength Right    # Reps 2  -SD      Right Rung 2  -SD      Right  Test 1 24  -SD      Right  Test 2 28  -SD       Strength Average Right 26  -SD            User Key  (r) = Recorded By, (t) = Taken By, (c) = Cosigned By    Initials Name Provider Type    Parminder Farris, OTR Occupational Therapist                    Therapy Education  Education Details:  Reinforced HEP regarding edema management, pain management, sensory re-education, rom program, hand strengthening and compensatory strategies for adl management secondary to weakness.  Education provided regarding benefits of FMC activity for rom/activity tolerance for right hand  Given: HEP, Symptoms/condition management, Pain management, Edema management  Program: Reinforced, Progressed (progress with functional activity and place/hold exercises for thumb opp and digit 4/5 add)  How Provided: Verbal, Demonstration, Written  Provided to: Patient  Level of Understanding: Teach back education performed, Verbalized, Demonstrated     OT Assessment/Plan     Row Name 02/21/22 1024          OT Assessment    Assessment Comments Pt demonstrated improved right wrist/hand rom, decreased edema at wrist and improved functional  strength. Pt continues with limited thumb opposition/add and digit 4/5 adduction. Reinforced HEP and progressed program for place/hold exercises and fmc activity to address her rom deficits and hand strength. Rec continue with edema management activity. Pt provided with coban to use at wrist crease as needed to address edema. Rec pt continue with HEP and follow up with therapy in 2 weeks.  -SD     OT Diagnosis pain, joint stiffness, weakness  -SD            OT Plan    Planned Therapy Interventions (Optional Details) home exercise program; patient/family education; ROM (Range of Motion); strengthening; other (see comments)  -SD     OT Plan Comments Pt to continue with HEP and follow up in 2 weeks to modify HEP as needed.  -SD           User Key  (r) = Recorded By, (t) = Taken By, (c) = Cosigned By    Initials Name Provider Type    SD Parminder Cantrell, OTR Occupational Therapist                  OT Goals     Row Name 02/21/22 0900          OT Short Term Goals    STG Date to Achieve 02/23/22  -SD     STG 1 Pt to be independent with home program for edema management, sensory re-education and rom  program.  -SD     STG 1 Progress Ongoing; Progressing  -SD     STG 2 Pt to improve right wrist flexion by 10 degrees to allow for increased adl function.  -SD     STG 2 Progress Met  -SD            Long Term Goals    LTG Date to Achieve 03/11/22  -SD     LTG 1 Pt to be independent with distal RUE strengthening program to improve function for daily tasks.  -SD     LTG 1 Progress Ongoing; Progressing  -SD     LTG 2 Pt to improve right  strength by 10# to improve ability to hold utensils/cut with a knife.  -SD     LTG 2 Progress Ongoing; Progressing  -SD           User Key  (r) = Recorded By, (t) = Taken By, (c) = Cosigned By    Initials Name Provider Type    Parminder Farris OTNAILA Occupational Therapist                 Modalities     Row Name 02/21/22 0900             Subjective Pain    Pre-Treatment Pain Level 0  -SD              Moist Heat    MH Applied Yes  -SD      Location right wrist  -SD      OT Moist Heat Minutes 10  -SD      MH Prior to Rx Yes  -SD            User Key  (r) = Recorded By, (t) = Taken By, (c) = Cosigned By    Initials Name Provider Type    Parminder Farris OTNAILA Occupational Therapist               OT Exercises     Row Name 02/21/22 0900             Subjective Comments    Subjective Comments Pt states she has been actively using her right hand/arm at home. Pt does report weakness and she still has difficulty bringing her right digits 4/5 together (adduction). Pt had questions regarding her HEP. HEP was reviewed/demonstrated. Pt reported no further questions regarding HEP. Pt did not c/o pain today  -SD              Exercise 1    Exercise Name 1 right wrist rom program (arom for flex/ext/UD/RD)  -SD      Cueing 1 Verbal; Tactile; Demo  -SD              Exercise 2    Exercise Name 2 right hand rom program (composite and isolated tendon glides, digit abd/add and thumb add)  -SD      Cueing 2 Verbal; Tactile; Demo  -SD              Exercise 3    Exercise Name 3 hand strengthening program   -SD      Cueing 3 Verbal; Tactile; Demo  -SD      Equipment 3 Other  resisitve pins and digiflex  -SD      Resistance 3 Yellow; Red  pins-red, digiflex red/yellow  -SD              Exercise 4    Exercise Name 4 Education regarding compensatory strategies for adl management secondary to right hand weakness  -SD            User Key  (r) = Recorded By, (t) = Taken By, (c) = Cosigned By    Initials Name Provider Type    Parminder Farris OTNAILA Occupational Therapist                              Time Calculation:   OT Start Time: 0857  OT Stop Time: 0947  OT Time Calculation (min): 50 min  Timed Charges  22521 - OT Therapeutic Activity Minutes: 40  Untimed Charges  OT Moist Heat Minutes: 10  Total Minutes  Timed Charges Total Minutes: 40  Untimed Charges Total Minutes: 10   Total Minutes: 40     Therapy Charges for Today     Code Description Service Date Service Provider Modifiers Qty    [id number]  OT THERAPEUTIC ACT EA 15 MIN 2/21/2022 Parminder Cantrell OTR GO 3    [id number]  OT HOT OR COLD PACK TREAT MCARE 2/21/2022 Parminder Cantrell OTR GO 1                    LV Hughes  2/21/2022

## 2022-02-23 ENCOUNTER — OFFICE VISIT (OUTPATIENT)
Dept: ORTHOPEDIC SURGERY | Facility: CLINIC | Age: 72
End: 2022-02-23

## 2022-02-23 VITALS — HEIGHT: 63 IN | WEIGHT: 176 LBS | BODY MASS INDEX: 31.18 KG/M2

## 2022-02-23 DIAGNOSIS — S62.101D CLOSED FRACTURE OF RIGHT WRIST WITH ROUTINE HEALING, SUBSEQUENT ENCOUNTER: Primary | ICD-10-CM

## 2022-02-23 PROCEDURE — 73110 X-RAY EXAM OF WRIST: CPT | Performed by: ORTHOPAEDIC SURGERY

## 2022-02-23 PROCEDURE — 99024 POSTOP FOLLOW-UP VISIT: CPT | Performed by: ORTHOPAEDIC SURGERY

## 2022-02-23 RX ORDER — SEMAGLUTIDE 1.34 MG/ML
INJECTION, SOLUTION SUBCUTANEOUS
COMMUNITY
Start: 2022-02-03 | End: 2022-03-07 | Stop reason: SDUPTHER

## 2022-03-01 ENCOUNTER — LAB (OUTPATIENT)
Dept: INTERNAL MEDICINE | Facility: CLINIC | Age: 72
End: 2022-03-01

## 2022-03-01 DIAGNOSIS — E11.65 UNCONTROLLED TYPE 2 DIABETES MELLITUS WITH HYPERGLYCEMIA: ICD-10-CM

## 2022-03-01 DIAGNOSIS — E78.2 MIXED HYPERLIPIDEMIA: ICD-10-CM

## 2022-03-01 DIAGNOSIS — I10 ESSENTIAL HYPERTENSION: ICD-10-CM

## 2022-03-01 DIAGNOSIS — K21.9 GASTROESOPHAGEAL REFLUX DISEASE WITHOUT ESOPHAGITIS: ICD-10-CM

## 2022-03-02 ENCOUNTER — TRANSCRIBE ORDERS (OUTPATIENT)
Dept: ADMINISTRATIVE | Facility: HOSPITAL | Age: 72
End: 2022-03-02

## 2022-03-02 DIAGNOSIS — N28.1 ACQUIRED CYST OF KIDNEY: Primary | ICD-10-CM

## 2022-03-02 LAB
ALBUMIN SERPL-MCNC: 4.5 G/DL (ref 3.7–4.7)
ALBUMIN/GLOB SERPL: 1.7 {RATIO} (ref 1.2–2.2)
ALP SERPL-CCNC: 55 IU/L (ref 44–121)
ALT SERPL-CCNC: 31 IU/L (ref 0–32)
AST SERPL-CCNC: 30 IU/L (ref 0–40)
BASOPHILS # BLD AUTO: 0.1 X10E3/UL (ref 0–0.2)
BASOPHILS NFR BLD AUTO: 1 %
BILIRUB SERPL-MCNC: 0.3 MG/DL (ref 0–1.2)
BUN SERPL-MCNC: 12 MG/DL (ref 8–27)
BUN/CREAT SERPL: 24 (ref 12–28)
CALCIUM SERPL-MCNC: 8.8 MG/DL (ref 8.7–10.3)
CHLORIDE SERPL-SCNC: 97 MMOL/L (ref 96–106)
CHOLEST SERPL-MCNC: 207 MG/DL (ref 100–199)
CHOLEST/HDLC SERPL: 3.5 RATIO (ref 0–4.4)
CO2 SERPL-SCNC: 21 MMOL/L (ref 20–29)
CREAT SERPL-MCNC: 0.51 MG/DL (ref 0.57–1)
EGFR GENE MUT ANL BLD/T: 98 ML/MIN/1.73
EOSINOPHIL # BLD AUTO: 0.2 X10E3/UL (ref 0–0.4)
EOSINOPHIL NFR BLD AUTO: 4 %
ERYTHROCYTE [DISTWIDTH] IN BLOOD BY AUTOMATED COUNT: 14.6 % (ref 11.7–15.4)
FERRITIN SERPL-MCNC: 13 NG/ML (ref 15–150)
GLOBULIN SER CALC-MCNC: 2.6 G/DL (ref 1.5–4.5)
GLUCOSE SERPL-MCNC: 205 MG/DL (ref 65–99)
HBA1C MFR BLD: 8.1 % (ref 4.8–5.6)
HCT VFR BLD AUTO: 37.7 % (ref 34–46.6)
HDLC SERPL-MCNC: 59 MG/DL
HGB BLD-MCNC: 12.5 G/DL (ref 11.1–15.9)
IMM GRANULOCYTES # BLD AUTO: 0 X10E3/UL (ref 0–0.1)
IMM GRANULOCYTES NFR BLD AUTO: 0 %
IRON SATN MFR SERPL: 14 % (ref 15–55)
IRON SERPL-MCNC: 58 UG/DL (ref 27–139)
LDLC SERPL CALC-MCNC: 111 MG/DL (ref 0–99)
LYMPHOCYTES # BLD AUTO: 2.8 X10E3/UL (ref 0.7–3.1)
LYMPHOCYTES NFR BLD AUTO: 45 %
MCH RBC QN AUTO: 26.9 PG (ref 26.6–33)
MCHC RBC AUTO-ENTMCNC: 33.2 G/DL (ref 31.5–35.7)
MCV RBC AUTO: 81 FL (ref 79–97)
MONOCYTES # BLD AUTO: 0.4 X10E3/UL (ref 0.1–0.9)
MONOCYTES NFR BLD AUTO: 7 %
NEUTROPHILS # BLD AUTO: 2.6 X10E3/UL (ref 1.4–7)
NEUTROPHILS NFR BLD AUTO: 43 %
PLATELET # BLD AUTO: 362 X10E3/UL (ref 150–450)
POTASSIUM SERPL-SCNC: 4.2 MMOL/L (ref 3.5–5.2)
PROT SERPL-MCNC: 7.1 G/DL (ref 6–8.5)
RBC # BLD AUTO: 4.64 X10E6/UL (ref 3.77–5.28)
SODIUM SERPL-SCNC: 135 MMOL/L (ref 134–144)
TIBC SERPL-MCNC: 420 UG/DL (ref 250–450)
TRIGL SERPL-MCNC: 214 MG/DL (ref 0–149)
UIBC SERPL-MCNC: 362 UG/DL (ref 118–369)
VIT B12 SERPL-MCNC: 424 PG/ML (ref 232–1245)
VLDLC SERPL CALC-MCNC: 37 MG/DL (ref 5–40)
WBC # BLD AUTO: 6.1 X10E3/UL (ref 3.4–10.8)

## 2022-03-07 ENCOUNTER — APPOINTMENT (OUTPATIENT)
Dept: OCCUPATIONAL THERAPY | Facility: HOSPITAL | Age: 72
End: 2022-03-07

## 2022-03-07 ENCOUNTER — OFFICE VISIT (OUTPATIENT)
Dept: INTERNAL MEDICINE | Facility: CLINIC | Age: 72
End: 2022-03-07

## 2022-03-07 VITALS
DIASTOLIC BLOOD PRESSURE: 62 MMHG | BODY MASS INDEX: 31.54 KG/M2 | HEIGHT: 63 IN | SYSTOLIC BLOOD PRESSURE: 126 MMHG | WEIGHT: 178 LBS

## 2022-03-07 DIAGNOSIS — E78.2 MIXED HYPERLIPIDEMIA: ICD-10-CM

## 2022-03-07 DIAGNOSIS — M81.0 AGE-RELATED OSTEOPOROSIS WITHOUT CURRENT PATHOLOGICAL FRACTURE: ICD-10-CM

## 2022-03-07 DIAGNOSIS — N39.0 CHRONIC UTI: ICD-10-CM

## 2022-03-07 DIAGNOSIS — E11.65 UNCONTROLLED TYPE 2 DIABETES MELLITUS WITH HYPERGLYCEMIA: Primary | ICD-10-CM

## 2022-03-07 DIAGNOSIS — E55.9 VITAMIN D DEFICIENCY: ICD-10-CM

## 2022-03-07 DIAGNOSIS — I10 ESSENTIAL HYPERTENSION: ICD-10-CM

## 2022-03-07 PROBLEM — N30.90 BLADDER INFECTION: Status: RESOLVED | Noted: 2022-01-25 | Resolved: 2022-03-07

## 2022-03-07 PROBLEM — N30.00 ACUTE CYSTITIS: Status: RESOLVED | Noted: 2017-08-30 | Resolved: 2022-03-07

## 2022-03-07 PROCEDURE — 99214 OFFICE O/P EST MOD 30 MIN: CPT | Performed by: NURSE PRACTITIONER

## 2022-03-07 RX ORDER — SEMAGLUTIDE 1.34 MG/ML
1 INJECTION, SOLUTION SUBCUTANEOUS WEEKLY
Qty: 9 ML | Refills: 1 | Status: SHIPPED | OUTPATIENT
Start: 2022-03-07 | End: 2022-04-05 | Stop reason: SDUPTHER

## 2022-03-07 RX ORDER — AMLODIPINE AND OLMESARTAN MEDOXOMIL 5; 40 MG/1; MG/1
1 TABLET ORAL DAILY
Qty: 90 TABLET | Refills: 1 | Status: SHIPPED | OUTPATIENT
Start: 2022-03-07 | End: 2022-03-23

## 2022-03-07 NOTE — PROGRESS NOTES
"Chief Complaint   Patient presents with   • Follow-up       Subjective     Amber Campos is a 71 y.o. female being seen for a follow up appointment today regarding uncontrolled DM 2, HTN, Hyperlipidemia, IC. She is on Metformin 1000 mg and Ozempic 1 mg weekly. She was evaluated by endo, but decided she did not want to see them anymore because \"they don't do anything for me.\" She is not checking her glucose regularly, and she is not compliant with her diet. She denies any blurred vision, dry mouth.      She is on Susannah 5/40mg daily for HTN. She has previously been on Diovan, Toprol XL and Ziac. SHe linton snot check her BP, but feels like she is tolerating this medicaiton well.      She is followed by Dr Benton and having a CT abd and pelvis and cystoscopy scheduled for renal cysts and IC.      She has history of JOSETTE. She Has been evaluated with both a C-scope and EGD with Dr. Roca. She is on an oral iron supplement.     She is on prolia and Tolerated her first injection well.     History of Present Illness     Allergies   Allergen Reactions   • Iodinated Diagnostic Agents Photosensitivity     contrast   • Adhesive Tape Other (See Comments)     EKG stickers only   • Farxiga [Dapagliflozin] Diarrhea and Itching     Yeast infeciotn   • Neomycin-Bacitracin Zn-Polymyx Rash   • Toprol Xl [Metoprolol] Dizziness   • Trulicity [Dulaglutide] GI Intolerance     Constipation   • Victoza [Liraglutide] Nausea Only         Current Outpatient Medications:   •  amlodipine-olmesartan (Susannah) 5-40 MG per tablet, Take 1 tablet by mouth Daily., Disp: 30 tablet, Rfl: 1  •  budesonide-formoterol (Symbicort) 160-4.5 MCG/ACT inhaler, Inhale 2 puffs 2 (Two) Times a Day., Disp: 1 each, Rfl: 6  •  Cetirizine HCl (ZyrTEC Childrens Allergy) 5 MG/5ML solution solution, Take 5 mL by mouth Daily., Disp: 150 mL, Rfl:   •  glucose blood (FREESTYLE LITE) test strip, USE TO CHECK BLOOD SUGAR 3 TIMES A DAY, Disp: 300 each, Rfl: 3  •  Insulin Pen Needle (PEN " NEEDLES) 32G X 6 MM misc, 1 each Daily With Breakfast., Disp: 90 each, Rfl: 3  •  ketoconazole (NIZORAL) 2 % shampoo, USE TWICE WEEKLY AS DIRECTED., Disp: , Rfl:   •  Lancets (FREESTYLE) lancets, , Disp: , Rfl:   •  metFORMIN (GLUCOPHAGE) 1000 MG tablet, TAKE 1 TABLET TWICE DAILY  WITH MEALS, Disp: 180 tablet, Rfl: 3  •  nystatin (MYCOSTATIN) 846382 UNIT/GM powder, See Admin Instructions., Disp: , Rfl:   •  Ozempic, 1 MG/DOSE, 4 MG/3ML solution pen-injector, , Disp: , Rfl:   •  pantoprazole (PROTONIX) 40 MG EC tablet, TAKE 1 TABLET DAILY, Disp: 90 tablet, Rfl: 3  •  Spacer/Aero Chamber Mouthpiece misc, 1 each Daily., Disp: 1 each, Rfl: 0  •  sulfamethoxazole-trimethoprim (Bactrim DS) 800-160 MG per tablet, Take 1 tablet by mouth 2 (Two) Times a Day., Disp: 14 tablet, Rfl: 0  •  Vascepa 1 g capsule capsule, 2 (Two) Times a Day With Meals., Disp: , Rfl:   •  venlafaxine XR (EFFEXOR-XR) 37.5 MG 24 hr capsule, Take 1 capsule by mouth Daily., Disp: 30 capsule, Rfl: 1    The following portions of the patient's history were reviewed and updated as appropriate: allergies, current medications, past family history, past medical history, past social history, past surgical history and problem list.    Review of Systems   Constitutional: Negative.    HENT: Negative.    Eyes: Negative for visual disturbance.   Respiratory: Negative.    Cardiovascular: Negative.  Negative for chest pain, palpitations and leg swelling.   Gastrointestinal: Negative.  Negative for abdominal distention and abdominal pain.   Endocrine: Negative.  Negative for polyphagia and polyuria.   Genitourinary: Positive for frequency and urgency.   Musculoskeletal: Positive for arthralgias.   Skin: Negative.    Allergic/Immunologic: Positive for environmental allergies. Negative for food allergies and immunocompromised state.   Neurological: Negative.    Hematological: Negative.  Negative for adenopathy. Does not bruise/bleed easily.   Psychiatric/Behavioral:  Negative.    All other systems reviewed and are negative.      Assessment     Physical Exam  Vitals reviewed.   Constitutional:       Appearance: Normal appearance. She is obese. She is not ill-appearing.   Cardiovascular:      Rate and Rhythm: Normal rate and regular rhythm.      Pulses: Normal pulses.      Heart sounds: Normal heart sounds. No murmur heard.  Pulmonary:      Effort: Pulmonary effort is normal. No respiratory distress.      Breath sounds: Normal breath sounds. No stridor.   Musculoskeletal:      Cervical back: Neck supple.      Right lower leg: No edema.      Left lower leg: No edema.   Skin:     General: Skin is warm and dry.   Neurological:      General: No focal deficit present.      Mental Status: She is alert and oriented to person, place, and time.   Psychiatric:         Mood and Affect: Mood normal.         Behavior: Behavior normal.         Thought Content: Thought content normal.         Plan     Her fasting labs were reviewed with the patient from last week.     Diagnoses and all orders for this visit:    1. Uncontrolled type 2 diabetes mellitus with hyperglycemia (HCC) (Primary)  -     Ozempic, 1 MG/DOSE, 4 MG/3ML solution pen-injector; Inject 1 mg under the skin into the appropriate area as directed 1 (One) Time Per Week.  Dispense: 9 mL; Refill: 1  -     CBC & Differential; Future  -     Comprehensive Metabolic Panel; Future  -     Lipid Panel With / Chol / HDL Ratio; Future  -     Hemoglobin A1c; Future  -     Vitamin D 25 Hydroxy; Future    2. Chronic UTI  Comments:  She is going to discuss urodynamics studies    3. Essential hypertension  -     amlodipine-olmesartan (Susannah) 5-40 MG per tablet; Take 1 tablet by mouth Daily.  Dispense: 90 tablet; Refill: 1  -     CBC & Differential; Future  -     Comprehensive Metabolic Panel; Future  -     Lipid Panel With / Chol / HDL Ratio; Future  -     Hemoglobin A1c; Future  -     Vitamin D 25 Hydroxy; Future    4. Mixed hyperlipidemia  -     CBC  & Differential; Future  -     Comprehensive Metabolic Panel; Future  -     Lipid Panel With / Chol / HDL Ratio; Future  -     Hemoglobin A1c; Future  -     Vitamin D 25 Hydroxy; Future    5. Age-related osteoporosis without current pathological fracture  Comments:  Doing well on Prolia injections    6. Vitamin D deficiency  -     Vitamin D 25 Hydroxy; Future      Follow up in 3 months

## 2022-03-11 ENCOUNTER — TELEPHONE (OUTPATIENT)
Dept: INTERNAL MEDICINE | Facility: CLINIC | Age: 72
End: 2022-03-11

## 2022-03-11 DIAGNOSIS — R91.8 ABNORMAL FINDING ON LUNG IMAGING: Primary | ICD-10-CM

## 2022-03-11 NOTE — TELEPHONE ENCOUNTER
Patient was at the Chiropractor and they seen something on her lungs, can you put in an order for her to get an xray? The next OV is 3/18

## 2022-03-14 ENCOUNTER — HOSPITAL ENCOUNTER (OUTPATIENT)
Dept: GENERAL RADIOLOGY | Facility: HOSPITAL | Age: 72
Discharge: HOME | End: 2022-03-14
Admitting: NURSE PRACTITIONER

## 2022-03-14 DIAGNOSIS — R91.8 MASS OF LOWER LOBE OF LEFT LUNG: Primary | ICD-10-CM

## 2022-03-14 DIAGNOSIS — R91.8 ABNORMAL FINDING ON LUNG IMAGING: ICD-10-CM

## 2022-03-14 PROCEDURE — 71046 X-RAY EXAM CHEST 2 VIEWS: CPT

## 2022-03-15 ENCOUNTER — TELEPHONE (OUTPATIENT)
Dept: INTERNAL MEDICINE | Facility: CLINIC | Age: 72
End: 2022-03-15

## 2022-03-15 NOTE — TELEPHONE ENCOUNTER
PT SAID SHE WAS RETURNING A MISSED CALL FROM THE OFFICE. SHE WONDERED IF IT COULD HAVE BEEN ABOUT [facility] WANTING HER TO GET A LUNG CT BUT SHE SAID SHE DIDN'T GET A VM. SHE WOULD LIKE TO SPEAK TO SOMEONE ABOUT NEEDING A LUNG CT. SHE HAS QUESTIONS ABOUT WHY SHE IS NEEDING ONE. PLEASE CALL PT BACK WHEN POSSIBLE.    THANK YOU

## 2022-03-15 NOTE — TELEPHONE ENCOUNTER
PATIENT IS ASKING FOR A CALL BACK.  SHE RECEIVED A  CALL FROM THE HOSPITAL THIS MORNING REGARDING A CT OF HER LUNGS.  SHE JUST HAD A CHEST XRAY YESTERDAY, SHE IS WANTING TO KNOW IF [facility]ARRINGTON FOUND SOMETHING SERIOUS ENOUGH TO HAVE THE CT OF HER LUNGS DONE,  SHE IS HAVING A CT OF HER KIDNEYS ON 03/21, IF YES SHE WILL CALL TO HAVE THE CT OF THE LUNGS SCHEDULED.  SHE IS ALLERGIC TO THE CONTRAST.  SHE WILL NEED A PRESCRIPTION FOR SOMETHING, THE UROLOGIST WROTE A PRESCRIPTION FOR BENYDRIL TO COUNTER ACT THE CONTRAST.     PLEASE ADVISE   [phone]

## 2022-03-21 ENCOUNTER — HOSPITAL ENCOUNTER (OUTPATIENT)
Dept: CT IMAGING | Facility: HOSPITAL | Age: 72
Discharge: HOME | End: 2022-03-21
Admitting: UROLOGY

## 2022-03-21 DIAGNOSIS — N28.1 ACQUIRED CYST OF KIDNEY: ICD-10-CM

## 2022-03-21 PROCEDURE — 74178 CT ABD&PLV WO CNTR FLWD CNTR: CPT

## 2022-03-21 PROCEDURE — 0 IOPAMIDOL PER 1 ML: Performed by: UROLOGY

## 2022-03-21 RX ADMIN — IOPAMIDOL 100 ML: 755 INJECTION, SOLUTION INTRAVENOUS at 08:45

## 2022-03-23 ENCOUNTER — TELEPHONE (OUTPATIENT)
Dept: INTERNAL MEDICINE | Facility: CLINIC | Age: 72
End: 2022-03-23

## 2022-03-23 DIAGNOSIS — I10 ESSENTIAL HYPERTENSION: ICD-10-CM

## 2022-03-23 RX ORDER — AMLODIPINE AND OLMESARTAN MEDOXOMIL 5; 40 MG/1; MG/1
TABLET ORAL
Qty: 30 TABLET | Refills: 1 | Status: SHIPPED | OUTPATIENT
Start: 2022-03-23 | End: 2022-06-08 | Stop reason: SDUPTHER

## 2022-03-23 NOTE — TELEPHONE ENCOUNTER
Can we send her something in before her Chest CT on the 31st since she had a reaction to iodine in the past?

## 2022-03-23 NOTE — TELEPHONE ENCOUNTER
Caller: Amber Campos    Relationship: Self    Best call back number: [phone]      Have you had these symptoms before:    [x] Yes  [] No    Have you been treated for these symptoms before:   [x] Yes  [] No    If a prescription is needed, what is your preferred pharmacy and phone number: [id number] - [facility] [address] [address] AT [facility] [place] [address] - [phone]  - [phone]      Additional notes: PATIENT HAD AN ALLERGIC REACTION TO IODINE IN THE PAST AND IS NEEDING THE MEDICATION CALLED IN BEFORE HER CT SCAN 3/31. SHE DOESN'T KNOW THE NAME OF IT. IT IS TO STOP THE REACTION.

## 2022-03-28 RX ORDER — PREDNISONE 10 MG/1
TABLET ORAL
Qty: 9 TABLET | Refills: 0 | Status: SHIPPED | OUTPATIENT
Start: 2022-03-28 | End: 2022-04-11

## 2022-03-29 ENCOUNTER — DOCUMENTATION (OUTPATIENT)
Dept: OCCUPATIONAL THERAPY | Facility: HOSPITAL | Age: 72
End: 2022-03-29

## 2022-03-31 ENCOUNTER — HOSPITAL ENCOUNTER (OUTPATIENT)
Dept: CT IMAGING | Facility: HOSPITAL | Age: 72
Discharge: HOME | End: 2022-03-31
Admitting: NURSE PRACTITIONER

## 2022-03-31 DIAGNOSIS — R91.8 MASS OF LOWER LOBE OF LEFT LUNG: ICD-10-CM

## 2022-03-31 PROCEDURE — 0 IOPAMIDOL PER 1 ML: Performed by: NURSE PRACTITIONER

## 2022-03-31 PROCEDURE — 71260 CT THORAX DX C+: CPT

## 2022-03-31 RX ADMIN — IOPAMIDOL 100 ML: 755 INJECTION, SOLUTION INTRAVENOUS at 08:24

## 2022-04-04 DIAGNOSIS — E11.65 UNCONTROLLED TYPE 2 DIABETES MELLITUS WITH HYPERGLYCEMIA: ICD-10-CM

## 2022-04-04 NOTE — TELEPHONE ENCOUNTER
Caller: Amber Campos    Relationship: Self    Best call back number: [phone]     Requested Prescriptions:   Requested Prescriptions     Pending Prescriptions Disp Refills   • Ozempic, 1 MG/DOSE, 4 MG/3ML solution pen-injector 9 mL 1     Sig: Inject 1 mg under the skin into the appropriate area as directed 1 (One) Time Per Week.        Pharmacy where request should be sent: [id number] - [place], [place] - [address] [address] AT [facility] [place] [address] - [phone] [facility] [phone] FX     Additional details provided by patient: PATIENT HAS 1 MONTH SUPPLY LEFT SHE IS NEEDING 1 MONTH SUPPLY     Does the patient have less than a 3 day supply:  [x] Yes  [] No    Nigel Lee Rep   04/04/22 08:29 EDT

## 2022-04-05 DIAGNOSIS — E11.65 UNCONTROLLED TYPE 2 DIABETES MELLITUS WITH HYPERGLYCEMIA: ICD-10-CM

## 2022-04-05 RX ORDER — SEMAGLUTIDE 1.34 MG/ML
1 INJECTION, SOLUTION SUBCUTANEOUS WEEKLY
Qty: 9 ML | Refills: 1 | Status: SHIPPED | OUTPATIENT
Start: 2022-04-05 | End: 2022-07-07 | Stop reason: SDUPTHER

## 2022-04-05 RX ORDER — SEMAGLUTIDE 1.34 MG/ML
1 INJECTION, SOLUTION SUBCUTANEOUS WEEKLY
Qty: 9 ML | Refills: 1 | OUTPATIENT
Start: 2022-04-05

## 2022-04-05 NOTE — TELEPHONE ENCOUNTER
Caller: Amber Campos    Relationship: Self    Best call back number: Amber Campos    Requested Prescriptions:   Requested Prescriptions     Refused Prescriptions Disp Refills   • Ozempic, 1 MG/DOSE, 4 MG/3ML solution pen-injector 9 mL 1     Sig: Inject 1 mg under the skin into the appropriate area as directed 1 (One) Time Per Week.     Refused By: ROSA CÁRDENAS     Reason for Refusal: Patient has requested refill too soon        Pharmacy where request should be sent: [id number] [address] [address] AT [place] [address] - [phone]  - [phone]      Additional details provided by patient: PATIENT HAS 1 PEN LEFT FOR Sunday SHE NORMALLY GOES [facility]OUGH [facility] SHE IS LEAVING FOR VACATION Thursday SHE WOULD LIKE TO GET A 1 MONTH SUPPLY. PLEASE CALL PATIENT.    Does the patient have less than a 3 day supply:  [] Yes  [x] No    Nigel Lee Rep   04/05/22 09:32 EDT

## 2022-04-05 NOTE — TELEPHONE ENCOUNTER
Called patient and was informed that she did not use [facility] and the medication had been sent to the wrong pharmacy. Resending to the correct [facility].

## 2022-04-05 NOTE — TELEPHONE ENCOUNTER
Called patient, medication had been sent to the wrong pharmacy I corrected it and sent it to the correct one.

## 2022-04-11 ENCOUNTER — OFFICE VISIT (OUTPATIENT)
Dept: INTERNAL MEDICINE | Facility: CLINIC | Age: 72
End: 2022-04-11

## 2022-04-11 VITALS
SYSTOLIC BLOOD PRESSURE: 146 MMHG | HEART RATE: 96 BPM | OXYGEN SATURATION: 98 % | DIASTOLIC BLOOD PRESSURE: 74 MMHG | HEIGHT: 63 IN | WEIGHT: 174.6 LBS | BODY MASS INDEX: 30.94 KG/M2

## 2022-04-11 DIAGNOSIS — L30.9 DERMATITIS: Primary | ICD-10-CM

## 2022-04-11 PROCEDURE — 96372 THER/PROPH/DIAG INJ SC/IM: CPT | Performed by: NURSE PRACTITIONER

## 2022-04-11 PROCEDURE — 99213 OFFICE O/P EST LOW 20 MIN: CPT | Performed by: NURSE PRACTITIONER

## 2022-04-11 RX ORDER — METHYLPREDNISOLONE ACETATE 80 MG/ML
80 INJECTION, SUSPENSION INTRA-ARTICULAR; INTRALESIONAL; INTRAMUSCULAR; SOFT TISSUE ONCE
Status: COMPLETED | OUTPATIENT
Start: 2022-04-11 | End: 2022-04-11

## 2022-04-11 RX ORDER — CLOBETASOL PROPIONATE 0.5 MG/G
1 OINTMENT TOPICAL 2 TIMES DAILY
Qty: 45 G | Refills: 0 | Status: SHIPPED | OUTPATIENT
Start: 2022-04-11 | End: 2022-04-25

## 2022-04-11 RX ADMIN — METHYLPREDNISOLONE ACETATE 80 MG: 80 INJECTION, SUSPENSION INTRA-ARTICULAR; INTRALESIONAL; INTRAMUSCULAR; SOFT TISSUE at 16:29

## 2022-04-11 NOTE — PROGRESS NOTES
"Amber Campos is a 71 y.o. female presenting today for   Chief Complaint   Patient presents with   • Rash     All over Body  began 5 days ago       Subjective    Rash  This is a new problem. Episode onset: approx 5 days ago. The problem has been gradually worsening since onset. The rash is diffuse. The rash is characterized by itchiness and redness. She was exposed to nothing. Pertinent negatives include no cough, diarrhea, fever, shortness of breath or vomiting. Treatments tried: anti-fungal cream.        The following portions of the patient's history were reviewed and updated as appropriate: allergies, current medications, problem list, past medical history, past surgical history, family history, and social history.    Review of Systems   Constitutional: Negative for chills and fever.   HENT: Negative for trouble swallowing.    Respiratory: Negative for cough and shortness of breath.    Gastrointestinal: Negative for diarrhea, nausea and vomiting.   Skin: Positive for rash.         Objective    Vitals:    04/11/22 1531   BP: 146/74   Pulse: 96   SpO2: 98%   Weight: 79.2 kg (174 lb 9.6 oz)   Height: 160 cm (63\")     Body mass index is 30.93 kg/m².  Nursing notes and vitals reviewed.    Physical Exam  Constitutional:       General: She is not in acute distress.     Appearance: She is well-developed.   Pulmonary:      Effort: Pulmonary effort is normal.   Skin:     General: Skin is warm and dry.      Findings: Rash (generalized erythematous maculopapular rash) present.   Neurological:      Mental Status: She is alert.   Psychiatric:         Attention and Perception: She is attentive.         Speech: Speech normal.           Assessment and Plan    Diagnoses and all orders for this visit:    1. Dermatitis (Primary)  -     clobetasol (TEMOVATE) 0.05 % ointment; Apply 1 application topically to the appropriate area as directed 2 (Two) Times a Day for 14 days.  Dispense: 45 g; Refill: 0  -     methylPREDNISolone acetate " (DEPO-medrol) injection 80 mg      Pt insistent upon receiving steroid by injection. Sts she is leaving town for the next two weeks and doesn't have time to deal with this.  Advised that steroid will raise blood sugars and that she should keep close monitoring.      Medications, including side effects, were discussed with the patient. Patient verbalized understanding.  The plan of care was discussed. All questions were answered. Patient verbalized understanding.        Return if symptoms worsen or fail to improve.

## 2022-05-09 ENCOUNTER — OFFICE VISIT (OUTPATIENT)
Dept: INTERNAL MEDICINE | Facility: CLINIC | Age: 72
End: 2022-05-09

## 2022-05-09 VITALS
HEART RATE: 112 BPM | OXYGEN SATURATION: 95 % | BODY MASS INDEX: 31.47 KG/M2 | WEIGHT: 177.6 LBS | HEIGHT: 63 IN | DIASTOLIC BLOOD PRESSURE: 55 MMHG | SYSTOLIC BLOOD PRESSURE: 130 MMHG

## 2022-05-09 DIAGNOSIS — R42 VERTIGO: ICD-10-CM

## 2022-05-09 DIAGNOSIS — N39.0 RECURRENT UTI: Primary | ICD-10-CM

## 2022-05-09 DIAGNOSIS — J45.21 MILD INTERMITTENT REACTIVE AIRWAY DISEASE WITH ACUTE EXACERBATION: ICD-10-CM

## 2022-05-09 LAB
BILIRUB BLD-MCNC: NEGATIVE MG/DL
CLARITY, POC: CLEAR
COLOR UR: ABNORMAL
EXPIRATION DATE: ABNORMAL
GLUCOSE UR STRIP-MCNC: NEGATIVE MG/DL
KETONES UR QL: NEGATIVE
LEUKOCYTE EST, POC: ABNORMAL
Lab: ABNORMAL
NITRITE UR-MCNC: NEGATIVE MG/ML
PH UR: 5.5 [PH] (ref 5–8)
PROT UR STRIP-MCNC: ABNORMAL MG/DL
RBC # UR STRIP: ABNORMAL /UL
SP GR UR: 1.02 (ref 1–1.03)
UROBILINOGEN UR QL: NORMAL

## 2022-05-09 PROCEDURE — 81003 URINALYSIS AUTO W/O SCOPE: CPT | Performed by: NURSE PRACTITIONER

## 2022-05-09 PROCEDURE — 99214 OFFICE O/P EST MOD 30 MIN: CPT | Performed by: NURSE PRACTITIONER

## 2022-05-09 RX ORDER — SULFAMETHOXAZOLE AND TRIMETHOPRIM 800; 160 MG/1; MG/1
1 TABLET ORAL 2 TIMES DAILY
Qty: 14 TABLET | Refills: 0 | Status: SHIPPED | OUTPATIENT
Start: 2022-05-09 | End: 2022-06-08

## 2022-05-09 RX ORDER — BUDESONIDE AND FORMOTEROL FUMARATE DIHYDRATE 160; 4.5 UG/1; UG/1
2 AEROSOL RESPIRATORY (INHALATION)
Qty: 1 EACH | Refills: 6 | Status: SHIPPED | OUTPATIENT
Start: 2022-05-09

## 2022-05-09 NOTE — PROGRESS NOTES
Chief Complaint   Patient presents with   • Medication Problem     Dizziness associated with BP meds     • Urinary Tract Infection     Possible       Subjective     Amber Campos is a 71 y.o. female being seen for an acute visit for 2 issues. She is reporting dizziness and possible UTI.     She was in the office last week for the same complaints, but left prior to being evaluated due to long wait time. She had 2 days of vertigo symptoms. She is on Nic 5/40mg daily. She thought it may be causing the dizziness, but her vertigo has resolved. She had this in the past with allergy season. She denies any difficulty with speech, unilateral  Weakness.     She has associated allergy symptoms of cough, nasal congestion, and intermittent headaches. She is on Zyrtec 2.5mg nightly, which she just resumed over the weekend. She had taken inhaler in the past and feels like she will need a refill on this.     Secondly, she is having dysuria for 5 days. She denies frequency,  but does have urgency, blood in urine and foul odor. No fever. She tried to quit her vit d in case it was contributing.       History of Present Illness     Allergies   Allergen Reactions   • Iodinated Diagnostic Agents Photosensitivity     contrast   • Adhesive Tape Other (See Comments)     EKG stickers only   • Farxiga [Dapagliflozin] Diarrhea and Itching     Yeast infeciotn   • Neomycin-Bacitracin Zn-Polymyx Rash   • Toprol Xl [Metoprolol] Dizziness   • Trulicity [Dulaglutide] GI Intolerance     Constipation   • Victoza [Liraglutide] Nausea Only         Current Outpatient Medications:   •  amlodipine-olmesartan (NIC) 5-40 MG per tablet, TAKE 1 TABLET BY MOUTH EVERY DAY, Disp: 30 tablet, Rfl: 1  •  Cetirizine HCl (ZyrTEC Childrens Allergy) 5 MG/5ML solution solution, Take 5 mL by mouth Daily., Disp: 150 mL, Rfl:   •  glucose blood (FREESTYLE LITE) test strip, USE TO CHECK BLOOD SUGAR 3 TIMES A DAY, Disp: 300 each, Rfl: 3  •  Insulin Pen Needle (PEN NEEDLES)  32G X 6 MM misc, 1 each Daily With Breakfast., Disp: 90 each, Rfl: 3  •  metFORMIN (GLUCOPHAGE) 1000 MG tablet, TAKE 1 TABLET DAILY WITH BREAKFAST, Disp: 90 tablet, Rfl: 3  •  Ozempic, 1 MG/DOSE, 4 MG/3ML solution pen-injector, Inject 1 mg under the skin into the appropriate area as directed 1 (One) Time Per Week., Disp: 9 mL, Rfl: 1  •  pantoprazole (PROTONIX) 40 MG EC tablet, TAKE 1 TABLET DAILY, Disp: 90 tablet, Rfl: 3  •  Vascepa 1 g capsule capsule, 2 (Two) Times a Day With Meals., Disp: , Rfl:   •  venlafaxine XR (EFFEXOR-XR) 37.5 MG 24 hr capsule, Take 1 capsule by mouth Daily., Disp: 30 capsule, Rfl: 1  •  budesonide-formoterol (Symbicort) 160-4.5 MCG/ACT inhaler, Inhale 2 puffs 2 (Two) Times a Day., Disp: 1 each, Rfl: 6  •  Lancets (FREESTYLE) lancets, , Disp: , Rfl:   •  Spacer/Aero Chamber Mouthpiece misc, 1 each Daily., Disp: 1 each, Rfl: 0    The following portions of the patient's history were reviewed and updated as appropriate: allergies, current medications, past family history, past medical history, past social history, past surgical history and problem list.    Review of Systems   Constitutional: Negative.    HENT: Positive for congestion.    Respiratory: Positive for cough. Negative for shortness of breath and stridor.    Cardiovascular: Negative.  Negative for chest pain, palpitations and leg swelling.   Genitourinary: Positive for dysuria and urgency. Negative for decreased urine volume and vaginal bleeding.   Musculoskeletal: Negative.    Allergic/Immunologic: Positive for environmental allergies.   Neurological: Negative.    Hematological: Negative for adenopathy. Does not bruise/bleed easily.   Psychiatric/Behavioral: Negative for self-injury. The patient is not nervous/anxious.    All other systems reviewed and are negative.      Assessment     Physical Exam  Vitals reviewed.   Constitutional:       Appearance: Normal appearance. She is obese. She is not ill-appearing.   HENT:      Head:  Normocephalic.      Right Ear: Tympanic membrane normal.      Left Ear: Tympanic membrane normal.      Nose: Congestion and rhinorrhea present.      Mouth/Throat:      Mouth: Mucous membranes are dry.      Pharynx: No posterior oropharyngeal erythema.   Cardiovascular:      Rate and Rhythm: Normal rate and regular rhythm.      Pulses: Normal pulses.      Heart sounds: Normal heart sounds. No murmur heard.    No gallop.   Pulmonary:      Effort: Pulmonary effort is normal. No respiratory distress.      Breath sounds: Normal breath sounds. No stridor.   Skin:     General: Skin is warm and dry.   Neurological:      General: No focal deficit present.      Mental Status: She is alert and oriented to person, place, and time.      Gait: Gait normal.   Psychiatric:         Mood and Affect: Mood normal.         Behavior: Behavior normal.         Thought Content: Thought content normal.         Plan         Diagnoses and all orders for this visit:    1. Recurrent UTI (Primary)  -     POCT urinalysis dipstick, automated  -     Urine Culture - Urine, Urine, Clean Catch; Future  -     sulfamethoxazole-trimethoprim (Bactrim DS) 800-160 MG per tablet; Take 1 tablet by mouth 2 (Two) Times a Day.  Dispense: 14 tablet; Refill: 0    2. Vertigo  -     Ambulatory Referral to Physical Therapy Evaluate and treat    3. Mild intermittent reactive airway disease with acute exacerbation  -     budesonide-formoterol (Symbicort) 160-4.5 MCG/ACT inhaler; Inhale 2 puffs 2 (Two) Times a Day.  Dispense: 1 each; Refill: 6      Follow up in 4 weeks

## 2022-05-12 LAB
BACTERIA UR CULT: ABNORMAL
BACTERIA UR CULT: ABNORMAL
OTHER ANTIBIOTIC SUSC ISLT: ABNORMAL

## 2022-05-13 ENCOUNTER — TELEPHONE (OUTPATIENT)
Dept: INTERNAL MEDICINE | Facility: CLINIC | Age: 72
End: 2022-05-13

## 2022-05-13 NOTE — TELEPHONE ENCOUNTER
Called and spoke with patient and informed her that Jenny was wanting her to use [facility] She stated her understanding and said she would get something scheduled with them.

## 2022-05-13 NOTE — TELEPHONE ENCOUNTER
Pt. Expressed understanding. Pt. Had a follow up question regarding PT. [facility] called per your request but the Pt. Thought her PCP,Jenny wanted her to go to the hospital to do PT there so she just wanted to make sure she was doing the right thing, she did schedule with court though.  05/13/22 [facility]

## 2022-05-13 NOTE — TELEPHONE ENCOUNTER
----- Message from JAKE Blount sent at 5/13/2022  7:46 AM EDT -----  Bactrim will treat current UTI

## 2022-05-23 ENCOUNTER — OFFICE VISIT (OUTPATIENT)
Dept: ORTHOPEDIC SURGERY | Facility: CLINIC | Age: 72
End: 2022-05-23

## 2022-05-23 VITALS — HEIGHT: 63 IN | BODY MASS INDEX: 31.48 KG/M2 | WEIGHT: 177.69 LBS

## 2022-05-23 DIAGNOSIS — G89.29 CHRONIC PAIN OF RIGHT KNEE: ICD-10-CM

## 2022-05-23 DIAGNOSIS — M25.561 CHRONIC PAIN OF RIGHT KNEE: ICD-10-CM

## 2022-05-23 DIAGNOSIS — S62.101D CLOSED FRACTURE OF RIGHT WRIST WITH ROUTINE HEALING, SUBSEQUENT ENCOUNTER: Primary | ICD-10-CM

## 2022-05-23 DIAGNOSIS — M94.261 CHONDROMALACIA OF KNEE, RIGHT: ICD-10-CM

## 2022-05-23 PROCEDURE — 73562 X-RAY EXAM OF KNEE 3: CPT | Performed by: ORTHOPAEDIC SURGERY

## 2022-05-23 PROCEDURE — 99213 OFFICE O/P EST LOW 20 MIN: CPT | Performed by: ORTHOPAEDIC SURGERY

## 2022-05-23 PROCEDURE — 73110 X-RAY EXAM OF WRIST: CPT | Performed by: ORTHOPAEDIC SURGERY

## 2022-05-23 NOTE — PROGRESS NOTES
Subjective:     Patient ID: Amber Campos is a 71 y.o. female.    Chief Complaint:  Follow-up status post ORIF right distal radius fracture-12/1/2021  New issue, right knee pain  History of Present Illness  Amber Campos presents to clinic today for evaluation of her right wrist status post surgery. The patient reports that she has no pain at all, and she is happy with the result.    She also presents to clinic today for evaluation of her right knee. She had a meniscus surgery several years ago. She had done well after that, and back in 2018 she started to have some recurrence of pain, but x-rays were taken at that point in time, and she felt fairly good and was given a fairly good report from her x-rays. Over the last 2 to 3 months she has noted some increase in pain over the medial aspect of her knee. She rates it as a 7 out of 10, moderate in intensity, stabbing in nature when it does occur. It is intermittent, fairly well controlled with Aleve and topical anti-inflammatory creams. She has noticed some occasional giving way episodes but denies any dennise swelling. Pain is exacerbated primarily with climbing stairs.       Social History     Occupational History   • Occupation: retired    Tobacco Use   • Smoking status: Never Smoker   • Smokeless tobacco: Never Used   Vaping Use   • Vaping Use: Never used   Substance and Sexual Activity   • Alcohol use: No   • Drug use: No   • Sexual activity: Defer      Past Medical History:   Diagnosis Date   • Basaloid carcinoma (HCC)     facial   • Gastroesophageal reflux disease 3/14/2016   • History of kidney infection    • History of mammogram    • Hypertension    • Osteopenia    • Osteoporosis    • Postmenopausal    • PVCs (premature ventricular contractions)    • Seasonal allergies    • Simple renal cyst 3/14/2016   • Steatosis of liver 3/14/2016   • Type 2 diabetes mellitus (HCC)      Past Surgical History:   Procedure Laterality Date   • BUNIONECTOMY Right    •  "COLONOSCOPY N/A 4/25/2017    Procedure: COLONOSCOPY TO CECUM ;  Surgeon: Aidan Roca MD;  Location:  [facility];  Service:    • EXOSTECTOMY Left     HEEL   • FOOT FUSION Right     9 screws and plate   • KNEE ARTHROSCOPY W/ MENISCAL REPAIR Right    • ORIF WRIST FRACTURE Right 12/1/2021    Procedure: WRIST OPEN REDUCTION INTERNAL FIXATION;  Surgeon: Salazar Amezcua MD;  Location:  [facility];  Service: Orthopedics;  Laterality: Right;   • PAP SMEAR     • TONSILLECTOMY     • TOTAL SHOULDER REVERSE ARTHROPLASTY Right    • UMBILICAL HERNIA REPAIR         Family History   Problem Relation Age of Onset   • Other Mother         brain death   • Emphysema Mother    • Alcohol abuse Father    • Glaucoma Father    • Other Sister         fatty liver   • Heart disease Sister         valve problem, being followed (no surgery required)   • Arthritis Sister    • Other Brother         fatty liver   • Alcohol abuse Maternal Aunt    • Diabetes Maternal Aunt    • Glaucoma Maternal Grandmother    • Heart disease Maternal Grandmother          Review of Systems        Objective:  Vitals:    05/23/22 0751   Weight: 80.6 kg (177 lb 11.1 oz)   Height: 160 cm (63\")         05/23/22  0751   Weight: 80.6 kg (177 lb 11.1 oz)     Body mass index is 31.48 kg/m².  Physical Exam    Vital signs reviewed.   General: No acute distress, alert and oriented  Eyes: conjunctiva clear; pupils equally round and reactive  ENT: external ears and nose atraumatic; oropharynx clear  CV: no peripheral edema  Resp: normal respiratory effort  Skin: no rashes or wounds; normal turgor  Psych: mood and affect appropriate; recent and remote memory intact          Ortho Exam       Right Wrist-  Incision is well healed  Flexion- 80 degrees, 4+/5 strength  Extension- 75 degrees, 4+/5 strength  Finger Flexion- 5/5 strength  Finger Extension- 5/5 strength  Brisk cap refill all digits  2+ radial pulse    Right Knee-  ROM 0 to 130 degrees  4+/5 strength on " flexion  4+/5 strength on extension  Mild tenderness along the medial joint line  Yaniv's exam- Negative  Effusion- Minimal  Grade 1A Lachman  Anterior drawer- Negative  Posterior drawer- Negative  Stable opening on varus and valgus stress at 0 and 30  Log roll- No hip pain.  Stinchfield- No hip pain.  Brisk cap refill all digits  2+ dorsalis pedis pulse    Imaging:  Three-view x-rays right wrist from today's visit, AP lateral and oblique views, ordered and reviewed by me, indication status post open reduction internal fixation distal radius fracture, compared to prior office x-rays.  Fracture does appear to be consolidating though there are some areas still of residual radiolucency as well as some regions that look like there is some mild widening around screws particularly on AP and oblique views.  No evidence of perforation into the joint.  Joint still appears well aligned as well as fracture appears to be well aligned at this time    Right Knee X-Ray  Indication: Pain    AP, Lateral, and [place] views    Findings:  No fracture  No bony lesion  Normal soft tissues  Mild medial and patellofemoral compartment joint space narrowing    No prior studies were available for comparison.    Assessment:        1. Closed fracture of right wrist with routine healing, subsequent encounter    2. Chronic pain of right knee    3. Chondromalacia of knee, right           Plan:      1, Discussed treatment options at length with patient at today's visit.    2. In regards to the wrist, we will have patient continue working on range of motion and strengthening exercises. She is doing well at this point in time. I am very happy with her outcome. I did review with her that she does have some residual areas of radiolucency on her x-rays, but I am optimistic that these should continue to fill in, especially with how well she is doing clinically at this point. We will see her back in 3 months with repeat x-rays of right wrist for  evaluation of that.    3. In regards to her knee, we discussed options including but not limited to continued observation, home exercises, intra-articular injection, and advanced imaging. At this point in time, she is going to continue with her home treatments as these have been working well for her. I have also given her home exercises to work on for strength and range of motion. If she has any further exacerbation in the meantime, she will contact us back.    Amber Campos was in agreement with plan and had all questions answered.     Orders:  Orders Placed This Encounter   Procedures   • XR Wrist 3+ View Right   • XR Knee 3 View Right       Medications:  No orders of the defined types were placed in this encounter.      Followup:  No follow-ups on file.    Diagnoses and all orders for this visit:    1. Closed fracture of right wrist with routine healing, subsequent encounter (Primary)  -     XR Wrist 3+ View Right    2. Chronic pain of right knee  -     XR Knee 3 View Right    3. Chondromalacia of knee, right          Dictated utilizing Dragon dictation     Transcribed from ambient dictation for Salazar Amezcua MD by JOSE M GALVEZ.  05/23/22   10:00 EDT    Patient verbalized consent to the visit recording.

## 2022-05-25 ENCOUNTER — TRANSCRIBE ORDERS (OUTPATIENT)
Dept: ADMINISTRATIVE | Facility: HOSPITAL | Age: 72
End: 2022-05-25

## 2022-05-25 DIAGNOSIS — Z12.31 VISIT FOR SCREENING MAMMOGRAM: Primary | ICD-10-CM

## 2022-06-03 ENCOUNTER — LAB (OUTPATIENT)
Dept: INTERNAL MEDICINE | Facility: CLINIC | Age: 72
End: 2022-06-03

## 2022-06-03 DIAGNOSIS — E78.2 MIXED HYPERLIPIDEMIA: ICD-10-CM

## 2022-06-03 DIAGNOSIS — E11.65 UNCONTROLLED TYPE 2 DIABETES MELLITUS WITH HYPERGLYCEMIA: ICD-10-CM

## 2022-06-03 DIAGNOSIS — I10 ESSENTIAL HYPERTENSION: ICD-10-CM

## 2022-06-03 DIAGNOSIS — E55.9 VITAMIN D DEFICIENCY: ICD-10-CM

## 2022-06-03 DIAGNOSIS — F41.8 DEPRESSION WITH ANXIETY: ICD-10-CM

## 2022-06-03 RX ORDER — VENLAFAXINE HYDROCHLORIDE 37.5 MG/1
37.5 CAPSULE, EXTENDED RELEASE ORAL DAILY
Qty: 90 CAPSULE | Refills: 1 | Status: SHIPPED | OUTPATIENT
Start: 2022-06-03 | End: 2022-06-08 | Stop reason: SDUPTHER

## 2022-06-03 NOTE — TELEPHONE ENCOUNTER
Caller: Amber Campos    Relationship: Self    Best call back number: [phone]    Requested Prescriptions:   Requested Prescriptions     Pending Prescriptions Disp Refills   • venlafaxine XR (EFFEXOR-XR) 37.5 MG 24 hr capsule 30 capsule 1     Sig: Take 1 capsule by mouth Daily.        Pharmacy where request should be sent: [facility] #[id number] - [facility] [address] [address] AT [facility] [address] [place] [address] - [phone]  - [phone] FX     Additional details provided by patient: 30 DAYS SUPPLY SENT TO [facility] AND 60 DAYS SENT OVER TO [facility]    Does the patient have less than a 3 day supply:  [x] Yes  [] No    Nigel Parham Rep   06/03/22 11:24 EDT

## 2022-06-03 NOTE — TELEPHONE ENCOUNTER
Rx Refill Note  Requested Prescriptions     Pending Prescriptions Disp Refills    venlafaxine XR (EFFEXOR-XR) 37.5 MG 24 hr capsule 30 capsule 1     Sig: Take 1 capsule by mouth Daily.      Last office visit with prescribing clinician: 5/9/2022      Next office visit with prescribing clinician: 6/8/2022            ROSA CÁRDENAS MA  06/03/22, 14:08 EDT

## 2022-06-04 LAB
25(OH)D3+25(OH)D2 SERPL-MCNC: 38.2 NG/ML (ref 30–100)
ALBUMIN SERPL-MCNC: 4.5 G/DL (ref 3.7–4.7)
ALBUMIN/GLOB SERPL: 1.9 {RATIO} (ref 1.2–2.2)
ALP SERPL-CCNC: 56 IU/L (ref 44–121)
ALT SERPL-CCNC: 34 IU/L (ref 0–32)
AST SERPL-CCNC: 37 IU/L (ref 0–40)
BASOPHILS # BLD AUTO: 0.1 X10E3/UL (ref 0–0.2)
BASOPHILS NFR BLD AUTO: 1 %
BILIRUB SERPL-MCNC: 0.3 MG/DL (ref 0–1.2)
BUN SERPL-MCNC: 13 MG/DL (ref 8–27)
BUN/CREAT SERPL: 25 (ref 12–28)
CALCIUM SERPL-MCNC: 9.5 MG/DL (ref 8.7–10.3)
CHLORIDE SERPL-SCNC: 96 MMOL/L (ref 96–106)
CHOLEST SERPL-MCNC: 231 MG/DL (ref 100–199)
CHOLEST/HDLC SERPL: 3.3 RATIO (ref 0–4.4)
CO2 SERPL-SCNC: 25 MMOL/L (ref 20–29)
CREAT SERPL-MCNC: 0.52 MG/DL (ref 0.57–1)
EGFRCR SERPLBLD CKD-EPI 2021: 98 ML/MIN/1.73
EOSINOPHIL # BLD AUTO: 0.2 X10E3/UL (ref 0–0.4)
EOSINOPHIL NFR BLD AUTO: 3 %
ERYTHROCYTE [DISTWIDTH] IN BLOOD BY AUTOMATED COUNT: 14.4 % (ref 11.7–15.4)
GLOBULIN SER CALC-MCNC: 2.4 G/DL (ref 1.5–4.5)
GLUCOSE SERPL-MCNC: 176 MG/DL (ref 65–99)
HBA1C MFR BLD: 7.7 % (ref 4.8–5.6)
HCT VFR BLD AUTO: 36.8 % (ref 34–46.6)
HDLC SERPL-MCNC: 69 MG/DL
HGB BLD-MCNC: 12.3 G/DL (ref 11.1–15.9)
IMM GRANULOCYTES # BLD AUTO: 0 X10E3/UL (ref 0–0.1)
IMM GRANULOCYTES NFR BLD AUTO: 0 %
LDLC SERPL CALC-MCNC: 123 MG/DL (ref 0–99)
LYMPHOCYTES # BLD AUTO: 2.8 X10E3/UL (ref 0.7–3.1)
LYMPHOCYTES NFR BLD AUTO: 48 %
MCH RBC QN AUTO: 28 PG (ref 26.6–33)
MCHC RBC AUTO-ENTMCNC: 33.4 G/DL (ref 31.5–35.7)
MCV RBC AUTO: 84 FL (ref 79–97)
MONOCYTES # BLD AUTO: 0.4 X10E3/UL (ref 0.1–0.9)
MONOCYTES NFR BLD AUTO: 7 %
NEUTROPHILS # BLD AUTO: 2.4 X10E3/UL (ref 1.4–7)
NEUTROPHILS NFR BLD AUTO: 41 %
PLATELET # BLD AUTO: 344 X10E3/UL (ref 150–450)
POTASSIUM SERPL-SCNC: 4.3 MMOL/L (ref 3.5–5.2)
PROT SERPL-MCNC: 6.9 G/DL (ref 6–8.5)
RBC # BLD AUTO: 4.4 X10E6/UL (ref 3.77–5.28)
SODIUM SERPL-SCNC: 136 MMOL/L (ref 134–144)
TRIGL SERPL-MCNC: 223 MG/DL (ref 0–149)
VLDLC SERPL CALC-MCNC: 39 MG/DL (ref 5–40)
WBC # BLD AUTO: 6 X10E3/UL (ref 3.4–10.8)

## 2022-06-06 ENCOUNTER — AMBULATORY SURGICAL CENTER (OUTPATIENT)
Dept: URBAN - METROPOLITAN AREA SURGERY 20 | Facility: SURGERY | Age: 72
End: 2022-06-06
Payer: MEDICARE

## 2022-06-06 DIAGNOSIS — Z80.9 FAMILY HISTORY OF MALIGNANT NEOPLASM, UNSPECIFIED: ICD-10-CM

## 2022-06-06 PROCEDURE — G0105 COLORECTAL SCRN; HI RISK IND: HCPCS | Performed by: INTERNAL MEDICINE

## 2022-06-08 ENCOUNTER — OFFICE VISIT (OUTPATIENT)
Dept: INTERNAL MEDICINE | Facility: CLINIC | Age: 72
End: 2022-06-08

## 2022-06-08 VITALS
HEART RATE: 100 BPM | HEIGHT: 63 IN | DIASTOLIC BLOOD PRESSURE: 76 MMHG | TEMPERATURE: 98.3 F | OXYGEN SATURATION: 96 % | WEIGHT: 178.6 LBS | SYSTOLIC BLOOD PRESSURE: 130 MMHG | BODY MASS INDEX: 31.64 KG/M2

## 2022-06-08 DIAGNOSIS — Z87.440 HISTORY OF RECURRENT UTIS: ICD-10-CM

## 2022-06-08 DIAGNOSIS — I10 ESSENTIAL HYPERTENSION: ICD-10-CM

## 2022-06-08 DIAGNOSIS — M81.0 AGE-RELATED OSTEOPOROSIS WITHOUT CURRENT PATHOLOGICAL FRACTURE: ICD-10-CM

## 2022-06-08 DIAGNOSIS — R30.0 DYSURIA: ICD-10-CM

## 2022-06-08 DIAGNOSIS — K21.00 GASTROESOPHAGEAL REFLUX DISEASE WITH ESOPHAGITIS WITHOUT HEMORRHAGE: ICD-10-CM

## 2022-06-08 DIAGNOSIS — E11.65 UNCONTROLLED TYPE 2 DIABETES MELLITUS WITH HYPERGLYCEMIA: Primary | ICD-10-CM

## 2022-06-08 DIAGNOSIS — F41.8 DEPRESSION WITH ANXIETY: ICD-10-CM

## 2022-06-08 LAB
BILIRUB BLD-MCNC: NEGATIVE MG/DL
CLARITY, POC: CLEAR
COLOR UR: YELLOW
EXPIRATION DATE: ABNORMAL
GLUCOSE UR STRIP-MCNC: ABNORMAL MG/DL
KETONES UR QL: ABNORMAL
LEUKOCYTE EST, POC: NEGATIVE
Lab: ABNORMAL
NITRITE UR-MCNC: NEGATIVE MG/ML
PH UR: 6 [PH] (ref 5–8)
PROT UR STRIP-MCNC: NEGATIVE MG/DL
RBC # UR STRIP: NEGATIVE /UL
SP GR UR: 1.03 (ref 1–1.03)
UROBILINOGEN UR QL: NORMAL

## 2022-06-08 PROCEDURE — 81003 URINALYSIS AUTO W/O SCOPE: CPT | Performed by: NURSE PRACTITIONER

## 2022-06-08 PROCEDURE — 99214 OFFICE O/P EST MOD 30 MIN: CPT | Performed by: NURSE PRACTITIONER

## 2022-06-08 RX ORDER — VENLAFAXINE HYDROCHLORIDE 75 MG/1
75 CAPSULE, EXTENDED RELEASE ORAL DAILY
Qty: 90 CAPSULE | Refills: 1 | Status: SHIPPED | OUTPATIENT
Start: 2022-06-08 | End: 2022-12-05 | Stop reason: SDUPTHER

## 2022-06-08 RX ORDER — PROCHLORPERAZINE 25 MG/1
SUPPOSITORY RECTAL
Qty: 3 EACH | Refills: 3 | Status: SHIPPED | OUTPATIENT
Start: 2022-06-08 | End: 2022-09-14 | Stop reason: SDUPTHER

## 2022-06-08 RX ORDER — PROCHLORPERAZINE 25 MG/1
1 SUPPOSITORY RECTAL 4 TIMES DAILY
Qty: 1 EACH | Refills: 0 | Status: SHIPPED | OUTPATIENT
Start: 2022-06-08 | End: 2023-03-15

## 2022-06-08 RX ORDER — BLOOD-GLUCOSE,RECEIVER,CONT
1 EACH MISCELLANEOUS 4 TIMES DAILY
Qty: 1 EACH | Refills: 0 | Status: SHIPPED | OUTPATIENT
Start: 2022-06-08 | End: 2022-09-14 | Stop reason: SDUPTHER

## 2022-06-08 RX ORDER — AMLODIPINE AND OLMESARTAN MEDOXOMIL 5; 40 MG/1; MG/1
1 TABLET ORAL DAILY
Qty: 90 TABLET | Refills: 1 | Status: SHIPPED | OUTPATIENT
Start: 2022-06-08 | End: 2022-11-17

## 2022-06-08 NOTE — PROGRESS NOTES
"Chief Complaint   Patient presents with   • Urinary Tract Infection     And Follow up on DM 2, HTN, hyperlipidemia    Subjective     Amber Campos is a 71 y.o. female being seen for a follow up appointment today regarding uncontrolled DM 2, HTN, Hyperlipidemia, and IC with recurring UTIs. She is on Metformin 1000 mg BID and Ozempic 1 mg weekly. She was evaluated by endo, but decided she did not want to see them anymore because \"they don't do anything for me.\" She is not checking her glucose regularly, and she is not compliant with her diet. She denies any blurred vision, dry mouth.      She is on Nic 5/40mg daily for HTN. She has previously been on Diovan, Toprol XL and Ziac. She does not check her BP, but feels like she is tolerating this medicaiton well.      She is followed by Dr Benton and having a CT abd and pelvis and cystoscopy scheduled for renal cysts and IC. She was treated with Bactrim at last visit due to UTI, and will leave a culture today.      She has history of JOSETTE. She Has been evaluated with both a C-scope and EGD with Dr. Roca. She is on an oral iron supplement.     She is on prolia and Tolerated her first injection well. She has history of traumatic fractures of wrist and ankle. dexa bone density axial (04/30/2021 08:59)        History of Present Illness     Allergies   Allergen Reactions   • Iodinated Diagnostic Agents Photosensitivity     contrast   • Adhesive Tape Other (See Comments)     EKG stickers only   • Farxiga [Dapagliflozin] Diarrhea and Itching     Yeast infeciotn   • Neomycin-Bacitracin Zn-Polymyx Rash   • Toprol Xl [Metoprolol] Dizziness   • Trulicity [Dulaglutide] GI Intolerance     Constipation   • Victoza [Liraglutide] Nausea Only         Current Outpatient Medications:   •  amlodipine-olmesartan (NIC) 5-40 MG per tablet, TAKE 1 TABLET BY MOUTH EVERY DAY, Disp: 30 tablet, Rfl: 1  •  Cetirizine HCl (ZyrTEC Childrens Allergy) 5 MG/5ML solution solution, Take 5 mL by mouth " Daily., Disp: 150 mL, Rfl:   •  glucose blood (FREESTYLE LITE) test strip, USE TO CHECK BLOOD SUGAR 3 TIMES A DAY, Disp: 300 each, Rfl: 3  •  Insulin Pen Needle (PEN NEEDLES) 32G X 6 MM misc, 1 each Daily With Breakfast., Disp: 90 each, Rfl: 3  •  Lancets (FREESTYLE) lancets, , Disp: , Rfl:   •  metFORMIN (GLUCOPHAGE) 1000 MG tablet, TAKE 1 TABLET DAILY WITH BREAKFAST, Disp: 90 tablet, Rfl: 3  •  Ozempic, 1 MG/DOSE, 4 MG/3ML solution pen-injector, Inject 1 mg under the skin into the appropriate area as directed 1 (One) Time Per Week., Disp: 9 mL, Rfl: 1  •  pantoprazole (PROTONIX) 40 MG EC tablet, TAKE 1 TABLET DAILY, Disp: 90 tablet, Rfl: 3  •  Spacer/Aero Chamber Mouthpiece misc, 1 each Daily., Disp: 1 each, Rfl: 0  •  Vascepa 1 g capsule capsule, 2 (Two) Times a Day With Meals., Disp: , Rfl:   •  venlafaxine XR (EFFEXOR-XR) 37.5 MG 24 hr capsule, Take 1 capsule by mouth Daily., Disp: 90 capsule, Rfl: 1  •  budesonide-formoterol (Symbicort) 160-4.5 MCG/ACT inhaler, Inhale 2 puffs 2 (Two) Times a Day., Disp: 1 each, Rfl: 6  •  sulfamethoxazole-trimethoprim (Bactrim DS) 800-160 MG per tablet, Take 1 tablet by mouth 2 (Two) Times a Day., Disp: 14 tablet, Rfl: 0    The following portions of the patient's history were reviewed and updated as appropriate: allergies, current medications, past family history, past medical history, past social history, past surgical history and problem list.    Review of Systems   Constitutional: Negative.    HENT: Negative.    Eyes: Negative.  Negative for photophobia and visual disturbance.   Respiratory: Positive for cough. Negative for shortness of breath.    Cardiovascular: Negative for chest pain, palpitations and leg swelling.   Gastrointestinal: Negative.    Endocrine: Negative.  Negative for polyphagia and polyuria.   Genitourinary: Negative.    Musculoskeletal: Negative.    Allergic/Immunologic: Positive for environmental allergies.   Neurological: Negative.  Negative for  dizziness.   Hematological: Negative.  Negative for adenopathy. Does not bruise/bleed easily.   Psychiatric/Behavioral: Negative.  Negative for agitation.   All other systems reviewed and are negative.      Assessment     Physical Exam  Vitals reviewed.   Constitutional:       Appearance: Normal appearance. She is obese. She is not ill-appearing.   HENT:      Head: Normocephalic.      Right Ear: Tympanic membrane normal.      Left Ear: Tympanic membrane normal.      Nose: Nose normal.   Cardiovascular:      Rate and Rhythm: Normal rate and regular rhythm.      Pulses: Normal pulses.      Heart sounds: Normal heart sounds. No murmur heard.  Pulmonary:      Effort: Pulmonary effort is normal. No respiratory distress.      Breath sounds: Normal breath sounds. No stridor.   Musculoskeletal:      Cervical back: Neck supple.      Right lower leg: No edema.      Left lower leg: No edema.   Skin:     General: Skin is warm and dry.      Findings: No lesion.   Neurological:      General: No focal deficit present.      Mental Status: She is alert and oriented to person, place, and time.      Gait: Gait normal.   Psychiatric:         Mood and Affect: Mood normal.         Behavior: Behavior normal.         Thought Content: Thought content normal.         Plan     Her fasting labs were reviewed with the patient from last week.     Diagnoses and all orders for this visit:    1. Uncontrolled type 2 diabetes mellitus with hyperglycemia (HCC) (Primary)  Comments:  Hgb A1c improving on Ozempic 1mg and Metformin 100mg BID  Orders:  -     Continuous Blood Gluc  (Dexcom G5  Kit) device; 1 each 4 (Four) Times a Day.  Dispense: 1 each; Refill: 0  -     Continuous Blood Gluc Sensor (Dexcom G6 Sensor); Every 10 (Ten) Days.  Dispense: 3 each; Refill: 3  -     Continuous Blood Gluc Transmit (Dexcom G6 Transmitter) misc; 1 each 4 (Four) Times a Day.  Dispense: 1 each; Refill: 0  -     CBC & Differential; Future  -      Comprehensive Metabolic Panel; Future  -     Lipid Panel With / Chol / HDL Ratio; Future  -     Hemoglobin A1c; Future    2. Essential hypertension  Comments:  BP at goal on Nic 5/40mg   Orders:  -     amlodipine-olmesartan (NIC) 5-40 MG per tablet; Take 1 tablet by mouth Daily.  Dispense: 90 tablet; Refill: 1  -     CBC & Differential; Future  -     Comprehensive Metabolic Panel; Future  -     Lipid Panel With / Chol / HDL Ratio; Future  -     Hemoglobin A1c; Future    3. History of recurrent UTIs  Comments:  UA clear in office    4. Gastroesophageal reflux disease with esophagitis without hemorrhage  -     CBC & Differential; Future  -     Comprehensive Metabolic Panel; Future    5. Age-related osteoporosis without current pathological fracture  Comments:  On Prolia    6. Depression with anxiety  -     venlafaxine XR (EFFEXOR-XR) 75 MG 24 hr capsule; Take 1 capsule by mouth Daily.  Dispense: 90 capsule; Refill: 1    7. Dysuria  -     POCT urinalysis dipstick, automated  -     Urine Culture - Urine, Urine, Clean Catch; Future      Follow up in 3 months with

## 2022-06-10 DIAGNOSIS — I10 ESSENTIAL HYPERTENSION: ICD-10-CM

## 2022-06-10 RX ORDER — AMLODIPINE AND OLMESARTAN MEDOXOMIL 5; 40 MG/1; MG/1
TABLET ORAL
Qty: 30 TABLET | Refills: 1 | OUTPATIENT
Start: 2022-06-10

## 2022-06-13 ENCOUNTER — TELEPHONE (OUTPATIENT)
Dept: INTERNAL MEDICINE | Facility: CLINIC | Age: 72
End: 2022-06-13

## 2022-06-14 ENCOUNTER — HOSPITAL ENCOUNTER (OUTPATIENT)
Dept: INFUSION THERAPY | Facility: HOSPITAL | Age: 72
Discharge: HOME | End: 2022-06-14
Admitting: NURSE PRACTITIONER

## 2022-06-14 VITALS
HEIGHT: 63 IN | DIASTOLIC BLOOD PRESSURE: 66 MMHG | SYSTOLIC BLOOD PRESSURE: 127 MMHG | WEIGHT: 170 LBS | TEMPERATURE: 97.6 F | BODY MASS INDEX: 30.12 KG/M2 | HEART RATE: 93 BPM | OXYGEN SATURATION: 96 % | RESPIRATION RATE: 16 BRPM

## 2022-06-14 DIAGNOSIS — M80.031A PATHOLOGICAL FRACTURE OF RIGHT RADIUS DUE TO AGE-RELATED OSTEOPOROSIS, INITIAL ENCOUNTER: ICD-10-CM

## 2022-06-14 DIAGNOSIS — M80.031D PATHOLOGICAL FRACTURE OF RIGHT RADIUS DUE TO AGE-RELATED OSTEOPOROSIS WITH ROUTINE HEALING, SUBSEQUENT ENCOUNTER: Primary | ICD-10-CM

## 2022-06-14 LAB
MAGNESIUM SERPL-MCNC: 1.5 MG/DL (ref 1.6–2.4)
PHOSPHATE SERPL-MCNC: 3.9 MG/DL (ref 2.5–4.5)

## 2022-06-14 PROCEDURE — 83735 ASSAY OF MAGNESIUM: CPT | Performed by: NURSE PRACTITIONER

## 2022-06-14 PROCEDURE — 25010000002 DENOSUMAB 60 MG/ML SOLUTION PREFILLED SYRINGE: Performed by: NURSE PRACTITIONER

## 2022-06-14 PROCEDURE — 96372 THER/PROPH/DIAG INJ SC/IM: CPT

## 2022-06-14 PROCEDURE — 36415 COLL VENOUS BLD VENIPUNCTURE: CPT

## 2022-06-14 PROCEDURE — 84100 ASSAY OF PHOSPHORUS: CPT | Performed by: NURSE PRACTITIONER

## 2022-06-14 RX ADMIN — DENOSUMAB 60 MG: 60 INJECTION SUBCUTANEOUS at 09:55

## 2022-06-14 NOTE — NURSING NOTE
0900 AMBULATORY TO [facility] FOR SCHEDULED PROLIA INJECTION, LABS COMPLETE, MAGNESIUM SLIGHTLY LOW, KIRK JOSEPH NOTIFIED, OK TO GIVE PROLIA. PROLIA GIVEN, AS PATIENT GETTING READY TO LEAVE, C/O ITCHING @ INJECTION SITE. REQUESTED PATIENT TO STAY A WHILE LONGER TO MONITOR.1020 DENIES ITCHING @ THIS TIME, DENIES C/O, DISCHARGED HOME AMBULATORY WITHOUT C/O.

## 2022-06-14 NOTE — TELEPHONE ENCOUNTER
HUB READ FOLLOWING MESSAGE    Hub please inform patients, the results of Urine culture is negative.     PATIENT EXPRESSED UNDERSTANDING

## 2022-06-14 NOTE — PATIENT INSTRUCTIONS
"  Call [facility] at [phone] if you have any problems or concerns.    We know you have a Choice in healthcare and appreciate you using [facility].  Our purpose is to provide you \"Excellent Care\".  We hope that you will always choose us in the future and continue to recommend us to your family and friends.             "

## 2022-07-01 ENCOUNTER — HOSPITAL ENCOUNTER (OUTPATIENT)
Dept: MAMMOGRAPHY | Facility: HOSPITAL | Age: 72
Discharge: HOME | End: 2022-07-01
Admitting: NURSE PRACTITIONER

## 2022-07-01 DIAGNOSIS — Z12.31 VISIT FOR SCREENING MAMMOGRAM: ICD-10-CM

## 2022-07-01 PROCEDURE — 77063 BREAST TOMOSYNTHESIS BI: CPT

## 2022-07-01 PROCEDURE — 77067 SCR MAMMO BI INCL CAD: CPT

## 2022-07-07 DIAGNOSIS — E11.65 UNCONTROLLED TYPE 2 DIABETES MELLITUS WITH HYPERGLYCEMIA: ICD-10-CM

## 2022-07-07 RX ORDER — SEMAGLUTIDE 1.34 MG/ML
1 INJECTION, SOLUTION SUBCUTANEOUS WEEKLY
Qty: 9 ML | Refills: 1 | Status: SHIPPED | OUTPATIENT
Start: 2022-07-07 | End: 2023-02-13

## 2022-07-07 NOTE — TELEPHONE ENCOUNTER
Caller: Amber Campos    Relationship: Self    Best call back number: [phone]    Requested Prescriptions:   Requested Prescriptions     Pending Prescriptions Disp Refills   • Ozempic, 1 MG/DOSE, 4 MG/3ML solution pen-injector 9 mL 1     Sig: Inject 1 mg under the skin into the appropriate area as directed 1 (One) Time Per Week.        Pharmacy where request should be sent: [facility] [address] [phone] [facility] [phone] FX     Additional details provided by patient: HAS 1 PEN LEFT    Does the patient have less than a 3 day supply:  [] Yes  [] No    Nigel Alejandre   07/07/22 09:37 EDT

## 2022-07-07 NOTE — TELEPHONE ENCOUNTER
Rx Refill Note  Requested Prescriptions     Pending Prescriptions Disp Refills    Ozempic, 1 MG/DOSE, 4 MG/3ML solution pen-injector 9 mL 1     Sig: Inject 1 mg under the skin into the appropriate area as directed 1 (One) Time Per Week.      Last office visit with prescribing clinician: 6/8/2022      Next office visit with prescribing clinician: 9/14/2022            ROSA CÁRDENAS MA  07/07/22, 09:49 EDT

## 2022-07-22 ENCOUNTER — OFFICE VISIT (OUTPATIENT)
Dept: INTERNAL MEDICINE | Facility: CLINIC | Age: 72
End: 2022-07-22

## 2022-07-22 VITALS
HEIGHT: 63 IN | WEIGHT: 180 LBS | HEART RATE: 109 BPM | TEMPERATURE: 97 F | SYSTOLIC BLOOD PRESSURE: 130 MMHG | BODY MASS INDEX: 31.89 KG/M2 | OXYGEN SATURATION: 96 % | DIASTOLIC BLOOD PRESSURE: 80 MMHG

## 2022-07-22 DIAGNOSIS — G89.29 CHRONIC LEFT HIP PAIN: ICD-10-CM

## 2022-07-22 DIAGNOSIS — M25.552 CHRONIC LEFT HIP PAIN: ICD-10-CM

## 2022-07-22 DIAGNOSIS — I65.22 LEFT CAROTID STENOSIS: ICD-10-CM

## 2022-07-22 DIAGNOSIS — R35.0 FREQUENT URINATION: Primary | ICD-10-CM

## 2022-07-22 LAB
BILIRUB BLD-MCNC: NEGATIVE MG/DL
CLARITY, POC: CLEAR
COLOR UR: YELLOW
EXPIRATION DATE: ABNORMAL
GLUCOSE UR STRIP-MCNC: NEGATIVE MG/DL
KETONES UR QL: ABNORMAL
LEUKOCYTE EST, POC: NEGATIVE
Lab: ABNORMAL
NITRITE UR-MCNC: NEGATIVE MG/ML
PH UR: 6 [PH] (ref 5–8)
PROT UR STRIP-MCNC: NEGATIVE MG/DL
RBC # UR STRIP: NEGATIVE /UL
SP GR UR: 1.02 (ref 1–1.03)
UROBILINOGEN UR QL: NORMAL

## 2022-07-22 PROCEDURE — 99214 OFFICE O/P EST MOD 30 MIN: CPT | Performed by: NURSE PRACTITIONER

## 2022-07-22 PROCEDURE — 81003 URINALYSIS AUTO W/O SCOPE: CPT | Performed by: NURSE PRACTITIONER

## 2022-07-22 RX ORDER — MELOXICAM 15 MG/1
15 TABLET ORAL DAILY
Qty: 30 TABLET | Refills: 0 | Status: SHIPPED | OUTPATIENT
Start: 2022-07-22 | End: 2022-09-14

## 2022-07-22 NOTE — PROGRESS NOTES
Chief Complaint   Patient presents with   • Urinary Tract Infection     Burning when urinating,foul smell       Subjective     Amber Campos is a 71 y.o. female being seen for an acute visit for UTI and a couple other concerns.. She started with burnign pain and pressure with urination 2 days ago. She has history of IC, and was not sure if it was a UTI.     She has been having left hip pain for the past several months. She has not had an Xr but has an appt with Dr. Amezcua to discuss OA pain.     She had a lifeline screening. Her left carotid showed mild stenosis. She denies Unilateral weakness or visual changes.     History of Present Illness     Allergies   Allergen Reactions   • Iodinated Diagnostic Agents Photosensitivity     contrast   • Adhesive Tape Other (See Comments)     EKG stickers only   • Farxiga [Dapagliflozin] Diarrhea and Itching     Yeast infeciotn   • Neomycin-Bacitracin Zn-Polymyx Rash   • Toprol Xl [Metoprolol] Dizziness   • Trulicity [Dulaglutide] GI Intolerance     Constipation   • Victoza [Liraglutide] Nausea Only         Current Outpatient Medications:   •  amlodipine-olmesartan (NIC) 5-40 MG per tablet, Take 1 tablet by mouth Daily., Disp: 90 tablet, Rfl: 1  •  budesonide-formoterol (Symbicort) 160-4.5 MCG/ACT inhaler, Inhale 2 puffs 2 (Two) Times a Day. (Patient taking differently: Inhale 2 puffs 2 (Two) Times a Day As Needed.), Disp: 1 each, Rfl: 6  •  Cetirizine HCl (ZyrTEC Childrens Allergy) 5 MG/5ML solution solution, Take 5 mL by mouth Daily., Disp: 150 mL, Rfl:   •  Continuous Blood Gluc  (Dexcom G5  Kit) device, 1 each 4 (Four) Times a Day., Disp: 1 each, Rfl: 0  •  Continuous Blood Gluc Sensor (Dexcom G6 Sensor), Every 10 (Ten) Days., Disp: 3 each, Rfl: 3  •  Continuous Blood Gluc Transmit (Dexcom G6 Transmitter) misc, 1 each 4 (Four) Times a Day., Disp: 1 each, Rfl: 0  •  glucose blood (FREESTYLE LITE) test strip, USE TO CHECK BLOOD SUGAR 3 TIMES A DAY, Disp: 300  each, Rfl: 3  •  Insulin Pen Needle (PEN NEEDLES) 32G X 6 MM misc, 1 each Daily With Breakfast., Disp: 90 each, Rfl: 3  •  Lancets (FREESTYLE) lancets, , Disp: , Rfl:   •  metFORMIN (GLUCOPHAGE) 1000 MG tablet, TAKE 1 TABLET DAILY WITH BREAKFAST (Patient taking differently: 1,000 mg 2 (Two) Times a Day With Meals.), Disp: 90 tablet, Rfl: 3  •  Ozempic, 1 MG/DOSE, 4 MG/3ML solution pen-injector, Inject 1 mg under the skin into the appropriate area as directed 1 (One) Time Per Week., Disp: 9 mL, Rfl: 1  •  pantoprazole (PROTONIX) 40 MG EC tablet, TAKE 1 TABLET DAILY, Disp: 90 tablet, Rfl: 3  •  Spacer/Aero Chamber Mouthpiece misc, 1 each Daily., Disp: 1 each, Rfl: 0  •  Vascepa 1 g capsule capsule, 2 (Two) Times a Day With Meals., Disp: , Rfl:   •  venlafaxine XR (EFFEXOR-XR) 75 MG 24 hr capsule, Take 1 capsule by mouth Daily., Disp: 90 capsule, Rfl: 1    The following portions of the patient's history were reviewed and updated as appropriate: allergies, current medications, past family history, past medical history, past social history, past surgical history and problem list.    Review of Systems   Constitutional: Negative.    HENT: Negative.    Cardiovascular: Negative.    Gastrointestinal: Negative.    Genitourinary: Positive for dysuria and urgency. Negative for difficulty urinating and hematuria.   Musculoskeletal: Positive for arthralgias. Negative for back pain.   Skin: Negative.    All other systems reviewed and are negative.      Assessment     Physical Exam  Vitals reviewed.   Constitutional:       Appearance: Normal appearance. She is obese. She is not ill-appearing.   HENT:      Head: Normocephalic.   Cardiovascular:      Rate and Rhythm: Normal rate and regular rhythm.      Pulses: Normal pulses.      Heart sounds: Normal heart sounds. No murmur heard.  Pulmonary:      Effort: Pulmonary effort is normal.      Breath sounds: Normal breath sounds.   Musculoskeletal:      Left hip: Tenderness (Lateral hip  joint) present. No bony tenderness. Decreased range of motion. Decreased strength.   Neurological:      Mental Status: She is alert.         Plan         Diagnoses and all orders for this visit:    1. Frequent urination (Primary)  Comments:  Negative UA in office  Orders:  -     POC Urinalysis Dipstick, Automated  -     Urine Culture - Urine, Urine, Clean Catch; Future  -     Urine Culture - Urine, Urine, Clean Catch    2. Chronic left hip pain  Comments:  Start NSAID and F/U for XR wiht ortho  Orders:  -     meloxicam (Mobic) 15 MG tablet; Take 1 tablet by mouth Daily.  Dispense: 30 tablet; Refill: 0    3. Left carotid stenosis  Comments:  reviewed lifeline screening. Reccomend statin therapy for placque regression, Crestor. She declines and wants to proceed with carotid US.   Orders:  -     US Carotid Bilateral; Future        The 10-year ASCVD risk score (Anadawit WADE Jr., et al., 2013) is: 31.7%    Values used to calculate the score:      Age: 71 years      Sex: Female      Is Non- : No      Diabetic: Yes      Tobacco smoker: No      Systolic Blood Pressure: 130 mmHg      Is BP treated: Yes      HDL Cholesterol: 69 mg/dL      Total Cholesterol: 231 mg/dL     Follow up as scheduled

## 2022-07-24 LAB
BACTERIA UR CULT: NO GROWTH
BACTERIA UR CULT: NORMAL

## 2022-07-25 ENCOUNTER — TELEPHONE (OUTPATIENT)
Dept: INTERNAL MEDICINE | Facility: CLINIC | Age: 72
End: 2022-07-25

## 2022-08-03 ENCOUNTER — TELEPHONE (OUTPATIENT)
Dept: INTERNAL MEDICINE | Facility: CLINIC | Age: 72
End: 2022-08-03

## 2022-08-03 DIAGNOSIS — M80.031A PATHOLOGICAL FRACTURE OF RIGHT RADIUS DUE TO AGE-RELATED OSTEOPOROSIS, INITIAL ENCOUNTER: ICD-10-CM

## 2022-08-03 DIAGNOSIS — M81.0 AGE-RELATED OSTEOPOROSIS WITHOUT CURRENT PATHOLOGICAL FRACTURE: Primary | ICD-10-CM

## 2022-08-03 NOTE — TELEPHONE ENCOUNTER
[facility] calls needs undated order for Prolia. Pt will be scheduled around Dec 16th. Any questions call [phone] Thank you

## 2022-08-22 DIAGNOSIS — E11.65 UNCONTROLLED TYPE 2 DIABETES MELLITUS WITH HYPERGLYCEMIA: ICD-10-CM

## 2022-08-22 DIAGNOSIS — M80.031A PATHOLOGICAL FRACTURE OF RIGHT RADIUS DUE TO AGE-RELATED OSTEOPOROSIS, INITIAL ENCOUNTER: ICD-10-CM

## 2022-08-22 DIAGNOSIS — K21.00 GASTROESOPHAGEAL REFLUX DISEASE WITH ESOPHAGITIS WITHOUT HEMORRHAGE: ICD-10-CM

## 2022-08-22 DIAGNOSIS — I10 ESSENTIAL HYPERTENSION: ICD-10-CM

## 2022-08-26 ENCOUNTER — TELEMEDICINE (OUTPATIENT)
Dept: INTERNAL MEDICINE | Facility: CLINIC | Age: 72
End: 2022-08-26

## 2022-08-26 DIAGNOSIS — U07.1 UPPER RESPIRATORY TRACT INFECTION DUE TO COVID-19 VIRUS: Primary | ICD-10-CM

## 2022-08-26 DIAGNOSIS — J06.9 UPPER RESPIRATORY TRACT INFECTION DUE TO COVID-19 VIRUS: Primary | ICD-10-CM

## 2022-08-26 PROCEDURE — 99442 PR PHYS/QHP TELEPHONE EVALUATION 11-20 MIN: CPT | Performed by: INTERNAL MEDICINE

## 2022-08-26 NOTE — PROGRESS NOTES
"      Amber Campos is a 71 y.o. female who presents with a chief complaint of   Chief Complaint   Patient presents with   • Cough     Just tested + for COVID 19     This was a telehealth visit.  Please see note at bottom.     HPI   Ms. Campos is coughing up phlegm--slightly green tinged, runny nose, eye discharge.  On Wednesday was running a fever and has been taking Aleve.  Monday  tested positive for COVID.  Has just tested positive for COVID.  Has had all vaccines plus boosters.  Has history of chronic bronchitis and \"reactive airways\".  Never officially diagnosed with asthma.  Using Symbicort BID everyday.  Does have history of diabetes.  Not interested in Paxlovid at this time.      The following portions of the patient's history were reviewed and updated as appropriate: allergies, current medications, past family history, past medical history, past social history, past surgical history and problem list.      Current Outpatient Medications:   •  amlodipine-olmesartan (NIC) 5-40 MG per tablet, Take 1 tablet by mouth Daily., Disp: 90 tablet, Rfl: 1  •  budesonide-formoterol (Symbicort) 160-4.5 MCG/ACT inhaler, Inhale 2 puffs 2 (Two) Times a Day. (Patient taking differently: Inhale 2 puffs 2 (Two) Times a Day As Needed.), Disp: 1 each, Rfl: 6  •  Cetirizine HCl (yrTEC Childrens Allergy) 5 MG/5ML solution solution, Take 5 mL by mouth Daily., Disp: 150 mL, Rfl:   •  Continuous Blood Gluc  (Dexcom G5  Kit) device, 1 each 4 (Four) Times a Day., Disp: 1 each, Rfl: 0  •  Continuous Blood Gluc Sensor (Dexcom G6 Sensor), Every 10 (Ten) Days., Disp: 3 each, Rfl: 3  •  Continuous Blood Gluc Transmit (Dexcom G6 Transmitter) misc, 1 each 4 (Four) Times a Day., Disp: 1 each, Rfl: 0  •  glucose blood (FREESTYLE LITE) test strip, USE TO CHECK BLOOD SUGAR 3 TIMES A DAY, Disp: 300 each, Rfl: 3  •  Insulin Pen Needle (PEN NEEDLES) 32G X 6 MM misc, 1 each Daily With Breakfast., Disp: 90 each, Rfl: 3  •  " Lancets (FREESTYLE) lancets, , Disp: , Rfl:   •  meloxicam (Mobic) 15 MG tablet, Take 1 tablet by mouth Daily., Disp: 30 tablet, Rfl: 0  •  metFORMIN (GLUCOPHAGE) 1000 MG tablet, TAKE 1 TABLET DAILY WITH BREAKFAST (Patient taking differently: 1,000 mg 2 (Two) Times a Day With Meals.), Disp: 90 tablet, Rfl: 3  •  Ozempic, 1 MG/DOSE, 4 MG/3ML solution pen-injector, Inject 1 mg under the skin into the appropriate area as directed 1 (One) Time Per Week., Disp: 9 mL, Rfl: 1  •  pantoprazole (PROTONIX) 40 MG EC tablet, TAKE 1 TABLET DAILY, Disp: 90 tablet, Rfl: 3  •  Spacer/Aero Chamber Mouthpiece misc, 1 each Daily., Disp: 1 each, Rfl: 0  •  Vascepa 1 g capsule capsule, 2 (Two) Times a Day With Meals., Disp: , Rfl:   •  venlafaxine XR (EFFEXOR-XR) 75 MG 24 hr capsule, Take 1 capsule by mouth Daily., Disp: 90 capsule, Rfl: 1    Review of Systems   Constitutional: Positive for fatigue and fever.   HENT: Positive for rhinorrhea and sore throat.    Eyes: Positive for discharge.   Respiratory: Positive for cough. Negative for shortness of breath.    Cardiovascular: Negative for chest pain and leg swelling.     163/76 with pulse of 108.       Results for orders placed or performed in visit on 07/22/22   Urine Culture - Urine, Urine, Clean Catch    Specimen: Urine, Clean Catch    UR   Result Value Ref Range    Urine Culture Final report     Result 1 No growth    POC Urinalysis Dipstick, Automated    Specimen: Urine   Result Value Ref Range    Color Yellow Yellow, Straw, Dark Yellow, Patrizia    Clarity, UA Clear Clear    Specific Gravity  1.020 1.005 - 1.030    pH, Urine 6.0 (A) 5.0 - 8.0    Leukocytes Negative Negative    Nitrite, UA Negative Negative    Protein, POC Negative Negative mg/dL    Glucose, UA Negative Negative mg/dL    Ketones, UA 15 mg/dL (A) Negative    Urobilinogen, UA Normal Normal    Bilirubin Negative Negative    Blood, UA Negative Negative    Lot Number [id number]     Expiration Date 2/28/23            Diagnoses  "and all orders for this visit:    1. Upper respiratory tract infection due to COVID-19 virus (Primary)  Assessment & Plan:  - Advised supportive care for COVID with honey, tea, and rest.  - Patient is fully vaccinated against COVID   - Offered Paxlovid, but patient would like to hold off at this time.  Patient asked about Prednisone or Azithromycin.  Discussed that she does not really fall in the category where Prednisone would be helpful at this time.  - Advised no antibiotics due to lack of evidence of bacterial infection.  Advised cough alone may hang on for an extended period of time.  Discussed if symptoms were to quickly worsen, specifically fever and cough and fatigue, please call back and be seen.  - Patient using symbicort BID as previously diagnosed with reactive airways.  I agree that this is a good plan as a \"sick day\" management, especially given the inflammatory nature of COVID 19.      Patient attempted to connect with video multiple times with help from help line and myself, but was unable to connect.  I decided to then conduct the visit as telephone only.  The patient has a blood pressure/pulse cuff at home and did take her vitals while we were on the phone together. Patient present in her home.  Encounter was 20 minutes in length.  Encounter was audio only due to above issues.   "

## 2022-08-26 NOTE — ASSESSMENT & PLAN NOTE
"- Advised supportive care for COVID with honey, tea, and rest.  - Patient is fully vaccinated against COVID   - Offered Paxlovid, but patient would like to hold off at this time.  Patient asked about Prednisone or Azithromycin.  Discussed that she does not really fall in the category where Prednisone would be helpful at this time.  - Advised no antibiotics due to lack of evidence of bacterial infection.  Advised cough alone may hang on for an extended period of time.  Discussed if symptoms were to quickly worsen, specifically fever and cough and fatigue, please call back and be seen.  - Patient using symbicort BID as previously diagnosed with reactive airways.  I agree that this is a good plan as a \"sick day\" management, especially given the inflammatory nature of COVID 19.  "

## 2022-09-01 LAB
ALBUMIN SERPL-MCNC: 4.4 G/DL (ref 3.5–5.2)
ALBUMIN/GLOB SERPL: 1.8 G/DL
ALP SERPL-CCNC: 61 U/L (ref 39–117)
ALT SERPL-CCNC: 40 U/L (ref 1–33)
AST SERPL-CCNC: 44 U/L (ref 1–32)
BASOPHILS # BLD AUTO: 0.05 10*3/MM3 (ref 0–0.2)
BASOPHILS NFR BLD AUTO: 0.7 % (ref 0–1.5)
BILIRUB SERPL-MCNC: 0.3 MG/DL (ref 0–1.2)
BUN SERPL-MCNC: 11 MG/DL (ref 8–23)
BUN/CREAT SERPL: 20.8 (ref 7–25)
CALCIUM SERPL-MCNC: 9.2 MG/DL (ref 8.6–10.5)
CHLORIDE SERPL-SCNC: 100 MMOL/L (ref 98–107)
CHOLEST SERPL-MCNC: 165 MG/DL (ref 0–200)
CHOLEST/HDLC SERPL: 2.89 {RATIO}
CO2 SERPL-SCNC: 26.1 MMOL/L (ref 22–29)
CREAT SERPL-MCNC: 0.53 MG/DL (ref 0.57–1)
EGFRCR-CYS SERPLBLD CKD-EPI 2021: 97.8 ML/MIN/1.73
EOSINOPHIL # BLD AUTO: 0.29 10*3/MM3 (ref 0–0.4)
EOSINOPHIL NFR BLD AUTO: 4.2 % (ref 0.3–6.2)
ERYTHROCYTE [DISTWIDTH] IN BLOOD BY AUTOMATED COUNT: 13.4 % (ref 12.3–15.4)
GLOBULIN SER CALC-MCNC: 2.5 GM/DL
GLUCOSE SERPL-MCNC: 206 MG/DL (ref 65–99)
HBA1C MFR BLD: 8 % (ref 4.8–5.6)
HCT VFR BLD AUTO: 38.2 % (ref 34–46.6)
HDLC SERPL-MCNC: 57 MG/DL (ref 40–60)
HGB BLD-MCNC: 12.5 G/DL (ref 12–15.9)
IMM GRANULOCYTES # BLD AUTO: 0.02 10*3/MM3 (ref 0–0.05)
IMM GRANULOCYTES NFR BLD AUTO: 0.3 % (ref 0–0.5)
LDLC SERPL CALC-MCNC: 71 MG/DL (ref 0–100)
LYMPHOCYTES # BLD AUTO: 2.57 10*3/MM3 (ref 0.7–3.1)
LYMPHOCYTES NFR BLD AUTO: 37.4 % (ref 19.6–45.3)
MCH RBC QN AUTO: 27.6 PG (ref 26.6–33)
MCHC RBC AUTO-ENTMCNC: 32.7 G/DL (ref 31.5–35.7)
MCV RBC AUTO: 84.3 FL (ref 79–97)
MONOCYTES # BLD AUTO: 0.48 10*3/MM3 (ref 0.1–0.9)
MONOCYTES NFR BLD AUTO: 7 % (ref 5–12)
NEUTROPHILS # BLD AUTO: 3.46 10*3/MM3 (ref 1.7–7)
NEUTROPHILS NFR BLD AUTO: 50.4 % (ref 42.7–76)
NRBC BLD AUTO-RTO: 0.1 /100 WBC (ref 0–0.2)
PLATELET # BLD AUTO: 380 10*3/MM3 (ref 140–450)
POTASSIUM SERPL-SCNC: 4.6 MMOL/L (ref 3.5–5.2)
PROT SERPL-MCNC: 6.9 G/DL (ref 6–8.5)
RBC # BLD AUTO: 4.53 10*6/MM3 (ref 3.77–5.28)
SODIUM SERPL-SCNC: 136 MMOL/L (ref 136–145)
TRIGL SERPL-MCNC: 228 MG/DL (ref 0–150)
VLDLC SERPL CALC-MCNC: 37 MG/DL (ref 5–40)
WBC # BLD AUTO: 6.87 10*3/MM3 (ref 3.4–10.8)

## 2022-09-14 ENCOUNTER — OFFICE VISIT (OUTPATIENT)
Dept: INTERNAL MEDICINE | Facility: CLINIC | Age: 72
End: 2022-09-14

## 2022-09-14 VITALS
HEART RATE: 103 BPM | TEMPERATURE: 97.8 F | SYSTOLIC BLOOD PRESSURE: 128 MMHG | RESPIRATION RATE: 18 BRPM | OXYGEN SATURATION: 97 % | BODY MASS INDEX: 31.5 KG/M2 | DIASTOLIC BLOOD PRESSURE: 82 MMHG | WEIGHT: 177.8 LBS | HEIGHT: 63 IN

## 2022-09-14 DIAGNOSIS — M25.552 CHRONIC LEFT HIP PAIN: ICD-10-CM

## 2022-09-14 DIAGNOSIS — I10 ESSENTIAL HYPERTENSION: ICD-10-CM

## 2022-09-14 DIAGNOSIS — R30.0 DYSURIA: ICD-10-CM

## 2022-09-14 DIAGNOSIS — M81.0 AGE-RELATED OSTEOPOROSIS WITHOUT CURRENT PATHOLOGICAL FRACTURE: ICD-10-CM

## 2022-09-14 DIAGNOSIS — E11.65 UNCONTROLLED TYPE 2 DIABETES MELLITUS WITH HYPERGLYCEMIA: Primary | ICD-10-CM

## 2022-09-14 DIAGNOSIS — G89.29 CHRONIC LEFT HIP PAIN: ICD-10-CM

## 2022-09-14 DIAGNOSIS — H01.119: ICD-10-CM

## 2022-09-14 DIAGNOSIS — E61.1 LOW SERUM IRON: ICD-10-CM

## 2022-09-14 DIAGNOSIS — Z86.16 PERSONAL HISTORY OF COVID-19: ICD-10-CM

## 2022-09-14 DIAGNOSIS — E78.2 MIXED HYPERLIPIDEMIA: ICD-10-CM

## 2022-09-14 DIAGNOSIS — Z23 NEEDS FLU SHOT: ICD-10-CM

## 2022-09-14 DIAGNOSIS — Z23 NEED FOR PNEUMOCOCCAL VACCINE: ICD-10-CM

## 2022-09-14 DIAGNOSIS — F41.8 DEPRESSION WITH ANXIETY: ICD-10-CM

## 2022-09-14 LAB
BILIRUB BLD-MCNC: NEGATIVE MG/DL
CLARITY, POC: CLEAR
COLOR UR: YELLOW
EXPIRATION DATE: ABNORMAL
GLUCOSE UR STRIP-MCNC: NEGATIVE MG/DL
KETONES UR QL: ABNORMAL
LEUKOCYTE EST, POC: NEGATIVE
Lab: ABNORMAL
NITRITE UR-MCNC: NEGATIVE MG/ML
PH UR: 5.5 [PH] (ref 5–8)
POC CREATININE URINE: NORMAL
POC MICROALBUMIN URINE: NORMAL
PROT UR STRIP-MCNC: NEGATIVE MG/DL
RBC # UR STRIP: NEGATIVE /UL
SP GR UR: 1.02 (ref 1–1.03)
UROBILINOGEN UR QL: ABNORMAL

## 2022-09-14 PROCEDURE — 99214 OFFICE O/P EST MOD 30 MIN: CPT | Performed by: NURSE PRACTITIONER

## 2022-09-14 PROCEDURE — 90677 PCV20 VACCINE IM: CPT | Performed by: NURSE PRACTITIONER

## 2022-09-14 PROCEDURE — G0009 ADMIN PNEUMOCOCCAL VACCINE: HCPCS | Performed by: NURSE PRACTITIONER

## 2022-09-14 PROCEDURE — 81003 URINALYSIS AUTO W/O SCOPE: CPT | Performed by: NURSE PRACTITIONER

## 2022-09-14 PROCEDURE — 82044 UR ALBUMIN SEMIQUANTITATIVE: CPT | Performed by: NURSE PRACTITIONER

## 2022-09-14 PROCEDURE — 90662 IIV NO PRSV INCREASED AG IM: CPT | Performed by: NURSE PRACTITIONER

## 2022-09-14 PROCEDURE — G0008 ADMIN INFLUENZA VIRUS VAC: HCPCS | Performed by: NURSE PRACTITIONER

## 2022-09-14 RX ORDER — BLOOD-GLUCOSE,RECEIVER,CONT
1 EACH MISCELLANEOUS 4 TIMES DAILY
Qty: 1 EACH | Refills: 0 | Status: SHIPPED | OUTPATIENT
Start: 2022-09-14 | End: 2023-03-15

## 2022-09-14 RX ORDER — CLOCORTOLONE PIVALATE 0 G/G
1 CREAM TOPICAL 2 TIMES DAILY
Qty: 75 G | Refills: 3 | Status: SHIPPED | OUTPATIENT
Start: 2022-09-14 | End: 2022-09-21 | Stop reason: CLARIF

## 2022-09-14 RX ORDER — MELOXICAM 15 MG/1
15 TABLET ORAL DAILY PRN
Qty: 30 TABLET | Refills: 0
Start: 2022-09-14 | End: 2022-12-14

## 2022-09-14 RX ORDER — PROCHLORPERAZINE 25 MG/1
SUPPOSITORY RECTAL
Qty: 3 EACH | Refills: 3 | Status: SHIPPED | OUTPATIENT
Start: 2022-09-14 | End: 2023-03-15

## 2022-09-14 NOTE — PROGRESS NOTES
Injection  Injection performed by ROSA CÁRDENAS MA. Patient tolerated the procedure well without complications.  09/14/22   ROSA CÁRDENAS MA

## 2022-09-14 NOTE — PROGRESS NOTES
"Chief Complaint   Patient presents with   • Diabetes     3 month follow up. Wanting to discuss getting a prescription for [place]ern. Wanting her right ear checked, wanting to discuss her thyroid and check her covid antibodies. Says that she had a coated tongue, says that it has been this way since she had covid. Wants her pneumonia and flu vaccines        Subjective     Amber Campos is a 71 y.o. female being seen for a follow up appointment today regarding uncontrolled DM 2, HTN, Hyperlipidemia, and IC with recurring UTIs. She is on Metformin 1000 mg BID and Ozempic 1 mg weekly. She was evaluated by endo, but decided she did not want to see them anymore because \"they don't do anything for me.\" She is not checking her glucose regularly. Her last check was 119. She is not compliant with her diet, especially when traveling. She denies any blurred vision, dry mouth.      She is on Susannah 5/40mg daily for HTN. She has previously been on Diovan, Toprol XL and Ziac. She does not check her BP, but feels like she is tolerating this medicaiton well.     She started Cholest-off 4 times daily for hyperlipidemia.      She is followed by Dr Benton ([facility]). She had a CT abd and pelvis and cystoscopy scheduled for renal cysts and IC. She was treated with Bactrim at last visit due to UTI, and will leave a culture today.      She has history of JOSETTE and GERD. She Has been evaluated with both a C-scope 6-6-2022 with Dr. Alaniz. She is on an oral iron supplement. She takes a daily Protonix 40mg tablet.      She is on prolia for osteoporosis with history of fracture and Tolerated her first injection well. She has history of traumatic fractures of wrist and ankle. dexa bone density axial (04/30/2021 08:59)    She was in last month and saw Dr. Higgins for Covid infection. She is still reporting right ear clogged and \"white coating\" on tongue.       History of Present Illness     Allergies   Allergen Reactions   • Iodinated Diagnostic " Agents Photosensitivity     contrast   • Adhesive Tape Other (See Comments)     EKG stickers only   • Farxiga [Dapagliflozin] Diarrhea and Itching     Yeast infeciotn   • Neomycin-Bacitracin Zn-Polymyx Rash   • Toprol Xl [Metoprolol] Dizziness   • Trulicity [Dulaglutide] GI Intolerance     Constipation   • Victoza [Liraglutide] Nausea Only         Current Outpatient Medications:   •  amlodipine-olmesartan (NIC) 5-40 MG per tablet, Take 1 tablet by mouth Daily., Disp: 90 tablet, Rfl: 1  •  budesonide-formoterol (Symbicort) 160-4.5 MCG/ACT inhaler, Inhale 2 puffs 2 (Two) Times a Day. (Patient taking differently: Inhale 2 puffs 2 (Two) Times a Day As Needed.), Disp: 1 each, Rfl: 6  •  Cetirizine HCl (ZyrTEC Childrens Allergy) 5 MG/5ML solution solution, Take 5 mL by mouth Daily., Disp: 150 mL, Rfl:   •  glucose blood (FREESTYLE LITE) test strip, USE TO CHECK BLOOD SUGAR 3 TIMES A DAY, Disp: 300 each, Rfl: 3  •  Insulin Pen Needle (PEN NEEDLES) 32G X 6 MM misc, 1 each Daily With Breakfast., Disp: 90 each, Rfl: 3  •  Lancets (FREESTYLE) lancets, , Disp: , Rfl:   •  meloxicam (Mobic) 15 MG tablet, Take 1 tablet by mouth Daily., Disp: 30 tablet, Rfl: 0  •  metFORMIN (GLUCOPHAGE) 1000 MG tablet, TAKE 1 TABLET DAILY WITH BREAKFAST (Patient taking differently: 1,000 mg 2 (Two) Times a Day With Meals.), Disp: 90 tablet, Rfl: 3  •  Ozempic, 1 MG/DOSE, 4 MG/3ML solution pen-injector, Inject 1 mg under the skin into the appropriate area as directed 1 (One) Time Per Week., Disp: 9 mL, Rfl: 1  •  pantoprazole (PROTONIX) 40 MG EC tablet, TAKE 1 TABLET DAILY, Disp: 90 tablet, Rfl: 3  •  Spacer/Aero Chamber Mouthpiece misc, 1 each Daily., Disp: 1 each, Rfl: 0  •  venlafaxine XR (EFFEXOR-XR) 75 MG 24 hr capsule, Take 1 capsule by mouth Daily., Disp: 90 capsule, Rfl: 1  •  Continuous Blood Gluc  (Dexcom G5  Kit) device, 1 each 4 (Four) Times a Day., Disp: 1 each, Rfl: 0  •  Continuous Blood Gluc Sensor (Dexcom G6  Sensor), Every 10 (Ten) Days., Disp: 3 each, Rfl: 3  •  Continuous Blood Gluc Transmit (Dexcom G6 Transmitter) misc, 1 each 4 (Four) Times a Day., Disp: 1 each, Rfl: 0  •  Vascepa 1 g capsule capsule, 2 (Two) Times a Day With Meals., Disp: , Rfl:     The following portions of the patient's history were reviewed and updated as appropriate: allergies, current medications, past family history, past medical history, past social history, past surgical history and problem list.    Review of Systems   Constitutional: Positive for fatigue.   HENT: Positive for congestion and rhinorrhea.    Eyes: Negative.    Respiratory: Negative.    Cardiovascular: Negative.  Negative for chest pain, palpitations and leg swelling.   Gastrointestinal: Negative.    Musculoskeletal: Positive for arthralgias.   Neurological: Positive for headaches.   All other systems reviewed and are negative.      Assessment     Physical Exam  Vitals reviewed.   Constitutional:       Appearance: Normal appearance. She is obese. She is not ill-appearing.   HENT:      Right Ear: A middle ear effusion is present.      Left Ear: Tympanic membrane normal.   Cardiovascular:      Rate and Rhythm: Normal rate and regular rhythm.      Pulses: Normal pulses.           Dorsalis pedis pulses are 2+ on the right side and 2+ on the left side.        Posterior tibial pulses are 2+ on the right side.      Heart sounds: Normal heart sounds. No murmur heard.  Pulmonary:      Effort: Pulmonary effort is normal. No respiratory distress.      Breath sounds: Normal breath sounds. No stridor.   Feet:      Right foot:      Skin integrity: Skin integrity normal.      Left foot:      Skin integrity: Skin integrity normal.   Skin:     General: Skin is warm and dry.   Neurological:      General: No focal deficit present.      Mental Status: She is alert and oriented to person, place, and time.      Gait: Gait normal.   Psychiatric:         Mood and Affect: Mood normal.          Behavior: Behavior normal.         Thought Content: Thought content normal.         Plan     Her fasting labs were reviewed with the patient from last week.     Diagnoses and all orders for this visit:    1. Uncontrolled type 2 diabetes mellitus with hyperglycemia (HCC) (Primary)  Comments:  Discussed increasing Ozempic to 2mg weekly, wants to await next labs. Continue Metformin 1000mg BID  Orders:  -     T4, Free  -     TSH  -     Comprehensive Metabolic Panel; Future  -     Lipid Panel With / Chol / HDL Ratio; Future  -     Hemoglobin A1c; Future  -     POCT microalbumin  -     Continuous Blood Gluc  (Dexcom G5  Kit) device; 1 each 4 (Four) Times a Day.  Dispense: 1 each; Refill: 0  -     Continuous Blood Gluc Sensor (Dexcom G6 Sensor); Every 10 (Ten) Days.  Dispense: 3 each; Refill: 3    2. Personal history of COVID-19  -     SARS-CoV-2 Semi-Quant Total Ab    3. Depression with anxiety  Comments:  Controlled on Effexor XR 75mg daily  Orders:  -     T4, Free  -     TSH    4. Age-related osteoporosis without current pathological fracture  Comments:  On Prolia q 6 months  Orders:  -     T4, Free  -     TSH    5. Essential hypertension  -     T4, Free  -     TSH  -     Comprehensive Metabolic Panel; Future  -     Lipid Panel With / Chol / HDL Ratio; Future  -     CBC & Differential; Future    6. Mixed hyperlipidemia  Comments:  LDL goal < 70  Orders:  -     Comprehensive Metabolic Panel; Future  -     Lipid Panel With / Chol / HDL Ratio; Future    7. Low serum iron  -     CBC & Differential; Future  -     Ferritin; Future  -     Iron Profile; Future    8. Dysuria  -     POC Urinalysis Dipstick, Automated    9. Chronic left hip pain  Comments:  Start NSAID and F/U for XR wi ortho  Orders:  -     meloxicam (Mobic) 15 MG tablet; Take 1 tablet by mouth Daily As Needed for Mild Pain.  Dispense: 30 tablet; Refill: 0    10. Need for pneumococcal vaccine  -     Pneumococcal Conjugate Vaccine 20-Valent  All    11. Needs flu shot  -     Fluzone High-Dose 65+yrs    12. Dermatitis of eyelid, contact or allergic  -     Clocortolone Pivalate (Cloderm) 0.1 % cream; Apply 1 application topically to the appropriate area as directed 2 (Two) Times a Day.  Dispense: 75 g; Refill: 3      Follow up in 3 months w labs

## 2022-09-15 LAB
SARS-COV-2 AB SERPL IA-ACNC: NORMAL U/ML
SARS-COV-2 AB SERPL IA-ACNC: NORMAL U/ML
SARS-COV-2 AB SERPL-IMP: POSITIVE
T4 FREE SERPL-MCNC: 1.02 NG/DL (ref 0.93–1.7)
TSH SERPL DL<=0.005 MIU/L-ACNC: 1.22 UIU/ML (ref 0.27–4.2)

## 2022-09-16 ENCOUNTER — TELEPHONE (OUTPATIENT)
Dept: INTERNAL MEDICINE | Facility: CLINIC | Age: 72
End: 2022-09-16

## 2022-09-16 NOTE — TELEPHONE ENCOUNTER
Caller: Amber Campos    Relationship to patient: Self    Best call back number: [phone]    Chief complaint:     Type of visit: LABS     Requested date: WEEK BEFORE 12-16-22 APPOINTMENT     If rescheduling, when is the original appointment:     Additional notes:

## 2022-09-20 ENCOUNTER — TELEPHONE (OUTPATIENT)
Dept: INTERNAL MEDICINE | Facility: CLINIC | Age: 72
End: 2022-09-20

## 2022-09-20 NOTE — TELEPHONE ENCOUNTER
received a fax from the pharmacy informing us that the Clocortolone Pivalate cream is on back order and they cannot receive it. Would you like to change to something else?

## 2022-09-23 DIAGNOSIS — K21.9 GASTROESOPHAGEAL REFLUX DISEASE WITHOUT ESOPHAGITIS: ICD-10-CM

## 2022-09-23 RX ORDER — PANTOPRAZOLE SODIUM 40 MG/1
TABLET, DELAYED RELEASE ORAL
Qty: 90 TABLET | Refills: 3 | Status: SHIPPED | OUTPATIENT
Start: 2022-09-23

## 2022-09-23 NOTE — TELEPHONE ENCOUNTER
Rx Refill Note  Requested Prescriptions     Pending Prescriptions Disp Refills   • pantoprazole (PROTONIX) 40 MG EC tablet [Pharmacy Med Name: PANTOPRAZOLE SODIUM DR TABS 40MG] 90 tablet 3     Sig: TAKE 1 TABLET DAILY      Last office visit with prescribing clinician: 9/14/2022      Next office visit with prescribing clinician: 12/16/2022            Sheila Underwood MA  09/23/22, 08:52 EDT

## 2022-11-15 ENCOUNTER — OFFICE VISIT (OUTPATIENT)
Dept: INTERNAL MEDICINE | Facility: CLINIC | Age: 72
End: 2022-11-15

## 2022-11-15 VITALS
HEART RATE: 84 BPM | BODY MASS INDEX: 31.61 KG/M2 | TEMPERATURE: 97.7 F | HEIGHT: 63 IN | WEIGHT: 178.4 LBS | SYSTOLIC BLOOD PRESSURE: 138 MMHG | DIASTOLIC BLOOD PRESSURE: 80 MMHG | OXYGEN SATURATION: 99 %

## 2022-11-15 DIAGNOSIS — Z81.1 ALCOHOLISM IN FAMILY MEMBER: ICD-10-CM

## 2022-11-15 DIAGNOSIS — Z87.440 HISTORY OF RECURRENT UTIS: ICD-10-CM

## 2022-11-15 DIAGNOSIS — R30.0 DYSURIA: ICD-10-CM

## 2022-11-15 DIAGNOSIS — N95.2 ATROPHIC VAGINITIS: Primary | ICD-10-CM

## 2022-11-15 LAB
BILIRUB BLD-MCNC: NEGATIVE MG/DL
CLARITY, POC: ABNORMAL
COLOR UR: YELLOW
EXPIRATION DATE: ABNORMAL
GLUCOSE UR STRIP-MCNC: ABNORMAL MG/DL
KETONES UR QL: ABNORMAL
LEUKOCYTE EST, POC: NEGATIVE
Lab: ABNORMAL
NITRITE UR-MCNC: NEGATIVE MG/ML
PH UR: 7 [PH] (ref 5–8)
PROT UR STRIP-MCNC: NEGATIVE MG/DL
RBC # UR STRIP: NEGATIVE /UL
SP GR UR: 1.02 (ref 1–1.03)
UROBILINOGEN UR QL: NORMAL

## 2022-11-15 PROCEDURE — 81003 URINALYSIS AUTO W/O SCOPE: CPT | Performed by: NURSE PRACTITIONER

## 2022-11-15 PROCEDURE — 99214 OFFICE O/P EST MOD 30 MIN: CPT | Performed by: NURSE PRACTITIONER

## 2022-11-15 RX ORDER — CONJUGATED ESTROGENS 0.62 MG/G
CREAM VAGINAL
Qty: 30 G | Refills: 6 | Status: SHIPPED | OUTPATIENT
Start: 2022-11-15 | End: 2023-02-01

## 2022-11-15 NOTE — PROGRESS NOTES
Chief Complaint   Patient presents with   • Difficulty Urinating     Burning sensation   • Urinary Frequency     Going on for a couple days       Subjective     Amber Campos is a 71 y.o. female being seen for an acute visit for possible UTI. She was evalated by urology for recurring UTIs, last visit April 2022, and it was recommended that she have a cystoscopy, she declined at that time. She started a probiotic. She recently returned from the [facility] has history of vaginal atrophy on last pap. Denies pain with intercourse.    Her  is struggling with alcohol use, and recently had an abdominal paracentesis. She reports that he is increasingly agitation, but not abusive. She reports feeling increasing stress related to his alcohol use, but he does not wish to seek treatment.       History of Present Illness     Allergies   Allergen Reactions   • Iodinated Diagnostic Agents Photosensitivity     contrast   • Adhesive Tape Other (See Comments)     EKG stickers only   • Farxiga [Dapagliflozin] Diarrhea and Itching     Yeast infeciotn   • Neomycin-Bacitracin Zn-Polymyx Rash   • Toprol Xl [Metoprolol] Dizziness   • Trulicity [Dulaglutide] GI Intolerance     Constipation   • Victoza [Liraglutide] Nausea Only         Current Outpatient Medications:   •  amlodipine-olmesartan (NIC) 5-40 MG per tablet, Take 1 tablet by mouth Daily., Disp: 90 tablet, Rfl: 1  •  betamethasone valerate (VALISONE) 0.1 % ointment, Apply 1 application topically to the appropriate area as directed 2 (Two) Times a Day., Disp: 45 g, Rfl: 3  •  budesonide-formoterol (Symbicort) 160-4.5 MCG/ACT inhaler, Inhale 2 puffs 2 (Two) Times a Day. (Patient taking differently: Inhale 2 puffs 2 (Two) Times a Day As Needed.), Disp: 1 each, Rfl: 6  •  Cetirizine HCl (ZyrTEC Childrens Allergy) 5 MG/5ML solution solution, Take 5 mL by mouth Daily., Disp: 150 mL, Rfl:   •  Continuous Blood Gluc  (Dexcom G5  Kit) device, 1 each 4 (Four) Times a  Day., Disp: 1 each, Rfl: 0  •  Continuous Blood Gluc Sensor (Dexcom G6 Sensor), Every 10 (Ten) Days., Disp: 3 each, Rfl: 3  •  Continuous Blood Gluc Transmit (Dexcom G6 Transmitter) misc, 1 each 4 (Four) Times a Day., Disp: 1 each, Rfl: 0  •  glucose blood (FREESTYLE LITE) test strip, USE TO CHECK BLOOD SUGAR 3 TIMES A DAY, Disp: 300 each, Rfl: 3  •  Insulin Pen Needle (PEN NEEDLES) 32G X 6 MM misc, 1 each Daily With Breakfast., Disp: 90 each, Rfl: 3  •  Lancets (FREESTYLE) lancets, , Disp: , Rfl:   •  meloxicam (Mobic) 15 MG tablet, Take 1 tablet by mouth Daily As Needed for Mild Pain., Disp: 30 tablet, Rfl: 0  •  metFORMIN (GLUCOPHAGE) 1000 MG tablet, TAKE 1 TABLET DAILY WITH BREAKFAST (Patient taking differently: 1 tablet 2 (Two) Times a Day With Meals.), Disp: 90 tablet, Rfl: 3  •  Ozempic, 1 MG/DOSE, 4 MG/3ML solution pen-injector, Inject 1 mg under the skin into the appropriate area as directed 1 (One) Time Per Week., Disp: 9 mL, Rfl: 1  •  pantoprazole (PROTONIX) 40 MG EC tablet, TAKE 1 TABLET DAILY, Disp: 90 tablet, Rfl: 3  •  Spacer/Aero Chamber Mouthpiece misc, 1 each Daily., Disp: 1 each, Rfl: 0  •  venlafaxine XR (EFFEXOR-XR) 75 MG 24 hr capsule, Take 1 capsule by mouth Daily., Disp: 90 capsule, Rfl: 1    The following portions of the patient's history were reviewed and updated as appropriate: allergies, current medications, past family history, past medical history, past social history, past surgical history and problem list.    Review of Systems   Constitutional: Negative.    HENT: Negative.    Cardiovascular: Negative.  Negative for chest pain, palpitations and leg swelling.   Genitourinary: Positive for dysuria, frequency and urgency.   Musculoskeletal: Negative for arthralgias.   Skin: Negative.    Allergic/Immunologic: Negative.    Neurological: Negative.    Hematological: Negative.    Psychiatric/Behavioral: Negative.    All other systems reviewed and are negative.      Assessment     Physical  Exam  Vitals reviewed.   Constitutional:       Appearance: Normal appearance. She is not ill-appearing.   Cardiovascular:      Rate and Rhythm: Normal rate.      Pulses: Normal pulses.      Heart sounds: Normal heart sounds. No murmur heard.  Pulmonary:      Effort: Pulmonary effort is normal.      Breath sounds: Normal breath sounds.   Neurological:      Mental Status: She is alert.   Psychiatric:         Mood and Affect: Mood normal.         Thought Content: Thought content normal.         Plan         Diagnoses and all orders for this visit:    1. Atrophic vaginitis (Primary)  -     Estrogens Conjugated (Premarin) 0.625 MG/GM vaginal cream; Insert 1 gr PV 3 days per week in the eveneing  Dispense: 30 g; Refill: 6    2. Dysuria  -     Estrogens Conjugated (Premarin) 0.625 MG/GM vaginal cream; Insert 1 gr PV 3 days per week in the eveneing  Dispense: 30 g; Refill: 6  -     POCT urinalysis dipstick, automated  -     Urine Culture - Urine, Urine, Clean Catch; Future  -     Urine Culture - Urine, Urine, Clean Catch    3. History of recurrent UTIs  -     Estrogens Conjugated (Premarin) 0.625 MG/GM vaginal cream; Insert 1 gr PV 3 days per week in the eveneing  Dispense: 30 g; Refill: 6    4. Alcoholism in family member  Comments:  I have reccomended AA and Al-Anon for family. SHe will look into the resourses.         Follow up as scheduled

## 2022-11-17 DIAGNOSIS — I10 ESSENTIAL HYPERTENSION: ICD-10-CM

## 2022-11-17 LAB
BACTERIA UR CULT: NO GROWTH
BACTERIA UR CULT: NORMAL

## 2022-11-17 RX ORDER — AMLODIPINE AND OLMESARTAN MEDOXOMIL 5; 40 MG/1; MG/1
TABLET ORAL
Qty: 90 TABLET | Refills: 3 | Status: SHIPPED | OUTPATIENT
Start: 2022-11-17

## 2022-11-17 NOTE — TELEPHONE ENCOUNTER
Rx Refill Note  Requested Prescriptions     Pending Prescriptions Disp Refills    amlodipine-olmesartan (NIC) 5-40 MG per tablet [Pharmacy Med Name: AMLODIPINE/OLMESARTAN TABS 5/40MG] 90 tablet 3     Sig: TAKE 1 TABLET DAILY      Last office visit with prescribing clinician: 11/15/2022      Next office visit with prescribing clinician: 12/16/2022            Ginny Mcclure MA  11/17/22, 07:32 EST

## 2022-12-05 ENCOUNTER — TELEPHONE (OUTPATIENT)
Dept: ORTHOPEDIC SURGERY | Facility: CLINIC | Age: 72
End: 2022-12-05

## 2022-12-05 DIAGNOSIS — F41.8 DEPRESSION WITH ANXIETY: ICD-10-CM

## 2022-12-05 NOTE — TELEPHONE ENCOUNTER
Caller: Amber Campos    Relationship to patient: Self    Best call back number:     Chief complaint: LEFT HIP PAIN     Type of visit: FUP INJECTION

## 2022-12-05 NOTE — TELEPHONE ENCOUNTER
Caller: Andres Amber SAGAR    Relationship: Self    Best call back number: [phone]    Requested Prescriptions:   Requested Prescriptions     Pending Prescriptions Disp Refills   • venlafaxine XR (EFFEXOR-XR) 75 MG 24 hr capsule 90 capsule 1     Sig: Take 1 capsule by mouth Daily.        Pharmacy where request should be sent: [facility] - [address] [phone] [facility] [phone] FX     Additional details provided by patient: PATIENT STATES SHE WAS INFORMED BY HER PHARMACY, THAT THEY HAVEN'T RECEIVED THE REFILL REQUEST FOR THE PRESCRIPTION LISTED ABOVE.     PATIENT ALSO STATES THAT SHE HAS BEEN TAKING 2 CAPSULES OF AN OLDER VENLAFAXINE 37.5 MG, THAT HAD  10/2022. SHE HAS 5 CAPSULES OF THIS BOTTLE REMAINING.    PLEASE CALL AND ADVISE IF ANY QUESTIONS OR CONCERNS.     Does the patient have less than a 3 day supply:  [x] Yes  [] No    Would you like a call back once the refill request has been completed: [x] Yes [] No    If the office needs to give you a call back, can they leave a voicemail: [x] Yes [] No    Nigel Iverson Rep   22 08:35 EST

## 2022-12-06 DIAGNOSIS — E61.1 LOW SERUM IRON: ICD-10-CM

## 2022-12-06 DIAGNOSIS — E78.2 MIXED HYPERLIPIDEMIA: ICD-10-CM

## 2022-12-06 DIAGNOSIS — E11.65 UNCONTROLLED TYPE 2 DIABETES MELLITUS WITH HYPERGLYCEMIA: ICD-10-CM

## 2022-12-06 DIAGNOSIS — I10 ESSENTIAL HYPERTENSION: ICD-10-CM

## 2022-12-06 RX ORDER — VENLAFAXINE HYDROCHLORIDE 75 MG/1
75 CAPSULE, EXTENDED RELEASE ORAL DAILY
Qty: 90 CAPSULE | Refills: 1 | Status: SHIPPED | OUTPATIENT
Start: 2022-12-06 | End: 2022-12-07 | Stop reason: SDUPTHER

## 2022-12-06 RX ORDER — VENLAFAXINE HYDROCHLORIDE 75 MG/1
CAPSULE, EXTENDED RELEASE ORAL
Qty: 90 CAPSULE | Refills: 3 | OUTPATIENT
Start: 2022-12-06

## 2022-12-06 NOTE — TELEPHONE ENCOUNTER
Rx Refill Note  Requested Prescriptions     Pending Prescriptions Disp Refills    venlafaxine XR (EFFEXOR-XR) 75 MG 24 hr capsule 90 capsule 1     Sig: Take 1 capsule by mouth Daily.      Last office visit with prescribing clinician: 11/15/2022   Last telemedicine visit with prescribing clinician: Visit date not found   Next office visit with prescribing clinician: 12/16/2022                         Would you like a call back once the refill request has been completed: [] Yes [] No    If the office needs to give you a call back, can they leave a voicemail: [] Yes [] No    Ginny Mcclure MA  12/06/22, 11:47 EST

## 2022-12-07 DIAGNOSIS — F41.8 DEPRESSION WITH ANXIETY: ICD-10-CM

## 2022-12-07 RX ORDER — VENLAFAXINE HYDROCHLORIDE 75 MG/1
75 CAPSULE, EXTENDED RELEASE ORAL DAILY
Qty: 90 CAPSULE | Refills: 1 | Status: SHIPPED | OUTPATIENT
Start: 2022-12-07

## 2022-12-07 RX ORDER — VENLAFAXINE HYDROCHLORIDE 75 MG/1
75 CAPSULE, EXTENDED RELEASE ORAL DAILY
Qty: 90 CAPSULE | Refills: 1 | Status: SHIPPED | OUTPATIENT
Start: 2022-12-07 | End: 2022-12-07

## 2022-12-07 RX ORDER — VENLAFAXINE HYDROCHLORIDE 75 MG/1
75 CAPSULE, EXTENDED RELEASE ORAL DAILY
Qty: 90 CAPSULE | Refills: 1 | OUTPATIENT
Start: 2022-12-07

## 2022-12-07 NOTE — TELEPHONE ENCOUNTER
Caller: Amber Campos    Relationship: Self    Best call back number: [phone]    Requested Prescriptions:   Requested Prescriptions     Pending Prescriptions Disp Refills   • venlafaxine XR (EFFEXOR-XR) 75 MG 24 hr capsule 90 capsule 1     Sig: Take 1 capsule by mouth Daily.        Pharmacy where request should be sent: [facility] [address] [phone] [facility] [phone] FX     Additional details provided by patient: WILL NEED THIS CALLED IN THE 75 MG     Does the patient have less than a 3 day supply:  [x] Yes  [] No    Would you like a call back once the refill request has been completed: [x] Yes [] No    If the office needs to give you a call back, can they leave a voicemail: [x] Yes [] No    Priti Streeter   12/07/22 14:43 EST

## 2022-12-07 NOTE — TELEPHONE ENCOUNTER
[facility] DENIED HAVING THIS PRESCRIPTION ALSO STATED THAT IT HAD BEEN DENIED BY THE PRACTICE, THE PATIENT ONLY HAS A FEW LEFT WILL NEED TO VERIFY WITH [facility] SO THE PATIENT WILL NOT RUN OUT OF THIS PRESCRIPTION.

## 2022-12-07 NOTE — TELEPHONE ENCOUNTER
Rx Refill Note  Requested Prescriptions     Signed Prescriptions Disp Refills   • venlafaxine XR (EFFEXOR-XR) 75 MG 24 hr capsule 90 capsule 1     Sig: Take 1 capsule by mouth Daily.     Authorizing Provider: KIRK CARRINGTON     Ordering User: MIKAEL UNDERWOOD     Refused Prescriptions Disp Refills   • venlafaxine XR (EFFEXOR-XR) 75 MG 24 hr capsule 90 capsule 1     Sig: Take 1 capsule by mouth Daily.     Refused By: MIKAEL UNDERWOOD     Reason for Refusal: Duplicate renewal request      Last office visit with prescribing clinician: 11/15/2022   Last telemedicine visit with prescribing clinician: 12/9/2022   Next office visit with prescribing clinician: 12/16/2022                         Would you like a call back once the refill request has been completed: [] Yes [] No    If the office needs to give you a call back, can they leave a voicemail: [] Yes [] No    Mikael Underwood MA  12/07/22, 15:37 EST

## 2022-12-10 LAB
ALBUMIN SERPL-MCNC: 4.2 G/DL (ref 3.5–5.2)
ALBUMIN/GLOB SERPL: 2 G/DL
ALP SERPL-CCNC: 53 U/L (ref 39–117)
ALT SERPL-CCNC: 33 U/L (ref 1–33)
AST SERPL-CCNC: 36 U/L (ref 1–32)
BASOPHILS # BLD AUTO: 0.04 10*3/MM3 (ref 0–0.2)
BASOPHILS NFR BLD AUTO: 0.8 % (ref 0–1.5)
BILIRUB SERPL-MCNC: 0.2 MG/DL (ref 0–1.2)
BUN SERPL-MCNC: 13 MG/DL (ref 8–23)
BUN/CREAT SERPL: 25.5 (ref 7–25)
CALCIUM SERPL-MCNC: 9.5 MG/DL (ref 8.6–10.5)
CHLORIDE SERPL-SCNC: 100 MMOL/L (ref 98–107)
CHOLEST SERPL-MCNC: 195 MG/DL (ref 0–200)
CHOLEST/HDLC SERPL: 3.36 {RATIO}
CO2 SERPL-SCNC: 29 MMOL/L (ref 22–29)
CREAT SERPL-MCNC: 0.51 MG/DL (ref 0.57–1)
EGFRCR SERPLBLD CKD-EPI 2021: 98.7 ML/MIN/1.73
EOSINOPHIL # BLD AUTO: 0.3 10*3/MM3 (ref 0–0.4)
EOSINOPHIL NFR BLD AUTO: 5.9 % (ref 0.3–6.2)
ERYTHROCYTE [DISTWIDTH] IN BLOOD BY AUTOMATED COUNT: 13.4 % (ref 12.3–15.4)
FERRITIN SERPL-MCNC: 15 NG/ML (ref 13–150)
GLOBULIN SER CALC-MCNC: 2.1 GM/DL
GLUCOSE SERPL-MCNC: 248 MG/DL (ref 65–99)
HBA1C MFR BLD: 8.3 % (ref 4.8–5.6)
HCT VFR BLD AUTO: 35.7 % (ref 34–46.6)
HDLC SERPL-MCNC: 58 MG/DL (ref 40–60)
HGB BLD-MCNC: 11.7 G/DL (ref 12–15.9)
IMM GRANULOCYTES # BLD AUTO: 0.01 10*3/MM3 (ref 0–0.05)
IMM GRANULOCYTES NFR BLD AUTO: 0.2 % (ref 0–0.5)
IRON SATN MFR SERPL: 8 % (ref 20–50)
IRON SERPL-MCNC: 45 MCG/DL (ref 37–145)
LDLC SERPL CALC-MCNC: 92 MG/DL (ref 0–100)
LYMPHOCYTES # BLD AUTO: 2.03 10*3/MM3 (ref 0.7–3.1)
LYMPHOCYTES NFR BLD AUTO: 40.1 % (ref 19.6–45.3)
MCH RBC QN AUTO: 27 PG (ref 26.6–33)
MCHC RBC AUTO-ENTMCNC: 32.8 G/DL (ref 31.5–35.7)
MCV RBC AUTO: 82.4 FL (ref 79–97)
MONOCYTES # BLD AUTO: 0.41 10*3/MM3 (ref 0.1–0.9)
MONOCYTES NFR BLD AUTO: 8.1 % (ref 5–12)
NEUTROPHILS # BLD AUTO: 2.27 10*3/MM3 (ref 1.7–7)
NEUTROPHILS NFR BLD AUTO: 44.9 % (ref 42.7–76)
NRBC BLD AUTO-RTO: 0 /100 WBC (ref 0–0.2)
PLATELET # BLD AUTO: 361 10*3/MM3 (ref 140–450)
POTASSIUM SERPL-SCNC: 4.8 MMOL/L (ref 3.5–5.2)
PROT SERPL-MCNC: 6.3 G/DL (ref 6–8.5)
RBC # BLD AUTO: 4.33 10*6/MM3 (ref 3.77–5.28)
SODIUM SERPL-SCNC: 136 MMOL/L (ref 136–145)
TIBC SERPL-MCNC: 539 MCG/DL
TRIGL SERPL-MCNC: 273 MG/DL (ref 0–150)
UIBC SERPL-MCNC: 494 MCG/DL (ref 112–346)
VLDLC SERPL CALC-MCNC: 45 MG/DL (ref 5–40)
WBC # BLD AUTO: 5.06 10*3/MM3 (ref 3.4–10.8)

## 2022-12-14 ENCOUNTER — OFFICE VISIT (OUTPATIENT)
Dept: ORTHOPEDIC SURGERY | Facility: CLINIC | Age: 72
End: 2022-12-14

## 2022-12-14 VITALS
DIASTOLIC BLOOD PRESSURE: 74 MMHG | BODY MASS INDEX: 30.12 KG/M2 | SYSTOLIC BLOOD PRESSURE: 134 MMHG | WEIGHT: 170 LBS | HEART RATE: 96 BPM | HEIGHT: 63 IN

## 2022-12-14 DIAGNOSIS — M70.62 GREATER TROCHANTERIC BURSITIS OF LEFT HIP: Primary | ICD-10-CM

## 2022-12-14 DIAGNOSIS — R52 PAIN: ICD-10-CM

## 2022-12-14 PROCEDURE — 73502 X-RAY EXAM HIP UNI 2-3 VIEWS: CPT | Performed by: NURSE PRACTITIONER

## 2022-12-14 PROCEDURE — 20610 DRAIN/INJ JOINT/BURSA W/O US: CPT | Performed by: NURSE PRACTITIONER

## 2022-12-14 PROCEDURE — 99214 OFFICE O/P EST MOD 30 MIN: CPT | Performed by: NURSE PRACTITIONER

## 2022-12-14 RX ADMIN — LIDOCAINE HYDROCHLORIDE 4 ML: 10 INJECTION, SOLUTION EPIDURAL; INFILTRATION; INTRACAUDAL; PERINEURAL at 11:38

## 2022-12-14 RX ADMIN — TRIAMCINOLONE ACETONIDE 80 MG: 40 INJECTION, SUSPENSION INTRA-ARTICULAR; INTRAMUSCULAR at 11:38

## 2022-12-14 NOTE — PROGRESS NOTES
Subjective:     Patient ID: Amber Campos is a 71 y.o. female.    Chief Complaint:  Left hip pain, new issue to examiner   History of Present Illness     Amber Campos presents to clinic for evaluation of her left hip. There is maximal tenderness present along the lateral aspect of the hip. She is not experiencing pain that is radiating into the groin nor down the entire lower extremity. She has experienced pain off and on for several years, but most recently onset of pain was over the last few weeks. The patient rates discomfort 4/10. She describes the pain as stabbing and aching in nature. There is pain with ascending and descending stairs and ambulatory activities. She does feel as if the lateral aspect of the hips is going to give way. She denies any recent x-ray, MRI, or CT. The patient is currently taking Aleve daily without any significant symptom relief. Otherwise, she is doing very well. She denies any other concerns present.       Social History     Occupational History   • Occupation: retired    Tobacco Use   • Smoking status: Never   • Smokeless tobacco: Never   Vaping Use   • Vaping Use: Never used   Substance and Sexual Activity   • Alcohol use: No   • Drug use: No   • Sexual activity: Defer      Past Medical History:   Diagnosis Date   • Basaloid carcinoma (HCC)     facial   • Gastroesophageal reflux disease 3/14/2016   • History of kidney infection    • History of mammogram    • Hypertension    • Osteopenia    • Osteoporosis    • Postmenopausal    • PVCs (premature ventricular contractions)    • Seasonal allergies    • Simple renal cyst 3/14/2016   • Steatosis of liver 3/14/2016   • Type 2 diabetes mellitus (HCC)      Past Surgical History:   Procedure Laterality Date   • BREAST BIOPSY Bilateral     Benign   • BUNIONECTOMY Right    • COLONOSCOPY N/A 04/25/2017    Procedure: COLONOSCOPY TO CECUM ;  Surgeon: Aidan Roca MD;  Location: [facility] ENDOSCOPY;  Service:    • EXOSTECTOMY Left     HEEL   •  "FOOT FUSION Right     9 screws and plate   • KNEE ARTHROSCOPY W/ MENISCAL REPAIR Right    • ORIF WRIST FRACTURE Right 12/01/2021    Procedure: WRIST OPEN REDUCTION INTERNAL FIXATION;  Surgeon: Salazar Amezcua MD;  Location: [facility];  Service: Orthopedics;  Laterality: Right;   • PAP SMEAR     • TONSILLECTOMY     • TOTAL SHOULDER REVERSE ARTHROPLASTY Right    • UMBILICAL HERNIA REPAIR         Family History   Problem Relation Age of Onset   • Other Mother         brain death   • Emphysema Mother    • Alcohol abuse Father    • Glaucoma Father    • Other Sister         fatty liver   • Heart disease Sister         valve problem, being followed (no surgery required)   • Arthritis Sister    • Other Brother         fatty liver   • Glaucoma Maternal Grandmother    • Heart disease Maternal Grandmother    • Alcohol abuse Maternal Aunt    • Diabetes Maternal Aunt    • Breast cancer Paternal Aunt                Objective:  Physical Exam    Vital signs reviewed.   General: No acute distress.  Eyes: conjunctiva clear; pupils equally round and reactive  ENT: external ears and nose atraumatic; oropharynx clear  CV: no peripheral edema  Resp: normal respiratory effort  Skin: no rashes or wounds; normal turgor  Psych: mood and affect appropriate; recent and remote memory intact    Vitals:    12/14/22 1008   BP: 134/74   Pulse: 96   Weight: 77.1 kg (170 lb)   Height: 160 cm (63\")         12/14/22  1008   Weight: 77.1 kg (170 lb)     Body mass index is 30.11 kg/m².      Left Hip Exam     Tenderness   The patient is experiencing tenderness in the greater trochanter.    Range of Motion   Abduction: 45   Adduction: 25   Extension: 0   Flexion: 110     Tests   SHRAVAN: negative  Raheem: positive  Fadir:  Negative FADIR test    Other   Erythema: absent  Sensation: normal  Pulse: present    Comments:  Negative logroll exam  Negative sensory exam                 Imaging:  Left Hip X-Ray  Indication: Pain  AP and Frog Leg " views    Findings:  No fracture  No bony lesion  Normal soft tissues  Mild hip arthropathy, new significant advanced arthritis left hip    No prior studies were available for comparison.    Assessment:        1. Pain    2. Greater trochanteric bursitis of left hip           Plan:  Large Joint Arthrocentesis: L greater trochanteric bursa  Date/Time: 12/14/2022 11:38 AM  Consent given by: patient  Site marked: site marked  Timeout: Immediately prior to procedure a time out was called to verify the correct patient, procedure, equipment, support staff and site/side marked as required   Supporting Documentation  Indications: pain   Procedure Details  Location: hip - L greater trochanteric bursa  Preparation: Patient was prepped and draped in the usual sterile fashion  Needle size: 22 G  Approach: lateral  Medications administered: 4 mL lidocaine PF 1% 1 %; 80 mg triamcinolone acetonide 40 MG/ML  Patient tolerance: patient tolerated the procedure well with no immediate complications          1. Discussed plan of care with patient. Reviewed x-ray images with patient and also discussed treatment options including corticosteroid injection of the greater trochanter for treatment of greater trochanteric bursitis, which she does wish to proceed with. Discussed application of ice on injection site this evening.    2. She also verbalized some concerns about some swelling at the anterior proximal thigh and femur. Discussed soft tissue massage as needed. We will plan to see her back in clinic with any further concerns or if symptoms fail to improve. All questions answered.    Orders:  Orders Placed This Encounter   Procedures   • Large Joint Arthrocentesis: L greater trochanteric bursa   • XR Hip With or Without Pelvis 2 - 3 View Left     No orders of the defined types were placed in this encounter.          I ordered and reviewed the YOSELIN today.       Transcribed from ambient dictation for JAKE Rivera by Latanya  Deejay.  12/14/22   19:27 EST    Patient or patient representative verbalized consent to the visit recording.  I have personally performed the services described in this document as transcribed by the above individual, and it is both accurate and complete.

## 2022-12-15 ENCOUNTER — DOCUMENTATION (OUTPATIENT)
Dept: INTERNAL MEDICINE | Facility: CLINIC | Age: 72
End: 2022-12-15

## 2022-12-15 DIAGNOSIS — M80.031D PATHOLOGICAL FRACTURE OF RIGHT RADIUS DUE TO AGE-RELATED OSTEOPOROSIS WITH ROUTINE HEALING, SUBSEQUENT ENCOUNTER: Primary | ICD-10-CM

## 2022-12-15 PROBLEM — M70.62 GREATER TROCHANTERIC BURSITIS OF LEFT HIP: Status: ACTIVE | Noted: 2019-04-17

## 2022-12-15 RX ORDER — TRIAMCINOLONE ACETONIDE 40 MG/ML
80 INJECTION, SUSPENSION INTRA-ARTICULAR; INTRAMUSCULAR
Status: COMPLETED | OUTPATIENT
Start: 2022-12-14 | End: 2022-12-14

## 2022-12-15 RX ORDER — LIDOCAINE HYDROCHLORIDE 10 MG/ML
4 INJECTION, SOLUTION EPIDURAL; INFILTRATION; INTRACAUDAL; PERINEURAL
Status: COMPLETED | OUTPATIENT
Start: 2022-12-14 | End: 2022-12-14

## 2022-12-16 ENCOUNTER — OFFICE VISIT (OUTPATIENT)
Dept: INTERNAL MEDICINE | Facility: CLINIC | Age: 72
End: 2022-12-16

## 2022-12-16 ENCOUNTER — HOSPITAL ENCOUNTER (OUTPATIENT)
Dept: INFUSION THERAPY | Facility: HOSPITAL | Age: 72
Setting detail: INFUSION SERIES
Discharge: HOME | End: 2022-12-16

## 2022-12-16 VITALS
RESPIRATION RATE: 16 BRPM | HEART RATE: 85 BPM | SYSTOLIC BLOOD PRESSURE: 145 MMHG | WEIGHT: 173 LBS | TEMPERATURE: 97.6 F | BODY MASS INDEX: 30.65 KG/M2 | DIASTOLIC BLOOD PRESSURE: 73 MMHG | OXYGEN SATURATION: 97 % | HEIGHT: 63 IN

## 2022-12-16 VITALS
OXYGEN SATURATION: 97 % | SYSTOLIC BLOOD PRESSURE: 136 MMHG | WEIGHT: 177.2 LBS | HEART RATE: 67 BPM | DIASTOLIC BLOOD PRESSURE: 64 MMHG | HEIGHT: 63 IN | TEMPERATURE: 98.2 F | BODY MASS INDEX: 31.4 KG/M2

## 2022-12-16 DIAGNOSIS — M81.0 AGE-RELATED OSTEOPOROSIS WITHOUT CURRENT PATHOLOGICAL FRACTURE: Primary | ICD-10-CM

## 2022-12-16 DIAGNOSIS — M80.031D PATHOLOGICAL FRACTURE OF RIGHT RADIUS DUE TO AGE-RELATED OSTEOPOROSIS WITH ROUTINE HEALING, SUBSEQUENT ENCOUNTER: Primary | ICD-10-CM

## 2022-12-16 DIAGNOSIS — E11.65 UNCONTROLLED TYPE 2 DIABETES MELLITUS WITH HYPERGLYCEMIA: ICD-10-CM

## 2022-12-16 DIAGNOSIS — F41.8 DEPRESSION WITH ANXIETY: ICD-10-CM

## 2022-12-16 DIAGNOSIS — I10 ESSENTIAL HYPERTENSION: ICD-10-CM

## 2022-12-16 DIAGNOSIS — E78.2 MIXED HYPERLIPIDEMIA: ICD-10-CM

## 2022-12-16 DIAGNOSIS — E61.1 LOW SERUM IRON: ICD-10-CM

## 2022-12-16 DIAGNOSIS — Z87.440 HISTORY OF UTI: ICD-10-CM

## 2022-12-16 DIAGNOSIS — K21.00 GASTROESOPHAGEAL REFLUX DISEASE WITH ESOPHAGITIS WITHOUT HEMORRHAGE: ICD-10-CM

## 2022-12-16 DIAGNOSIS — M81.0 AGE-RELATED OSTEOPOROSIS WITHOUT CURRENT PATHOLOGICAL FRACTURE: ICD-10-CM

## 2022-12-16 LAB
25(OH)D3 SERPL-MCNC: 40 NG/ML (ref 30–100)
BILIRUB BLD-MCNC: ABNORMAL MG/DL
CLARITY, POC: CLEAR
COLOR UR: YELLOW
EXPIRATION DATE: ABNORMAL
GLUCOSE UR STRIP-MCNC: NEGATIVE MG/DL
KETONES UR QL: ABNORMAL
LEUKOCYTE EST, POC: NEGATIVE
Lab: ABNORMAL
MAGNESIUM SERPL-MCNC: 1.8 MG/DL (ref 1.6–2.4)
NITRITE UR-MCNC: NEGATIVE MG/ML
PH UR: 5.5 [PH] (ref 5–8)
PHOSPHATE SERPL-MCNC: 3.6 MG/DL (ref 2.5–4.5)
PROT UR STRIP-MCNC: ABNORMAL MG/DL
RBC # UR STRIP: NEGATIVE /UL
SP GR UR: 1.02 (ref 1–1.03)
UROBILINOGEN UR QL: NORMAL

## 2022-12-16 PROCEDURE — 83735 ASSAY OF MAGNESIUM: CPT | Performed by: FAMILY MEDICINE

## 2022-12-16 PROCEDURE — 25010000002 DENOSUMAB 60 MG/ML SOLUTION PREFILLED SYRINGE: Performed by: FAMILY MEDICINE

## 2022-12-16 PROCEDURE — 81003 URINALYSIS AUTO W/O SCOPE: CPT | Performed by: NURSE PRACTITIONER

## 2022-12-16 PROCEDURE — 36415 COLL VENOUS BLD VENIPUNCTURE: CPT

## 2022-12-16 PROCEDURE — 84100 ASSAY OF PHOSPHORUS: CPT | Performed by: FAMILY MEDICINE

## 2022-12-16 PROCEDURE — 96372 THER/PROPH/DIAG INJ SC/IM: CPT

## 2022-12-16 PROCEDURE — 99214 OFFICE O/P EST MOD 30 MIN: CPT | Performed by: NURSE PRACTITIONER

## 2022-12-16 PROCEDURE — 82306 VITAMIN D 25 HYDROXY: CPT | Performed by: FAMILY MEDICINE

## 2022-12-16 RX ORDER — FLUOCINONIDE TOPICAL SOLUTION USP, 0.05% 0.5 MG/ML
SOLUTION TOPICAL
COMMUNITY
Start: 2022-12-08 | End: 2022-12-16

## 2022-12-16 RX ORDER — SEMAGLUTIDE 1.34 MG/ML
INJECTION, SOLUTION SUBCUTANEOUS
COMMUNITY
End: 2022-12-16 | Stop reason: SDUPTHER

## 2022-12-16 RX ORDER — PANTOPRAZOLE SODIUM 20 MG/1
TABLET, DELAYED RELEASE ORAL
COMMUNITY
End: 2022-12-16

## 2022-12-16 RX ADMIN — DENOSUMAB 60 MG: 60 INJECTION SUBCUTANEOUS at 09:09

## 2022-12-16 NOTE — NURSING NOTE
NURSE PROGRESS NOTE    PT. ARRIVED @ [facility] FOR SCHEDULED INJECTION AT 0830 .  INJECTION WAS PERFORMED WITHOUT INCIDENT.  PT. DISCHARGED TO HOME AT 0916.

## 2022-12-16 NOTE — PATIENT INSTRUCTIONS
"  Call [facility] at [phone] if you have any problems or concerns.    We know you have a Choice in healthcare and appreciate you using [facility].  Our purpose is to provide you \"Excellent Care\".  We hope that you will always choose us in the future and continue to recommend us to your family and friends.             "

## 2023-01-31 ENCOUNTER — TELEPHONE (OUTPATIENT)
Dept: INTERNAL MEDICINE | Facility: CLINIC | Age: 73
End: 2023-01-31

## 2023-01-31 NOTE — TELEPHONE ENCOUNTER
Caller: Amber Campos    Relationship: Self    Best call back number: [phone]    Who are you requesting to speak with (clinical staff, provider,  specific staff member):KIRK CARRINGTON     What was the call regarding: PATIENT HAS BEEN HAVING LEFT SIDE PAIN FOR ABOUT A WEEK, PATIENT IS ALSO NEEDING HER DEXA SCAN ORDERS PUT IT. PATIENT BELIEVES SHE MAY HAVE A YEAST INFECTION ON TOP OF HER LEFT SIDE PAIN.    Do you require a callback: YES

## 2023-02-01 ENCOUNTER — OFFICE VISIT (OUTPATIENT)
Dept: INTERNAL MEDICINE | Facility: CLINIC | Age: 73
End: 2023-02-01
Payer: MEDICARE

## 2023-02-01 VITALS
WEIGHT: 177.4 LBS | BODY MASS INDEX: 31.43 KG/M2 | DIASTOLIC BLOOD PRESSURE: 80 MMHG | OXYGEN SATURATION: 97 % | HEART RATE: 99 BPM | HEIGHT: 63 IN | SYSTOLIC BLOOD PRESSURE: 120 MMHG | TEMPERATURE: 97.3 F

## 2023-02-01 DIAGNOSIS — B37.0 CANDIDA, ORAL: ICD-10-CM

## 2023-02-01 DIAGNOSIS — R39.9 UTI SYMPTOMS: Primary | ICD-10-CM

## 2023-02-01 DIAGNOSIS — K59.03 DRUG-INDUCED CONSTIPATION: ICD-10-CM

## 2023-02-01 LAB
BILIRUB BLD-MCNC: ABNORMAL MG/DL
CLARITY, POC: CLEAR
COLOR UR: YELLOW
EXPIRATION DATE: ABNORMAL
GLUCOSE UR STRIP-MCNC: NEGATIVE MG/DL
KETONES UR QL: NEGATIVE
LEUKOCYTE EST, POC: NEGATIVE
Lab: ABNORMAL
NITRITE UR-MCNC: NEGATIVE MG/ML
PH UR: 5.5 [PH] (ref 5–8)
PROT UR STRIP-MCNC: ABNORMAL MG/DL
RBC # UR STRIP: NEGATIVE /UL
SP GR UR: 1.02 (ref 1–1.03)
UROBILINOGEN UR QL: ABNORMAL

## 2023-02-01 PROCEDURE — 99214 OFFICE O/P EST MOD 30 MIN: CPT | Performed by: NURSE PRACTITIONER

## 2023-02-01 PROCEDURE — 81003 URINALYSIS AUTO W/O SCOPE: CPT | Performed by: NURSE PRACTITIONER

## 2023-02-01 NOTE — PROGRESS NOTES
Chief Complaint   Patient presents with   • Urinary Tract Infection     Side pain on left..and lower back. Frequency of urine.   • Oral Swelling     Tongue has coating       Subjective     Amber Campos is a 72 y.o. female being seen for casey cute visit for possible UTI. Constipation, and tongue problem.    She is complaining of chronic constipation from ozempic 1mg. She has increased fiber (beans) , tried flax seed, magnesium, and a stool softner.     She is having a coating on her tongue. Non painful and white in color. She is taking a probiotic for yeast Y2 cleanse.         History of Present Illness     Allergies   Allergen Reactions   • Iodinated Contrast Media Photosensitivity     contrast   • Adhesive Tape Other (See Comments)     EKG stickers only   • Farxiga [Dapagliflozin] Diarrhea and Itching     Yeast infeciotn   • Neomycin-Bacitracin Zn-Polymyx Rash   • Toprol Xl [Metoprolol] Dizziness   • Trulicity [Dulaglutide] GI Intolerance     Constipation   • Victoza [Liraglutide] Nausea Only         Current Outpatient Medications:   •  amlodipine-olmesartan (NIC) 5-40 MG per tablet, TAKE 1 TABLET DAILY, Disp: 90 tablet, Rfl: 3  •  glucose blood (FREESTYLE LITE) test strip, USE TO CHECK BLOOD SUGAR 3 TIMES A DAY, Disp: 300 each, Rfl: 3  •  metFORMIN (GLUCOPHAGE) 1000 MG tablet, TAKE 1 TABLET DAILY WITH BREAKFAST (Patient taking differently: 1,000 mg 2 (Two) Times a Day With Meals.), Disp: 90 tablet, Rfl: 3  •  Ozempic, 1 MG/DOSE, 4 MG/3ML solution pen-injector, Inject 1 mg under the skin into the appropriate area as directed 1 (One) Time Per Week., Disp: 9 mL, Rfl: 1  •  pantoprazole (PROTONIX) 40 MG EC tablet, TAKE 1 TABLET DAILY, Disp: 90 tablet, Rfl: 3  •  venlafaxine XR (EFFEXOR-XR) 75 MG 24 hr capsule, Take 1 capsule by mouth Daily., Disp: 90 capsule, Rfl: 1  •  budesonide-formoterol (Symbicort) 160-4.5 MCG/ACT inhaler, Inhale 2 puffs 2 (Two) Times a Day. (Patient taking differently: Inhale 2 puffs 2 (Two)  Times a Day As Needed.), Disp: 1 each, Rfl: 6  •  Cetirizine HCl (ZyrTE Childrens Allergy) 5 MG/5ML solution solution, Take 5 mL by mouth Daily., Disp: 150 mL, Rfl:   •  Continuous Blood Gluc  (Dexcom G5  Kit) device, 1 each 4 (Four) Times a Day., Disp: 1 each, Rfl: 0  •  Continuous Blood Gluc Sensor (Dexcom G6 Sensor), Every 10 (Ten) Days., Disp: 3 each, Rfl: 3  •  Continuous Blood Gluc Transmit (Dexcom G6 Transmitter) misc, 1 each 4 (Four) Times a Day., Disp: 1 each, Rfl: 0  •  Insulin Pen Needle (PEN NEEDLES) 32G X 6 MM misc, 1 each Daily With Breakfast., Disp: 90 each, Rfl: 3  •  Lancets (FREESTYLE) lancets, , Disp: , Rfl:   •  Spacer/Aero Chamber Mouthpiece misc, 1 each Daily., Disp: 1 each, Rfl: 0    The following portions of the patient's history were reviewed and updated as appropriate: allergies, current medications, past family history, past medical history, past social history, past surgical history and problem list.    Review of Systems   Constitutional: Positive for unexpected weight change.   HENT: Negative for congestion and dental problem.    Eyes: Negative.    Respiratory: Negative.  Negative for cough and shortness of breath.    Cardiovascular: Negative for chest pain, palpitations and leg swelling.   Gastrointestinal: Negative.    Endocrine: Negative.    Genitourinary: Positive for frequency and urgency. Negative for decreased urine volume, difficulty urinating, dyspareunia, dysuria, enuresis, genital sores, hematuria and menstrual problem.   Musculoskeletal: Negative.  Negative for arthralgias, back pain and gait problem.   Skin: Negative.    Allergic/Immunologic: Negative.  Negative for environmental allergies and food allergies.   Neurological: Negative.  Negative for dizziness and facial asymmetry.   Hematological: Negative.  Negative for adenopathy.   Psychiatric/Behavioral: Negative.  Negative for agitation and dysphoric mood.   All other systems reviewed and are  negative.      Assessment     Physical Exam  Vitals reviewed.   Constitutional:       Appearance: Normal appearance. She is not ill-appearing.   HENT:      Head: Normocephalic.   Cardiovascular:      Rate and Rhythm: Normal rate and regular rhythm.      Pulses: Normal pulses.      Heart sounds: Normal heart sounds. No murmur heard.  Pulmonary:      Effort: Pulmonary effort is normal.      Breath sounds: Normal breath sounds.   Abdominal:      General: Abdomen is flat.      Palpations: Abdomen is soft.   Skin:     General: Skin is warm and dry.      Capillary Refill: Capillary refill takes less than 2 seconds.   Neurological:      Mental Status: She is alert and oriented to person, place, and time.   Psychiatric:         Mood and Affect: Mood normal.         Behavior: Behavior normal.         Thought Content: Thought content normal.         Plan         Diagnoses and all orders for this visit:    1. UTI symptoms (Primary)  Comments:  Urine clear in office  Orders:  -     POC Urinalysis Dipstick, Automated  -     Urine Culture - Urine, Urine, Clean Catch; Future  -     Urine Culture - Urine, Urine, Clean Catch    2. Drug-induced constipation  Comments:  Trial of linzess. Hold fiber supplements due to stool bulking.   Orders:  -     linaclotide (LINZESS) 72 MCG capsule capsule; Take 1 capsule by mouth Every Morning Before Breakfast.  Dispense: 30 capsule; Refill: 1    3. Candida, oral  -     nystatin (MYCOSTATIN) 100,000 unit/mL suspension; Swish and swallow 5 mL 4 (Four) Times a Day.  Dispense: 473 mL; Refill: 0        Follow up as scheduled

## 2023-02-03 LAB
BACTERIA UR CULT: NORMAL
BACTERIA UR CULT: NORMAL

## 2023-02-08 ENCOUNTER — TELEPHONE (OUTPATIENT)
Dept: INTERNAL MEDICINE | Facility: CLINIC | Age: 73
End: 2023-02-08
Payer: MEDICARE

## 2023-02-08 NOTE — TELEPHONE ENCOUNTER
Patient reports that the swish mouth rinse isn't helping her coated tongue symptom. Please advise. TY

## 2023-02-13 DIAGNOSIS — E11.65 UNCONTROLLED TYPE 2 DIABETES MELLITUS WITH HYPERGLYCEMIA: ICD-10-CM

## 2023-02-13 RX ORDER — SEMAGLUTIDE 1.34 MG/ML
INJECTION, SOLUTION SUBCUTANEOUS
Qty: 9 ML | Refills: 3 | Status: SHIPPED | OUTPATIENT
Start: 2023-02-13

## 2023-03-01 DIAGNOSIS — E61.1 LOW SERUM IRON: ICD-10-CM

## 2023-03-01 DIAGNOSIS — I65.22 LEFT CAROTID STENOSIS: ICD-10-CM

## 2023-03-01 DIAGNOSIS — E11.65 UNCONTROLLED TYPE 2 DIABETES MELLITUS WITH HYPERGLYCEMIA: ICD-10-CM

## 2023-03-01 DIAGNOSIS — I10 ESSENTIAL HYPERTENSION: ICD-10-CM

## 2023-03-01 DIAGNOSIS — E78.2 MIXED HYPERLIPIDEMIA: ICD-10-CM

## 2023-03-01 DIAGNOSIS — K21.00 GASTROESOPHAGEAL REFLUX DISEASE WITH ESOPHAGITIS WITHOUT HEMORRHAGE: ICD-10-CM

## 2023-03-11 LAB
ALBUMIN SERPL-MCNC: 4.4 G/DL (ref 3.5–5.2)
ALBUMIN/GLOB SERPL: 1.9 G/DL
ALP SERPL-CCNC: 55 U/L (ref 39–117)
ALT SERPL-CCNC: 28 U/L (ref 1–33)
AST SERPL-CCNC: 32 U/L (ref 1–32)
BASOPHILS # BLD AUTO: 0.03 10*3/MM3 (ref 0–0.2)
BASOPHILS NFR BLD AUTO: 0.6 % (ref 0–1.5)
BILIRUB SERPL-MCNC: 0.3 MG/DL (ref 0–1.2)
BUN SERPL-MCNC: 19 MG/DL (ref 8–23)
BUN/CREAT SERPL: 29.7 (ref 7–25)
CALCIUM SERPL-MCNC: 9.3 MG/DL (ref 8.6–10.5)
CHLORIDE SERPL-SCNC: 98 MMOL/L (ref 98–107)
CHOLEST SERPL-MCNC: 215 MG/DL (ref 0–200)
CHOLEST/HDLC SERPL: 2.83 {RATIO}
CO2 SERPL-SCNC: 26.7 MMOL/L (ref 22–29)
CREAT SERPL-MCNC: 0.64 MG/DL (ref 0.57–1)
EGFRCR SERPLBLD CKD-EPI 2021: 92.9 ML/MIN/1.73
EOSINOPHIL # BLD AUTO: 0.2 10*3/MM3 (ref 0–0.4)
EOSINOPHIL NFR BLD AUTO: 4.2 % (ref 0.3–6.2)
ERYTHROCYTE [DISTWIDTH] IN BLOOD BY AUTOMATED COUNT: 13.5 % (ref 12.3–15.4)
FERRITIN SERPL-MCNC: 13.1 NG/ML (ref 13–150)
GLOBULIN SER CALC-MCNC: 2.3 GM/DL
GLUCOSE SERPL-MCNC: 200 MG/DL (ref 65–99)
HBA1C MFR BLD: 8 % (ref 4.8–5.6)
HCT VFR BLD AUTO: 34.7 % (ref 34–46.6)
HDLC SERPL-MCNC: 76 MG/DL (ref 40–60)
HGB BLD-MCNC: 11.6 G/DL (ref 12–15.9)
IMM GRANULOCYTES # BLD AUTO: 0.01 10*3/MM3 (ref 0–0.05)
IMM GRANULOCYTES NFR BLD AUTO: 0.2 % (ref 0–0.5)
IRON SATN MFR SERPL: 13 % (ref 20–50)
IRON SERPL-MCNC: 70 MCG/DL (ref 37–145)
LDLC SERPL CALC-MCNC: 109 MG/DL (ref 0–100)
LYMPHOCYTES # BLD AUTO: 2.14 10*3/MM3 (ref 0.7–3.1)
LYMPHOCYTES NFR BLD AUTO: 44.5 % (ref 19.6–45.3)
MCH RBC QN AUTO: 27.9 PG (ref 26.6–33)
MCHC RBC AUTO-ENTMCNC: 33.4 G/DL (ref 31.5–35.7)
MCV RBC AUTO: 83.4 FL (ref 79–97)
MONOCYTES # BLD AUTO: 0.4 10*3/MM3 (ref 0.1–0.9)
MONOCYTES NFR BLD AUTO: 8.3 % (ref 5–12)
NEUTROPHILS # BLD AUTO: 2.03 10*3/MM3 (ref 1.7–7)
NEUTROPHILS NFR BLD AUTO: 42.2 % (ref 42.7–76)
NRBC BLD AUTO-RTO: 0 /100 WBC (ref 0–0.2)
PLATELET # BLD AUTO: 348 10*3/MM3 (ref 140–450)
POTASSIUM SERPL-SCNC: 4.4 MMOL/L (ref 3.5–5.2)
PROT SERPL-MCNC: 6.7 G/DL (ref 6–8.5)
RBC # BLD AUTO: 4.16 10*6/MM3 (ref 3.77–5.28)
SODIUM SERPL-SCNC: 134 MMOL/L (ref 136–145)
TIBC SERPL-MCNC: 523 MCG/DL
TRIGL SERPL-MCNC: 173 MG/DL (ref 0–150)
UIBC SERPL-MCNC: 453 MCG/DL (ref 112–346)
VLDLC SERPL CALC-MCNC: 30 MG/DL (ref 5–40)
WBC # BLD AUTO: 4.81 10*3/MM3 (ref 3.4–10.8)

## 2023-03-15 ENCOUNTER — OFFICE VISIT (OUTPATIENT)
Dept: INTERNAL MEDICINE | Facility: CLINIC | Age: 73
End: 2023-03-15
Payer: MEDICARE

## 2023-03-15 VITALS
DIASTOLIC BLOOD PRESSURE: 72 MMHG | HEIGHT: 63 IN | HEART RATE: 102 BPM | WEIGHT: 181 LBS | BODY MASS INDEX: 32.07 KG/M2 | OXYGEN SATURATION: 96 % | TEMPERATURE: 97.5 F | SYSTOLIC BLOOD PRESSURE: 142 MMHG

## 2023-03-15 DIAGNOSIS — M81.0 AGE-RELATED OSTEOPOROSIS WITHOUT CURRENT PATHOLOGICAL FRACTURE: ICD-10-CM

## 2023-03-15 DIAGNOSIS — Z00.00 ENCOUNTER FOR SUBSEQUENT ANNUAL WELLNESS VISIT (AWV) IN MEDICARE PATIENT: Primary | ICD-10-CM

## 2023-03-15 DIAGNOSIS — I10 ESSENTIAL HYPERTENSION: ICD-10-CM

## 2023-03-15 DIAGNOSIS — E11.65 UNCONTROLLED TYPE 2 DIABETES MELLITUS WITH HYPERGLYCEMIA: ICD-10-CM

## 2023-03-15 DIAGNOSIS — E78.2 MIXED HYPERLIPIDEMIA: ICD-10-CM

## 2023-03-15 DIAGNOSIS — R30.0 DYSURIA: ICD-10-CM

## 2023-03-15 LAB
BILIRUB BLD-MCNC: ABNORMAL MG/DL
CLARITY, POC: CLEAR
COLOR UR: YELLOW
EXPIRATION DATE: ABNORMAL
GLUCOSE UR STRIP-MCNC: ABNORMAL MG/DL
KETONES UR QL: ABNORMAL
LEUKOCYTE EST, POC: NEGATIVE
Lab: ABNORMAL
NITRITE UR-MCNC: NEGATIVE MG/ML
PH UR: 5 [PH] (ref 5–8)
PROT UR STRIP-MCNC: ABNORMAL MG/DL
RBC # UR STRIP: NEGATIVE /UL
SP GR UR: 1.02 (ref 1–1.03)
UROBILINOGEN UR QL: NORMAL

## 2023-03-15 PROCEDURE — 3078F DIAST BP <80 MM HG: CPT | Performed by: NURSE PRACTITIONER

## 2023-03-15 PROCEDURE — 81003 URINALYSIS AUTO W/O SCOPE: CPT | Performed by: NURSE PRACTITIONER

## 2023-03-15 PROCEDURE — 99214 OFFICE O/P EST MOD 30 MIN: CPT | Performed by: NURSE PRACTITIONER

## 2023-03-15 PROCEDURE — 3077F SYST BP >= 140 MM HG: CPT | Performed by: NURSE PRACTITIONER

## 2023-03-15 PROCEDURE — 3052F HG A1C>EQUAL 8.0%<EQUAL 9.0%: CPT | Performed by: NURSE PRACTITIONER

## 2023-03-15 PROCEDURE — G0439 PPPS, SUBSEQ VISIT: HCPCS | Performed by: NURSE PRACTITIONER

## 2023-03-15 RX ORDER — FLASH GLUCOSE SCANNING READER
1 EACH MISCELLANEOUS 4 TIMES DAILY
Qty: 1 EACH | Refills: 3 | Status: SHIPPED | OUTPATIENT
Start: 2023-03-15

## 2023-03-15 RX ORDER — FLASH GLUCOSE SENSOR
KIT MISCELLANEOUS
Qty: 3 EACH | Refills: 3 | Status: SHIPPED | OUTPATIENT
Start: 2023-03-15

## 2023-03-15 RX ORDER — PREDNISOLONE ACETATE 10 MG/ML
SUSPENSION/ DROPS OPHTHALMIC
COMMUNITY
Start: 2023-02-13

## 2023-03-15 NOTE — PROGRESS NOTES
The ABCs of the Annual Wellness Visit  Subsequent Medicare Wellness Visit    Subjective    Amber Campos is a 72 y.o. female who presents for a Subsequent Medicare Wellness Visit.    The following portions of the patient's history were reviewed and   updated as appropriate: allergies, current medications, past family history, past medical history, past social history, past surgical history and problem list.    Compared to one year ago, the patient feels her physical   health is the same.    Compared to one year ago, the patient feels her mental   health is the same.    Recent Hospitalizations:  She was not admitted to the hospital during the last year.       Current Medical Providers:  Patient Care Team:  Jenyn Sun APRN as PCP - General  Jenny Sun APRN as PCP - Family Medicine  Zaida Barragan MD as Consulting Physician (Obstetrics and Gynecology)  Naomie Osman APRN as Nurse Practitioner (Orthopedic Surgery)  Zhane Saldivar MD as Consulting Physician (Ophthalmology)    Outpatient Medications Prior to Visit   Medication Sig Dispense Refill   • amlodipine-olmesartan (NIC) 5-40 MG per tablet TAKE 1 TABLET DAILY 90 tablet 3   • budesonide-formoterol (Symbicort) 160-4.5 MCG/ACT inhaler Inhale 2 puffs 2 (Two) Times a Day. (Patient taking differently: Inhale 2 puffs 2 (Two) Times a Day As Needed.) 1 each 6   • Cetirizine HCl (ZyrTEC Childrens Allergy) 5 MG/5ML solution solution Take 5 mL by mouth Daily. 150 mL    • Continuous Blood Gluc  (Dexcom G5  Kit) device 1 each 4 (Four) Times a Day. 1 each 0   • Continuous Blood Gluc Sensor (Dexcom G6 Sensor) Every 10 (Ten) Days. 3 each 3   • Continuous Blood Gluc Transmit (Dexcom G6 Transmitter) misc 1 each 4 (Four) Times a Day. 1 each 0   • glucose blood (FREESTYLE LITE) test strip USE TO CHECK BLOOD SUGAR 3 TIMES A  each 3   • Insulin Pen Needle (PEN NEEDLES) 32G X 6 MM misc 1 each Daily With Breakfast. 90 each 3   • Lancets (FREESTYLE)  lancets      • linaclotide (LINZESS) 72 MCG capsule capsule Take 1 capsule by mouth Every Morning Before Breakfast. 30 capsule 1   • metFORMIN (GLUCOPHAGE) 1000 MG tablet TAKE 1 TABLET DAILY WITH BREAKFAST (Patient taking differently: 1 tablet 2 (Two) Times a Day With Meals.) 90 tablet 3   • nystatin (MYCOSTATIN) 100,000 unit/mL suspension Swish and swallow 5 mL 4 (Four) Times a Day. 473 mL 0   • Ozempic, 1 MG/DOSE, 4 MG/3ML solution pen-injector INJECT 1 MG UNDER THE SKIN INTO THE APPROPRIATE AREA ONCE WEEKLY AS DIRECTED 9 mL 3   • pantoprazole (PROTONIX) 40 MG EC tablet TAKE 1 TABLET DAILY 90 tablet 3   • prednisoLONE acetate (PRED FORTE) 1 % ophthalmic suspension INSTILL 1 DROP IN RIGHT EYE EVERY 2 HOURS     • Spacer/Aero Chamber Mouthpiece misc 1 each Daily. 1 each 0   • venlafaxine XR (EFFEXOR-XR) 75 MG 24 hr capsule Take 1 capsule by mouth Daily. 90 capsule 1     No facility-administered medications prior to visit.       No opioid medication identified on active medication list. I have reviewed chart for other potential  high risk medication/s and harmful drug interactions in the elderly.          Aspirin is not on active medication list.  Aspirin use is not indicated based on review of current medical condition/s. Risk of harm outweighs potential benefits.  .    Patient Active Problem List   Diagnosis   • Reactive airway disease   • Kidney cysts   • Depression with anxiety   • Menopausal symptom   • Hyperlipidemia   • Hematuria   • Steatosis of liver   • Gastroesophageal reflux disease   • Dysuria   • Ventricular premature beats   • Uncontrolled type 2 diabetes mellitus with hyperglycemia (HCC)   • Chronic UTI   • Age-related osteoporosis without current pathological fracture   • Screening for blood or protein in urine   • Essential hypertension   • Status post replacement of left shoulder joint   • Urinary frequency   • Chronic fatigue   • Environmental allergies   • Vitamin D deficiency   • Left shoulder  "tendonitis   • Greater trochanteric bursitis of left hip   • Drug-induced constipation   • Candida, oral   • Diabetes mellitus due to underlying condition, uncontrolled, with hyperglycemia (HCC)   • Seborrheic keratoses   • IC (interstitial cystitis)   • Low serum iron   • Generalized osteoarthritis   • Pelvic pain in female   • Memory deficit   • Pathological fracture of right radius due to age-related osteoporosis   • Chronic bilateral low back pain without sciatica   • History of UTI   • Chronic bilateral thoracic back pain   • Vertigo   • Chronic left hip pain   • Left carotid stenosis   • Upper respiratory tract infection due to COVID-19 virus   • Atrophic vaginitis   • Alcoholism in family member   • UTI symptoms     Advance Care Planning  Advance Directive is not on file.  ACP discussion was held with the patient during this visit. Patient has an advance directive (not in EMR), copy requested.     Objective    Vitals:    03/15/23 1326   BP: 142/72   BP Location: Left arm   Patient Position: Sitting   Cuff Size: Adult   Pulse: 102   Temp: 97.5 °F (36.4 °C)   SpO2: 96%   Weight: 82.1 kg (181 lb)   Height: 160 cm (63\")     Estimated body mass index is 32.06 kg/m² as calculated from the following:    Height as of this encounter: 160 cm (63\").    Weight as of this encounter: 82.1 kg (181 lb).    BMI is >= 30 and <35. (Class 1 Obesity). The following options were offered after discussion;: exercise counseling/recommendations, nutrition counseling/recommendations and pharmacological intervention options      Does the patient have evidence of cognitive impairment? No    Lab Results   Component Value Date    CHLPL 215 (H) 03/10/2023    TRIG 173 (H) 03/10/2023    HDL 76 (H) 03/10/2023     (H) 03/10/2023    VLDL 30 03/10/2023    HGBA1C 8.00 (H) 03/10/2023        HEALTH RISK ASSESSMENT    Smoking Status:  Social History     Tobacco Use   Smoking Status Never   • Passive exposure: Never   Smokeless Tobacco Never "     Alcohol Consumption:  Social History     Substance and Sexual Activity   Alcohol Use No     Fall Risk Screen:    STEADI Fall Risk Assessment was completed, and patient is at LOW risk for falls.Assessment completed on:2/1/2023    Depression Screening:  PHQ-2/PHQ-9 Depression Screening 2/1/2023   Little Interest or Pleasure in Doing Things 0-->not at all   Feeling Down, Depressed or Hopeless 0-->not at all   PHQ-9: Brief Depression Severity Measure Score 0       Health Habits and Functional and Cognitive Screening:  Functional & Cognitive Status 3/15/2023   Do you have difficulty preparing food and eating? No   Do you have difficulty bathing yourself, getting dressed or grooming yourself? No   Do you have difficulty using the toilet? No   Do you have difficulty moving around from place to place? No   Do you have trouble with steps or getting out of a bed or a chair? No   Current Diet Other   Dental Exam Up to date   Eye Exam Up to date   Exercise (times per week) 0 times per week   Current Exercises Include No Regular Exercise   Current Exercise Activities Include -   Do you need help using the phone?  No   Are you deaf or do you have serious difficulty hearing?  No   Do you need help with transportation? No   Do you need help shopping? No   Do you need help preparing meals?  No   Do you need help with housework?  No   Do you need help with laundry? No   Do you need help taking your medications? No   Do you need help managing money? No   Do you ever drive or ride in a car without wearing a seat belt? No   Have you felt unusual stress, anger or loneliness in the last month? No   Who do you live with? Spouse   If you need help, do you have trouble finding someone available to you? No   Have you been bothered in the last four weeks by sexual problems? No   Do you have difficulty concentrating, remembering or making decisions? No       Age-appropriate Screening Schedule:  Refer to the list below for future screening  recommendations based on patient's age, sex and/or medical conditions. Orders for these recommended tests are listed in the plan section. The patient has been provided with a written plan.    Health Maintenance   Topic Date Due   • ZOSTER VACCINE (2 of 3) 02/26/2009   • COVID-19 Vaccine (4 - Booster for Pfizer series) 11/22/2021   • ANNUAL WELLNESS VISIT  11/30/2022   • DXA SCAN  04/30/2023   • DIABETIC EYE EXAM  05/05/2023   • HEMOGLOBIN A1C  09/10/2023   • URINE MICROALBUMIN  09/14/2023   • DIABETIC FOOT EXAM  09/16/2023   • LIPID PANEL  03/10/2024   • MAMMOGRAM  07/01/2024   • COLORECTAL CANCER SCREENING  06/06/2027   • TDAP/TD VACCINES (3 - Td or Tdap) 06/18/2030   • HEPATITIS C SCREENING  Completed   • INFLUENZA VACCINE  Completed   • Pneumococcal Vaccine 65+  Completed                CMS Preventative Services Quick Reference  Risk Factors Identified During Encounter  Inactivity/Sedentary: Patient was advised to exercise at least 150 minutes a week per CDC recommendations.  Vision Screening Recommended  The above risks/problems have been discussed with the patient.  Pertinent information has been shared with the patient in the After Visit Summary.  An After Visit Summary and PPPS were made available to the patient.    Follow Up:   Next Medicare Wellness visit to be scheduled in 1 year.       Additional E&M Note during same encounter follows:  Patient has multiple medical problems which are significant and separately identifiable that require additional work above and beyond the Medicare Wellness Visit.      Chief Complaint  Diabetes (3 month follow up), Hypertension, Hyperlipidemia, Depression, and Anxiety    Subjective        HPI uncontrolled DM 2, HTN, Hyperlipidemia, and IC with recurring UTIs. She is on Metformin 1000 mg BID and Ozempic 1 mg weekly. She was evaluated by endo, but decided she did not want to see them anymore. She is not checking her glucose regularly. She is not compliant with her diet,  "especially when traveling. She denies any blurred vision, dry mouth. She got a Dexcom meter, but would prefer a michele to place on arm, not abd.      She is on Susannah 5/40mg daily for HTN. She has previously been on Diovan, Toprol XL and Ziac. She does not check her BP, but feels like she is tolerating this medicaiton well. She denies edema, CP, SOA.      She started Cholest-off daily for hyperlipidemia. Declines statin therapy.      She is followed by Dr Benton ([facility]). She had a CT abd and pelvis and cystoscopy scheduled for renal cysts and IC. She is requesting a urine dip due to recurrent UTIs.         She has history of JOSETTE and GERD. She Has been evaluated with both a C-scope 6-6-2022 with Dr. Alaniz. She is on an oral iron supplement. She takes a daily Protonix 40mg tablet.      She is on prolia for osteoporosis with history of fracture and Tolerated her first injection well. She has history of traumatic fractures of wrist and ankle. dexa bone density axial (04/30/2021 08:59)        Amber Campos is also being seen today for          Objective   Vital Signs:  /72 (BP Location: Left arm, Patient Position: Sitting, Cuff Size: Adult)   Pulse 102   Temp 97.5 °F (36.4 °C)   Ht 160 cm (63\")   Wt 82.1 kg (181 lb)   SpO2 96%   BMI 32.06 kg/m²     Physical Exam  Vitals reviewed.   Constitutional:       Appearance: Normal appearance. She is obese. She is not ill-appearing.   HENT:      Head: Normocephalic.      Right Ear: Tympanic membrane normal.      Left Ear: Tympanic membrane normal.      Nose: Nose normal.   Cardiovascular:      Rate and Rhythm: Normal rate.      Pulses: Normal pulses.      Heart sounds: Normal heart sounds. No murmur heard.  Pulmonary:      Effort: Pulmonary effort is normal.      Breath sounds: Normal breath sounds. No stridor.   Musculoskeletal:      Cervical back: Neck supple.      Right lower leg: No edema.      Left lower leg: No edema.   Skin:     General: Skin is warm and dry. "   Neurological:      General: No focal deficit present.      Mental Status: She is alert and oriented to person, place, and time.   Psychiatric:         Mood and Affect: Mood normal.         Behavior: Behavior normal.         Thought Content: Thought content normal.            CMP    CMP 8/31/22 12/9/22 3/10/23   Glucose 206 (A) 248 (A) 200 (A)   BUN 11 13 19   Creatinine 0.53 (A) 0.51 (A) 0.64   Sodium 136 136 134 (A)   Potassium 4.6 4.8 4.4   Chloride 100 100 98   Calcium 9.2 9.5 9.3   Total Protein 6.9 6.3 6.7   Albumin 4.40 4.20 4.4   Globulin 2.5 2.1 2.3   Total Bilirubin 0.3 0.2 0.3   Alkaline Phosphatase 61 53 55   AST (SGOT) 44 (A) 36 (A) 32   ALT (SGPT) 40 (A) 33 28   BUN/Creatinine Ratio 20.8 25.5 (A) 29.7 (A)   (A) Abnormal value            CBC    CBC 8/31/22 12/9/22 3/10/23   WBC 6.87 5.06 4.81   RBC 4.53 4.33 4.16   Hemoglobin 12.5 11.7 (A) 11.6 (A)   Hematocrit 38.2 35.7 34.7   MCV 84.3 82.4 83.4   MCH 27.6 27.0 27.9   MCHC 32.7 32.8 33.4   RDW 13.4 13.4 13.5   Platelets 380 361 348   (A) Abnormal value            CBC w/diff    CBC w/Diff 8/31/22 12/9/22 3/10/23   WBC 6.87 5.06 4.81   RBC 4.53 4.33 4.16   Hemoglobin 12.5 11.7 (A) 11.6 (A)   Hematocrit 38.2 35.7 34.7   MCV 84.3 82.4 83.4   MCH 27.6 27.0 27.9   MCHC 32.7 32.8 33.4   RDW 13.4 13.4 13.5   Platelets 380 361 348   Neutrophil Rel % 50.4 44.9 42.2 (A)   Lymphocyte Rel % 37.4 40.1 44.5   Monocyte Rel % 7.0 8.1 8.3   Eosinophil Rel % 4.2 5.9 4.2   Basophil Rel % 0.7 0.8 0.6   (A) Abnormal value            Lipid Panel    Lipid Panel 8/31/22 12/9/22 3/10/23   Total Cholesterol 165 195 215 (A)   Triglycerides 228 (A) 273 (A) 173 (A)   HDL Cholesterol 57 58 76 (A)   VLDL Cholesterol 37 45 (A) 30   LDL Cholesterol  71 92 109 (A)   (A) Abnormal value       Comments are available for some flowsheets but are not being displayed.           TSH    TSH 9/14/22   TSH 1.220           BMP    BMP 8/31/22 12/9/22 3/10/23   BUN 11 13 19   Creatinine 0.53 (A)  0.51 (A) 0.64   Sodium 136 136 134 (A)   Potassium 4.6 4.8 4.4   Chloride 100 100 98   CO2 26.1 29.0 26.7   Calcium 9.2 9.5 9.3   (A) Abnormal value                Data reviewed: Radiologic studies DXA and mammogram           Assessment and Plan   There are no diagnoses linked to this encounter.   Diagnosis Plan   1. Encounter for subsequent annual wellness visit (AWV) in Medicare patient        2. Uncontrolled type 2 diabetes mellitus with hyperglycemia (HCC)  metFORMIN (GLUCOPHAGE) 1000 MG tablet    Ambulatory Referral to Diabetic Education    Continuous Blood Gluc  (Codasipyle Willi 14 Day [place]) device    Continuous Blood Gluc Sensor ([employer] Dive Willi Sensor System)    Try to send in a willi system for CGM. Discusssed increasing Ozempic to 2 mg, but she declined      3. Essential hypertension        4. Mixed hyperlipidemia        5. Age-related osteoporosis without current pathological fracture  dexa bone density axial    DXA scan, on prolia      6. Dysuria  POCT urinalysis dipstick, automated           I spent 45 minutes caring for Amber on this date of service. This time includes time spent by me in the following activities:preparing for the visit, reviewing tests, obtaining and/or reviewing a separately obtained history, performing a medically appropriate examination and/or evaluation , counseling and educating the patient/family/caregiver, ordering medications, tests, or procedures, referring and communicating with other health care professionals , documenting information in the medical record and independently interpreting results and communicating that information with the patient/family/caregiver       Follow Up    3 months with labs       Patient was given instructions and counseling regarding her condition or for health maintenance advice. Please see specific information pulled into the AVS if appropriate.

## 2023-03-20 ENCOUNTER — TELEPHONE (OUTPATIENT)
Dept: INTERNAL MEDICINE | Facility: CLINIC | Age: 73
End: 2023-03-20

## 2023-03-20 NOTE — TELEPHONE ENCOUNTER
Hub staff attempted to follow warm transfer process and was unsuccessful     Caller: Amber Campos    Relationship to patient: Self    Best call back number: [phone]    Patient is needing:     NEEDS TO RESCHEDULE LAB APPOINTMENT.

## 2023-04-06 ENCOUNTER — HOSPITAL ENCOUNTER (OUTPATIENT)
Dept: DIABETES SERVICES | Facility: HOSPITAL | Age: 73
Discharge: HOME | End: 2023-04-06
Admitting: NURSE PRACTITIONER
Payer: MEDICARE

## 2023-04-06 PROCEDURE — 97802 MEDICAL NUTRITION INDIV IN: CPT

## 2023-04-06 NOTE — CONSULTS
Mrs. Campos was seen today by Registered Dietitian for Diabetes diet education. Consistent with the ADA’s standards of care, a comprehensive assessment/training record has been sent to medical records (see media tab).    Checks blood sugar occasionally. Just got michele but has not worn yet- encouraged patient to come back in for michele help. Has had success with weight watchers and HMR in the past. Carb counting, ADA plate, portions, meal planning discussed. Encouraged working towards 150 min/activity week.     Mrs. Campos has been encouraged to call our office with questions or additional education needs. Please place referral for additional services or follow-up should need arise.    Thank you for the referral.

## 2023-04-10 ENCOUNTER — OFFICE VISIT (OUTPATIENT)
Dept: INTERNAL MEDICINE | Facility: CLINIC | Age: 73
End: 2023-04-10
Payer: MEDICARE

## 2023-04-10 VITALS
SYSTOLIC BLOOD PRESSURE: 126 MMHG | DIASTOLIC BLOOD PRESSURE: 70 MMHG | HEART RATE: 90 BPM | BODY MASS INDEX: 32.03 KG/M2 | OXYGEN SATURATION: 96 % | TEMPERATURE: 98.7 F | WEIGHT: 180.8 LBS

## 2023-04-10 DIAGNOSIS — R35.0 FREQUENCY OF URINATION: ICD-10-CM

## 2023-04-10 DIAGNOSIS — N39.41 URGENCY INCONTINENCE: ICD-10-CM

## 2023-04-10 DIAGNOSIS — N30.00 ACUTE CYSTITIS WITHOUT HEMATURIA: ICD-10-CM

## 2023-04-10 DIAGNOSIS — N30.00 ACUTE CYSTITIS WITHOUT HEMATURIA: Primary | ICD-10-CM

## 2023-04-10 LAB
BILIRUB BLD-MCNC: ABNORMAL MG/DL
CLARITY, POC: CLEAR
COLOR UR: YELLOW
EXPIRATION DATE: ABNORMAL
GLUCOSE UR STRIP-MCNC: ABNORMAL MG/DL
KETONES UR QL: ABNORMAL
LEUKOCYTE EST, POC: ABNORMAL
Lab: ABNORMAL
NITRITE UR-MCNC: NEGATIVE MG/ML
PH UR: 8 [PH] (ref 5–8)
PROT UR STRIP-MCNC: ABNORMAL MG/DL
RBC # UR STRIP: ABNORMAL /UL
SP GR UR: 1.02 (ref 1–1.03)
UROBILINOGEN UR QL: ABNORMAL

## 2023-04-10 PROCEDURE — 99213 OFFICE O/P EST LOW 20 MIN: CPT | Performed by: FAMILY MEDICINE

## 2023-04-10 PROCEDURE — 3078F DIAST BP <80 MM HG: CPT | Performed by: FAMILY MEDICINE

## 2023-04-10 PROCEDURE — 1159F MED LIST DOCD IN RCRD: CPT | Performed by: FAMILY MEDICINE

## 2023-04-10 PROCEDURE — 3052F HG A1C>EQUAL 8.0%<EQUAL 9.0%: CPT | Performed by: FAMILY MEDICINE

## 2023-04-10 PROCEDURE — 3074F SYST BP LT 130 MM HG: CPT | Performed by: FAMILY MEDICINE

## 2023-04-10 PROCEDURE — 81003 URINALYSIS AUTO W/O SCOPE: CPT | Performed by: FAMILY MEDICINE

## 2023-04-10 PROCEDURE — 1160F RVW MEDS BY RX/DR IN RCRD: CPT | Performed by: FAMILY MEDICINE

## 2023-04-10 RX ORDER — SULFAMETHOXAZOLE AND TRIMETHOPRIM 800; 160 MG/1; MG/1
1 TABLET ORAL 2 TIMES DAILY
Qty: 10 TABLET | Refills: 0 | Status: SHIPPED | OUTPATIENT
Start: 2023-04-10 | End: 2023-04-15

## 2023-04-10 RX ORDER — CHLORAL HYDRATE 500 MG
CAPSULE ORAL
COMMUNITY

## 2023-04-10 NOTE — PROGRESS NOTES
Subjective   Amber Campos is a 72 y.o. female presenting today for follow up of   Chief Complaint   Patient presents with   • Urinary Urgency     Lower back pain, radiating to front. Symptoms started this weekend. She had an accident with diarrhea a few days ago and has had an upset stomach for about 5 days.   • Urinary Frequency       History of Present Illness     This is a 71 yo patient of [facility] who presents with 4 days of urinary frequency, urgency, dysuria, odor.  Denies fevers.  She had a diarrheal illness last week and believes she had fecal contamination to the urinary tract.  She is on Ozempic and metformin for diabetes.    Patient Active Problem List   Diagnosis   • Reactive airway disease   • Kidney cysts   • Depression with anxiety   • Menopausal symptom   • Hyperlipidemia   • Hematuria   • Steatosis of liver   • Gastroesophageal reflux disease   • Dysuria   • Ventricular premature beats   • Uncontrolled type 2 diabetes mellitus with hyperglycemia   • Chronic UTI   • Age-related osteoporosis without current pathological fracture   • Encounter for subsequent annual wellness visit (AWV) in Medicare patient   • Essential hypertension   • Status post replacement of left shoulder joint   • Urinary frequency   • Chronic fatigue   • Environmental allergies   • Vitamin D deficiency   • Left shoulder tendonitis   • Greater trochanteric bursitis of left hip   • Drug-induced constipation   • Candida, oral   • Diabetes mellitus due to underlying condition, uncontrolled, with hyperglycemia   • Seborrheic keratoses   • IC (interstitial cystitis)   • Low serum iron   • Generalized osteoarthritis   • Pelvic pain in female   • Memory deficit   • Pathological fracture of right radius due to age-related osteoporosis   • Chronic bilateral low back pain without sciatica   • History of UTI   • Chronic bilateral thoracic back pain   • Vertigo   • Chronic left hip pain   • Left carotid stenosis   • Upper respiratory tract  infection due to COVID-19 virus   • Atrophic vaginitis   • Alcoholism in family member   • UTI symptoms       Current Outpatient Medications on File Prior to Visit   Medication Sig   • amlodipine-olmesartan (NIC) 5-40 MG per tablet TAKE 1 TABLET DAILY   • budesonide-formoterol (Symbicort) 160-4.5 MCG/ACT inhaler Inhale 2 puffs 2 (Two) Times a Day. (Patient taking differently: Inhale 2 puffs 2 (Two) Times a Day As Needed.)   • Cetirizine HCl (ZyrTEC Childrens Allergy) 5 MG/5ML solution solution Take 5 mL by mouth Daily.   • Continuous Blood Gluc  ([employer] Willi 14 Day [place]) device 1 each 4 (Four) Times a Day.   • Continuous Blood Gluc Sensor ([employer] Willi Sensor System) Every 10 (Ten) Days.   • glucose blood (FREESTYLE LITE) test strip USE TO CHECK BLOOD SUGAR 3 TIMES A DAY   • Insulin Pen Needle (PEN NEEDLES) 32G X 6 MM misc 1 each Daily With Breakfast.   • Lancets (FREESTYLE) lancets    • metFORMIN (GLUCOPHAGE) 1000 MG tablet Take 1 tablet by mouth 2 (Two) Times a Day With Meals.   • Omega-3 Fatty Acids (fish oil) 1000 MG capsule capsule Take  by mouth Daily With Breakfast.   • Ozempic, 1 MG/DOSE, 4 MG/3ML solution pen-injector INJECT 1 MG UNDER THE SKIN INTO THE APPROPRIATE AREA ONCE WEEKLY AS DIRECTED   • pantoprazole (PROTONIX) 40 MG EC tablet TAKE 1 TABLET DAILY   • Spacer/Aero Chamber Mouthpiece misc 1 each Daily.   • venlafaxine XR (EFFEXOR-XR) 75 MG 24 hr capsule Take 1 capsule by mouth Daily.   • [DISCONTINUED] linaclotide (LINZESS) 72 MCG capsule capsule Take 1 capsule by mouth Every Morning Before Breakfast. (Patient not taking: Reported on 4/10/2023)   • [DISCONTINUED] nystatin (MYCOSTATIN) 100,000 unit/mL suspension Swish and swallow 5 mL 4 (Four) Times a Day.   • [DISCONTINUED] prednisoLONE acetate (PRED FORTE) 1 % ophthalmic suspension INSTILL 1 DROP IN RIGHT EYE EVERY 2 HOURS (Patient not taking: Reported on 4/10/2023)     No current facility-administered medications on file prior to  visit.          The following portions of the patient's history were reviewed and updated as appropriate: allergies, current medications, past family history, past medical history, past social history, past surgical history and problem list.    Review of Systems   Constitutional: Negative for fever.   Genitourinary: Positive for dysuria, flank pain, frequency and urgency. Negative for hematuria.       Objective   Vitals:    04/10/23 1527   BP: 126/70   BP Location: Left arm   Pulse: 90   Temp: 98.7 °F (37.1 °C)   TempSrc: Infrared   SpO2: 96%   Weight: 82 kg (180 lb 12.8 oz)       BP Readings from Last 3 Encounters:   04/10/23 126/70   03/15/23 142/72   02/01/23 120/80        Wt Readings from Last 3 Encounters:   04/10/23 82 kg (180 lb 12.8 oz)   03/15/23 82.1 kg (181 lb)   02/01/23 80.5 kg (177 lb 6.4 oz)        Body mass index is 32.03 kg/m².  Nursing notes and vitals reviewed.    Physical Exam  Vitals and nursing note reviewed.   Constitutional:       General: She is not in acute distress.     Appearance: She is not ill-appearing.   HENT:      Head: Normocephalic and atraumatic.      Mouth/Throat:      Mouth: Mucous membranes are moist.   Eyes:      Extraocular Movements: Extraocular movements intact.      Conjunctiva/sclera: Conjunctivae normal.   Cardiovascular:      Rate and Rhythm: Normal rate and regular rhythm.   Pulmonary:      Effort: Pulmonary effort is normal.      Breath sounds: Normal breath sounds.   Abdominal:      Palpations: Abdomen is soft.      Tenderness: There is no abdominal tenderness. There is no right CVA tenderness or left CVA tenderness.   Musculoskeletal:      Cervical back: Neck supple. No rigidity.   Skin:     General: Skin is warm and dry.   Neurological:      General: No focal deficit present.      Mental Status: She is alert and oriented to person, place, and time.   Psychiatric:         Mood and Affect: Mood normal.         Behavior: Behavior normal.         Recent Results (from  the past 672 hour(s))   POCT urinalysis dipstick, automated    Collection Time: 03/15/23  1:57 PM    Specimen: Urine   Result Value Ref Range    Color Yellow Yellow, Straw, Dark Yellow, Patrizia    Clarity, UA Clear Clear    Specific Gravity  1.025 1.005 - 1.030    pH, Urine 5.0 5.0 - 8.0    Leukocytes Negative Negative    Nitrite, UA Negative Negative    Protein, POC Trace (A) Negative mg/dL    Glucose,  mg/dL (A) Negative mg/dL    Ketones, UA Trace (A) Negative    Urobilinogen, UA Normal Normal, 0.2 E.U./dL    Bilirubin Small (1+) (A) Negative    Blood, UA Negative Negative    Lot Number [id number]     Expiration Date 7/13/24    POCT urinalysis dipstick, automated    Collection Time: 04/10/23  3:41 PM    Specimen: Urine   Result Value Ref Range    Color Yellow Yellow, Straw, Dark Yellow, Patrizia    Clarity, UA Clear Clear    Specific Gravity  1.020 1.005 - 1.030    pH, Urine 8.0 5.0 - 8.0    Leukocytes Moderate (2+) (A) Negative    Nitrite, UA Negative Negative    Protein,  mg/dL (A) Negative mg/dL    Glucose,  mg/dL (A) Negative mg/dL    Ketones, UA 15 mg/dL (A) Negative    Urobilinogen, UA 0.2 E.U./dL Normal, 0.2 E.U./dL    Bilirubin Small (1+) (A) Negative    Blood, UA Moderate (A) Negative    Lot Number [id number]     Expiration Date 7/13/24          Assessment & Plan   Diagnoses and all orders for this visit:    1. Acute cystitis without hematuria (Primary)  -     POCT urinalysis dipstick, automated  -     Urine Culture - Urine, Urine, Clean Catch; Future  -     sulfamethoxazole-trimethoprim (Bactrim DS) 800-160 MG per tablet; Take 1 tablet by mouth 2 (Two) Times a Day for 5 days.  Dispense: 10 tablet; Refill: 0    2. Frequency of urination  -     POCT urinalysis dipstick, automated    3. Urgency incontinence  -     POCT urinalysis dipstick, automated      Treat with Bactrim DS.  Culture urine.  Anticipatory guidance given.        Medications, including side effects, were discussed with  the patient. Patient verbalized understanding.  The plan of care was discussed. All questions were answered. Patient verbalized understanding.      Return if symptoms worsen or fail to improve, for Next scheduled follow up.

## 2023-04-11 ENCOUNTER — HOSPITAL ENCOUNTER (OUTPATIENT)
Dept: DIABETES SERVICES | Facility: HOSPITAL | Age: 73
Discharge: HOME | End: 2023-04-11
Admitting: NURSE PRACTITIONER
Payer: MEDICARE

## 2023-04-11 PROCEDURE — G0108 DIAB MANAGE TRN  PER INDIV: HCPCS

## 2023-04-11 NOTE — CONSULTS
Patient came in for assistance with CGM start and successfully applied first sensor by herself. Willi 2 features reviewed. Using handheld reader instead of phone khadijah at this time. ADA goals and TIR reviewed. Carb counting/meal logging reviewed. Discussed documenting carbs in Willi logbook. Assisted with [employer] khadijah.     Mrs. Campos has been encouraged to call our office with questions or additional education needs. Please place referral for additional services or follow-up should need arise.    Thank you for the referral.

## 2023-04-14 LAB
BACTERIA UR CULT: ABNORMAL
OTHER ANTIBIOTIC SUSC ISLT: ABNORMAL

## 2023-04-18 ENCOUNTER — TELEPHONE (OUTPATIENT)
Dept: INTERNAL MEDICINE | Facility: CLINIC | Age: 73
End: 2023-04-18

## 2023-04-18 ENCOUNTER — TELEPHONE (OUTPATIENT)
Dept: INTERNAL MEDICINE | Facility: CLINIC | Age: 73
End: 2023-04-18
Payer: MEDICARE

## 2023-04-18 NOTE — TELEPHONE ENCOUNTER
----- Message from John Vergara MD sent at 4/17/2023 10:15 AM EDT -----  Please call the patient regarding her result.  The urine culture is sensitive to the antibiotic that was prescribed.  I'm hopeful that she is feeling better.

## 2023-04-18 NOTE — TELEPHONE ENCOUNTER
Caller: Amber Campos    Relationship to patient: Self    Best call back number: [phone]    Patient is needing:PATIENT HAS BEEN  MONITORING SUGAR LEVELS. PATIENT STATES SHE DOES NOT THINK MEDICATION IS WORKING.   ASKS TO COME IN AND TALK TO KIRK CARRINGTON. FIRST AVAILABLE APPOINTMENT IS MAY 17.   PATIENT STATES KIRK CAN GET HER IN SOONER.  PLEASE ADVISE

## 2023-04-26 ENCOUNTER — OFFICE (OUTPATIENT)
Dept: URBAN - METROPOLITAN AREA CLINIC 66 | Facility: CLINIC | Age: 73
End: 2023-04-26
Payer: COMMERCIAL

## 2023-04-26 VITALS
HEIGHT: 63 IN | HEART RATE: 98 BPM | WEIGHT: 169 LBS | DIASTOLIC BLOOD PRESSURE: 62 MMHG | SYSTOLIC BLOOD PRESSURE: 118 MMHG

## 2023-04-26 DIAGNOSIS — R10.13 EPIGASTRIC PAIN: ICD-10-CM

## 2023-04-26 DIAGNOSIS — Z80.9 FAMILY HISTORY OF MALIGNANT NEOPLASM, UNSPECIFIED: ICD-10-CM

## 2023-04-26 DIAGNOSIS — K59.01 SLOW TRANSIT CONSTIPATION: ICD-10-CM

## 2023-04-26 PROCEDURE — 99213 OFFICE O/P EST LOW 20 MIN: CPT | Performed by: INTERNAL MEDICINE

## 2023-05-03 ENCOUNTER — APPOINTMENT (OUTPATIENT)
Dept: BONE DENSITY | Facility: HOSPITAL | Age: 73
End: 2023-05-03
Payer: MEDICARE

## 2023-05-03 DIAGNOSIS — M81.0 AGE-RELATED OSTEOPOROSIS WITHOUT CURRENT PATHOLOGICAL FRACTURE: ICD-10-CM

## 2023-05-03 PROCEDURE — 77080 DXA BONE DENSITY AXIAL: CPT

## 2023-05-17 ENCOUNTER — APPOINTMENT (OUTPATIENT)
Dept: GENERAL RADIOLOGY | Facility: HOSPITAL | Age: 73
End: 2023-05-17
Payer: MEDICARE

## 2023-05-17 ENCOUNTER — HOSPITAL ENCOUNTER (OUTPATIENT)
Dept: GENERAL RADIOLOGY | Facility: HOSPITAL | Age: 73
Discharge: HOME | End: 2023-05-17
Payer: MEDICARE

## 2023-05-17 ENCOUNTER — OFFICE VISIT (OUTPATIENT)
Dept: INTERNAL MEDICINE | Facility: CLINIC | Age: 73
End: 2023-05-17
Payer: MEDICARE

## 2023-05-17 VITALS
SYSTOLIC BLOOD PRESSURE: 138 MMHG | TEMPERATURE: 97 F | HEIGHT: 63 IN | OXYGEN SATURATION: 100 % | DIASTOLIC BLOOD PRESSURE: 76 MMHG | HEART RATE: 91 BPM | BODY MASS INDEX: 31.71 KG/M2 | WEIGHT: 179 LBS

## 2023-05-17 DIAGNOSIS — G89.29 CHRONIC NECK AND BACK PAIN: ICD-10-CM

## 2023-05-17 DIAGNOSIS — M54.2 CHRONIC NECK AND BACK PAIN: ICD-10-CM

## 2023-05-17 DIAGNOSIS — M50.30 DDD (DEGENERATIVE DISC DISEASE), CERVICAL: Primary | ICD-10-CM

## 2023-05-17 DIAGNOSIS — E11.65 UNCONTROLLED TYPE 2 DIABETES MELLITUS WITH HYPERGLYCEMIA: Primary | ICD-10-CM

## 2023-05-17 DIAGNOSIS — M54.9 CHRONIC NECK AND BACK PAIN: ICD-10-CM

## 2023-05-17 DIAGNOSIS — I65.23 CAROTID STENOSIS, BILATERAL: ICD-10-CM

## 2023-05-17 DIAGNOSIS — M51.34 DDD (DEGENERATIVE DISC DISEASE), THORACIC: ICD-10-CM

## 2023-05-17 DIAGNOSIS — R35.0 URINARY FREQUENCY: ICD-10-CM

## 2023-05-17 LAB
BILIRUB BLD-MCNC: ABNORMAL MG/DL
CLARITY, POC: CLEAR
COLOR UR: YELLOW
EXPIRATION DATE: ABNORMAL
GLUCOSE UR STRIP-MCNC: ABNORMAL MG/DL
KETONES UR QL: ABNORMAL
LEUKOCYTE EST, POC: NEGATIVE
Lab: ABNORMAL
NITRITE UR-MCNC: NEGATIVE MG/ML
PH UR: 5 [PH] (ref 5–8)
PROT UR STRIP-MCNC: NEGATIVE MG/DL
RBC # UR STRIP: NEGATIVE /UL
SP GR UR: 1.01 (ref 1–1.03)
UROBILINOGEN UR QL: ABNORMAL

## 2023-05-17 PROCEDURE — 81003 URINALYSIS AUTO W/O SCOPE: CPT | Performed by: NURSE PRACTITIONER

## 2023-05-17 PROCEDURE — 3052F HG A1C>EQUAL 8.0%<EQUAL 9.0%: CPT | Performed by: NURSE PRACTITIONER

## 2023-05-17 PROCEDURE — 99214 OFFICE O/P EST MOD 30 MIN: CPT | Performed by: NURSE PRACTITIONER

## 2023-05-17 PROCEDURE — 72072 X-RAY EXAM THORAC SPINE 3VWS: CPT

## 2023-05-17 PROCEDURE — 3078F DIAST BP <80 MM HG: CPT | Performed by: NURSE PRACTITIONER

## 2023-05-17 PROCEDURE — 72040 X-RAY EXAM NECK SPINE 2-3 VW: CPT

## 2023-05-17 PROCEDURE — 3075F SYST BP GE 130 - 139MM HG: CPT | Performed by: NURSE PRACTITIONER

## 2023-05-17 RX ORDER — CANAGLIFLOZIN AND METFORMIN HYDROCHLORIDE 50; 1000 MG/1; MG/1
1 TABLET, FILM COATED, EXTENDED RELEASE ORAL DAILY
Qty: 30 TABLET | Refills: 1 | Status: SHIPPED | OUTPATIENT
Start: 2023-05-17

## 2023-05-17 NOTE — PROGRESS NOTES
Chief Complaint   Patient presents with   • Medication Problem     Sugar levels inconsistent       Subjective     Amber Campos is a 72 y.o. female being seen for a follow up appointment today regarding DM 2. She recently went thru diabetic educations at [facility], and reports that she has been monitoring her glucose with a Freestyle meter. Glucose ranging from 119-267. Glucose Average last 7 days 194. She is on Glucophage 1000mg BID and Ozempic 1 mg weekly.     She has history of urinary frequency with IC and would like her urine checked for infection today. No visible blood or dysuria.     She has had chronic neck and thoracic for the past 2 years. She has been seeing chiropractor and using a massage device. Described as a soreness and aching pain in shoulders and neck. Pain rated 5 of 10. No radiation of pain. Taking aleve as needed.     She had a lifeline screening showing mild placque. She is not symptomatic.     History of Present Illness     Allergies   Allergen Reactions   • Iodinated Contrast Media Photosensitivity     contrast   • Adhesive Tape Other (See Comments)     EKG stickers only   • Farxiga [Dapagliflozin] Diarrhea and Itching     Yeast infeciotn   • Neomycin-Bacitracin Zn-Polymyx Rash   • Toprol Xl [Metoprolol] Dizziness   • Trulicity [Dulaglutide] GI Intolerance     Constipation   • Victoza [Liraglutide] Nausea Only         Current Outpatient Medications:   •  amlodipine-olmesartan (NIC) 5-40 MG per tablet, TAKE 1 TABLET DAILY, Disp: 90 tablet, Rfl: 3  •  Cetirizine HCl (ZyrTEC Childrens Allergy) 5 MG/5ML solution solution, Take 5 mL by mouth Daily., Disp: 150 mL, Rfl:   •  Continuous Blood Gluc  (FreeStyle Willi 14 Day [place]) device, 1 each 4 (Four) Times a Day., Disp: 1 each, Rfl: 3  •  Continuous Blood Gluc Sensor (FreeStyle Willi Sensor System), Every 10 (Ten) Days., Disp: 3 each, Rfl: 3  •  glucose blood (FREESTYLE LITE) test strip, USE TO CHECK BLOOD SUGAR 3 TIMES A DAY, Disp: 300 each,  Rfl: 3  •  metFORMIN (GLUCOPHAGE) 1000 MG tablet, Take 1 tablet by mouth 2 (Two) Times a Day With Meals., Disp: 180 tablet, Rfl: 2  •  Omega-3 Fatty Acids (fish oil) 1000 MG capsule capsule, Take  by mouth Daily With Breakfast., Disp: , Rfl:   •  Ozempic, 1 MG/DOSE, 4 MG/3ML solution pen-injector, INJECT 1 MG UNDER THE SKIN INTO THE APPROPRIATE AREA ONCE WEEKLY AS DIRECTED, Disp: 9 mL, Rfl: 3  •  pantoprazole (PROTONIX) 40 MG EC tablet, TAKE 1 TABLET DAILY, Disp: 90 tablet, Rfl: 3  •  venlafaxine XR (EFFEXOR-XR) 75 MG 24 hr capsule, Take 1 capsule by mouth Daily., Disp: 90 capsule, Rfl: 1  •  budesonide-formoterol (Symbicort) 160-4.5 MCG/ACT inhaler, Inhale 2 puffs 2 (Two) Times a Day. (Patient not taking: Reported on 5/17/2023), Disp: 1 each, Rfl: 6  •  Insulin Pen Needle (PEN NEEDLES) 32G X 6 MM misc, 1 each Daily With Breakfast. (Patient not taking: Reported on 5/17/2023), Disp: 90 each, Rfl: 3  •  Lancets (FREESTYLE) lancets, , Disp: , Rfl:   •  Spacer/Aero Chamber Mouthpiece misc, 1 each Daily. (Patient not taking: Reported on 5/17/2023), Disp: 1 each, Rfl: 0    The following portions of the patient's history were reviewed and updated as appropriate: allergies, current medications, past family history, past medical history, past social history, past surgical history and problem list.    Review of Systems   Constitutional: Negative for fever.   Cardiovascular: Negative for chest pain, palpitations and leg swelling.   Gastrointestinal: Positive for abdominal distention.   Musculoskeletal: Positive for arthralgias.   Skin: Negative.    Allergic/Immunologic: Positive for environmental allergies.   Hematological: Negative.    Psychiatric/Behavioral: Negative for agitation.       Assessment     Physical Exam  Vitals reviewed.   Constitutional:       Appearance: Normal appearance. She is obese.   Cardiovascular:      Rate and Rhythm: Normal rate and regular rhythm.      Pulses: Normal pulses.      Heart sounds: Normal  heart sounds. No murmur heard.  Pulmonary:      Effort: Pulmonary effort is normal. No respiratory distress.      Breath sounds: Normal breath sounds. No stridor.   Musculoskeletal:      Cervical back: Tenderness present. Decreased range of motion.      Thoracic back: Tenderness (right upper back) present. No bony tenderness.   Neurological:      Mental Status: She is alert.         Plan     Her fasting labs were reviewed with the patient from 2 months ago    Diagnoses and all orders for this visit:    1. Uncontrolled type 2 diabetes mellitus with hyperglycemia (Primary)  Comments:  Declines increasing Ozempic to 2mg due to constipation. Stop Metformin and start Inovkamet 5/1000mg XR daily  Orders:  -     Canagliflozin-metFORMIN HCl ER (Invokamet XR)  MG tablet sustained-release 24 hour; Take 1 tablet by mouth Daily.  Dispense: 30 tablet; Refill: 1    2. Urinary frequency  -     POCT urinalysis dipstick, automated    3. Chronic neck and back pain  -     XR Spine Cervical 2 or 3 View; Future  -     XR spine thoracic 3 vw; Future    4. Carotid stenosis, bilateral  -     US Carotid Bilateral; Future    Reviewed lifeline screening showing mild plaque in carotid arteries.      XRs reviewed today, referral placed to PT.    Follow up in 4 weeks

## 2023-05-26 ENCOUNTER — TELEPHONE (OUTPATIENT)
Dept: INTERNAL MEDICINE | Facility: CLINIC | Age: 73
End: 2023-05-26

## 2023-05-26 NOTE — TELEPHONE ENCOUNTER
Caller: Amber Campos    Relationship: Self    Best call back number: [phone]    Who are you requesting to speak with (clinical staff, provider,  specific staff member): KIRK CARRINGTON OR MA    What was the call regarding: PATIENT STATES THAT SHE HAS NOT HEARD BACK FROM PHYSICAL THERAPY FOR HER BACK. PLEASE CALL AND ADVISE    Do you require a callback: YES

## 2023-05-30 ENCOUNTER — OFFICE (OUTPATIENT)
Dept: URBAN - METROPOLITAN AREA PATHOLOGY 4 | Facility: PATHOLOGY | Age: 73
End: 2023-05-30
Payer: COMMERCIAL

## 2023-05-30 ENCOUNTER — AMBULATORY SURGICAL CENTER (OUTPATIENT)
Dept: URBAN - METROPOLITAN AREA SURGERY 20 | Facility: SURGERY | Age: 73
End: 2023-05-30
Payer: COMMERCIAL

## 2023-05-30 VITALS — HEIGHT: 63 IN

## 2023-05-30 DIAGNOSIS — K22.2 ESOPHAGEAL OBSTRUCTION: ICD-10-CM

## 2023-05-30 DIAGNOSIS — K31.89 OTHER DISEASES OF STOMACH AND DUODENUM: ICD-10-CM

## 2023-05-30 DIAGNOSIS — R13.10 DYSPHAGIA, UNSPECIFIED: ICD-10-CM

## 2023-05-30 DIAGNOSIS — K29.70 GASTRITIS, UNSPECIFIED, WITHOUT BLEEDING: ICD-10-CM

## 2023-05-30 DIAGNOSIS — K64.0 FIRST DEGREE HEMORRHOIDS: ICD-10-CM

## 2023-05-30 DIAGNOSIS — Z80.9 FAMILY HISTORY OF MALIGNANT NEOPLASM, UNSPECIFIED: ICD-10-CM

## 2023-05-30 DIAGNOSIS — K57.30 DIVERTICULOSIS OF LARGE INTESTINE WITHOUT PERFORATION OR ABS: ICD-10-CM

## 2023-05-30 LAB
GI HISTOLOGY: A. SELECT: (no result)
GI HISTOLOGY: PDF REPORT: (no result)

## 2023-05-30 PROCEDURE — 43450 DILATE ESOPHAGUS 1/MULT PASS: CPT | Performed by: INTERNAL MEDICINE

## 2023-05-30 PROCEDURE — 88305 TISSUE EXAM BY PATHOLOGIST: CPT | Performed by: INTERNAL MEDICINE

## 2023-05-30 PROCEDURE — 43239 EGD BIOPSY SINGLE/MULTIPLE: CPT | Performed by: INTERNAL MEDICINE

## 2023-05-30 PROCEDURE — 45378 DIAGNOSTIC COLONOSCOPY: CPT | Performed by: INTERNAL MEDICINE

## 2023-06-05 DIAGNOSIS — F41.8 DEPRESSION WITH ANXIETY: ICD-10-CM

## 2023-06-06 RX ORDER — VENLAFAXINE HYDROCHLORIDE 75 MG/1
CAPSULE, EXTENDED RELEASE ORAL
Qty: 90 CAPSULE | Refills: 3 | Status: SHIPPED | OUTPATIENT
Start: 2023-06-06

## 2023-06-07 ENCOUNTER — TELEPHONE (OUTPATIENT)
Dept: INTERNAL MEDICINE | Facility: CLINIC | Age: 73
End: 2023-06-07
Payer: MEDICARE

## 2023-06-07 DIAGNOSIS — E55.9 VITAMIN D DEFICIENCY: ICD-10-CM

## 2023-06-07 DIAGNOSIS — K76.0 STEATOSIS OF LIVER: ICD-10-CM

## 2023-06-07 DIAGNOSIS — I10 ESSENTIAL HYPERTENSION: ICD-10-CM

## 2023-06-07 DIAGNOSIS — E11.65 UNCONTROLLED TYPE 2 DIABETES MELLITUS WITH HYPERGLYCEMIA: Primary | ICD-10-CM

## 2023-06-07 DIAGNOSIS — E61.1 LOW SERUM IRON: ICD-10-CM

## 2023-06-07 DIAGNOSIS — M81.0 AGE-RELATED OSTEOPOROSIS WITHOUT CURRENT PATHOLOGICAL FRACTURE: ICD-10-CM

## 2023-06-07 DIAGNOSIS — E78.2 MIXED HYPERLIPIDEMIA: ICD-10-CM

## 2023-06-07 DIAGNOSIS — N39.0 CHRONIC UTI: ICD-10-CM

## 2023-06-07 PROBLEM — E08.65 DIABETES MELLITUS DUE TO UNDERLYING CONDITION, UNCONTROLLED, WITH HYPERGLYCEMIA: Status: RESOLVED | Noted: 2020-06-18 | Resolved: 2023-06-07

## 2023-06-08 ENCOUNTER — TRANSCRIBE ORDERS (OUTPATIENT)
Dept: ADMINISTRATIVE | Facility: HOSPITAL | Age: 73
End: 2023-06-08
Payer: MEDICARE

## 2023-06-08 DIAGNOSIS — Z12.31 SCREENING MAMMOGRAM FOR HIGH-RISK PATIENT: Primary | ICD-10-CM

## 2023-06-09 ENCOUNTER — OFFICE VISIT (OUTPATIENT)
Dept: ORTHOPEDIC SURGERY | Facility: CLINIC | Age: 73
End: 2023-06-09
Payer: MEDICARE

## 2023-06-09 VITALS
HEIGHT: 63 IN | BODY MASS INDEX: 31.71 KG/M2 | DIASTOLIC BLOOD PRESSURE: 76 MMHG | HEART RATE: 96 BPM | SYSTOLIC BLOOD PRESSURE: 123 MMHG

## 2023-06-09 DIAGNOSIS — G89.29 CHRONIC PAIN OF RIGHT KNEE: ICD-10-CM

## 2023-06-09 DIAGNOSIS — M17.11 PRIMARY OSTEOARTHRITIS OF RIGHT KNEE: Primary | ICD-10-CM

## 2023-06-09 DIAGNOSIS — M25.361 INSTABILITY OF RIGHT KNEE JOINT: ICD-10-CM

## 2023-06-09 DIAGNOSIS — M25.561 CHRONIC PAIN OF RIGHT KNEE: ICD-10-CM

## 2023-06-09 RX ADMIN — LIDOCAINE HYDROCHLORIDE 8 ML: 10 INJECTION, SOLUTION EPIDURAL; INFILTRATION; INTRACAUDAL; PERINEURAL at 13:55

## 2023-06-09 RX ADMIN — TRIAMCINOLONE ACETONIDE 80 MG: 40 INJECTION, SUSPENSION INTRA-ARTICULAR; INTRAMUSCULAR at 13:55

## 2023-06-09 NOTE — PROGRESS NOTES
Subjective:     Patient ID: Amber Campos is a 72 y.o. female.    Chief Complaint:  Right knee pain - new issue to examiner   History of Present Illness  Amber Campos presents to clinic for evaluate right knee. Pain present since 6/4/23, knee giving away. Has taken aspirin last 2 weeks, rates discomfort 4/10 throbbing in nature and giving away along with clicking and popping along the right knee.  Pain made worse by standing mild symptom relief with rest.  Denies any recent x-ray, MRI, CT of the knee denies any recent steroid injections.  Denies any other concerns present.       Social History     Occupational History    Occupation: retired    Tobacco Use    Smoking status: Never     Passive exposure: Never    Smokeless tobacco: Never   Vaping Use    Vaping Use: Never used   Substance and Sexual Activity    Alcohol use: No    Drug use: No    Sexual activity: Defer      Past Medical History:   Diagnosis Date    Basaloid carcinoma     facial    Gastroesophageal reflux disease 3/14/2016    History of kidney infection     History of mammogram     Hypertension     Osteopenia     Osteoporosis     Postmenopausal     PVCs (premature ventricular contractions)     Seasonal allergies     Simple renal cyst 3/14/2016    Steatosis of liver 3/14/2016    Type 2 diabetes mellitus      Past Surgical History:   Procedure Laterality Date    BREAST BIOPSY Bilateral     Benign    BUNIONECTOMY Right     COLONOSCOPY N/A 04/25/2017    Procedure: COLONOSCOPY TO CECUM ;  Surgeon: Aidan Roca MD;  Location: [facility] ENDOSCOPY;  Service:     EXOSTECTOMY Left     HEEL    FOOT FUSION Right     9 screws and plate    KNEE ARTHROSCOPY W/ MENISCAL REPAIR Right     ORIF WRIST FRACTURE Right 12/01/2021    Procedure: WRIST OPEN REDUCTION INTERNAL FIXATION;  Surgeon: Salazar Amezcua MD;  Location: [facility] OR;  Service: Orthopedics;  Laterality: Right;    PAP SMEAR      TONSILLECTOMY      TOTAL SHOULDER REVERSE ARTHROPLASTY Right     UMBILICAL  "HERNIA REPAIR         Family History   Problem Relation Age of Onset    Other Mother         brain death    Emphysema Mother     Alcohol abuse Father     Glaucoma Father     Other Sister         fatty liver    Heart disease Sister         valve problem, being followed (no surgery required)    Arthritis Sister     Other Brother         fatty liver    Glaucoma Maternal Grandmother     Heart disease Maternal Grandmother     Alcohol abuse Maternal Aunt     Diabetes Maternal Aunt     Breast cancer Paternal Aunt                Objective:  Physical Exam    Vital signs reviewed.   General: No acute distress.  Eyes: conjunctiva clear; pupils equally round and reactive  ENT: external ears and nose atraumatic; oropharynx clear  CV: no peripheral edema  Resp: normal respiratory effort  Skin: no rashes or wounds; normal turgor  Psych: mood and affect appropriate; recent and remote memory intact    Vitals:    06/09/23 1321   BP: 123/76   Pulse: 96   Height: 160 cm (63\")     There were no vitals filed for this visit.  Body mass index is 31.71 kg/m².      Right Knee Exam     Tenderness   The patient is experiencing tenderness in the medial joint line and patella.    Range of Motion   Extension:  0   Right knee flexion: 125.     Tests   Yaniv:  Medial - positive   Varus: negative Valgus: negative  Lachman:  Anterior - 1+      Patellar apprehension: positive    Other   Erythema: absent  Sensation: normal  Pulse: present  Swelling: moderate    Comments:  Psuedoinstability related to osteoarthritis with valgus testing   Positive active patellar compression test  Positive crepitus throughout arc of motion                Imaging:  Right Knee X-Ray  Indication: Pain    AP, Lateral, and [place] views    Findings:  No fracture  No bony lesion  Normal soft tissues  Mild-moderate medial compartment narrowing, moderate patellofemoral narrowing with reactive osteophyte superior patellar pole     No prior studies were available for " comparison.    Assessment:        1. Chronic pain of right knee    2. Primary osteoarthritis of right knee    3. Instability of right knee joint           Plan:    - Large Joint Arthrocentesis: R knee on 6/9/2023 1:55 PM  Indications: pain  Details: 22 G needle, superolateral approach  Medications: 80 mg triamcinolone acetonide 40 MG/ML; 8 mL lidocaine PF 1% 1 %  Outcome: tolerated well, no immediate complications  Procedure, treatment alternatives, risks and benefits explained, specific risks discussed. Immediately prior to procedure a time out was called to verify the correct patient, procedure, equipment, support staff and site/side marked as required. Patient was prepped and draped in the usual sterile fashion.       Did discuss plan of care with patient.  She does wish to proceed corticosteroid injection right knee.  I will see her back clinic in 4 weeks to reevaluate.  We also discussed due to the instability of the right knee and giving way she does wish to proceed with Dry-Abbe hinged brace right knee.  She will be fitted today's visit with hinged knee brace to provide stability the right knee.  All questions answered.  Orders:  Orders Placed This Encounter   Procedures    - Large Joint Arthrocentesis: R knee    XR Knee 3+ View With [place] Right     No orders of the defined types were placed in this encounter.          I ordered and reviewed the YOSELIN today.       Dragon dictation utilized

## 2023-06-12 ENCOUNTER — HOSPITAL ENCOUNTER (OUTPATIENT)
Dept: ULTRASOUND IMAGING | Facility: HOSPITAL | Age: 73
Discharge: HOME | End: 2023-06-12
Admitting: NURSE PRACTITIONER
Payer: MEDICARE

## 2023-06-12 DIAGNOSIS — I65.23 CAROTID STENOSIS, BILATERAL: ICD-10-CM

## 2023-06-12 PROCEDURE — 93880 EXTRACRANIAL BILAT STUDY: CPT

## 2023-06-17 PROBLEM — M25.361 INSTABILITY OF RIGHT KNEE JOINT: Status: ACTIVE | Noted: 2023-06-17

## 2023-06-17 RX ORDER — LIDOCAINE HYDROCHLORIDE 10 MG/ML
8 INJECTION, SOLUTION EPIDURAL; INFILTRATION; INTRACAUDAL; PERINEURAL
Status: COMPLETED | OUTPATIENT
Start: 2023-06-09 | End: 2023-06-09

## 2023-06-17 RX ORDER — TRIAMCINOLONE ACETONIDE 40 MG/ML
80 INJECTION, SUSPENSION INTRA-ARTICULAR; INTRAMUSCULAR
Status: COMPLETED | OUTPATIENT
Start: 2023-06-09 | End: 2023-06-09

## 2023-06-23 PROBLEM — Z87.440 HISTORY OF UTI: Status: RESOLVED | Noted: 2022-01-26 | Resolved: 2023-06-23

## 2023-07-26 ENCOUNTER — TELEPHONE (OUTPATIENT)
Dept: ORTHOPEDIC SURGERY | Facility: CLINIC | Age: 73
End: 2023-07-26

## 2023-07-26 NOTE — TELEPHONE ENCOUNTER
Caller: Amber Campos    Relationship to patient: Self    Best call back number: [phone]    Patient is needing: PATIENT STATES NO ONE HAS CALLED HER FROM [facility] TO MAKE HER MRI, WANTS TO MAKE SURE ORDERS WERE SENT THERE. HUB STAFF GAVE HER [facility] NUMBER

## 2023-08-23 ENCOUNTER — TRANSCRIBE ORDERS (OUTPATIENT)
Dept: ADMINISTRATIVE | Facility: HOSPITAL | Age: 73
End: 2023-08-23
Payer: MEDICARE

## 2023-08-23 DIAGNOSIS — E11.65 UNCONTROLLED TYPE 2 DIABETES MELLITUS WITH HYPERGLYCEMIA: ICD-10-CM

## 2023-08-23 DIAGNOSIS — I10 ESSENTIAL HYPERTENSION: ICD-10-CM

## 2023-08-23 DIAGNOSIS — E61.1 LOW SERUM IRON: ICD-10-CM

## 2023-08-23 DIAGNOSIS — M25.571 PAIN, JOINT, ANKLE, RIGHT: Primary | ICD-10-CM

## 2023-08-23 DIAGNOSIS — E78.2 MIXED HYPERLIPIDEMIA: ICD-10-CM

## 2023-08-23 DIAGNOSIS — E55.9 VITAMIN D DEFICIENCY: ICD-10-CM

## 2023-08-30 ENCOUNTER — HOSPITAL ENCOUNTER (OUTPATIENT)
Dept: CT IMAGING | Facility: HOSPITAL | Age: 73
Discharge: HOME | End: 2023-08-30
Admitting: ORTHOPAEDIC SURGERY
Payer: MEDICARE

## 2023-08-30 DIAGNOSIS — M25.571 PAIN, JOINT, ANKLE, RIGHT: ICD-10-CM

## 2023-08-30 PROCEDURE — 73700 CT LOWER EXTREMITY W/O DYE: CPT

## 2023-09-02 LAB
25(OH)D3+25(OH)D2 SERPL-MCNC: 40.1 NG/ML (ref 30–100)
ALBUMIN SERPL-MCNC: 4.5 G/DL (ref 3.5–5.2)
ALBUMIN/GLOB SERPL: 2.1 G/DL
ALP SERPL-CCNC: 50 U/L (ref 39–117)
ALT SERPL-CCNC: 24 U/L (ref 1–33)
AST SERPL-CCNC: 24 U/L (ref 1–32)
BASOPHILS # BLD AUTO: 0.06 10*3/MM3 (ref 0–0.2)
BASOPHILS NFR BLD AUTO: 0.9 % (ref 0–1.5)
BILIRUB SERPL-MCNC: 0.3 MG/DL (ref 0–1.2)
BUN SERPL-MCNC: 13 MG/DL (ref 8–23)
BUN/CREAT SERPL: 23.6 (ref 7–25)
CALCIUM SERPL-MCNC: 9.3 MG/DL (ref 8.6–10.5)
CHLORIDE SERPL-SCNC: 102 MMOL/L (ref 98–107)
CHOLEST SERPL-MCNC: 219 MG/DL (ref 0–200)
CHOLEST/HDLC SERPL: 3.42 {RATIO}
CO2 SERPL-SCNC: 22.2 MMOL/L (ref 22–29)
CREAT SERPL-MCNC: 0.55 MG/DL (ref 0.57–1)
EGFRCR SERPLBLD CKD-EPI 2021: 96.3 ML/MIN/1.73
EOSINOPHIL # BLD AUTO: 0.38 10*3/MM3 (ref 0–0.4)
EOSINOPHIL NFR BLD AUTO: 5.7 % (ref 0.3–6.2)
ERYTHROCYTE [DISTWIDTH] IN BLOOD BY AUTOMATED COUNT: 14.1 % (ref 12.3–15.4)
FERRITIN SERPL-MCNC: 20.9 NG/ML (ref 13–150)
GLOBULIN SER CALC-MCNC: 2.1 GM/DL
GLUCOSE SERPL-MCNC: 167 MG/DL (ref 65–99)
HBA1C MFR BLD: 7.3 % (ref 4.8–5.6)
HCT VFR BLD AUTO: 38.2 % (ref 34–46.6)
HDLC SERPL-MCNC: 64 MG/DL (ref 40–60)
HGB BLD-MCNC: 12.4 G/DL (ref 12–15.9)
IMM GRANULOCYTES # BLD AUTO: 0.01 10*3/MM3 (ref 0–0.05)
IMM GRANULOCYTES NFR BLD AUTO: 0.2 % (ref 0–0.5)
IRON SATN MFR SERPL: 13 % (ref 20–50)
IRON SERPL-MCNC: 65 MCG/DL (ref 37–145)
LDLC SERPL CALC-MCNC: 110 MG/DL (ref 0–100)
LYMPHOCYTES # BLD AUTO: 2.47 10*3/MM3 (ref 0.7–3.1)
LYMPHOCYTES NFR BLD AUTO: 37.3 % (ref 19.6–45.3)
MCH RBC QN AUTO: 26.9 PG (ref 26.6–33)
MCHC RBC AUTO-ENTMCNC: 32.5 G/DL (ref 31.5–35.7)
MCV RBC AUTO: 82.9 FL (ref 79–97)
MONOCYTES # BLD AUTO: 0.48 10*3/MM3 (ref 0.1–0.9)
MONOCYTES NFR BLD AUTO: 7.3 % (ref 5–12)
NEUTROPHILS # BLD AUTO: 3.22 10*3/MM3 (ref 1.7–7)
NEUTROPHILS NFR BLD AUTO: 48.6 % (ref 42.7–76)
NRBC BLD AUTO-RTO: 0.2 /100 WBC (ref 0–0.2)
PLATELET # BLD AUTO: 395 10*3/MM3 (ref 140–450)
POTASSIUM SERPL-SCNC: 4.4 MMOL/L (ref 3.5–5.2)
PROT SERPL-MCNC: 6.6 G/DL (ref 6–8.5)
RBC # BLD AUTO: 4.61 10*6/MM3 (ref 3.77–5.28)
SODIUM SERPL-SCNC: 137 MMOL/L (ref 136–145)
TIBC SERPL-MCNC: 510 MCG/DL
TRIGL SERPL-MCNC: 266 MG/DL (ref 0–150)
UIBC SERPL-MCNC: 445 MCG/DL (ref 112–346)
VLDLC SERPL CALC-MCNC: 45 MG/DL (ref 5–40)
WBC # BLD AUTO: 6.62 10*3/MM3 (ref 3.4–10.8)

## 2023-09-05 DIAGNOSIS — E11.65 UNCONTROLLED TYPE 2 DIABETES MELLITUS WITH HYPERGLYCEMIA: ICD-10-CM

## 2023-09-08 ENCOUNTER — TELEPHONE (OUTPATIENT)
Dept: ORTHOPEDIC SURGERY | Facility: CLINIC | Age: 73
End: 2023-09-08

## 2023-09-08 NOTE — TELEPHONE ENCOUNTER
Provider: SUELLEN JOHNSON   Caller: JANUARY AGUILAR   Phone Number: [phone] (home)     Reason for Call: PATIENT IS WANTING TO GET SCHEDULED FOR ANOTHER CORTISONE INJECTION IN HER RIGHT KNEE. SHE WANTS IT WITH SUELLEN GOODMAN   PLEASE ADVISE FOR SCHEDULING

## 2023-09-13 ENCOUNTER — OFFICE VISIT (OUTPATIENT)
Dept: INTERNAL MEDICINE | Facility: CLINIC | Age: 73
End: 2023-09-13
Payer: MEDICARE

## 2023-09-13 VITALS
SYSTOLIC BLOOD PRESSURE: 122 MMHG | TEMPERATURE: 96.8 F | WEIGHT: 174.2 LBS | HEIGHT: 63 IN | DIASTOLIC BLOOD PRESSURE: 68 MMHG | BODY MASS INDEX: 30.87 KG/M2 | HEART RATE: 101 BPM | OXYGEN SATURATION: 95 %

## 2023-09-13 DIAGNOSIS — N30.00 ACUTE CYSTITIS WITHOUT HEMATURIA: ICD-10-CM

## 2023-09-13 DIAGNOSIS — E78.2 MIXED HYPERLIPIDEMIA: ICD-10-CM

## 2023-09-13 DIAGNOSIS — I10 ESSENTIAL HYPERTENSION: ICD-10-CM

## 2023-09-13 DIAGNOSIS — E11.65 UNCONTROLLED TYPE 2 DIABETES MELLITUS WITH HYPERGLYCEMIA: Primary | ICD-10-CM

## 2023-09-13 DIAGNOSIS — N30.10 IC (INTERSTITIAL CYSTITIS): ICD-10-CM

## 2023-09-13 DIAGNOSIS — M80.031D PATHOLOGICAL FRACTURE OF RIGHT RADIUS DUE TO AGE-RELATED OSTEOPOROSIS WITH ROUTINE HEALING, SUBSEQUENT ENCOUNTER: ICD-10-CM

## 2023-09-13 DIAGNOSIS — Z23 NEEDS FLU SHOT: ICD-10-CM

## 2023-09-13 DIAGNOSIS — E61.1 LOW SERUM IRON: ICD-10-CM

## 2023-09-13 DIAGNOSIS — F41.8 DEPRESSION WITH ANXIETY: ICD-10-CM

## 2023-09-13 DIAGNOSIS — E11.65 UNCONTROLLED TYPE 2 DIABETES MELLITUS WITH HYPERGLYCEMIA: ICD-10-CM

## 2023-09-13 PROBLEM — R39.9 UTI SYMPTOMS: Status: RESOLVED | Noted: 2023-02-01 | Resolved: 2023-09-13

## 2023-09-13 LAB
BILIRUB BLD-MCNC: ABNORMAL MG/DL
CLARITY, POC: ABNORMAL
COLOR UR: ABNORMAL
EXPIRATION DATE: ABNORMAL
GLUCOSE UR STRIP-MCNC: ABNORMAL MG/DL
KETONES UR QL: ABNORMAL
LEUKOCYTE EST, POC: ABNORMAL
Lab: ABNORMAL
NITRITE UR-MCNC: NEGATIVE MG/ML
PH UR: 5 [PH] (ref 5–8)
PROT UR STRIP-MCNC: ABNORMAL MG/DL
RBC # UR STRIP: ABNORMAL /UL
SP GR UR: 1.02 (ref 1–1.03)
UROBILINOGEN UR QL: NORMAL

## 2023-09-13 PROCEDURE — 1160F RVW MEDS BY RX/DR IN RCRD: CPT | Performed by: NURSE PRACTITIONER

## 2023-09-13 PROCEDURE — 3051F HG A1C>EQUAL 7.0%<8.0%: CPT | Performed by: NURSE PRACTITIONER

## 2023-09-13 PROCEDURE — 1159F MED LIST DOCD IN RCRD: CPT | Performed by: NURSE PRACTITIONER

## 2023-09-13 PROCEDURE — G0008 ADMIN INFLUENZA VIRUS VAC: HCPCS | Performed by: NURSE PRACTITIONER

## 2023-09-13 PROCEDURE — 99214 OFFICE O/P EST MOD 30 MIN: CPT | Performed by: NURSE PRACTITIONER

## 2023-09-13 PROCEDURE — 3074F SYST BP LT 130 MM HG: CPT | Performed by: NURSE PRACTITIONER

## 2023-09-13 PROCEDURE — 3078F DIAST BP <80 MM HG: CPT | Performed by: NURSE PRACTITIONER

## 2023-09-13 PROCEDURE — 81003 URINALYSIS AUTO W/O SCOPE: CPT | Performed by: NURSE PRACTITIONER

## 2023-09-13 PROCEDURE — 90662 IIV NO PRSV INCREASED AG IM: CPT | Performed by: NURSE PRACTITIONER

## 2023-09-13 RX ORDER — NITROFURANTOIN 25; 75 MG/1; MG/1
100 CAPSULE ORAL 2 TIMES DAILY
Qty: 14 CAPSULE | Refills: 0 | Status: SHIPPED | OUTPATIENT
Start: 2023-09-13

## 2023-09-13 NOTE — PROGRESS NOTES
"  Follow up on DM 2, HTN, Hyperlipidemia, depression w anxiety, IC, low iron levels    Subjective     Amber Campos is a 72 y.o. female being seen for a follow up appointment today regarding DM 2, HTN, hyperlipidemia, IC. She had diabetic education at [facility] ([facility] in 2023. She was followed by srinath, but declined to see them anymore. She has a Continuous glucose monitor/Willi meter. Average glucose 172. Fasting glucose ranges from 130-220s, depending on what she had for dinner. PP glucose 150s. She is following a Anamika Jovanny diet. She is prescribed metformin 1000 mg BID and Ozempic 2 mg weekly. She stopped the Invokana, due to urinary frequency.     She has history of urinary frequency with IC. She is reporting back pain, fatgiue and urinary frequency. She is followed by Dr Benton ([facility]). She had a CT abd and pelvis and cystoscopy scheduled for renal cysts and IC. She left a urine sample today.      She is on Susannah 5/40mg daily for HTN. She has previously been on Diovan, Toprol XL and Ziac. She does not check her BP, but feels like she is tolerating this medication well. She denies edema, CP, SOA.  She had a lifeline screening showing mild placque in carotid arteries.      She has history of JOSETTE and GERD. She sas been evaluated with both a C-scope 6-6-2022 with Dr. Alaniz. She is on an oral iron supplement. She takes a daily Protonix 40mg tablet.      She is on prolia for osteoporosis with history of traumatic fractures of wrist and ankle. dexa bone density axial (04/30/2021 08:59)     She is on Effexor XR for depression w anxiety. She does well on this.     She is on Effexor XR 75mg daily for depression and anxiety. Her  has hx alcohol abuse, but she reports feeling better after \"giving it to God.\" And realizing that she cannot control him.     History of Present Illness     Allergies   Allergen Reactions    Contrast Dye (Echo Or Unknown Ct/Mr) Anaphylaxis     Contrast dye used in Xray    Iodinated Contrast Media " Photosensitivity     contrast    Adhesive Tape Other (See Comments)     EKG stickers only    Farxiga [Dapagliflozin] Diarrhea and Itching     Yeast infeciotn    Neomycin-Bacitracin Zn-Polymyx Rash    Toprol Xl [Metoprolol] Dizziness    Trulicity [Dulaglutide] GI Intolerance     Constipation    Victoza [Liraglutide] Nausea Only         Current Outpatient Medications:     amlodipine-olmesartan (NIC) 5-40 MG per tablet, TAKE 1 TABLET DAILY, Disp: 90 tablet, Rfl: 3    budesonide-formoterol (Symbicort) 160-4.5 MCG/ACT inhaler, Inhale 2 puffs 2 (Two) Times a Day., Disp: 1 each, Rfl: 6    Cetirizine HCl (ZyrTEC Childrens Allergy) 5 MG/5ML solution solution, Take 5 mL by mouth Daily., Disp: 150 mL, Rfl:     ciprofloxacin (Cipro) 500 MG tablet, Take 1 tablet by mouth 2 (Two) Times a Day., Disp: 14 tablet, Rfl: 0    Clocortolone Pivalate 0.1 % cream, APPLY TOPICALLY TO AFFECTED AREA TWICE A DAY AS NEEDED, Disp: , Rfl:     Continuous Blood Gluc  (FreeStyle Willi 14 Day [place]) device, 1 each 4 (Four) Times a Day., Disp: 1 each, Rfl: 3    Continuous Blood Gluc Sensor (FreeStyle Willi 2 Sensor) misc, USE AS DIRECTED BY PROVIDER OR PACKAGE INSTRUCTIONS, Disp: 3 each, Rfl: 7    metFORMIN (GLUCOPHAGE) 1000 MG tablet, Take 1 tablet by mouth 2 (Two) Times a Day With Meals. Please discontinue her Invokanamet and resume Metformin, Disp: 180 tablet, Rfl: 1    Omega-3 Fatty Acids (fish oil) 1000 MG capsule capsule, Take  by mouth Daily With Breakfast., Disp: , Rfl:     pantoprazole (PROTONIX) 40 MG EC tablet, TAKE 1 TABLET DAILY, Disp: 90 tablet, Rfl: 3    Plant Sterols and Stanols (CholestOff) 450 MG tablet, Take 1 capsule by mouth 2 (Two) Times a Day., Disp: , Rfl:     Semaglutide, 2 MG/DOSE, (Ozempic, 2 MG/DOSE,) 8 MG/3ML solution pen-injector, Inject 2 mg under the skin into the appropriate area as directed 1 (One) Time Per Week., Disp: 9 mL, Rfl: 1    Spacer/Aero Chamber Mouthpiece misc, 1 each Daily., Disp: 1 each, Rfl:  0    venlafaxine XR (EFFEXOR-XR) 75 MG 24 hr capsule, TAKE 1 CAPSULE DAILY, Disp: 90 capsule, Rfl: 3    The following portions of the patient's history were reviewed and updated as appropriate: allergies, current medications, past family history, past medical history, past social history, past surgical history, and problem list.    Review of Systems   Constitutional: Negative.    HENT: Negative.     Eyes: Negative.    Respiratory: Negative.     Cardiovascular: Negative.  Negative for chest pain, palpitations and leg swelling.   Gastrointestinal: Negative.    Endocrine: Negative.    Genitourinary: Negative.    Musculoskeletal:  Positive for back pain.   Allergic/Immunologic: Positive for environmental allergies.   Neurological: Negative.    Hematological: Negative.  Negative for adenopathy. Does not bruise/bleed easily.   Psychiatric/Behavioral: Negative.     All other systems reviewed and are negative.    Assessment     Physical Exam  Vitals reviewed.   Constitutional:       Appearance: Normal appearance. She is obese.   HENT:      Left Ear: Tympanic membrane normal.   Cardiovascular:      Rate and Rhythm: Normal rate and regular rhythm.      Pulses: Normal pulses.      Heart sounds: Normal heart sounds. No murmur heard.  Pulmonary:      Effort: Pulmonary effort is normal. No respiratory distress.      Breath sounds: Normal breath sounds. No stridor.   Musculoskeletal:      Right lower leg: No edema.      Left lower leg: No edema.   Skin:     General: Skin is warm and dry.   Neurological:      General: No focal deficit present.      Mental Status: She is alert and oriented to person, place, and time.   Psychiatric:         Mood and Affect: Mood normal.         Behavior: Behavior normal.         Thought Content: Thought content normal.       Plan     Her fasting labs were reviewed with the patient from last week.     Diagnoses and all orders for this visit:    1. Uncontrolled type 2 diabetes mellitus with  hyperglycemia (Primary)  Comments:  Improving after ncreasing Ozempic to 2mg weekly and takign metformin as scheduled. Better diet and exercise regimen needed with carb reduciton  Orders:  -     Comprehensive Metabolic Panel; Future  -     Hemoglobin A1c; Future    2. Essential hypertension  Comments:  BP at goal on Susannah as prescribed  Orders:  -     CBC & Differential; Future  -     Lipid Panel With / Chol / HDL Ratio; Future    3. Mixed hyperlipidemia  Comments:  LDL goal < 70  Orders:  -     Comprehensive Metabolic Panel; Future  -     Lipid Panel With / Chol / HDL Ratio; Future    4. Depression with anxiety  Comments:  On effexor XR 75mg    5. IC (interstitial cystitis)  -     POCT urinalysis dipstick, automated    6. Low serum iron  -     CBC & Differential; Future  -     Iron Profile; Future  -     Ferritin; Future    7. Acute cystitis without hematuria  -     nitrofurantoin, macrocrystal-monohydrate, (Macrobid) 100 MG capsule; Take 1 capsule by mouth 2 (Two) Times a Day.  Dispense: 14 capsule; Refill: 0  -     Cancel: Urine Culture - Urine, Urine, Clean Catch; Future  -     Urine Culture - Urine, Urine, Clean Catch; Future    8. Needs flu shot  -     Fluzone High-Dose 65+yrs        F/U in 3 months w albs

## 2023-09-14 ENCOUNTER — OFFICE VISIT (OUTPATIENT)
Dept: ORTHOPEDIC SURGERY | Facility: CLINIC | Age: 73
End: 2023-09-14
Payer: MEDICARE

## 2023-09-14 VITALS — BODY MASS INDEX: 30.83 KG/M2 | HEIGHT: 63 IN | WEIGHT: 174 LBS

## 2023-09-14 DIAGNOSIS — M17.11 PRIMARY OSTEOARTHRITIS OF RIGHT KNEE: Primary | ICD-10-CM

## 2023-09-14 RX ADMIN — TRIAMCINOLONE ACETONIDE 80 MG: 40 INJECTION, SUSPENSION INTRA-ARTICULAR; INTRAMUSCULAR at 14:54

## 2023-09-14 RX ADMIN — LIDOCAINE HYDROCHLORIDE 8 ML: 10 INJECTION, SOLUTION EPIDURAL; INFILTRATION; INTRACAUDAL; PERINEURAL at 14:54

## 2023-09-14 NOTE — PROGRESS NOTES
Subjective:     Patient ID: Amber Campos is a 72 y.o. female.    Chief Complaint:  Follow-up mechanical knee pain, right  DJD right knee   History of Present Illness  Amber Campos returns to clinic for follow-up of her right knee.  Is received corticosteroid injections in the past with fairly decent symptom relief and returns today to discuss injection.  Denies any new onset of symptoms.  Denies any other concerns present.     Social History     Occupational History    Occupation: retired    Tobacco Use    Smoking status: Never     Passive exposure: Never    Smokeless tobacco: Never   Vaping Use    Vaping Use: Never used   Substance and Sexual Activity    Alcohol use: No    Drug use: No    Sexual activity: Defer      Past Medical History:   Diagnosis Date    Basaloid carcinoma     facial    Gastroesophageal reflux disease 3/14/2016    History of kidney infection     History of mammogram     Hypertension     Osteopenia     Osteoporosis     Postmenopausal     PVCs (premature ventricular contractions)     Seasonal allergies     Simple renal cyst 3/14/2016    Steatosis of liver 3/14/2016    Type 2 diabetes mellitus      Past Surgical History:   Procedure Laterality Date    BREAST BIOPSY Bilateral     Benign    BUNIONECTOMY Right     COLONOSCOPY N/A 04/25/2017    Procedure: COLONOSCOPY TO CECUM ;  Surgeon: Aidan Roca MD;  Location: [facility] ENDOSCOPY;  Service:     EXOSTECTOMY Left     HEEL    FOOT FUSION Right     9 screws and plate    KNEE ARTHROSCOPY W/ MENISCAL REPAIR Right     ORIF WRIST FRACTURE Right 12/01/2021    Procedure: WRIST OPEN REDUCTION INTERNAL FIXATION;  Surgeon: Salazar Amezcua MD;  Location: [facility] OR;  Service: Orthopedics;  Laterality: Right;    PAP SMEAR      TONSILLECTOMY      TOTAL SHOULDER REVERSE ARTHROPLASTY Right     UMBILICAL HERNIA REPAIR         Family History   Problem Relation Age of Onset    Other Mother         brain death    Emphysema Mother     Alcohol abuse Father      "Glaucoma Father     Other Sister         fatty liver    Heart disease Sister         valve problem, being followed (no surgery required)    Arthritis Sister     Other Brother         fatty liver    Glaucoma Maternal Grandmother     Heart disease Maternal Grandmother     Alcohol abuse Maternal Aunt     Diabetes Maternal Aunt     Breast cancer Paternal Aunt                Objective:  Physical Exam    General: No acute distress.  Eyes: conjunctiva clear; pupils equally round and reactive  ENT: external ears and nose atraumatic; oropharynx clear  CV: no peripheral edema  Resp: normal respiratory effort  Skin: no rashes or wounds; normal turgor  Psych: mood and affect appropriate; recent and remote memory intact    Vitals:    09/14/23 1416   Weight: 78.9 kg (174 lb)   Height: 160 cm (63\")         09/14/23  1416   Weight: 78.9 kg (174 lb)     Body mass index is 30.82 kg/m².      Ortho Exam     Right knee examined  Knee range of motion 0 to 125 degrees  Positive tenderness along the medial joint line, patella  Positive medial Yaniv's exam  Negative lateral Yaniv's exam  Stable to varus and valgus stress at 0 degrees and 30 degrees  1+ anterior Lachman  Positive active patellar compression test  Positive crepitus arc of motion  Moderate swelling, negative effusion    Assessment:        1. Primary osteoarthritis of right knee           Plan:  1.  Discussed plan of care with patient.  She does wish to proceed corticosteroid injection right knee today's visit.  I do recommend application of ice injection site this evening.  Encouraged to continue with quad strengthening-strengthening Strengthening exercises right lower extremity.  She would like to find some group activity.  She did discuss Aman however discussed with patient that the activity will exacerbate her symptoms specially pounding in indican twisting motion.  She could try Aman and try modification of the activity but this specific activity will exacerbate " her underlying arthritis.  We also discussed yoga.  Discussed core strengthening hamstring strengthening calf strengthening quad strengthening hip strengthening exercises.  Large Joint Arthrocentesis: R knee  Date/Time: 9/14/2023 2:54 PM  Consent given by: patient  Site marked: site marked  Timeout: Immediately prior to procedure a time out was called to verify the correct patient, procedure, equipment, support staff and site/side marked as required   Supporting Documentation  Indications: pain   Procedure Details  Location: knee - R knee  Preparation: Patient was prepped and draped in the usual sterile fashion  Needle size: 22 G  Approach: superior  Medications administered: 8 mL lidocaine PF 1% 1 %; 80 mg triamcinolone acetonide 40 MG/ML  Patient tolerance: patient tolerated the procedure well with no immediate complications        Orders:  Orders Placed This Encounter   Procedures    Large Joint Arthrocentesis: R knee     No orders of the defined types were placed in this encounter.            Dragon dictation utilized

## 2023-09-17 PROBLEM — M17.11 PRIMARY OSTEOARTHRITIS OF RIGHT KNEE: Status: ACTIVE | Noted: 2023-09-17

## 2023-09-17 RX ORDER — LIDOCAINE HYDROCHLORIDE 10 MG/ML
8 INJECTION, SOLUTION EPIDURAL; INFILTRATION; INTRACAUDAL; PERINEURAL
Status: COMPLETED | OUTPATIENT
Start: 2023-09-14 | End: 2023-09-14

## 2023-09-17 RX ORDER — TRIAMCINOLONE ACETONIDE 40 MG/ML
80 INJECTION, SUSPENSION INTRA-ARTICULAR; INTRAMUSCULAR
Status: COMPLETED | OUTPATIENT
Start: 2023-09-14 | End: 2023-09-14

## 2023-09-18 ENCOUNTER — TELEPHONE (OUTPATIENT)
Dept: INTERNAL MEDICINE | Facility: CLINIC | Age: 73
End: 2023-09-18
Payer: MEDICARE

## 2023-09-18 DIAGNOSIS — K21.9 GASTROESOPHAGEAL REFLUX DISEASE WITHOUT ESOPHAGITIS: ICD-10-CM

## 2023-09-18 RX ORDER — PANTOPRAZOLE SODIUM 40 MG/1
TABLET, DELAYED RELEASE ORAL
Qty: 90 TABLET | Refills: 3 | Status: SHIPPED | OUTPATIENT
Start: 2023-09-18

## 2023-09-18 NOTE — TELEPHONE ENCOUNTER
Patient is calling, was put on macrobid for uti, would like to know results of urine culture and she also is still having urgency to go, and her blood sugar has been elevated to 240 or higher while taking the macrobid. If someone could give her a call, she's not sure medicine is correct or maybe needs to be changed. Will be away from phone between 9:30 a.m to 1:00, but message can be left and she'll get back with us.

## 2023-09-18 NOTE — TELEPHONE ENCOUNTER
The urine culture is not back yet. Infection can raised her glucose. Elevated glucose is not a result of Macrobid use. Will call with culture when available.

## 2023-09-18 NOTE — TELEPHONE ENCOUNTER
Left voicemail for patient to call back    Hub to read:   The urine culture is not back yet. Infection can raised her glucose. Elevated glucose is not a result of Macrobid use. Will call with culture when available.

## 2023-09-21 ENCOUNTER — TELEPHONE (OUTPATIENT)
Dept: INTERNAL MEDICINE | Facility: CLINIC | Age: 73
End: 2023-09-21

## 2023-09-21 LAB
BACTERIA UR CULT: ABNORMAL
BACTERIA UR CULT: ABNORMAL
OTHER ANTIBIOTIC SUSC ISLT: ABNORMAL

## 2023-09-21 NOTE — TELEPHONE ENCOUNTER
Caller: Amber Campos    Relationship to patient: Self    Best call back number: [phone]    Patient is needing: PATIENT STATES SHE HAS FINISHED ALL MEDICATIONS, BUT FEELS LIKE SHE STILL HAS A UTI. PATIENT STATES HER GLUCOSE SENSOR READING , BUT MONITOR WITH STRIP SHOWS 229. PATIENT ASKS IF ONE OF THE MACHINES COULD BE FAULTY?  PATIENT WOULD LIKE TO BE SEE KIRK CARRINGTON TODAY OR TOMORROW  PLEASE ADVISE

## 2023-09-22 ENCOUNTER — OFFICE VISIT (OUTPATIENT)
Dept: INTERNAL MEDICINE | Facility: CLINIC | Age: 73
End: 2023-09-22
Payer: COMMERCIAL

## 2023-09-22 VITALS
WEIGHT: 171.6 LBS | HEIGHT: 63 IN | SYSTOLIC BLOOD PRESSURE: 130 MMHG | HEART RATE: 87 BPM | BODY MASS INDEX: 30.41 KG/M2 | DIASTOLIC BLOOD PRESSURE: 74 MMHG | TEMPERATURE: 98.2 F | OXYGEN SATURATION: 96 %

## 2023-09-22 DIAGNOSIS — E11.65 UNCONTROLLED TYPE 2 DIABETES MELLITUS WITH HYPERGLYCEMIA: Primary | ICD-10-CM

## 2023-09-22 DIAGNOSIS — N30.00 ACUTE CYSTITIS WITHOUT HEMATURIA: ICD-10-CM

## 2023-09-22 DIAGNOSIS — N89.8 VAGINA ITCHING: ICD-10-CM

## 2023-09-22 LAB
BILIRUB BLD-MCNC: NEGATIVE MG/DL
CLARITY, POC: ABNORMAL
COLOR UR: YELLOW
EXPIRATION DATE: ABNORMAL
EXPIRATION DATE: NORMAL
GLUCOSE BLDC GLUCOMTR-MCNC: 172 MG/DL (ref 70–130)
GLUCOSE UR STRIP-MCNC: NEGATIVE MG/DL
KETONES UR QL: ABNORMAL
LEUKOCYTE EST, POC: ABNORMAL
Lab: ABNORMAL
Lab: NORMAL
NITRITE UR-MCNC: POSITIVE MG/ML
PH UR: 5 [PH] (ref 5–8)
POC CREATININE URINE: 200
POC MICROALBUMIN URINE: 80
PROT UR STRIP-MCNC: ABNORMAL MG/DL
RBC # UR STRIP: ABNORMAL /UL
SP GR UR: 1.01 (ref 1–1.03)
UROBILINOGEN UR QL: ABNORMAL

## 2023-09-22 PROCEDURE — 82948 REAGENT STRIP/BLOOD GLUCOSE: CPT | Performed by: NURSE PRACTITIONER

## 2023-09-22 PROCEDURE — 81003 URINALYSIS AUTO W/O SCOPE: CPT | Performed by: NURSE PRACTITIONER

## 2023-09-22 PROCEDURE — 99214 OFFICE O/P EST MOD 30 MIN: CPT | Performed by: NURSE PRACTITIONER

## 2023-09-22 PROCEDURE — 82044 UR ALBUMIN SEMIQUANTITATIVE: CPT | Performed by: NURSE PRACTITIONER

## 2023-09-22 RX ORDER — FLUCONAZOLE 200 MG/1
200 TABLET ORAL DAILY
Qty: 7 TABLET | Refills: 0 | Status: SHIPPED | OUTPATIENT
Start: 2023-09-22

## 2023-09-22 RX ORDER — SULFAMETHOXAZOLE AND TRIMETHOPRIM 800; 160 MG/1; MG/1
1 TABLET ORAL 2 TIMES DAILY
Qty: 14 TABLET | Refills: 0 | Status: SHIPPED | OUTPATIENT
Start: 2023-09-22

## 2023-09-22 NOTE — PROGRESS NOTES
Chief Complaint   Patient presents with    Diabetes       Subjective     Amber Campos is a 72 y.o. female being seen for a follow up appointment today regarding DM 2. She is on Ozempic 2 mg weekly, Metformin 1000mg BID. She has seen a steady increase in her glucose level, average from 190 to 230 over the past 90 days. She has lost 8 pounds.     She is reporting that she has had a coated and tongue and vaginal itching.       Diabetes  Pertinent negatives for diabetes include no fatigue.      Allergies   Allergen Reactions    Contrast Dye (Echo Or Unknown Ct/Mr) Anaphylaxis     Contrast dye used in Xray    Iodinated Contrast Media Photosensitivity     contrast    Adhesive Tape Other (See Comments)     EKG stickers only    Farxiga [Dapagliflozin] Diarrhea and Itching     Yeast infeciotn    Neomycin-Bacitracin Zn-Polymyx Rash    Toprol Xl [Metoprolol] Dizziness    Trulicity [Dulaglutide] GI Intolerance     Constipation    Victoza [Liraglutide] Nausea Only         Current Outpatient Medications:     amlodipine-olmesartan (NIC) 5-40 MG per tablet, TAKE 1 TABLET DAILY, Disp: 90 tablet, Rfl: 3    budesonide-formoterol (Symbicort) 160-4.5 MCG/ACT inhaler, Inhale 2 puffs 2 (Two) Times a Day., Disp: 1 each, Rfl: 6    Cetirizine HCl (ZyrTEC Childrens Allergy) 5 MG/5ML solution solution, Take 5 mL by mouth Daily., Disp: 150 mL, Rfl:     Clocortolone Pivalate 0.1 % cream, APPLY TOPICALLY TO AFFECTED AREA TWICE A DAY AS NEEDED, Disp: , Rfl:     Continuous Blood Gluc  (FreeStyle Willi 14 Day [place]) device, 1 each 4 (Four) Times a Day., Disp: 1 each, Rfl: 3    Continuous Blood Gluc Sensor (FreeStyle Willi 2 Sensor) misc, USE AS DIRECTED BY PROVIDER OR PACKAGE INSTRUCTIONS, Disp: 3 each, Rfl: 7    metFORMIN (GLUCOPHAGE) 1000 MG tablet, Take 1 tablet by mouth 2 (Two) Times a Day With Meals. Please discontinue her Invokanamet and resume Metformin, Disp: 180 tablet, Rfl: 1    nitrofurantoin, macrocrystal-monohydrate,  (Macrobid) 100 MG capsule, Take 1 capsule by mouth 2 (Two) Times a Day., Disp: 14 capsule, Rfl: 0    Omega-3 Fatty Acids (fish oil) 1000 MG capsule capsule, Take  by mouth Daily With Breakfast., Disp: , Rfl:     pantoprazole (PROTONIX) 40 MG EC tablet, TAKE 1 TABLET DAILY, Disp: 90 tablet, Rfl: 3    Plant Sterols and Stanols (CholestOff) 450 MG tablet, Take 1 capsule by mouth 2 (Two) Times a Day., Disp: , Rfl:     Semaglutide, 2 MG/DOSE, (Ozempic, 2 MG/DOSE,) 8 MG/3ML solution pen-injector, Inject 2 mg under the skin into the appropriate area as directed 1 (One) Time Per Week., Disp: 9 mL, Rfl: 1    Spacer/Aero Chamber Mouthpiece misc, 1 each Daily., Disp: 1 each, Rfl: 0    venlafaxine XR (EFFEXOR-XR) 75 MG 24 hr capsule, TAKE 1 CAPSULE DAILY, Disp: 90 capsule, Rfl: 3    The following portions of the patient's history were reviewed and updated as appropriate: allergies, current medications, past family history, past medical history, past social history, past surgical history, and problem list.    Review of Systems   Constitutional: Negative.  Negative for fatigue and fever.   Genitourinary:  Positive for frequency. Vaginal discharge: itching.  Musculoskeletal:  Negative for arthralgias.   All other systems reviewed and are negative.    Assessment     Physical Exam  Vitals reviewed.   Constitutional:       Appearance: Normal appearance.   Cardiovascular:      Rate and Rhythm: Normal rate and regular rhythm.      Pulses: Normal pulses.      Heart sounds: Normal heart sounds. No murmur heard.  Neurological:      Mental Status: She is alert.       Plan     Her Urine culture was reviewed from 3 days ago    Diagnoses and all orders for this visit:    1. Uncontrolled type 2 diabetes mellitus with hyperglycemia (Primary)  Comments:  Stiop metfomrin and start Invokamet  Orders:  -     POC Microalbumin  -     POCT Glucose  -     Canagliflozin-metFORMIN HCl  MG tablet; Take 1 tablet by mouth Every Evening.  Dispense: 90  tablet; Refill: 1  -     POCT urinalysis dipstick, automated    2. Vagina itching  -     fluconazole (Diflucan) 200 MG tablet; Take 1 tablet by mouth Daily.  Dispense: 7 tablet; Refill: 0    3. Acute cystitis without hematuria  -     sulfamethoxazole-trimethoprim (Bactrim DS) 800-160 MG per tablet; Take 1 tablet by mouth 2 (Two) Times a Day.  Dispense: 14 tablet; Refill: 0      Follow up in 4 weeks

## 2023-09-23 PROBLEM — N89.8 VAGINA ITCHING: Status: ACTIVE | Noted: 2023-09-23

## 2023-09-26 DIAGNOSIS — E11.65 UNCONTROLLED TYPE 2 DIABETES MELLITUS WITH HYPERGLYCEMIA: ICD-10-CM

## 2023-10-03 PROBLEM — J06.9 UPPER RESPIRATORY TRACT INFECTION DUE TO COVID-19 VIRUS: Status: RESOLVED | Noted: 2022-08-26 | Resolved: 2023-10-03

## 2023-10-03 PROBLEM — U07.1 UPPER RESPIRATORY TRACT INFECTION DUE TO COVID-19 VIRUS: Status: RESOLVED | Noted: 2022-08-26 | Resolved: 2023-10-03

## 2023-10-03 RX ORDER — CANAGLIFLOZIN AND METFORMIN HYDROCHLORIDE 50; 1000 MG/1; MG/1
1 TABLET, FILM COATED, EXTENDED RELEASE ORAL
Qty: 90 TABLET | Refills: 1 | Status: SHIPPED | OUTPATIENT
Start: 2023-10-03

## 2023-10-12 ENCOUNTER — TELEPHONE (OUTPATIENT)
Dept: INTERNAL MEDICINE | Facility: CLINIC | Age: 73
End: 2023-10-12
Payer: MEDICARE

## 2023-10-12 NOTE — TELEPHONE ENCOUNTER
Caller: Amber Campos    Relationship: Self    Best call back number:     What is the best time to reach you:     Who are you requesting to speak with (clinical staff, provider,  specific staff member):     Do you know the name of the person who called:     What was the call regarding: PATIENT SAYS THAT SHE FINISHED THE MEDICATION THAT SHE WAS PRESCRIBED FOR HER URINARY TRACT INFECTION BUT STILL FEELS THAT IT IS NOT COMPLETELY GONE AWAY AS HER URINE HAS A REALLY STRONG SMELL TO IT.  SHE IS NOT IN ANY PAIN AT ALL.  SHE WANTS TO BE CALLED BACK AS SHE MAY NEED MORE MEDICATION.     Is it okay if the provider responds through MyChart:

## 2023-10-12 NOTE — TELEPHONE ENCOUNTER
I called and spoke with patient and suggested that since it had almost been 3 weeks since she had been seen that it would be a good idea to have her urine rechecked. Got her scheduled for an appointment for a recheck.

## 2023-10-13 ENCOUNTER — OFFICE VISIT (OUTPATIENT)
Dept: INTERNAL MEDICINE | Facility: CLINIC | Age: 73
End: 2023-10-13
Payer: MEDICARE

## 2023-10-13 VITALS
DIASTOLIC BLOOD PRESSURE: 58 MMHG | SYSTOLIC BLOOD PRESSURE: 110 MMHG | TEMPERATURE: 98 F | BODY MASS INDEX: 29.8 KG/M2 | WEIGHT: 168.2 LBS | HEIGHT: 63 IN | OXYGEN SATURATION: 97 % | HEART RATE: 109 BPM

## 2023-10-13 DIAGNOSIS — R30.0 DYSURIA: ICD-10-CM

## 2023-10-13 DIAGNOSIS — R25.2 CRAMP IN LIMB: ICD-10-CM

## 2023-10-13 DIAGNOSIS — E11.65 UNCONTROLLED TYPE 2 DIABETES MELLITUS WITH HYPERGLYCEMIA: ICD-10-CM

## 2023-10-13 DIAGNOSIS — D69.2 PURPURA: ICD-10-CM

## 2023-10-13 DIAGNOSIS — N30.00 ACUTE CYSTITIS WITHOUT HEMATURIA: Primary | ICD-10-CM

## 2023-10-13 LAB
BILIRUB BLD-MCNC: ABNORMAL MG/DL
CLARITY, POC: ABNORMAL
COLOR UR: YELLOW
EXPIRATION DATE: ABNORMAL
GLUCOSE UR STRIP-MCNC: NEGATIVE MG/DL
KETONES UR QL: ABNORMAL
LEUKOCYTE EST, POC: ABNORMAL
Lab: ABNORMAL
NITRITE UR-MCNC: POSITIVE MG/ML
PH UR: 5.5 [PH] (ref 5–8)
PROT UR STRIP-MCNC: ABNORMAL MG/DL
RBC # UR STRIP: ABNORMAL /UL
SP GR UR: 1.02 (ref 1–1.03)
UROBILINOGEN UR QL: NORMAL

## 2023-10-13 PROCEDURE — 3074F SYST BP LT 130 MM HG: CPT | Performed by: NURSE PRACTITIONER

## 2023-10-13 PROCEDURE — 3051F HG A1C>EQUAL 7.0%<8.0%: CPT | Performed by: NURSE PRACTITIONER

## 2023-10-13 PROCEDURE — 1160F RVW MEDS BY RX/DR IN RCRD: CPT | Performed by: NURSE PRACTITIONER

## 2023-10-13 PROCEDURE — 99214 OFFICE O/P EST MOD 30 MIN: CPT | Performed by: NURSE PRACTITIONER

## 2023-10-13 PROCEDURE — 81003 URINALYSIS AUTO W/O SCOPE: CPT | Performed by: NURSE PRACTITIONER

## 2023-10-13 PROCEDURE — 3078F DIAST BP <80 MM HG: CPT | Performed by: NURSE PRACTITIONER

## 2023-10-13 PROCEDURE — 1159F MED LIST DOCD IN RCRD: CPT | Performed by: NURSE PRACTITIONER

## 2023-10-13 RX ORDER — CIPROFLOXACIN 500 MG/1
500 TABLET, FILM COATED ORAL 2 TIMES DAILY
Qty: 14 TABLET | Refills: 0 | Status: SHIPPED | OUTPATIENT
Start: 2023-10-13

## 2023-10-13 RX ORDER — DIPHENOXYLATE HYDROCHLORIDE AND ATROPINE SULFATE 2.5; .025 MG/1; MG/1
1 TABLET ORAL DAILY
Start: 2023-10-13

## 2023-10-13 NOTE — PROGRESS NOTES
Chief Complaint   Patient presents with    Urinary Tract Infection       Subjective     Amber Campos is a 72 y.o. female being seen for an acute visit for possible UTI, discuss DM, discuss brushing to arm and leg cramps. She recently got back from a trip on a yacht. She started having dysuria and frequency again upon return a few days ago. She was treated recently with Bactrim DS and has been seen by urology in the past.      She is a type 2 diabetic, who is on Ozempic 2mg weekly. She did not start Invokamet, because her glucose was better, down to 131. She resumed her Metformin 1000mg BID.     She has had intermittent leg cramping. She is taking Magnesium as well as using a foam roller.     She noticed a bruise coming and going to LFA. No known injury.       History of Present Illness     Allergies   Allergen Reactions    Contrast Dye (Echo Or Unknown Ct/Mr) Anaphylaxis     Contrast dye used in Xray    Iodinated Contrast Media Photosensitivity     contrast    Adhesive Tape Other (See Comments)     EKG stickers only    Farxiga [Dapagliflozin] Diarrhea and Itching     Yeast infeciotn    Neomycin-Bacitracin Zn-Polymyx Rash    Toprol Xl [Metoprolol] Dizziness    Trulicity [Dulaglutide] GI Intolerance     Constipation    Victoza [Liraglutide] Nausea Only         Current Outpatient Medications:     amlodipine-olmesartan (NIC) 5-40 MG per tablet, TAKE 1 TABLET DAILY, Disp: 90 tablet, Rfl: 3    budesonide-formoterol (Symbicort) 160-4.5 MCG/ACT inhaler, Inhale 2 puffs 2 (Two) Times a Day., Disp: 1 each, Rfl: 6    Canagliflozin-metFORMIN HCl ER (Invokamet XR)  MG tablet sustained-release 24 hour, Take 1 tablet by mouth Daily With Dinner., Disp: 90 tablet, Rfl: 1    Cetirizine HCl (ZyrTE Childrens Allergy) 5 MG/5ML solution solution, Take 5 mL by mouth Daily., Disp: 150 mL, Rfl:     Clocortolone Pivalate 0.1 % cream, APPLY TOPICALLY TO AFFECTED AREA TWICE A DAY AS NEEDED, Disp: , Rfl:     Continuous Blood Gluc   (compropagoStyle Willi 14 Day [place]) device, 1 each 4 (Four) Times a Day., Disp: 1 each, Rfl: 3    Continuous Blood Gluc Sensor (FreeStyle Willi 2 Sensor) misc, USE AS DIRECTED BY PROVIDER OR PACKAGE INSTRUCTIONS, Disp: 3 each, Rfl: 7    fluconazole (Diflucan) 200 MG tablet, Take 1 tablet by mouth Daily., Disp: 7 tablet, Rfl: 0    Omega-3 Fatty Acids (fish oil) 1000 MG capsule capsule, Take  by mouth Daily With Breakfast., Disp: , Rfl:     pantoprazole (PROTONIX) 40 MG EC tablet, TAKE 1 TABLET DAILY, Disp: 90 tablet, Rfl: 3    Plant Sterols and Stanols (CholestOff) 450 MG tablet, Take 1 capsule by mouth 2 (Two) Times a Day., Disp: , Rfl:     Semaglutide, 2 MG/DOSE, (Ozempic, 2 MG/DOSE,) 8 MG/3ML solution pen-injector, Inject 2 mg under the skin into the appropriate area as directed 1 (One) Time Per Week., Disp: 9 mL, Rfl: 1    Spacer/Aero Chamber Mouthpiece misc, 1 each Daily., Disp: 1 each, Rfl: 0    venlafaxine XR (EFFEXOR-XR) 75 MG 24 hr capsule, TAKE 1 CAPSULE DAILY, Disp: 90 capsule, Rfl: 3    sulfamethoxazole-trimethoprim (Bactrim DS) 800-160 MG per tablet, Take 1 tablet by mouth 2 (Two) Times a Day. (Patient not taking: Reported on 10/13/2023), Disp: 14 tablet, Rfl: 0    The following portions of the patient's history were reviewed and updated as appropriate: allergies, current medications, past family history, past medical history, past social history, past surgical history, and problem list.    Review of Systems   Constitutional: Negative.    HENT: Negative.  Negative for congestion.    Eyes: Negative.    Respiratory: Negative.  Negative for cough and shortness of breath.    Endocrine: Negative.    Genitourinary:  Positive for dysuria and frequency.   Musculoskeletal:  Positive for myalgias.   Skin:  Positive for color change (bruise).   Psychiatric/Behavioral:  Negative for sleep disturbance.    All other systems reviewed and are negative.      Assessment     Physical Exam  Vitals reviewed.    Constitutional:       Appearance: Normal appearance. She is obese.   HENT:      Head: Normocephalic.   Cardiovascular:      Rate and Rhythm: Normal rate and regular rhythm.      Pulses: Normal pulses.      Heart sounds: Normal heart sounds. No murmur heard.  Pulmonary:      Effort: Pulmonary effort is normal. No respiratory distress.      Breath sounds: Normal breath sounds. No stridor.   Musculoskeletal:      Cervical back: Neck supple.      Right lower leg: No edema.      Left lower leg: No edema.   Skin:     General: Skin is warm and dry.      Findings: Bruising (purpura to LFA) present.   Neurological:      General: No focal deficit present.      Mental Status: She is alert and oriented to person, place, and time.   Psychiatric:         Mood and Affect: Mood normal.         Behavior: Behavior normal.         Thought Content: Thought content normal.         Plan         Diagnoses and all orders for this visit:    1. Acute cystitis without hematuria (Primary)  Comments:  Start Cipro 500mg BID for 7 days, repeat UA in 2 weeks  Orders:  -     ciprofloxacin (Cipro) 500 MG tablet; Take 1 tablet by mouth 2 (Two) Times a Day.  Dispense: 14 tablet; Refill: 0    2. Dysuria  -     POCT urinalysis dipstick, automated    3. Purpura  Comments:  Arnica cream and use sunscreen lotion daily    4. Cramp in limb  Comments:  Start Calcium OTC, use foam roller, hydrate    5. Uncontrolled type 2 diabetes mellitus with hyperglycemia  -     metFORMIN (GLUCOPHAGE) 1000 MG tablet; Take 1 tablet by mouth 2 (Two) Times a Day With Meals.    Other orders  -     multivitamin (THERAGRAN) tablet tablet; Take 1 tablet by mouth Daily.        Follow up in 2 weeks with urine

## 2023-10-27 ENCOUNTER — OFFICE VISIT (OUTPATIENT)
Dept: INTERNAL MEDICINE | Facility: CLINIC | Age: 73
End: 2023-10-27
Payer: MEDICARE

## 2023-10-27 VITALS
HEART RATE: 101 BPM | WEIGHT: 166.2 LBS | BODY MASS INDEX: 29.45 KG/M2 | SYSTOLIC BLOOD PRESSURE: 134 MMHG | TEMPERATURE: 97.8 F | DIASTOLIC BLOOD PRESSURE: 74 MMHG | HEIGHT: 63 IN | OXYGEN SATURATION: 94 %

## 2023-10-27 DIAGNOSIS — N39.0 FREQUENT UTI: Primary | ICD-10-CM

## 2023-10-27 DIAGNOSIS — G47.10 HYPERSOMNIA: ICD-10-CM

## 2023-10-27 LAB
BILIRUB BLD-MCNC: NEGATIVE MG/DL
CLARITY, POC: CLEAR
COLOR UR: YELLOW
EXPIRATION DATE: NORMAL
GLUCOSE UR STRIP-MCNC: NEGATIVE MG/DL
KETONES UR QL: NEGATIVE
LEUKOCYTE EST, POC: NEGATIVE
Lab: NORMAL
NITRITE UR-MCNC: NEGATIVE MG/ML
PH UR: 5 [PH] (ref 5–8)
PROT UR STRIP-MCNC: NEGATIVE MG/DL
RBC # UR STRIP: NEGATIVE /UL
SP GR UR: 1.01 (ref 1–1.03)
UROBILINOGEN UR QL: NORMAL

## 2023-10-27 PROCEDURE — 3051F HG A1C>EQUAL 7.0%<8.0%: CPT | Performed by: NURSE PRACTITIONER

## 2023-10-27 PROCEDURE — 3078F DIAST BP <80 MM HG: CPT | Performed by: NURSE PRACTITIONER

## 2023-10-27 PROCEDURE — 3075F SYST BP GE 130 - 139MM HG: CPT | Performed by: NURSE PRACTITIONER

## 2023-10-27 PROCEDURE — 99214 OFFICE O/P EST MOD 30 MIN: CPT | Performed by: NURSE PRACTITIONER

## 2023-10-27 PROCEDURE — 81003 URINALYSIS AUTO W/O SCOPE: CPT | Performed by: NURSE PRACTITIONER

## 2023-10-27 NOTE — PROGRESS NOTES
Chief Complaint   Patient presents with    Urinary Tract Infection     F/U       Subjective     Amber Campos is a 72 y.o. female being seen for a follow up appointment today regarding  UTI. She was in the office 2 weeks ago and placed on Cipro 500mg BID for 7 days.     She is reporting hypercomnnia. She is napping during the day sometimes for 3 hours. She is not aware of snoring or witnessed apnea.      History of Present Illness     Allergies   Allergen Reactions    Contrast Dye (Echo Or Unknown Ct/Mr) Anaphylaxis     Contrast dye used in Xray    Iodinated Contrast Media Photosensitivity     contrast    Adhesive Tape Other (See Comments)     EKG stickers only    Farxiga [Dapagliflozin] Diarrhea and Itching     Yeast infeciotn    Neomycin-Bacitracin Zn-Polymyx Rash    Toprol Xl [Metoprolol] Dizziness    Trulicity [Dulaglutide] GI Intolerance     Constipation    Victoza [Liraglutide] Nausea Only         Current Outpatient Medications:     amlodipine-olmesartan (NIC) 5-40 MG per tablet, TAKE 1 TABLET DAILY, Disp: 90 tablet, Rfl: 3    budesonide-formoterol (Symbicort) 160-4.5 MCG/ACT inhaler, Inhale 2 puffs 2 (Two) Times a Day., Disp: 1 each, Rfl: 6    Cetirizine HCl (ZyrTEC Childrens Allergy) 5 MG/5ML solution solution, Take 5 mL by mouth Daily., Disp: 150 mL, Rfl:     Clocortolone Pivalate 0.1 % cream, APPLY TOPICALLY TO AFFECTED AREA TWICE A DAY AS NEEDED, Disp: , Rfl:     Continuous Blood Gluc  (FreeStyle Willi 14 Day [place]) device, 1 each 4 (Four) Times a Day., Disp: 1 each, Rfl: 3    Continuous Blood Gluc Sensor (FreeStyle Willi 2 Sensor) misc, USE AS DIRECTED BY PROVIDER OR PACKAGE INSTRUCTIONS, Disp: 3 each, Rfl: 7    metFORMIN (GLUCOPHAGE) 1000 MG tablet, Take 1 tablet by mouth 2 (Two) Times a Day With Meals., Disp: , Rfl:     multivitamin (THERAGRAN) tablet tablet, Take 1 tablet by mouth Daily., Disp: , Rfl:     Omega-3 Fatty Acids (fish oil) 1000 MG capsule capsule, Take  by mouth Daily With  Breakfast., Disp: , Rfl:     pantoprazole (PROTONIX) 40 MG EC tablet, TAKE 1 TABLET DAILY, Disp: 90 tablet, Rfl: 3    Plant Sterols and Stanols (CholestOff) 450 MG tablet, Take 1 capsule by mouth 2 (Two) Times a Day., Disp: , Rfl:     Semaglutide, 2 MG/DOSE, (Ozempic, 2 MG/DOSE,) 8 MG/3ML solution pen-injector, Inject 2 mg under the skin into the appropriate area as directed 1 (One) Time Per Week., Disp: 9 mL, Rfl: 1    Spacer/Aero Chamber Mouthpiece misc, 1 each Daily., Disp: 1 each, Rfl: 0    venlafaxine XR (EFFEXOR-XR) 75 MG 24 hr capsule, TAKE 1 CAPSULE DAILY, Disp: 90 capsule, Rfl: 3    The following portions of the patient's history were reviewed and updated as appropriate: allergies, current medications, past family history, past medical history, past social history, past surgical history, and problem list.    Review of Systems   Constitutional:  Positive for fatigue.   HENT: Negative.     Genitourinary: Negative.    Musculoskeletal:  Positive for arthralgias.   All other systems reviewed and are negative.      Assessment     Physical Exam  Vitals reviewed.   Constitutional:       Appearance: Normal appearance. She is obese. She is not ill-appearing.   Cardiovascular:      Rate and Rhythm: Normal rate and regular rhythm.      Pulses: Normal pulses.      Heart sounds: Normal heart sounds. No murmur heard.  Pulmonary:      Effort: Pulmonary effort is normal.      Breath sounds: Normal breath sounds.   Neurological:      Mental Status: She is alert and oriented to person, place, and time.   Psychiatric:         Mood and Affect: Mood normal.         Behavior: Behavior normal.         Plan         Diagnoses and all orders for this visit:    1. Frequent UTI (Primary)  Comments:  Urine is clear today  Orders:  -     POCT urinalysis dipstick, automated    2. Hypersomnia  Comments:  Eval by sleep medicine needed  Orders:  -     Ambulatory Referral to Sleep Medicine      Follow up as scheduled

## 2023-11-07 NOTE — PROGRESS NOTES
RM:________     PCP: Jenny Sun APRN    : 1950  AGE: 72 y.o.  EST PATIENT     REASON FOR VISIT/  CC:        BP Readings from Last 3 Encounters:   10/27/23 134/74   10/13/23 110/58   23 130/74      Wt Readings from Last 3 Encounters:   10/27/23 75.4 kg (166 lb 3.2 oz)   10/13/23 76.3 kg (168 lb 3.2 oz)   23 77.8 kg (171 lb 9.6 oz)        WT: ____________ BP: __________L __________R HR______    CHEST PAIN: _____________    SOA: _____________PALPS: _______________     LIGHTHEADED: ___________FATIGUE: ________________ EDEMA __________    ALLERGIES:Contrast dye (echo or unknown ct/mr), Iodinated contrast media, Adhesive tape, Farxiga [dapagliflozin], Neomycin-bacitracin zn-polymyx, Toprol xl [metoprolol], Trulicity [dulaglutide], and Victoza [liraglutide] SMOKING HISTORY:  Social History     Tobacco Use    Smoking status: Never     Passive exposure: Never    Smokeless tobacco: Never   Vaping Use    Vaping Use: Never used   Substance Use Topics    Alcohol use: No    Drug use: No     CAFFEINE USE_________________  ALCOHOL ______________________

## 2023-11-13 ENCOUNTER — TELEPHONE (OUTPATIENT)
Dept: INTERNAL MEDICINE | Facility: CLINIC | Age: 73
End: 2023-11-13

## 2023-11-13 DIAGNOSIS — I10 ESSENTIAL HYPERTENSION: ICD-10-CM

## 2023-11-13 RX ORDER — AMLODIPINE AND OLMESARTAN MEDOXOMIL 5; 40 MG/1; MG/1
TABLET ORAL
Qty: 90 TABLET | Refills: 3 | Status: SHIPPED | OUTPATIENT
Start: 2023-11-13

## 2023-11-13 NOTE — TELEPHONE ENCOUNTER
Caller: Amber Campos    Relationship: Self    Best call back number: [phone]     What medication are you requesting: SOMETHING TO HELP RELIEVE SYMPTOMS    What are your current symptoms: HEADACHES, CONGESTION, COUGH, GREEN PHLEGM    How long have you been experiencing symptoms: FOUR DAYS    Have you had these symptoms before:    [x] Yes  [] No    Have you been treated for these symptoms before:   [x] Yes  [] No    If a prescription is needed, what is your preferred pharmacy and phone number:  [facility]/pharmacy #[id number] - [facility] [address] [address] AT [address] [address] - [phone]  - [phone]      Additional notes: PATIENT IS CONCERNED SHE WILL GET BRONCHITIS, PLEASE ADVISE.

## 2023-11-14 ENCOUNTER — OFFICE VISIT (OUTPATIENT)
Dept: CARDIOLOGY | Facility: CLINIC | Age: 73
End: 2023-11-14
Payer: MEDICARE

## 2023-11-14 ENCOUNTER — OFFICE VISIT (OUTPATIENT)
Dept: INTERNAL MEDICINE | Facility: CLINIC | Age: 73
End: 2023-11-14
Payer: MEDICARE

## 2023-11-14 VITALS
BODY MASS INDEX: 29.41 KG/M2 | HEIGHT: 63 IN | SYSTOLIC BLOOD PRESSURE: 128 MMHG | HEART RATE: 105 BPM | OXYGEN SATURATION: 96 % | WEIGHT: 166 LBS | DIASTOLIC BLOOD PRESSURE: 68 MMHG

## 2023-11-14 VITALS
DIASTOLIC BLOOD PRESSURE: 62 MMHG | OXYGEN SATURATION: 97 % | BODY MASS INDEX: 29.55 KG/M2 | SYSTOLIC BLOOD PRESSURE: 112 MMHG | TEMPERATURE: 97.7 F | HEART RATE: 102 BPM | WEIGHT: 166.8 LBS | HEIGHT: 63 IN

## 2023-11-14 DIAGNOSIS — R51.9 NONINTRACTABLE HEADACHE, UNSPECIFIED CHRONICITY PATTERN, UNSPECIFIED HEADACHE TYPE: ICD-10-CM

## 2023-11-14 DIAGNOSIS — R05.9 COUGH, UNSPECIFIED TYPE: Primary | ICD-10-CM

## 2023-11-14 DIAGNOSIS — R00.0 SINUS TACHYCARDIA: ICD-10-CM

## 2023-11-14 DIAGNOSIS — R52 GENERALIZED BODY ACHES: ICD-10-CM

## 2023-11-14 DIAGNOSIS — I49.3 VENTRICULAR PREMATURE BEATS: Primary | ICD-10-CM

## 2023-11-14 DIAGNOSIS — J40 BRONCHITIS: ICD-10-CM

## 2023-11-14 DIAGNOSIS — R09.81 NASAL CONGESTION: ICD-10-CM

## 2023-11-14 DIAGNOSIS — J02.9 SORE THROAT: ICD-10-CM

## 2023-11-14 DIAGNOSIS — J45.21 MILD INTERMITTENT REACTIVE AIRWAY DISEASE WITH ACUTE EXACERBATION: ICD-10-CM

## 2023-11-14 DIAGNOSIS — I10 ESSENTIAL HYPERTENSION: ICD-10-CM

## 2023-11-14 PROBLEM — B37.0 CANDIDA, ORAL: Status: RESOLVED | Noted: 2020-03-09 | Resolved: 2023-11-14

## 2023-11-14 PROBLEM — N30.00 ACUTE CYSTITIS WITHOUT HEMATURIA: Status: RESOLVED | Noted: 2017-01-04 | Resolved: 2023-11-14

## 2023-11-14 LAB
EXPIRATION DATE: NORMAL
FLUAV AG NPH QL: NEGATIVE
FLUBV AG NPH QL: NEGATIVE
INTERNAL CONTROL: NORMAL
Lab: NORMAL
S PYO AG THROAT QL: NEGATIVE
SARS-COV-2 AG UPPER RESP QL IA.RAPID: NOT DETECTED

## 2023-11-14 PROCEDURE — 93000 ELECTROCARDIOGRAM COMPLETE: CPT | Performed by: INTERNAL MEDICINE

## 2023-11-14 PROCEDURE — 3074F SYST BP LT 130 MM HG: CPT | Performed by: INTERNAL MEDICINE

## 2023-11-14 PROCEDURE — 1159F MED LIST DOCD IN RCRD: CPT | Performed by: INTERNAL MEDICINE

## 2023-11-14 PROCEDURE — 99214 OFFICE O/P EST MOD 30 MIN: CPT | Performed by: INTERNAL MEDICINE

## 2023-11-14 PROCEDURE — 1160F RVW MEDS BY RX/DR IN RCRD: CPT | Performed by: INTERNAL MEDICINE

## 2023-11-14 PROCEDURE — 3078F DIAST BP <80 MM HG: CPT | Performed by: INTERNAL MEDICINE

## 2023-11-14 RX ORDER — ALBUTEROL SULFATE 90 UG/1
2 AEROSOL, METERED RESPIRATORY (INHALATION) EVERY 4 HOURS PRN
Qty: 8.5 G | Refills: 2 | Status: SHIPPED | OUTPATIENT
Start: 2023-11-14

## 2023-11-14 RX ORDER — AMOXICILLIN 500 MG/1
1000 CAPSULE ORAL 2 TIMES DAILY
Qty: 40 CAPSULE | Refills: 0 | Status: SHIPPED | OUTPATIENT
Start: 2023-11-14 | End: 2023-11-24

## 2023-11-14 RX ORDER — BUDESONIDE AND FORMOTEROL FUMARATE DIHYDRATE 160; 4.5 UG/1; UG/1
2 AEROSOL RESPIRATORY (INHALATION)
Qty: 1 EACH | Refills: 6 | Status: SHIPPED | OUTPATIENT
Start: 2023-11-14

## 2023-11-14 NOTE — PROGRESS NOTES
Date of Office Visit: 23  Encounter Provider: Jordon Finn MD  Place of Service: [facility] CARDIOLOGY  Patient Name: Amber Campos  :1950    Chief Complaint   Patient presents with    Rapid Heart Rate   :     HPI:     Ms. Campos is 72 y.o. and presents today for re-evaluation. I have reviewed prior notes and there are no changes except for any new updates described below. I have also reviewed any information entered into the medical record by the patient or by ancillary staff.     I saw her in 2016 for the evaluation of PVCs.  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  An echo was relatively unremarkable. Of note, her average HR on the Holter was 100 bpm.    She has been on various beta blockers in the past for HTN; she presently is not on one and does not know why.    She presents today for evaluation of asymptomatic elevated heart rate. It is consistently in the low 100s. She denies palpitations, syncope, lightheadedness, leg swelling, dyspnea, or orthopnea.     Past Medical History:   Diagnosis Date    Basaloid carcinoma     facial    Carotid artery plaque, bilateral     without stenosis,     Gastroesophageal reflux disease 2016    History of kidney infection     History of mammogram     Hypertension     IC (interstitial cystitis) 2021    Osteopenia     Osteoporosis     Postmenopausal     PVCs (premature ventricular contractions)     Seasonal allergies     Simple renal cyst 2016    Sinus tachycardia     Steatosis of liver 2016    Type 2 diabetes mellitus     Vertigo 2022       Past Surgical History:   Procedure Laterality Date    BREAST BIOPSY Bilateral     Benign    BUNIONECTOMY Right     COLONOSCOPY N/A 2017    Procedure: COLONOSCOPY TO CECUM ;  Surgeon: Aidan Roca MD;  Location: [facility] ENDOSCOPY;  Service:     EXOSTECTOMY Left     HEEL    FOOT  FUSION Right     9 screws and plate    KNEE ARTHROSCOPY W/ MENISCAL REPAIR Right     ORIF WRIST FRACTURE Right 12/01/2021    Procedure: WRIST OPEN REDUCTION INTERNAL FIXATION;  Surgeon: Salazar Amezcua MD;  Location: [facility];  Service: Orthopedics;  Laterality: Right;    PAP SMEAR      TONSILLECTOMY      TOTAL SHOULDER REVERSE ARTHROPLASTY Right     UMBILICAL HERNIA REPAIR         Social History     Socioeconomic History    Marital status:    Tobacco Use    Smoking status: Never     Passive exposure: Never    Smokeless tobacco: Never   Vaping Use    Vaping Use: Never used   Substance and Sexual Activity    Alcohol use: No    Drug use: No    Sexual activity: Defer       Family History   Problem Relation Age of Onset    Other Mother         brain death    Emphysema Mother     Alcohol abuse Father     Glaucoma Father     Other Sister         fatty liver    Heart disease Sister         valve problem, being followed (no surgery required)    Arthritis Sister     Other Brother         fatty liver    Glaucoma Maternal Grandmother     Heart disease Maternal Grandmother     Alcohol abuse Maternal Aunt     Diabetes Maternal Aunt     Breast cancer Paternal Aunt        Review of Systems   HENT:          As per HPI   All other systems reviewed and are negative.      Allergies   Allergen Reactions    Contrast Dye (Echo Or Unknown Ct/Mr) Anaphylaxis     Contrast dye used in Xray    Iodinated Contrast Media Photosensitivity     contrast    Adhesive Tape Other (See Comments)     EKG stickers only    Farxiga [Dapagliflozin] Diarrhea and Itching     Yeast infeciotn    Neomycin-Bacitracin Zn-Polymyx Rash    Toprol Xl [Metoprolol] Dizziness    Trulicity [Dulaglutide] GI Intolerance     Constipation    Victoza [Liraglutide] Nausea Only         Current Outpatient Medications:     amlodipine-olmesartan (NIC) 5-40 MG per tablet, TAKE 1 TABLET DAILY, Disp: 90 tablet, Rfl: 3    budesonide-formoterol (Symbicort) 160-4.5 MCG/ACT  "inhaler, Inhale 2 puffs 2 (Two) Times a Day., Disp: 1 each, Rfl: 6    Cetirizine HCl (ZyrTE Childrens Allergy) 5 MG/5ML solution solution, Take 5 mL by mouth Daily., Disp: 150 mL, Rfl:     Clocortolone Pivalate 0.1 % cream, APPLY TOPICALLY TO AFFECTED AREA TWICE A DAY AS NEEDED, Disp: , Rfl:     Continuous Blood Gluc  (FreeStyle Willi 14 Day [place]) device, 1 each 4 (Four) Times a Day., Disp: 1 each, Rfl: 3    Continuous Blood Gluc Sensor (FreeStyle Willi 2 Sensor) misc, USE AS DIRECTED BY PROVIDER OR PACKAGE INSTRUCTIONS, Disp: 3 each, Rfl: 7    metFORMIN (GLUCOPHAGE) 1000 MG tablet, Take 1 tablet by mouth 2 (Two) Times a Day With Meals., Disp: , Rfl:     multivitamin (THERAGRAN) tablet tablet, Take 1 tablet by mouth Daily., Disp: , Rfl:     Omega-3 Fatty Acids (fish oil) 1000 MG capsule capsule, Take  by mouth Daily With Breakfast., Disp: , Rfl:     pantoprazole (PROTONIX) 40 MG EC tablet, TAKE 1 TABLET DAILY, Disp: 90 tablet, Rfl: 3    Semaglutide, 2 MG/DOSE, (Ozempic, 2 MG/DOSE,) 8 MG/3ML solution pen-injector, Inject 2 mg under the skin into the appropriate area as directed 1 (One) Time Per Week., Disp: 9 mL, Rfl: 1    Spacer/Aero Chamber Mouthpiece misc, 1 each Daily., Disp: 1 each, Rfl: 0    venlafaxine XR (EFFEXOR-XR) 75 MG 24 hr capsule, TAKE 1 CAPSULE DAILY, Disp: 90 capsule, Rfl: 3    Plant Sterols and Stanols (CholestOff) 450 MG tablet, Take 1 capsule by mouth 2 (Two) Times a Day. (Patient not taking: Reported on 11/14/2023), Disp: , Rfl:       Objective:     Vitals:    11/14/23 1010   BP: 128/68   BP Location: Right arm   Patient Position: Sitting   Cuff Size: Adult   Pulse: 105   SpO2: 96%   Weight: 75.3 kg (166 lb)   Height: 160 cm (63\")       Body mass index is 29.41 kg/m².    Vitals reviewed.   Constitutional:       Appearance: Overweight and not in distress.   Eyes:      Conjunctiva/sclera: Conjunctivae normal.   HENT:      Head: Normocephalic.      Nose: Nose normal.   Neck:      Thyroid: " Thyroid normal.      Vascular: No JVD. JVD normal.      Lymphadenopathy: No cervical adenopathy.   Pulmonary:      Effort: Pulmonary effort is normal.      Breath sounds: Normal breath sounds.   Cardiovascular:      Tachycardia present. Regular rhythm.      Murmurs: There is no murmur.   Pulses:     Intact distal pulses.   Edema:     Peripheral edema absent.   Abdominal:      Palpations: Abdomen is soft.      Tenderness: There is no abdominal tenderness.   Musculoskeletal: Normal range of motion.      Cervical back: Normal range of motion. Skin:     General: Skin is warm and dry.      Findings: No rash.   Neurological:      General: No focal deficit present.      Mental Status: Alert, oriented to person, place, and time and oriented to person, place and time.      Cranial Nerves: No cranial nerve deficit.   Psychiatric:         Behavior: Behavior normal.         Thought Content: Thought content normal.         Judgment: Judgment normal.           ECG 12 Lead    Date/Time: 11/14/2023 12:55 PM  Performed by: Jordon Finn MD    Authorized by: Jordon Finn MD  Comparison: compared with previous ECG   Similar to previous ECG  Rhythm: sinus tachycardia  Conduction: conduction normal  ST Segments: ST segments normal  T Waves: T waves normal  QRS axis: right  Other: no other findings    Clinical impression: normal ECG          Assessment:       Diagnosis Plan   1. Ventricular premature beats        2. Sinus tachycardia        3. Essential hypertension             Plan:     1. 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  I did not hear any on exam and I do not appreciate any on her EKG today.      2/3.  In looking at all of her past office visits, it appears that her average heart rate runs on the higher end of normal.  She is completely asymptomatic and I doubt this is pathologic.  She was on bisoprolol and metoprolol succinate in the past but is not on these anymore and she does not remember why.  Her blood  pressure is within goal so I do not think we have to add something.  She had a TSH checked as well as a hemoglobin and they were unremarkable.  I have recommended a repeat Holter monitor just to assess her average heart rate over 24 hours, but in the end, I suspect that this will be benign.      Sincerely,       Jordon Finn MD

## 2023-11-14 NOTE — PROGRESS NOTES
Amber Campos is a 72 y.o. female, who presents with a chief complaint of   Chief Complaint   Patient presents with    Headache     Denies any nausea, vomiting, diarrhea, fever. Has been ongoing for the last 5 days     Nasal Congestion    Cough     Has a productive cough with green phlegm.     Sore Throat     Feels like she has needles sticking in the back of her throat    Generalized Body Aches     Says that they are from her big toe to the top of her head     Chills           HPI   Pt here for cough and congestion.  She has had a sore throat.  She has also had aches and chills.  She is going out of town soon and doesn't want to be sick with travel.  She has been taking mucinex and zyrtecc which helps some. She has a history of asthma and her inhaler has        The following portions of the patient's history were reviewed and updated as appropriate: allergies, current medications, past family history, past medical history, past social history, past surgical history and problem list.    Allergies: Contrast dye (echo or unknown ct/mr), Iodinated contrast media, Adhesive tape, Farxiga [dapagliflozin], Neomycin-bacitracin zn-polymyx, Toprol xl [metoprolol], Trulicity [dulaglutide], and Victoza [liraglutide]    Review of Systems   Constitutional: Negative.    HENT: Negative.  Positive for congestion.    Eyes: Negative.    Respiratory: Negative.  Positive for cough.    Cardiovascular: Negative.    Gastrointestinal: Negative.    Endocrine: Negative.    Genitourinary: Negative.    Musculoskeletal: Negative.    Skin: Negative.    Allergic/Immunologic: Negative.    Neurological: Negative.    Hematological: Negative.    Psychiatric/Behavioral: Negative.     All other systems reviewed and are negative.            Wt Readings from Last 3 Encounters:   23 75.7 kg (166 lb 12.8 oz)   23 75.3 kg (166 lb)   10/27/23 75.4 kg (166 lb 3.2 oz)     Temp Readings from Last 3 Encounters:   23 97.7 °F (36.5  °C) (Infrared)   10/27/23 97.8 °F (36.6 °C) (Infrared)   10/13/23 98 °F (36.7 °C) (Infrared)     BP Readings from Last 3 Encounters:   11/14/23 112/62   11/14/23 128/68   10/27/23 134/74     Pulse Readings from Last 3 Encounters:   11/14/23 102   11/14/23 105   10/27/23 101     Body mass index is 29.55 kg/m².  SpO2 Readings from Last 3 Encounters:   11/14/23 97%   11/14/23 96%   10/27/23 94%          Physical Exam  Vitals and nursing note reviewed.   Constitutional:       General: She is not in acute distress.     Appearance: She is well-developed.   HENT:      Head: Normocephalic and atraumatic.      Right Ear: External ear normal.      Left Ear: External ear normal.      Nose: Nose normal.   Eyes:      Conjunctiva/sclera: Conjunctivae normal.      Pupils: Pupils are equal, round, and reactive to light.   Cardiovascular:      Rate and Rhythm: Normal rate and regular rhythm.      Heart sounds: Normal heart sounds.   Pulmonary:      Effort: Pulmonary effort is normal. No respiratory distress.      Breath sounds: Normal breath sounds. No wheezing.   Musculoskeletal:         General: Normal range of motion.      Cervical back: Normal range of motion and neck supple.      Comments: Normal gait   Skin:     General: Skin is warm and dry.   Neurological:      Mental Status: She is alert and oriented to person, place, and time.   Psychiatric:         Behavior: Behavior normal.         Thought Content: Thought content normal.         Judgment: Judgment normal.         Results for orders placed or performed in visit on 11/14/23   POCT rapid strep A    Specimen: Swab   Result Value Ref Range    Rapid Strep A Screen Negative Negative, VALID, INVALID, Not Performed    Internal Control Passed Passed    Lot Number [id number]     Expiration Date 12/10/24    POCT SARS-CoV-2 Antigen BRENDA    Specimen: Swab   Result Value Ref Range    SARS Antigen Not Detected Not Detected, Presumptive Negative    Internal Control Passed Passed    Lot  Number [id number]     Expiration Date 4/1/24    POCT Influenza A/B    Specimen: Swab   Result Value Ref Range    Rapid Influenza A Ag Negative Negative    Rapid Influenza B Ag Negative Negative    Internal Control Passed Passed    Lot Number [id number]     Expiration Date 12/1/25      Result Review :                  Assessment and Plan    Diagnoses and all orders for this visit:    1. Cough, unspecified type (Primary)  -     POCT rapid strep A  -     POCT SARS-CoV-2 Antigen BRENDA  -     POCT Influenza A/B    2. Generalized body aches  -     POCT rapid strep A  -     POCT SARS-CoV-2 Antigen BRENDA  -     POCT Influenza A/B    3. Sore throat  -     POCT rapid strep A  -     POCT SARS-CoV-2 Antigen BRENDA  -     POCT Influenza A/B    4. Nasal congestion  -     POCT rapid strep A  -     POCT SARS-CoV-2 Antigen BRENDA  -     POCT Influenza A/B    5. Nonintractable headache, unspecified chronicity pattern, unspecified headache type  -     POCT rapid strep A  -     POCT SARS-CoV-2 Antigen BRENDA  -     POCT Influenza A/B    6. Mild intermittent reactive airway disease with acute exacerbation  -     budesonide-formoterol (Symbicort) 160-4.5 MCG/ACT inhaler; Inhale 2 puffs 2 (Two) Times a Day.  Dispense: 1 each; Refill: 6  -     albuterol sulfate  (90 Base) MCG/ACT inhaler; Inhale 2 puffs Every 4 (Four) Hours As Needed for Wheezing.  Dispense: 8.5 g; Refill: 2    7. Bronchitis  -     amoxicillin (AMOXIL) 500 MG capsule; Take 2 capsules by mouth 2 (Two) Times a Day for 10 days.  Dispense: 40 capsule; Refill: 0                       Outpatient Medications Prior to Visit   Medication Sig Dispense Refill    amlodipine-olmesartan (NIC) 5-40 MG per tablet TAKE 1 TABLET DAILY 90 tablet 3    Cetirizine HCl (ZyrTEC Childrens Allergy) 5 MG/5ML solution solution Take 5 mL by mouth Daily. 150 mL     Clocortolone Pivalate 0.1 % cream APPLY TOPICALLY TO AFFECTED AREA TWICE A DAY AS NEEDED      Continuous Blood Gluc  (FreeStyle Willi 14  Day [place]) device 1 each 4 (Four) Times a Day. 1 each 3    Continuous Blood Gluc Sensor (FreeStyle Willi 2 Sensor) misc USE AS DIRECTED BY PROVIDER OR PACKAGE INSTRUCTIONS 3 each 7    metFORMIN (GLUCOPHAGE) 1000 MG tablet Take 1 tablet by mouth 2 (Two) Times a Day With Meals.      multivitamin (THERAGRAN) tablet tablet Take 1 tablet by mouth Daily.      Omega-3 Fatty Acids (fish oil) 1000 MG capsule capsule Take  by mouth Daily With Breakfast.      pantoprazole (PROTONIX) 40 MG EC tablet TAKE 1 TABLET DAILY 90 tablet 3    Plant Sterols and Stanols (CholestOff) 450 MG tablet Take 1 capsule by mouth 2 (Two) Times a Day.      Semaglutide, 2 MG/DOSE, (Ozempic, 2 MG/DOSE,) 8 MG/3ML solution pen-injector Inject 2 mg under the skin into the appropriate area as directed 1 (One) Time Per Week. 9 mL 1    venlafaxine XR (EFFEXOR-XR) 75 MG 24 hr capsule TAKE 1 CAPSULE DAILY 90 capsule 3    Spacer/Aero Chamber Mouthpiece misc 1 each Daily. (Patient not taking: Reported on 11/14/2023) 1 each 0    budesonide-formoterol (Symbicort) 160-4.5 MCG/ACT inhaler Inhale 2 puffs 2 (Two) Times a Day. (Patient not taking: Reported on 11/14/2023) 1 each 6     No facility-administered medications prior to visit.     New Medications Ordered This Visit   Medications    amoxicillin (AMOXIL) 500 MG capsule     Sig: Take 2 capsules by mouth 2 (Two) Times a Day for 10 days.     Dispense:  40 capsule     Refill:  0    budesonide-formoterol (Symbicort) 160-4.5 MCG/ACT inhaler     Sig: Inhale 2 puffs 2 (Two) Times a Day.     Dispense:  1 each     Refill:  6    albuterol sulfate  (90 Base) MCG/ACT inhaler     Sig: Inhale 2 puffs Every 4 (Four) Hours As Needed for Wheezing.     Dispense:  8.5 g     Refill:  2     [unfilled]  Medications Discontinued During This Encounter   Medication Reason    budesonide-formoterol (Symbicort) 160-4.5 MCG/ACT inhaler Reorder         No follow-ups on file.    Patient was given instructions and counseling  regarding her condition or for health maintenance advice. Please see specific information pulled into the AVS if appropriate.

## 2023-11-28 ENCOUNTER — HOSPITAL ENCOUNTER (OUTPATIENT)
Dept: CARDIOLOGY | Facility: HOSPITAL | Age: 73
Discharge: HOME | End: 2023-11-28
Admitting: INTERNAL MEDICINE
Payer: MEDICARE

## 2023-11-28 DIAGNOSIS — E11.65 UNCONTROLLED TYPE 2 DIABETES MELLITUS WITH HYPERGLYCEMIA: ICD-10-CM

## 2023-11-28 DIAGNOSIS — I49.3 VENTRICULAR PREMATURE BEATS: ICD-10-CM

## 2023-11-28 DIAGNOSIS — R00.0 SINUS TACHYCARDIA: ICD-10-CM

## 2023-11-28 PROCEDURE — 93226 XTRNL ECG REC<48 HR SCAN A/R: CPT

## 2023-11-28 PROCEDURE — 93225 XTRNL ECG REC<48 HRS REC: CPT

## 2023-11-28 RX ORDER — SEMAGLUTIDE 2.68 MG/ML
2 INJECTION, SOLUTION SUBCUTANEOUS WEEKLY
Qty: 9 ML | Refills: 1 | Status: SHIPPED | OUTPATIENT
Start: 2023-11-28 | End: 2023-11-29 | Stop reason: SDUPTHER

## 2023-11-29 DIAGNOSIS — E11.65 UNCONTROLLED TYPE 2 DIABETES MELLITUS WITH HYPERGLYCEMIA: ICD-10-CM

## 2023-11-29 NOTE — TELEPHONE ENCOUNTER
Caller: [facility] - [phone] [facility] [phone] FX    Relationship: Pharmacy    Best call back number: [phone] REFERENCE # [id number]    Requested Prescriptions:   Requested Prescriptions     Pending Prescriptions Disp Refills    Semaglutide, 2 MG/DOSE, (Ozempic, 2 MG/DOSE,) 8 MG/3ML solution pen-injector 9 mL 1     Sig: Inject 2 mg under the skin into the appropriate area as directed 1 (One) Time Per Week.        Pharmacy where request should be sent: [facility] [place] [phone] [facility] [phone] FX     Last office visit with prescribing clinician: 10/27/2023   Last telemedicine visit with prescribing clinician: Visit date not found   Next office visit with prescribing clinician: 12/13/2023     Additional details provided by patient: PHARMACY IS REQUESTING A 90 DAY SUPPLY    Does the patient have less than a 3 day supply:  [] Yes  [] No UNKNOWN    Would you like a call back once the refill request has been completed: [] Yes [] No    If the office needs to give you a call back, can they leave a voicemail: [] Yes [] No    Nigel Stern Rep   11/29/23 12:58 EST

## 2023-11-29 NOTE — TELEPHONE ENCOUNTER
PRODUCT IS BACKORDERED UNTIL MARCH. PT NOTIFIED. Is there an alternative you would like to try? I told the pt to call other pharmacies to see if they had any inventory

## 2023-11-30 ENCOUNTER — TELEPHONE (OUTPATIENT)
Dept: INTERNAL MEDICINE | Facility: CLINIC | Age: 73
End: 2023-11-30
Payer: MEDICARE

## 2023-11-30 RX ORDER — SEMAGLUTIDE 2.68 MG/ML
2 INJECTION, SOLUTION SUBCUTANEOUS WEEKLY
Qty: 9 ML | Refills: 1 | Status: SHIPPED | OUTPATIENT
Start: 2023-11-30 | End: 2023-11-30 | Stop reason: RX

## 2023-11-30 NOTE — TELEPHONE ENCOUNTER
OK for Hub to Read.  Patient called yesterday and LVM at 4:55pm about [facility] being out of Ozempic until 02/2024.  Called Patient back. She is wanting to know what she can do in the mean time. She has 1 month left of her current dosage. Pharmacist suggested Zahra. Patient asked what the potential side effects and other options. Please advise and call Patient back.   Home

## 2023-11-30 NOTE — PROGRESS NOTES
I called -- this is a benign monitor. She just has a mildly elevated average HR, this is her normal. She has no symptoms and I don't feel she needs meds. The PVC burden is 1%, minimal symptoms.

## 2023-12-04 DIAGNOSIS — E11.65 UNCONTROLLED TYPE 2 DIABETES MELLITUS WITH HYPERGLYCEMIA: ICD-10-CM

## 2023-12-04 NOTE — TELEPHONE ENCOUNTER
Caller: Amber Campos    Relationship: Self    Requested Prescriptions:   Requested Prescriptions     Pending Prescriptions Disp Refills    Continuous Blood Gluc Sensor (FreeStyle Willi 2 Sensor) [facility] 3 each       Pharmacy where request should be sent: [facility] - [address] [phone] [facility] [phone] FX     Last office visit with prescribing clinician: 10/27/2023   Last telemedicine visit with prescribing clinician: Visit date not found   Next office visit with prescribing clinician: 12/13/2023     Additional details provided by patient: PATIENT STATES THIS NEEDS A NEW PRESCRIPTION.      Nigel Mckenzie Rep   12/04/23 10:35 EST

## 2023-12-07 LAB
ALBUMIN SERPL-MCNC: 4.5 G/DL (ref 3.5–5.2)
ALBUMIN/GLOB SERPL: 2.1 G/DL
ALP SERPL-CCNC: 50 U/L (ref 39–117)
ALT SERPL-CCNC: 22 U/L (ref 1–33)
AST SERPL-CCNC: 26 U/L (ref 1–32)
BASOPHILS # BLD AUTO: 0.07 10*3/MM3 (ref 0–0.2)
BASOPHILS NFR BLD AUTO: 1 % (ref 0–1.5)
BILIRUB SERPL-MCNC: 0.3 MG/DL (ref 0–1.2)
BUN SERPL-MCNC: 16 MG/DL (ref 8–23)
BUN/CREAT SERPL: 25.4 (ref 7–25)
CALCIUM SERPL-MCNC: 9.7 MG/DL (ref 8.6–10.5)
CHLORIDE SERPL-SCNC: 100 MMOL/L (ref 98–107)
CHOLEST SERPL-MCNC: 211 MG/DL (ref 0–200)
CHOLEST/HDLC SERPL: 2.97 {RATIO}
CO2 SERPL-SCNC: 26.4 MMOL/L (ref 22–29)
CREAT SERPL-MCNC: 0.63 MG/DL (ref 0.57–1)
EGFRCR SERPLBLD CKD-EPI 2021: 93.2 ML/MIN/1.73
EOSINOPHIL # BLD AUTO: 0.26 10*3/MM3 (ref 0–0.4)
EOSINOPHIL NFR BLD AUTO: 3.7 % (ref 0.3–6.2)
ERYTHROCYTE [DISTWIDTH] IN BLOOD BY AUTOMATED COUNT: 14.4 % (ref 12.3–15.4)
FERRITIN SERPL-MCNC: 14.2 NG/ML (ref 13–150)
GLOBULIN SER CALC-MCNC: 2.1 GM/DL
GLUCOSE SERPL-MCNC: 158 MG/DL (ref 65–99)
HBA1C MFR BLD: 7.4 % (ref 4.8–5.6)
HCT VFR BLD AUTO: 36.2 % (ref 34–46.6)
HDLC SERPL-MCNC: 71 MG/DL (ref 40–60)
HGB BLD-MCNC: 12.2 G/DL (ref 12–15.9)
IMM GRANULOCYTES # BLD AUTO: 0.01 10*3/MM3 (ref 0–0.05)
IMM GRANULOCYTES NFR BLD AUTO: 0.1 % (ref 0–0.5)
IRON SATN MFR SERPL: 11 % (ref 20–50)
IRON SERPL-MCNC: 59 MCG/DL (ref 37–145)
LDLC SERPL CALC-MCNC: 107 MG/DL (ref 0–100)
LYMPHOCYTES # BLD AUTO: 2.33 10*3/MM3 (ref 0.7–3.1)
LYMPHOCYTES NFR BLD AUTO: 33.5 % (ref 19.6–45.3)
MCH RBC QN AUTO: 28.8 PG (ref 26.6–33)
MCHC RBC AUTO-ENTMCNC: 33.7 G/DL (ref 31.5–35.7)
MCV RBC AUTO: 85.4 FL (ref 79–97)
MONOCYTES # BLD AUTO: 0.48 10*3/MM3 (ref 0.1–0.9)
MONOCYTES NFR BLD AUTO: 6.9 % (ref 5–12)
NEUTROPHILS # BLD AUTO: 3.8 10*3/MM3 (ref 1.7–7)
NEUTROPHILS NFR BLD AUTO: 54.8 % (ref 42.7–76)
NRBC BLD AUTO-RTO: 0 /100 WBC (ref 0–0.2)
PLATELET # BLD AUTO: 375 10*3/MM3 (ref 140–450)
POTASSIUM SERPL-SCNC: 4.4 MMOL/L (ref 3.5–5.2)
PROT SERPL-MCNC: 6.6 G/DL (ref 6–8.5)
RBC # BLD AUTO: 4.24 10*6/MM3 (ref 3.77–5.28)
SODIUM SERPL-SCNC: 136 MMOL/L (ref 136–145)
TIBC SERPL-MCNC: 532 MCG/DL
TRIGL SERPL-MCNC: 192 MG/DL (ref 0–150)
UIBC SERPL-MCNC: 473 MCG/DL (ref 112–346)
VLDLC SERPL CALC-MCNC: 33 MG/DL (ref 5–40)
WBC # BLD AUTO: 6.95 10*3/MM3 (ref 3.4–10.8)

## 2023-12-11 DIAGNOSIS — E11.65 UNCONTROLLED TYPE 2 DIABETES MELLITUS WITH HYPERGLYCEMIA: ICD-10-CM

## 2023-12-13 ENCOUNTER — OFFICE VISIT (OUTPATIENT)
Dept: INTERNAL MEDICINE | Facility: CLINIC | Age: 73
End: 2023-12-13
Payer: COMMERCIAL

## 2023-12-13 VITALS
DIASTOLIC BLOOD PRESSURE: 86 MMHG | HEART RATE: 95 BPM | OXYGEN SATURATION: 96 % | TEMPERATURE: 98.4 F | HEIGHT: 63 IN | BODY MASS INDEX: 29.95 KG/M2 | WEIGHT: 169 LBS | SYSTOLIC BLOOD PRESSURE: 150 MMHG

## 2023-12-13 DIAGNOSIS — K59.03 DRUG-INDUCED CONSTIPATION: ICD-10-CM

## 2023-12-13 DIAGNOSIS — N39.0 RECURRENT UTI: ICD-10-CM

## 2023-12-13 DIAGNOSIS — R79.0 LOW IRON STORES: ICD-10-CM

## 2023-12-13 DIAGNOSIS — E11.65 UNCONTROLLED TYPE 2 DIABETES MELLITUS WITH HYPERGLYCEMIA: Primary | ICD-10-CM

## 2023-12-13 DIAGNOSIS — I10 ESSENTIAL HYPERTENSION: ICD-10-CM

## 2023-12-13 DIAGNOSIS — K21.00 GASTROESOPHAGEAL REFLUX DISEASE WITH ESOPHAGITIS WITHOUT HEMORRHAGE: ICD-10-CM

## 2023-12-13 DIAGNOSIS — E78.2 MIXED HYPERLIPIDEMIA: ICD-10-CM

## 2023-12-13 DIAGNOSIS — K76.0 STEATOSIS OF LIVER: ICD-10-CM

## 2023-12-13 DIAGNOSIS — F33.8 SEASONAL AFFECTIVE DISORDER: ICD-10-CM

## 2023-12-13 DIAGNOSIS — N30.90 CYSTITIS: ICD-10-CM

## 2023-12-13 LAB
BILIRUB BLD-MCNC: NEGATIVE MG/DL
CLARITY, POC: ABNORMAL
COLOR UR: YELLOW
EXPIRATION DATE: ABNORMAL
GLUCOSE UR STRIP-MCNC: NEGATIVE MG/DL
KETONES UR QL: ABNORMAL
LEUKOCYTE EST, POC: ABNORMAL
Lab: ABNORMAL
NITRITE UR-MCNC: POSITIVE MG/ML
PH UR: 5.5 [PH] (ref 5–8)
PROT UR STRIP-MCNC: ABNORMAL MG/DL
RBC # UR STRIP: ABNORMAL /UL
SP GR UR: 1.02 (ref 1–1.03)
UROBILINOGEN UR QL: NORMAL

## 2023-12-13 PROCEDURE — 99214 OFFICE O/P EST MOD 30 MIN: CPT | Performed by: NURSE PRACTITIONER

## 2023-12-13 PROCEDURE — 81003 URINALYSIS AUTO W/O SCOPE: CPT | Performed by: NURSE PRACTITIONER

## 2023-12-13 RX ORDER — CIPROFLOXACIN 500 MG/1
500 TABLET, FILM COATED ORAL 2 TIMES DAILY
Qty: 10 TABLET | Refills: 0 | Status: SHIPPED | OUTPATIENT
Start: 2023-12-13

## 2023-12-13 NOTE — PROGRESS NOTES
"Chief Complaint   Patient presents with    Diabetes     3 month follow up       Subjective     Amber Campos is a 72 y.o. female being seen for a follow up appointment today regarding DM 2, HTN, hyperlipidemia, IC. She had diabetic education at [facility] in 2023. She was followed by srinath, but declined to see them anymore. She has a Continuous glucose monitor/Willi meter. Average glucose 160s Fasting glucose ranges from 130-220s, depending on what she had for dinner. PP glucose 150s. She is following a Anamika Jovanny diet. She is prescribed metformin 1000 mg BID and Ozempic 2 mg weekly. She stopped the Invokana, due to urinary frequency. She called the office and was switched to Mounjaro due to Ozempic availablity, but has not taken her first injection yet.      She has history of urinary frequency with IC. She reports symptoms of a UTI for 2 weeks and inability to get a visit here. She was directed to urgent care, but decided to \"tough it out\" and take cranberry juice. She is followed by Dr Benton ([facility]). She had a CT abd and pelvis and cystoscopy scheduled for renal cysts and IC. She left a urine sample today.      She is on Susannah 5/40mg daily for HTN. She has previously been on Diovan, Toprol XL and Ziac. She does not check her BP, but feels like she is tolerating this medication well. She denies edema, CP, SOA.  She had a lifeline screening showing mild placque in carotid arteries.      She has history of JOSETTE and GERD. She sas been evaluated with both a C-scope 6-6-2022 with Dr. Alaniz. She is on an oral iron supplement I'm MVI. She takes a daily Protonix 40mg tablet.      She is on prolia for osteoporosis with history of traumatic fractures of wrist and ankle. dexa bone density axial (04/30/2021 08:59)     She is on Effexor XR for depression w anxiety. Her  has hx of alcohol abuse. She has had increasing sadness since the sunlight has changed, and feels lonely.             History of Present Illness "     Allergies   Allergen Reactions    Contrast Dye (Echo Or Unknown Ct/Mr) Anaphylaxis     Contrast dye used in Xray    Iodinated Contrast Media Photosensitivity     contrast    Adhesive Tape Other (See Comments)     EKG stickers only    Farxiga [Dapagliflozin] Diarrhea and Itching     Yeast infeciotn    Neomycin-Bacitracin Zn-Polymyx Rash    Toprol Xl [Metoprolol] Dizziness    Trulicity [Dulaglutide] GI Intolerance     Constipation    Victoza [Liraglutide] Nausea Only         Current Outpatient Medications:     albuterol sulfate  (90 Base) MCG/ACT inhaler, Inhale 2 puffs Every 4 (Four) Hours As Needed for Wheezing., Disp: 8.5 g, Rfl: 2    amlodipine-olmesartan (NIC) 5-40 MG per tablet, TAKE 1 TABLET DAILY, Disp: 90 tablet, Rfl: 3    budesonide-formoterol (Symbicort) 160-4.5 MCG/ACT inhaler, Inhale 2 puffs 2 (Two) Times a Day., Disp: 1 each, Rfl: 6    Cetirizine HCl (ZyrTEC Childrens Allergy) 5 MG/5ML solution solution, Take 5 mL by mouth Daily., Disp: 150 mL, Rfl:     Clocortolone Pivalate 0.1 % cream, APPLY TOPICALLY TO AFFECTED AREA TWICE A DAY AS NEEDED, Disp: , Rfl:     Continuous Blood Gluc  (FreeStyle Willi 14 Day [place]) device, 1 each 4 (Four) Times a Day., Disp: 1 each, Rfl: 3    Continuous Blood Gluc Sensor (FreeStyle Willi 2 Sensor) misc, Inject 1 each under the skin into the appropriate area as directed Every 10 (Ten) Days., Disp: 3 each, Rfl: 7    metFORMIN (GLUCOPHAGE) 1000 MG tablet, TAKE 1 TABLET TWICE A DAY WITH MEALS (DISCONTINUE INVOKAMET AND RESUME METFORMIN), Disp: 180 tablet, Rfl: 3    multivitamin (THERAGRAN) tablet tablet, Take 1 tablet by mouth Daily., Disp: , Rfl:     Omega-3 Fatty Acids (fish oil) 1000 MG capsule capsule, Take  by mouth Daily With Breakfast., Disp: , Rfl:     pantoprazole (PROTONIX) 40 MG EC tablet, TAKE 1 TABLET DAILY, Disp: 90 tablet, Rfl: 3    Plant Sterols and Stanols (CholestOff) 450 MG tablet, Take 1 capsule by mouth 2 (Two) Times a Day., Disp: ,  Rfl:     Spacer/Aero Chamber Mouthpiece misc, 1 each Daily., Disp: 1 each, Rfl: 0    Tirzepatide (Mounjaro) 7.5 MG/0.5ML solution pen-injector, Inject 0.5 mL under the skin into the appropriate area as directed 1 (One) Time Per Week., Disp: 6 mL, Rfl: 0    venlafaxine XR (EFFEXOR-XR) 75 MG 24 hr capsule, TAKE 1 CAPSULE DAILY, Disp: 90 capsule, Rfl: 3    The following portions of the patient's history were reviewed and updated as appropriate: allergies, current medications, past family history, past medical history, past social history, past surgical history, and problem list.    Review of Systems   Constitutional:  Positive for fatigue.   HENT: Negative.     Eyes: Negative.    Respiratory: Negative.     Cardiovascular:  Negative for chest pain and leg swelling.   Gastrointestinal: Negative.    Endocrine: Negative.    Genitourinary: Negative.    Musculoskeletal:  Positive for arthralgias.   Allergic/Immunologic: Negative.  Negative for environmental allergies, food allergies and immunocompromised state.   Neurological: Negative.    Hematological: Negative.    Psychiatric/Behavioral:  Positive for dysphoric mood.    All other systems reviewed and are negative.      Assessment     Physical Exam  Vitals reviewed.   Constitutional:       Appearance: Normal appearance. She is obese. She is not ill-appearing.   HENT:      Right Ear: Tympanic membrane normal. There is no impacted cerumen.      Left Ear: Tympanic membrane normal.   Cardiovascular:      Rate and Rhythm: Normal rate and regular rhythm.      Pulses: Normal pulses.      Heart sounds: Normal heart sounds. No murmur heard.  Pulmonary:      Effort: Pulmonary effort is normal. No respiratory distress.      Breath sounds: Normal breath sounds. No stridor.   Neurological:      Mental Status: She is alert.   Psychiatric:         Mood and Affect: Mood normal. Affect is not angry.         Speech: Speech normal.         Behavior: Behavior normal.         Thought Content:  Thought content normal.      Comments: Slightly irritated about some scheduling issues         Plan     Her fasting labs were reviewed with the patient from last week.     Diagnoses and all orders for this visit:    1. Uncontrolled type 2 diabetes mellitus with hyperglycemia (Primary)  Comments:  Discussed switch to Monjaro weekly  Orders:  -     POCT urinalysis dipstick, automated  -     CBC & Differential; Future  -     Comprehensive Metabolic Panel; Future  -     Lipid Panel With / Chol / HDL Ratio; Future  -     Hemoglobin A1c; Future    2. Essential hypertension  Comments:  BP at goal on current meds  Orders:  -     CBC & Differential; Future  -     Comprehensive Metabolic Panel; Future  -     Lipid Panel With / Chol / HDL Ratio; Future    3. Seasonal affective disorder  Comments:  I gave her the number to YOLANDA Rose, psych. Discussed wellbutrin XL w Effexor XR, but decided to hold off  Orders:  -     Ambulatory Referral to Psychology    4. Mixed hyperlipidemia  -     Comprehensive Metabolic Panel; Future  -     Lipid Panel With / Chol / HDL Ratio; Future    5. Recurrent UTI  -     Urine Culture - Urine, Urine, Clean Catch; Future  -     Urine Culture - Urine, Urine, Clean Catch    6. Cystitis  -     POCT urinalysis dipstick, automated  -     Urine Culture - Urine, Urine, Clean Catch; Future  -     Urine Culture - Urine, Urine, Clean Catch    7. Steatosis of liver  Comments:  LFTs stable    8. Gastroesophageal reflux disease with esophagitis without hemorrhage  Comments:  Controlled on PPI    9. Drug-induced constipation    10. Low iron stores  -     Iron Profile; Future  -     Ferritin; Future    Other orders  -     ciprofloxacin (Cipro) 500 MG tablet; Take 1 tablet by mouth 2 (Two) Times a Day.  Dispense: 10 tablet; Refill: 0        I had Raheem Owens out  discuss her concerns about scheduling with the office.     Follow up in 3 months w labs, urine culture in 2 weeks

## 2023-12-18 LAB
BACTERIA UR CULT: ABNORMAL
BACTERIA UR CULT: ABNORMAL
OTHER ANTIBIOTIC SUSC ISLT: ABNORMAL

## 2023-12-19 DIAGNOSIS — M80.031A PATHOLOGICAL FRACTURE OF RIGHT RADIUS DUE TO AGE-RELATED OSTEOPOROSIS, INITIAL ENCOUNTER: ICD-10-CM

## 2023-12-19 DIAGNOSIS — M81.0 AGE-RELATED OSTEOPOROSIS WITHOUT CURRENT PATHOLOGICAL FRACTURE: Primary | ICD-10-CM

## 2023-12-20 ENCOUNTER — TELEPHONE (OUTPATIENT)
Dept: INTERNAL MEDICINE | Facility: CLINIC | Age: 73
End: 2023-12-20
Payer: MEDICARE

## 2023-12-20 ENCOUNTER — HOSPITAL ENCOUNTER (OUTPATIENT)
Dept: INFUSION THERAPY | Facility: HOSPITAL | Age: 73
Discharge: HOME | End: 2023-12-20
Admitting: NURSE PRACTITIONER
Payer: MEDICARE

## 2023-12-20 VITALS
OXYGEN SATURATION: 97 % | DIASTOLIC BLOOD PRESSURE: 67 MMHG | RESPIRATION RATE: 16 BRPM | SYSTOLIC BLOOD PRESSURE: 118 MMHG | HEART RATE: 104 BPM | TEMPERATURE: 97.6 F

## 2023-12-20 DIAGNOSIS — M80.031A PATHOLOGICAL FRACTURE OF RIGHT RADIUS DUE TO AGE-RELATED OSTEOPOROSIS, INITIAL ENCOUNTER: ICD-10-CM

## 2023-12-20 DIAGNOSIS — R79.0 LOW IRON STORES: ICD-10-CM

## 2023-12-20 DIAGNOSIS — M80.031D PATHOLOGICAL FRACTURE OF RIGHT RADIUS DUE TO AGE-RELATED OSTEOPOROSIS WITH ROUTINE HEALING, SUBSEQUENT ENCOUNTER: Primary | ICD-10-CM

## 2023-12-20 LAB
25(OH)D3 SERPL-MCNC: 38.5 NG/ML (ref 30–100)
MAGNESIUM SERPL-MCNC: 1.5 MG/DL (ref 1.6–2.4)
PHOSPHATE SERPL-MCNC: 4.1 MG/DL (ref 2.5–4.5)

## 2023-12-20 PROCEDURE — 84100 ASSAY OF PHOSPHORUS: CPT | Performed by: FAMILY MEDICINE

## 2023-12-20 PROCEDURE — 96372 THER/PROPH/DIAG INJ SC/IM: CPT

## 2023-12-20 PROCEDURE — 82306 VITAMIN D 25 HYDROXY: CPT | Performed by: FAMILY MEDICINE

## 2023-12-20 PROCEDURE — 83735 ASSAY OF MAGNESIUM: CPT | Performed by: FAMILY MEDICINE

## 2023-12-20 PROCEDURE — 36415 COLL VENOUS BLD VENIPUNCTURE: CPT

## 2023-12-20 PROCEDURE — 25010000002 DENOSUMAB 60 MG/ML SOLUTION PREFILLED SYRINGE: Performed by: NURSE PRACTITIONER

## 2023-12-20 RX ADMIN — DENOSUMAB 60 MG: 60 INJECTION SUBCUTANEOUS at 09:10

## 2023-12-20 NOTE — PATIENT INSTRUCTIONS
"  Call [facility] at [phone] if you have any problems or concerns.    We know you have a Choice in healthcare and appreciate you using [facility].  Our purpose is to provide you \"Excellent Care\".  We hope that you will always choose us in the future and continue to recommend us to your family and friends.             "

## 2023-12-20 NOTE — NURSING NOTE
NURSE PROGRESS NOTE    PT. ARRIVED @ [facility] FOR SCHEDULED INJECTION AT 0830 .  INJECTION WAS PERFORMED WITHOUT INCIDENT.  PT. DISCHARGED TO HOME AT 0920.

## 2023-12-20 NOTE — TELEPHONE ENCOUNTER
Pt will call us back later regarding results    Relay:  Cipro will treat current UTI   repeat Urine culture in 2 weeks.

## 2023-12-20 NOTE — TELEPHONE ENCOUNTER
Pt is coming in for labs 12/27/2023, are any labs needed in addition to her iron profile and ferritin?

## 2023-12-21 DIAGNOSIS — N39.0 RECURRENT UTI: ICD-10-CM

## 2023-12-21 DIAGNOSIS — N39.0 RECURRENT UTI: Primary | ICD-10-CM

## 2023-12-29 LAB
BACTERIA UR CULT: NORMAL
BACTERIA UR CULT: NORMAL

## 2024-02-19 ENCOUNTER — OFFICE (OUTPATIENT)
Dept: URBAN - METROPOLITAN AREA CLINIC 66 | Facility: CLINIC | Age: 74
End: 2024-02-19
Payer: COMMERCIAL

## 2024-02-19 VITALS
HEIGHT: 63 IN | HEART RATE: 93 BPM | DIASTOLIC BLOOD PRESSURE: 68 MMHG | WEIGHT: 168.4 LBS | SYSTOLIC BLOOD PRESSURE: 100 MMHG

## 2024-02-19 DIAGNOSIS — R10.13 EPIGASTRIC PAIN: ICD-10-CM

## 2024-02-19 DIAGNOSIS — K21.9 GASTRO-ESOPHAGEAL REFLUX DISEASE WITHOUT ESOPHAGITIS: ICD-10-CM

## 2024-02-19 DIAGNOSIS — K59.00 CONSTIPATION, UNSPECIFIED: ICD-10-CM

## 2024-02-19 DIAGNOSIS — R11.0 NAUSEA: ICD-10-CM

## 2024-02-19 PROCEDURE — 99214 OFFICE O/P EST MOD 30 MIN: CPT | Performed by: NURSE PRACTITIONER

## 2024-02-19 RX ORDER — SUCRALFATE 1 G/1
3 TABLET ORAL
Qty: 30 | Refills: 3 | Status: ACTIVE
Start: 2024-02-19

## 2024-02-22 ENCOUNTER — TELEPHONE (OUTPATIENT)
Dept: INTERNAL MEDICINE | Facility: CLINIC | Age: 74
End: 2024-02-22
Payer: MEDICARE

## 2024-02-22 NOTE — TELEPHONE ENCOUNTER
Name: Amber Campos      Relationship: Self      Best Callback Number: [phone]      HUB PROVIDED THE RELAY MESSAGE FROM THE OFFICE      PATIENT: VOICED UNDERSTANDING AND HAS NO FURTHER QUESTIONS AT THIS TIME    ADDITIONAL INFORMATION: TIME AND DATE IS OK, PATIENT WILL BE THERE

## 2024-02-22 NOTE — TELEPHONE ENCOUNTER
Caller: Amber Campos    Relationship: Self    Best call back number: [phone]     Who are you requesting to speak with (clinical staff, provider,  specific staff member): KIRK CARRINGTON OR MA    What was the call regarding: PATIENT CALLED TO MAKE AN APPOINTMENT FOR TOMORROW. STATES THAT SHE HAS FREQUENT, BURNING URINATION, BACK PAIN, AND IS COUGHING SPUTUM THAT SEEMS TO HAVE SOME BLOOD IN IT. PATIENT DECLINED TO SCHEDULE WITH ANOTHER PROVIDER, AND WOULD LIKE TO KNOW IF HER PCP CAN WORK HER IN. PLEASE CALL AND ADVISE

## 2024-02-22 NOTE — TELEPHONE ENCOUNTER
OK FOR HUB TO READ.  LVM for patient about appt scheduled per PCP. Just need to confirm date & time.

## 2024-02-23 ENCOUNTER — OFFICE VISIT (OUTPATIENT)
Dept: INTERNAL MEDICINE | Facility: CLINIC | Age: 74
End: 2024-02-23
Payer: MEDICARE

## 2024-02-23 VITALS
HEIGHT: 63 IN | BODY MASS INDEX: 29.3 KG/M2 | TEMPERATURE: 98.2 F | SYSTOLIC BLOOD PRESSURE: 108 MMHG | HEART RATE: 60 BPM | DIASTOLIC BLOOD PRESSURE: 58 MMHG | WEIGHT: 165.4 LBS | OXYGEN SATURATION: 96 %

## 2024-02-23 DIAGNOSIS — R10.13 EPIGASTRIC PAIN: ICD-10-CM

## 2024-02-23 DIAGNOSIS — E55.9 VITAMIN D DEFICIENCY: ICD-10-CM

## 2024-02-23 DIAGNOSIS — N39.0 RECURRENT UTI: Primary | ICD-10-CM

## 2024-02-23 DIAGNOSIS — I10 ESSENTIAL HYPERTENSION: ICD-10-CM

## 2024-02-23 DIAGNOSIS — E11.65 UNCONTROLLED TYPE 2 DIABETES MELLITUS WITH HYPERGLYCEMIA: ICD-10-CM

## 2024-02-23 DIAGNOSIS — R11.0 NAUSEA: ICD-10-CM

## 2024-02-23 DIAGNOSIS — R10.10 PAIN OF UPPER ABDOMEN: ICD-10-CM

## 2024-02-23 DIAGNOSIS — E78.2 MIXED HYPERLIPIDEMIA: ICD-10-CM

## 2024-02-23 PROBLEM — Z23 NEEDS FLU SHOT: Status: RESOLVED | Noted: 2020-06-18 | Resolved: 2024-02-23

## 2024-02-23 PROCEDURE — 99214 OFFICE O/P EST MOD 30 MIN: CPT | Performed by: NURSE PRACTITIONER

## 2024-02-23 PROCEDURE — 3078F DIAST BP <80 MM HG: CPT | Performed by: NURSE PRACTITIONER

## 2024-02-23 PROCEDURE — 81003 URINALYSIS AUTO W/O SCOPE: CPT | Performed by: NURSE PRACTITIONER

## 2024-02-23 PROCEDURE — 3074F SYST BP LT 130 MM HG: CPT | Performed by: NURSE PRACTITIONER

## 2024-02-23 RX ORDER — CIPROFLOXACIN 250 MG/1
250 TABLET, FILM COATED ORAL 2 TIMES DAILY
Qty: 6 TABLET | Refills: 0 | Status: SHIPPED | OUTPATIENT
Start: 2024-02-23

## 2024-02-23 NOTE — PROGRESS NOTES
"Chief Complaint   Patient presents with    Urinary Tract Infection     Pt reports burning while urinating and increased frequency       Subjective     Amber Campos is a 73 y.o. female being seen for an acute appointment today regarding possible UTI, nausea and abd pain. She has history of Diabetes and IC. She was evaluted by Dr. Benton (urology) in 2022    A few days ago, she was taking her pill pack of vitamins. She was swallowing her Vit C, and felt that it stuck in the throat. It caused her to cough so much that she has \"darryl sputum.\" It has since resolved and she does not have trouble swallowing.     She has also been having nausea and epigastric pain for about 4 weeks, and s she contacted her Gastro and had an appt 2-. She saw Sivan Auguste. She is on Protonix 40mg daily and was prescribed Carafate 1gm TID.       History of Present Illness     Allergies   Allergen Reactions    Contrast Dye (Echo Or Unknown Ct/Mr) Anaphylaxis     Contrast dye used in Xray    Iodinated Contrast Media Photosensitivity     contrast    Adhesive Tape Other (See Comments)     EKG stickers only    Farxiga [Dapagliflozin] Diarrhea and Itching     Yeast infeciotn    Neomycin-Bacitracin Zn-Polymyx Rash    Toprol Xl [Metoprolol] Dizziness    Trulicity [Dulaglutide] GI Intolerance     Constipation    Victoza [Liraglutide] Nausea Only         Current Outpatient Medications:     albuterol sulfate  (90 Base) MCG/ACT inhaler, Inhale 2 puffs Every 4 (Four) Hours As Needed for Wheezing., Disp: 8.5 g, Rfl: 2    amlodipine-olmesartan (NIC) 5-40 MG per tablet, TAKE 1 TABLET DAILY, Disp: 90 tablet, Rfl: 3    budesonide-formoterol (Symbicort) 160-4.5 MCG/ACT inhaler, Inhale 2 puffs 2 (Two) Times a Day., Disp: 1 each, Rfl: 6    Cetirizine HCl (ZyrTEC Childrens Allergy) 5 MG/5ML solution solution, Take 5 mL by mouth Daily., Disp: 150 mL, Rfl:     Clocortolone Pivalate 0.1 % cream, APPLY TOPICALLY TO AFFECTED AREA TWICE A DAY AS NEEDED, " Disp: , Rfl:     Continuous Blood Gluc  (FreeStyle Willi 14 Day [place]) device, 1 each 4 (Four) Times a Day., Disp: 1 each, Rfl: 3    Continuous Blood Gluc Sensor (FreeStyle Willi 2 Sensor) misc, Inject 1 each under the skin into the appropriate area as directed Every 10 (Ten) Days., Disp: 3 each, Rfl: 7    metFORMIN (GLUCOPHAGE) 1000 MG tablet, TAKE 1 TABLET TWICE A DAY WITH MEALS (DISCONTINUE INVOKAMET AND RESUME METFORMIN), Disp: 180 tablet, Rfl: 3    multivitamin (THERAGRAN) tablet tablet, Take 1 tablet by mouth Daily., Disp: , Rfl:     Omega-3 Fatty Acids (fish oil) 1000 MG capsule capsule, Take  by mouth Daily With Breakfast., Disp: , Rfl:     pantoprazole (PROTONIX) 40 MG EC tablet, TAKE 1 TABLET DAILY, Disp: 90 tablet, Rfl: 3    Plant Sterols and Stanols (CholestOff) 450 MG tablet, Take 1 capsule by mouth 2 (Two) Times a Day., Disp: , Rfl:     Spacer/Aero Chamber Mouthpiece misc, 1 each Daily., Disp: 1 each, Rfl: 0    Tirzepatide (Mounjaro) 7.5 MG/0.5ML solution pen-injector, Inject 0.5 mL under the skin into the appropriate area as directed 1 (One) Time Per Week., Disp: 6 mL, Rfl: 0    venlafaxine XR (EFFEXOR-XR) 75 MG 24 hr capsule, TAKE 1 CAPSULE DAILY, Disp: 90 capsule, Rfl: 3    The following portions of the patient's history were reviewed and updated as appropriate: allergies, current medications, past family history, past medical history, past social history, past surgical history, and problem list.    Review of Systems   Constitutional:  Positive for fatigue.   Eyes: Negative.    Respiratory: Negative.     Cardiovascular:  Negative for chest pain and leg swelling.   Gastrointestinal:  Positive for abdominal pain, constipation and nausea.   Endocrine: Negative.    Genitourinary:  Positive for frequency.   Musculoskeletal:  Positive for arthralgias.   Allergic/Immunologic: Negative.    Neurological: Negative.    Hematological: Negative.  Negative for adenopathy.       Assessment     Physical  Exam  Vitals reviewed.   Constitutional:       Appearance: Normal appearance. She is obese.   HENT:      Right Ear: There is impacted cerumen.      Left Ear: A middle ear effusion is present.   Cardiovascular:      Rate and Rhythm: Normal rate and regular rhythm.      Pulses: Normal pulses.      Heart sounds: Normal heart sounds. No murmur heard.  Abdominal:      Palpations: Abdomen is soft.      Tenderness: There is abdominal tenderness in the right upper quadrant and epigastric area.   Musculoskeletal:      Cervical back: Neck supple.      Right lower leg: No edema.      Left lower leg: No edema.   Skin:     General: Skin is warm and dry.   Neurological:      General: No focal deficit present.      Mental Status: She is alert and oriented to person, place, and time.   Psychiatric:         Mood and Affect: Mood normal.         Behavior: Behavior normal.         Thought Content: Thought content normal.         Plan     Her GI records reviewed form Monday    Diagnoses and all orders for this visit:    1. Recurrent UTI (Primary)  Comments:  Urien clear today, giving her Cipro 250mg BID to take on upcoming vacation prn  Orders:  -     POCT urinalysis dipstick, automated  -     ciprofloxacin (Cipro) 250 MG tablet; Take 1 tablet by mouth 2 (Two) Times a Day.  Dispense: 6 tablet; Refill: 0    2. Epigastric pain  -     Amylase; Future  -     Lipase; Future  -     US Gallbladder; Future    3. Pain of upper abdomen  -     US Gallbladder; Future    4. Nausea  -     US Gallbladder; Future    5. Essential hypertension  -     Comprehensive Metabolic Panel; Future  -     CBC & Differential; Future  -     Lipid Panel With / Chol / HDL Ratio; Future    6. Mixed hyperlipidemia  -     Comprehensive Metabolic Panel; Future  -     Lipid Panel With / Chol / HDL Ratio; Future    7. Vitamin D deficiency  -     Vitamin D,25-Hydroxy; Future  -     Lipase; Future    8. Uncontrolled type 2 diabetes mellitus with hyperglycemia  -      Comprehensive Metabolic Panel; Future  -     Lipid Panel With / Chol / HDL Ratio; Future  -     Hemoglobin A1c; Future    GB US needed to rule out cholecystis. Start Carafate 1 gm TID and f/u with GI for EGD.     Follow up in 4 weeks

## 2024-03-04 ENCOUNTER — TELEPHONE (OUTPATIENT)
Dept: INTERNAL MEDICINE | Facility: CLINIC | Age: 74
End: 2024-03-04

## 2024-03-04 RX ORDER — TIRZEPATIDE 7.5 MG/.5ML
INJECTION, SOLUTION SUBCUTANEOUS
Qty: 2 ML | Refills: 2 | Status: SHIPPED | OUTPATIENT
Start: 2024-03-04

## 2024-03-04 NOTE — TELEPHONE ENCOUNTER
Caller: Amber Campos    Relationship to patient: Self    Patient is needing: PATIENT STATES SHE WAS CONTACTED THREE TIMES TO SET UP THE SCAN OF HER GALLBLADDER; HOWEVER, SHE WAS OUT OF TOWN ON A CRUISE AND MISSED THOSE CALLS.      SHE STATES SHE HAS BEEN TOLD TODAY THAT THEY ONLY CALL THREE TIMES AND AFTER THAT, THE PCP HAS TO SUBMIT A NEW ORDER FOR THE SCAN.

## 2024-03-04 NOTE — TELEPHONE ENCOUNTER
Rx Refill Note  Requested Prescriptions     Pending Prescriptions Disp Refills    Mounjaro 7.5 MG/0.5ML solution pen-injector pen [Pharmacy Med Name: MOUNJARO 7.5 MG/0.5 ML PEN]  2     Sig: INJECT 0.5 ML SUBCUTANEOUSLY INTO THE APPROPRIATE AREA AS DIRECTED ONCE WEEKLY      Last office visit with prescribing clinician: 2/23/2024   Last telemedicine visit with prescribing clinician: Visit date not found   Next office visit with prescribing clinician: 3/15/2024                         Would you like a call back once the refill request has been completed: [] Yes [] No    If the office needs to give you a call back, can they leave a voicemail: [] Yes [] No    Timothy Benson, PCT  03/04/24, 13:49 EST

## 2024-03-04 NOTE — TELEPHONE ENCOUNTER
[facility] is able to jill even if order is closed. Not sure why they are telling pt's they can't. Order has been re opened

## 2024-03-15 ENCOUNTER — HOSPITAL ENCOUNTER (OUTPATIENT)
Dept: ULTRASOUND IMAGING | Facility: HOSPITAL | Age: 74
Discharge: HOME | End: 2024-03-15
Payer: MEDICARE

## 2024-03-15 ENCOUNTER — OFFICE VISIT (OUTPATIENT)
Dept: INTERNAL MEDICINE | Facility: CLINIC | Age: 74
End: 2024-03-15
Payer: MEDICARE

## 2024-03-15 VITALS
BODY MASS INDEX: 30.02 KG/M2 | WEIGHT: 169.4 LBS | TEMPERATURE: 98 F | DIASTOLIC BLOOD PRESSURE: 50 MMHG | HEART RATE: 94 BPM | SYSTOLIC BLOOD PRESSURE: 106 MMHG | HEIGHT: 63 IN | OXYGEN SATURATION: 96 %

## 2024-03-15 DIAGNOSIS — E11.8 CONTROLLED TYPE 2 DIABETES MELLITUS WITH COMPLICATION, WITHOUT LONG-TERM CURRENT USE OF INSULIN: ICD-10-CM

## 2024-03-15 DIAGNOSIS — R11.0 NAUSEA: ICD-10-CM

## 2024-03-15 DIAGNOSIS — I10 ESSENTIAL HYPERTENSION: ICD-10-CM

## 2024-03-15 DIAGNOSIS — K80.20 CALCULUS OF GALLBLADDER WITHOUT CHOLECYSTITIS WITHOUT OBSTRUCTION: ICD-10-CM

## 2024-03-15 DIAGNOSIS — Z00.00 ENCOUNTER FOR SUBSEQUENT ANNUAL WELLNESS VISIT (AWV) IN MEDICARE PATIENT: Primary | ICD-10-CM

## 2024-03-15 DIAGNOSIS — E78.2 MIXED HYPERLIPIDEMIA: ICD-10-CM

## 2024-03-15 DIAGNOSIS — R10.10 PAIN OF UPPER ABDOMEN: ICD-10-CM

## 2024-03-15 DIAGNOSIS — R10.13 EPIGASTRIC PAIN: ICD-10-CM

## 2024-03-15 PROCEDURE — 76705 ECHO EXAM OF ABDOMEN: CPT

## 2024-03-15 NOTE — PROGRESS NOTES
The ABCs of the Annual Wellness Visit  Subsequent Medicare Wellness Visit    Subjective    Amber Campos is a 73 y.o. female who presents for a Subsequent Medicare Wellness Visit.    The following portions of the patient's history were reviewed and   updated as appropriate: allergies, current medications, past family history, past medical history, past social history, past surgical history, and problem list.    Compared to one year ago, the patient feels her physical   health is the same.    Compared to one year ago, the patient feels her mental   health is the same.    Recent Hospitalizations:  She was not admitted to the hospital during the last year.       Current Medical Providers:  Patient Care Team:  Jenny Sun APRN as PCP - General  Jenny Sun APRN as PCP - Family Medicine  Zaida Barragan MD as Consulting Physician (Obstetrics and Gynecology)  Naomie Osman APRN as Nurse Practitioner (Orthopedic Surgery)  Zhane Saldivar MD as Consulting Physician (Ophthalmology)    Outpatient Medications Prior to Visit   Medication Sig Dispense Refill    albuterol sulfate  (90 Base) MCG/ACT inhaler Inhale 2 puffs Every 4 (Four) Hours As Needed for Wheezing. 8.5 g 2    amlodipine-olmesartan (NIC) 5-40 MG per tablet TAKE 1 TABLET DAILY 90 tablet 3    budesonide-formoterol (Symbicort) 160-4.5 MCG/ACT inhaler Inhale 2 puffs 2 (Two) Times a Day. 1 each 6    Cetirizine HCl (ZyrTEC Childrens Allergy) 5 MG/5ML solution solution Take 5 mL by mouth Daily. 150 mL     Clocortolone Pivalate 0.1 % cream APPLY TOPICALLY TO AFFECTED AREA TWICE A DAY AS NEEDED      Continuous Blood Gluc  (FreeStyle Willi 14 Day [place]) device 1 each 4 (Four) Times a Day. 1 each 3    Continuous Blood Gluc Sensor (FreeStyle Willi 2 Sensor) misc Inject 1 each under the skin into the appropriate area as directed Every 10 (Ten) Days. 3 each 7    metFORMIN (GLUCOPHAGE) 1000 MG tablet TAKE 1 TABLET TWICE A DAY WITH MEALS (DISCONTINUE  INVOKAMET AND RESUME METFORMIN) 180 tablet 3    multivitamin (THERAGRAN) tablet tablet Take 1 tablet by mouth Daily.      Omega-3 Fatty Acids (fish oil) 1000 MG capsule capsule Take  by mouth Daily With Breakfast.      pantoprazole (PROTONIX) 40 MG EC tablet TAKE 1 TABLET DAILY 90 tablet 3    Plant Sterols and Stanols (CholestOff) 450 MG tablet Take 1 capsule by mouth 2 (Two) Times a Day.      Spacer/Aero Chamber Mouthpiece misc 1 each Daily. 1 each 0    Tirzepatide (Mounjaro) 7.5 MG/0.5ML solution pen-injector pen INJECT 0.5 ML SUBCUTANEOUSLY INTO THE APPROPRIATE AREA AS DIRECTED ONCE WEEKLY 2 mL 2    venlafaxine XR (EFFEXOR-XR) 75 MG 24 hr capsule TAKE 1 CAPSULE DAILY 90 capsule 3    ciprofloxacin (Cipro) 250 MG tablet Take 1 tablet by mouth 2 (Two) Times a Day. 6 tablet 0     No facility-administered medications prior to visit.       No opioid medication identified on active medication list. I have reviewed chart for other potential  high risk medication/s and harmful drug interactions in the elderly.        Aspirin is not on active medication list.  Aspirin use is not indicated based on review of current medical condition/s. Risk of harm outweighs potential benefits.  .    Patient Active Problem List   Diagnosis    Reactive airway disease    Kidney cysts    Depression with anxiety    Menopausal symptom    Hyperlipidemia    Steatosis of liver    Gastroesophageal reflux disease    Ventricular premature beats    Uncontrolled type 2 diabetes mellitus with hyperglycemia    Age-related osteoporosis without current pathological fracture    Mechanical knee pain, right    Encounter for subsequent annual wellness visit (AWV) in Medicare patient    Essential hypertension    Status post replacement of left shoulder joint    Urinary frequency    Chronic fatigue    Environmental allergies    Recurrent UTI    Vitamin D deficiency    Left shoulder tendonitis    Greater trochanteric bursitis of left hip    Drug-induced  "constipation    Seborrheic keratoses    IC (interstitial cystitis)    Low iron stores    Generalized osteoarthritis    Pelvic pain in female    Memory deficit    Pathological fracture of right radius due to age-related osteoporosis    Cystitis    Chronic bilateral low back pain without sciatica    Chronic neck and back pain    Vertigo    Chronic left hip pain    Carotid stenosis, bilateral    Atrophic vaginitis    Alcoholism in family member    Instability of right knee joint    Primary osteoarthritis of right knee    Vagina itching    Purpura    Hypersomnia    Sinus tachycardia    Pain of upper abdomen    Nausea     Advance Care Planning   Advance Care Planning     Advance Directive is not on file.  ACP discussion was held with the patient during this visit. Patient has an advance directive (not in EMR), copy requested.     Objective    Vitals:    03/15/24 1251   BP: 106/50   BP Location: Left arm   Patient Position: Sitting   Cuff Size: Adult   Pulse: 94   Temp: 98 °F (36.7 °C)   TempSrc: Infrared   SpO2: 96%   Weight: 76.8 kg (169 lb 6.4 oz)   Height: 160 cm (62.99\")     Estimated body mass index is 30.02 kg/m² as calculated from the following:    Height as of this encounter: 160 cm (62.99\").    Weight as of this encounter: 76.8 kg (169 lb 6.4 oz).           Does the patient have evidence of cognitive impairment? No    Lab Results   Component Value Date    CHLPL 239 (H) 03/08/2024    TRIG 291 (H) 03/08/2024    HDL 67 (H) 03/08/2024     (H) 03/08/2024    VLDL 50 (H) 03/08/2024    HGBA1C 6.90 (H) 03/08/2024        HEALTH RISK ASSESSMENT    Smoking Status:  Social History     Tobacco Use   Smoking Status Never    Passive exposure: Never   Smokeless Tobacco Never     Alcohol Consumption:  Social History     Substance and Sexual Activity   Alcohol Use No     Fall Risk Screen:    STEADI Fall Risk Assessment was completed, and patient is at LOW risk for falls.Assessment completed on:3/15/2024    Depression " Screening:      3/15/2024    12:54 PM   PHQ-2/PHQ-9 Depression Screening   Little Interest or Pleasure in Doing Things 0-->not at all   Feeling Down, Depressed or Hopeless 0-->not at all   PHQ-9: Brief Depression Severity Measure Score 0       Health Habits and Functional and Cognitive Screening:      3/15/2024    12:00 PM   Functional & Cognitive Status   Do you have difficulty preparing food and eating? No   Do you have difficulty bathing yourself, getting dressed or grooming yourself? No   Do you have difficulty using the toilet? No   Do you have difficulty moving around from place to place? No   Do you have trouble with steps or getting out of a bed or a chair? No   Current Diet Well Balanced Diet   Dental Exam Up to date   Eye Exam Up to date   Exercise (times per week) 0 times per week   Current Exercises Include No Regular Exercise   Do you need help using the phone?  No   Are you deaf or do you have serious difficulty hearing?  No   Do you need help to go to places out of walking distance? No   Do you need help shopping? No   Do you need help preparing meals?  No   Do you need help with housework?  No   Do you need help with laundry? No   Do you need help taking your medications? No   Do you need help managing money? No   Do you ever drive or ride in a car without wearing a seat belt? No   Have you felt unusual stress, anger or loneliness in the last month? No   Who do you live with? Spouse   If you need help, do you have trouble finding someone available to you? No   Do you have difficulty concentrating, remembering or making decisions? Yes       Age-appropriate Screening Schedule:  Refer to the list below for future screening recommendations based on patient's age, sex and/or medical conditions. Orders for these recommended tests are listed in the plan section. The patient has been provided with a written plan.    Health Maintenance   Topic Date Due    RSV Vaccine - Adults (1 - 1-dose 60+ series) Never  done    ZOSTER VACCINE (2 of 3) 02/26/2009    COVID-19 Vaccine (4 - 2023-24 season) 09/01/2023    ANNUAL WELLNESS VISIT  03/15/2024    BMI FOLLOWUP  04/06/2024    DIABETIC EYE EXAM  08/03/2024    HEMOGLOBIN A1C  09/08/2024    DIABETIC FOOT EXAM  09/22/2024    URINE MICROALBUMIN  09/22/2024    LIPID PANEL  03/08/2025    DXA SCAN  05/03/2025    COLORECTAL CANCER SCREENING  05/30/2028    TDAP/TD VACCINES (3 - Td or Tdap) 06/18/2030    HEPATITIS C SCREENING  Completed    INFLUENZA VACCINE  Completed    Pneumococcal Vaccine 65+  Completed                  CMS Preventative Services Quick Reference  Risk Factors Identified During Encounter  Depression/Dysphoria: Current medication is effective, no change recommended  Fall Risk-High or Moderate: Referral Placed for Physical Therapy and has had PT in the past  Immunizations Discussed/Encouraged: Shingrix, COVID19, and RSV (Respiratory Syncytial Virus)  Polypharmacy: Medication List reviewed  Vision Screening Recommended  The above risks/problems have been discussed with the patient.  Pertinent information has been shared with the patient in the After Visit Summary.  An After Visit Summary and PPPS were made available to the patient.    Follow Up:   Next Medicare Wellness visit to be scheduled in 1 year.       Additional E&M Note during same encounter follows:  Patient has multiple medical problems which are significant and separately identifiable that require additional work above and beyond the Medicare Wellness Visit.      Chief Complaint  Medicare Wellness-subsequent    Subjective        HPI  Amber Campos is also being seen today for DM 2, HTN, hyperlipidemia, IC. She had diabetic education at [facility] in 2023. She was followed by endo, but declined to see them anymore. She has a Continuous glucose monitor/Willi meter. Average glucose 160s Fasting glucose ranges from 130-220s, depending on what she had for dinner. PP glucose 150s. She is following a Anamika Jovanny diet. She is  "prescribed metformin 1000 mg BID and Ozempic 2 mg weekly. She stopped the Invokana, due to urinary frequency. She called the office and was switched to Mounjaro due to Ozempic availablity, but has not taken her first injection yet.      She has history of urinary frequency with IC. She reports symptoms of a UTI for 2 weeks and inability to get a visit here. She was directed to urgent care, but decided to \"tough it out\" and take cranberry juice. She is followed by Dr Benton (first urology). She had a CT abd and pelvis and cystoscopy scheduled for renal cysts and IC. She left a urine sample today.      She is on Susannah 5/40mg daily for HTN. She has previously been on Diovan, Toprol XL and Ziac. She does not check her BP, but feels like she is tolerating this medication well. She denies edema, CP, SOA.  She had a lifeline screening showing mild placque in carotid arteries.      She has history of JOSETTE and GERD. She sas been evaluated with both a C-scope 6-6-2022 with Dr. Alaniz. She is on an oral iron supplement I'm MVI. She takes a daily Protonix 40mg tablet.      She is on prolia for osteoporosis with history of traumatic fractures of wrist and ankle. dexa bone density axial (04/30/2021 08:59)     She is on Effexor XR for depression w anxiety. Her  has hx of alcohol abuse. She has had increasing sadness since the sunlight has changed, and feels lonely.       Review of Systems   Constitutional: Negative.    HENT: Negative.  Negative for congestion and dental problem.    Eyes: Negative.    Respiratory: Negative.     Cardiovascular:  Negative for chest pain and leg swelling.   Gastrointestinal: Negative.    Endocrine: Negative.    Genitourinary: Negative.    Musculoskeletal:  Positive for arthralgias.   Allergic/Immunologic: Positive for environmental allergies.   Neurological: Negative.    Psychiatric/Behavioral: Negative.     All other systems reviewed and are negative.      Objective   Vital Signs:  /50 (BP " "Location: Left arm, Patient Position: Sitting, Cuff Size: Adult)   Pulse 94   Temp 98 °F (36.7 °C) (Infrared)   Ht 160 cm (62.99\")   Wt 76.8 kg (169 lb 6.4 oz)   SpO2 96%   BMI 30.02 kg/m²     Physical Exam  Vitals reviewed.   Constitutional:       Appearance: Normal appearance.   HENT:      Head: Normocephalic.      Right Ear: Tympanic membrane normal. There is no impacted cerumen.      Left Ear: Tympanic membrane normal. There is no impacted cerumen.   Cardiovascular:      Rate and Rhythm: Normal rate and regular rhythm.      Pulses: Normal pulses.      Heart sounds: Normal heart sounds. No murmur heard.  Pulmonary:      Effort: Pulmonary effort is normal. No respiratory distress.      Breath sounds: Normal breath sounds. No stridor. No rhonchi.   Musculoskeletal:      Right lower leg: No edema.      Left lower leg: No edema.   Skin:     General: Skin is warm and dry.   Neurological:      General: No focal deficit present.      Mental Status: She is alert and oriented to person, place, and time.   Psychiatric:         Mood and Affect: Mood normal.         Behavior: Behavior normal.         Thought Content: Thought content normal.          The following data was reviewed by: JAKE Blount on 03/15/2024:  CMP          9/1/2023    09:18 12/6/2023    10:01 3/8/2024    10:05   CMP   Glucose 167  158  153    BUN 13  16  19    Creatinine 0.55  0.63  0.60    Sodium 137  136  137    Potassium 4.4  4.4  4.5    Chloride 102  100  99    Calcium 9.3  9.7  9.1    Total Protein 6.6  6.6  6.6    Albumin 4.5  4.5  4.5    Globulin 2.1  2.1  2.1    Total Bilirubin 0.3  0.3  0.3    Alkaline Phosphatase 50  50  50    AST (SGOT) 24  26  31    ALT (SGPT) 24  22  23    BUN/Creatinine Ratio 23.6  25.4  31.7      CBC          9/1/2023    09:18 12/6/2023    10:01 3/8/2024    10:05   CBC   WBC 6.62  6.95  6.70    RBC 4.61  4.24  4.22    Hemoglobin 12.4  12.2  12.1    Hematocrit 38.2  36.2  36.7    MCV 82.9  85.4  87.0    MCH 26.9  " 28.8  28.7    MCHC 32.5  33.7  33.0    RDW 14.1  14.4  13.1    Platelets 395  375  335      CBC w/diff          9/1/2023    09:18 12/6/2023    10:01 3/8/2024    10:05   CBC w/Diff   WBC 6.62  6.95  6.70    RBC 4.61  4.24  4.22    Hemoglobin 12.4  12.2  12.1    Hematocrit 38.2  36.2  36.7    MCV 82.9  85.4  87.0    MCH 26.9  28.8  28.7    MCHC 32.5  33.7  33.0    RDW 14.1  14.4  13.1    Platelets 395  375  335    Neutrophil Rel % 48.6  54.8  44.6    Lymphocyte Rel % 37.3  33.5  40.7    Monocyte Rel % 7.3  6.9  9.0    Eosinophil Rel % 5.7  3.7  4.6    Basophil Rel % 0.9  1.0  1.0      Lipid Panel          9/1/2023    09:18 12/6/2023    10:01 3/8/2024    10:05   Lipid Panel   Total Cholesterol 219  211  239    Triglycerides 266  192  291    HDL Cholesterol 64  71  67    VLDL Cholesterol 45  33  50    LDL Cholesterol  110  107  122      TSH          6/16/2023    08:26   TSH   TSH 1.120      A1C Last 3 Results          9/1/2023    09:18 12/6/2023    10:01 3/8/2024    10:05   HGBA1C Last 3 Results   Hemoglobin A1C 7.30  7.40  6.90        Data reviewed : Radiologic studies CT abd/pelvis, Cardiology studies ECG, Consultant notes ortho, GI, and surgery, and GI studies colonoscopy           Assessment and Plan   There are no diagnoses linked to this encounter.   Diagnosis Plan   1. Encounter for subsequent annual wellness visit (AWV) in Medicare patient        2. Controlled type 2 diabetes mellitus with complication, without long-term current use of insulin  Tirzepatide (Mounjaro) 7.5 MG/0.5ML solution pen-injector pen    CBC & Differential    Lipid Panel With / Chol / HDL Ratio    Hemoglobin A1c    Comprehensive Metabolic Panel    HgbA1c improved after increasing Mounjaro. Reviewed diet and exercise regimen      3. Essential hypertension  CBC & Differential    Lipid Panel With / Chol / HDL Ratio    BP at goal on current medicaitons      4. Mixed hyperlipidemia  CBC & Differential    Lipid Panel With / Chol / HDL Ratio     Comprehensive Metabolic Panel    LDL goal  < 70 on  statin  therapy      5. Calculus of gallbladder without cholecystitis without obstruction  Ambulatory Referral to General Surgery              Follow Up     3 months w labs    Patient was given instructions and counseling regarding her condition or for health maintenance advice. Please see specific information pulled into the AVS if appropriate.

## 2024-03-22 ENCOUNTER — TELEPHONE (OUTPATIENT)
Dept: INTERNAL MEDICINE | Facility: CLINIC | Age: 74
End: 2024-03-22
Payer: MEDICARE

## 2024-03-22 ENCOUNTER — PATIENT ROUNDING (BHMG ONLY) (OUTPATIENT)
Dept: SURGERY | Facility: CLINIC | Age: 74
End: 2024-03-22
Payer: MEDICARE

## 2024-03-22 ENCOUNTER — OFFICE VISIT (OUTPATIENT)
Dept: SURGERY | Facility: CLINIC | Age: 74
End: 2024-03-22
Payer: MEDICARE

## 2024-03-22 VITALS
HEIGHT: 63 IN | WEIGHT: 170.9 LBS | DIASTOLIC BLOOD PRESSURE: 82 MMHG | SYSTOLIC BLOOD PRESSURE: 118 MMHG | BODY MASS INDEX: 30.28 KG/M2

## 2024-03-22 DIAGNOSIS — K80.20 CALCULUS OF GALLBLADDER WITHOUT CHOLECYSTITIS WITHOUT OBSTRUCTION: Primary | ICD-10-CM

## 2024-03-22 PROCEDURE — 3074F SYST BP LT 130 MM HG: CPT | Performed by: PHYSICIAN ASSISTANT

## 2024-03-22 PROCEDURE — 99203 OFFICE O/P NEW LOW 30 MIN: CPT | Performed by: PHYSICIAN ASSISTANT

## 2024-03-22 PROCEDURE — 1160F RVW MEDS BY RX/DR IN RCRD: CPT | Performed by: PHYSICIAN ASSISTANT

## 2024-03-22 PROCEDURE — 1159F MED LIST DOCD IN RCRD: CPT | Performed by: PHYSICIAN ASSISTANT

## 2024-03-22 PROCEDURE — 3079F DIAST BP 80-89 MM HG: CPT | Performed by: PHYSICIAN ASSISTANT

## 2024-03-22 RX ORDER — OXYCODONE HYDROCHLORIDE AND ACETAMINOPHEN 5; 325 MG/1; MG/1
TABLET ORAL
COMMUNITY
Start: 2024-03-19

## 2024-03-22 NOTE — TELEPHONE ENCOUNTER
Jenny sent in prescription for Mounjaro to [facility] and they cannot get it, she  wants  to see if we can get Ozempic 2.5 mg. (?) for three months sent instead. Please call and let know if we can do this for her. She has enough mounjaro to last a couple of weeks, but is concerned about it

## 2024-03-22 NOTE — PROGRESS NOTES
ASSESSMENT/PLAN:    73-year-old lady with cholelithiasis that is minimally symptomatic.  Dr. Wilson and I discussed her options with her to include watching and waiting versus proceeding with a laparoscopic cholecystectomy.  She understands nature of the procedure and the risks including but not limited to bleeding, infection, conversion to open procedure, postoperative bile leak, and the bowel function changes which can accompany cholecystectomy.  Currently she would like to watch and wait and will contact us if she begins to have worsening symptoms.  She would need to hold her mom's urine no for 1 week prior to the procedure.  All questions were answered at the time of this visit and they were willing to proceed with all recommendations.    CC:     Cholelithiasis    HPI:    This is a 73-year-old lady presenting to the office today at the request of JAKE Huitron for consultation.  Approximately 2 months ago she experienced an episode of significant epigastric abdominal pain with nausea that resolved relatively quickly.  She was concerned that she had a recurrence of her previous ulcers however she had an endoscopy within the last year and her gastroenterologist told her this likely was not the source and recommended a gallbladder ultrasound.  She did proceed with a gallbladder ultrasound which demonstrated cholelithiasis and sludge.  Since then she has had no recurrent episodes and is feeling well and is hopeful of avoiding surgery.    ENDOSCOPY:   Colonoscopy 2017    SOCIAL HISTORY:   Denies tobacco use  Occasional alcohol use    FAMILY HISTORY:    Colorectal cancer: None  Gallbladder Disease: None    PREVIOUS ABDOMINAL SURGERY    Umbilical hernia repair  Incisional hernia repair    OTHER SURGERY  Past Surgical History:   Procedure Laterality Date    BREAST BIOPSY Bilateral     Benign    BUNIONECTOMY Right     COLONOSCOPY N/A 04/25/2017    Procedure: COLONOSCOPY TO CECUM ;  Surgeon: Aidan Roca MD;   Location:  [facility];  Service:     EXOSTECTOMY Left     HEEL    FOOT FUSION Right     9 screws and plate    KNEE ARTHROSCOPY W/ MENISCAL REPAIR Right     ORIF WRIST FRACTURE Right 12/01/2021    Procedure: WRIST OPEN REDUCTION INTERNAL FIXATION;  Surgeon: Salazar Amezcua MD;  Location:  [facility];  Service: Orthopedics;  Laterality: Right;    PAP SMEAR      TONSILLECTOMY      TOTAL SHOULDER REVERSE ARTHROPLASTY Right     UMBILICAL HERNIA REPAIR N/A 10/2010       PAST MEDICAL HISTORY:    Past Medical History:   Diagnosis Date    Basaloid carcinoma     facial    Carotid artery plaque, bilateral     without stenosis, 2023    Gastroesophageal reflux disease 03/14/2016    History of kidney infection     History of mammogram     Hypertension     IC (interstitial cystitis) 02/03/2021    Osteopenia     Osteoporosis     Postmenopausal     PVCs (premature ventricular contractions)     Seasonal allergies     Simple renal cyst 03/14/2016    Sinus tachycardia     Steatosis of liver 03/14/2016    Type 2 diabetes mellitus     Vertigo 05/09/2022       MEDICATIONS:     Current Outpatient Medications:     albuterol sulfate  (90 Base) MCG/ACT inhaler, Inhale 2 puffs Every 4 (Four) Hours As Needed for Wheezing., Disp: 8.5 g, Rfl: 2    amlodipine-olmesartan (NIC) 5-40 MG per tablet, TAKE 1 TABLET DAILY, Disp: 90 tablet, Rfl: 3    budesonide-formoterol (Symbicort) 160-4.5 MCG/ACT inhaler, Inhale 2 puffs 2 (Two) Times a Day., Disp: 1 each, Rfl: 6    Cetirizine HCl (ZyrTEC Childrens Allergy) 5 MG/5ML solution solution, Take 5 mL by mouth Daily., Disp: 150 mL, Rfl:     Clocortolone Pivalate 0.1 % cream, APPLY TOPICALLY TO AFFECTED AREA TWICE A DAY AS NEEDED, Disp: , Rfl:     Continuous Blood Gluc  (FreeStyle Willi 14 Day [place]) device, 1 each 4 (Four) Times a Day., Disp: 1 each, Rfl: 3    Continuous Blood Gluc Sensor (FreeStyle Willi 2 Sensor) misc, Inject 1 each under the skin into the appropriate area as directed  "Every 10 (Ten) Days., Disp: 3 each, Rfl: 7    metFORMIN (GLUCOPHAGE) 1000 MG tablet, TAKE 1 TABLET TWICE A DAY WITH MEALS (DISCONTINUE INVOKAMET AND RESUME METFORMIN), Disp: 180 tablet, Rfl: 3    multivitamin (THERAGRAN) tablet tablet, Take 1 tablet by mouth Daily., Disp: , Rfl:     Omega-3 Fatty Acids (fish oil) 1000 MG capsule capsule, Take  by mouth Daily With Breakfast., Disp: , Rfl:     oxyCODONE-acetaminophen (PERCOCET) 5-325 MG per tablet, , Disp: , Rfl:     pantoprazole (PROTONIX) 40 MG EC tablet, TAKE 1 TABLET DAILY, Disp: 90 tablet, Rfl: 3    Spacer/Aero Chamber Mouthpiece misc, 1 each Daily., Disp: 1 each, Rfl: 0    Tirzepatide (Mounjaro) 7.5 MG/0.5ML solution pen-injector pen, Inject 0.5 mL under the skin into the appropriate area as directed 1 (One) Time Per Week., Disp: 6 mL, Rfl: 1    venlafaxine XR (EFFEXOR-XR) 75 MG 24 hr capsule, TAKE 1 CAPSULE DAILY, Disp: 90 capsule, Rfl: 3    ALLERGIES:   Allergies   Allergen Reactions    Contrast Dye (Echo Or Unknown Ct/Mr) Anaphylaxis     Contrast dye used in Xray    Iodinated Contrast Media Photosensitivity     contrast    Adhesive Tape Other (See Comments)     EKG stickers only    Farxiga [Dapagliflozin] Diarrhea and Itching     Yeast infeciotn    Neomycin-Bacitracin Zn-Polymyx Rash    Toprol Xl [Metoprolol] Dizziness    Trulicity [Dulaglutide] GI Intolerance     Constipation    Victoza [Liraglutide] Nausea Only       PHYSICAL EXAM:   Constitutional: Well-developed well-nourished, no acute distress  Vital signs:   Height 63\"  Weight 170  BMI 30.3  Neck: Supple, no palpable mass, trachea midline  Respiratory: Clear to auscultation, normal inspiratory effort  Cardiovascular: Regular rate, no murmur, no carotid bruit  Gastrointestinal: Well-healed supraumbilical midline incision, soft, nontender  Psychiatric: Alert and oriented ×3, normal affect            Aidan Winchester PA-C    [facility] - General Surgery  [address] [place] [address], [address]  [place] [address]    [address] [facility], [address]  [place] [id number]    Office: [phone]  Fax: [phone]

## 2024-03-22 NOTE — PROGRESS NOTES
March 22, 2024    Hello, may I speak with Amber Campos?    My name is Onel      I am  with [facility]  [address]  [facility] [address] [phone].    Before we get started may I verify your date of birth? 1950    I am calling to officially welcome you to our practice and ask about your recent visit. Is this a good time to talk? yes    Tell me about your visit with us. What things went well?  Everything went well       We're always looking for ways to make our patients' experiences even better. Do you have recommendations on ways we may improve?  no    Overall were you satisfied with your first visit to our practice? yes       I appreciate you taking the time to speak with me today. Is there anything else I can do for you? no      Thank you, and have a great day.

## 2024-03-23 RX ORDER — SEMAGLUTIDE 2.68 MG/ML
2 INJECTION, SOLUTION SUBCUTANEOUS WEEKLY
Qty: 12 EACH | Refills: 1 | Status: SHIPPED | OUTPATIENT
Start: 2024-03-23

## 2024-03-25 PROBLEM — R10.13 ABDOMINAL PAIN - EPIGASTRIC: Status: ACTIVE | Noted: 2020-12-29

## 2024-03-29 ENCOUNTER — TELEPHONE (OUTPATIENT)
Dept: INTERNAL MEDICINE | Facility: CLINIC | Age: 74
End: 2024-03-29

## 2024-03-29 NOTE — TELEPHONE ENCOUNTER
Caller: Amber Campos    Relationship: Self    Best call back number: [phone]    Who are you requesting to speak with (clinical staff, provider,  specific staff member): CLINICAL STAFF    What was the call regarding: STATED THAT THEY WOULD LIKE TO DISCUSS ABOUT A BLADDER INFECTION THEY HAVE AND SOME OTHER THINGS THAT HAVE BEEN GOING ON BEFORE THEY REQUEST ANY MEDICATIONS. PLEASE CALL AND ADVISE FURTHER

## 2024-03-29 NOTE — TELEPHONE ENCOUNTER
I called and spoke with patient and she informed me that about 2 weeks ago she had oral surgery and has been on BID Amoxicillin 875. She said that about 3 days ago her urine started to smell chemically and she mentioned that you gave her 4 Cipro tablets about a month ago when she went on her cruise. Should she complete the course of Amox first or should she go ahead and start the Cipro?

## 2024-04-01 ENCOUNTER — TELEPHONE (OUTPATIENT)
Dept: INTERNAL MEDICINE | Facility: CLINIC | Age: 74
End: 2024-04-01
Payer: MEDICARE

## 2024-04-01 NOTE — TELEPHONE ENCOUNTER
The pharmacy called to verify that Ozempic is the most current medication for the pt (paige/c Mounjaro). I verified this but they are concerned about the pt starting the Ozempic at 2mg without titration? Is this ok or would she need to start at a lower dose since switching and titrate up? Please advise.     [phone]  Ref # [id number]

## 2024-04-01 NOTE — TELEPHONE ENCOUNTER
She has titrated the Ozempic and was getting lower doses at the local pharmacy due to availability issues. She is okay to start the 2mg (and has taken this dose before).

## 2024-04-03 ENCOUNTER — OFFICE VISIT (OUTPATIENT)
Dept: INTERNAL MEDICINE | Facility: CLINIC | Age: 74
End: 2024-04-03
Payer: MEDICARE

## 2024-04-03 VITALS
HEART RATE: 99 BPM | DIASTOLIC BLOOD PRESSURE: 66 MMHG | HEIGHT: 63 IN | SYSTOLIC BLOOD PRESSURE: 116 MMHG | OXYGEN SATURATION: 96 % | TEMPERATURE: 97.5 F | WEIGHT: 165 LBS | BODY MASS INDEX: 29.23 KG/M2

## 2024-04-03 DIAGNOSIS — N30.00 ACUTE CYSTITIS WITHOUT HEMATURIA: Primary | ICD-10-CM

## 2024-04-03 LAB
BILIRUB BLD-MCNC: ABNORMAL MG/DL
CLARITY, POC: ABNORMAL
COLOR UR: ABNORMAL
EXPIRATION DATE: ABNORMAL
GLUCOSE UR STRIP-MCNC: NEGATIVE MG/DL
KETONES UR QL: ABNORMAL
LEUKOCYTE EST, POC: ABNORMAL
Lab: ABNORMAL
NITRITE UR-MCNC: NEGATIVE MG/ML
PH UR: 5.5 [PH] (ref 5–8)
PROT UR STRIP-MCNC: ABNORMAL MG/DL
RBC # UR STRIP: ABNORMAL /UL
SP GR UR: 1.01 (ref 1–1.03)
UROBILINOGEN UR QL: ABNORMAL

## 2024-04-03 PROCEDURE — 3074F SYST BP LT 130 MM HG: CPT | Performed by: FAMILY MEDICINE

## 2024-04-03 PROCEDURE — 3078F DIAST BP <80 MM HG: CPT | Performed by: FAMILY MEDICINE

## 2024-04-03 PROCEDURE — 1159F MED LIST DOCD IN RCRD: CPT | Performed by: FAMILY MEDICINE

## 2024-04-03 PROCEDURE — 81003 URINALYSIS AUTO W/O SCOPE: CPT | Performed by: FAMILY MEDICINE

## 2024-04-03 PROCEDURE — 1160F RVW MEDS BY RX/DR IN RCRD: CPT | Performed by: FAMILY MEDICINE

## 2024-04-03 PROCEDURE — 99213 OFFICE O/P EST LOW 20 MIN: CPT | Performed by: FAMILY MEDICINE

## 2024-04-03 PROCEDURE — 3044F HG A1C LEVEL LT 7.0%: CPT | Performed by: FAMILY MEDICINE

## 2024-04-03 RX ORDER — SUCRALFATE 1 G/1
TABLET ORAL
COMMUNITY
Start: 2024-03-19

## 2024-04-03 RX ORDER — SULFAMETHOXAZOLE AND TRIMETHOPRIM 800; 160 MG/1; MG/1
1 TABLET ORAL 2 TIMES DAILY
Qty: 14 TABLET | Refills: 0 | Status: SHIPPED | OUTPATIENT
Start: 2024-04-03 | End: 2024-04-10

## 2024-04-03 NOTE — PROGRESS NOTES
Subjective   Amber Campos is a 73 y.o. female presenting today for follow up of   Chief Complaint   Patient presents with    Urinary Tract Infection       History of Present Illness     This is a 74 yo patient of [facility] with a history of DMII and frequent UTI who presents with 5 days of urinary frequency, abnormal urinary odor, occasional dysuria, and fatigue.  Denies gross hematuria, fevers, nausea.    Patient Active Problem List   Diagnosis    Reactive airway disease    Kidney cysts    Depression with anxiety    Menopausal symptom    Hyperlipidemia    Steatosis of liver    Gastroesophageal reflux disease    Ventricular premature beats    Controlled type 2 diabetes mellitus with complication, without long-term current use of insulin    Age-related osteoporosis without current pathological fracture    Mechanical knee pain, right    Encounter for subsequent annual wellness visit (AWV) in Medicare patient    Essential hypertension    Status post replacement of left shoulder joint    Urinary frequency    Chronic fatigue    Environmental allergies    Recurrent UTI    Vitamin D deficiency    Left shoulder tendonitis    Greater trochanteric bursitis of left hip    Drug-induced constipation    Seborrheic keratoses    IC (interstitial cystitis)    Low iron stores    Generalized osteoarthritis    Pelvic pain in female    Memory deficit    Pathological fracture of right radius due to age-related osteoporosis    Cystitis    Chronic bilateral low back pain without sciatica    Chronic neck and back pain    Vertigo    Chronic left hip pain    Carotid stenosis, bilateral    Atrophic vaginitis    Alcoholism in family member    Instability of right knee joint    Primary osteoarthritis of right knee    Vagina itching    Purpura    Hypersomnia    Sinus tachycardia    Pain of upper abdomen    Calculus of gallbladder without cholecystitis without obstruction       Current Outpatient Medications on File Prior to Visit   Medication Sig     albuterol sulfate  (90 Base) MCG/ACT inhaler Inhale 2 puffs Every 4 (Four) Hours As Needed for Wheezing.    amlodipine-olmesartan (NIC) 5-40 MG per tablet TAKE 1 TABLET DAILY    budesonide-formoterol (Symbicort) 160-4.5 MCG/ACT inhaler Inhale 2 puffs 2 (Two) Times a Day.    Cetirizine HCl (ZyrTEC Childrens Allergy) 5 MG/5ML solution solution Take 5 mL by mouth Daily.    Clocortolone Pivalate 0.1 % cream APPLY TOPICALLY TO AFFECTED AREA TWICE A DAY AS NEEDED    Continuous Blood Gluc  (FreeStyle Willi 14 Day [place]) device 1 each 4 (Four) Times a Day.    Continuous Blood Gluc Sensor (FreeStyle Willi 2 Sensor) misc Inject 1 each under the skin into the appropriate area as directed Every 10 (Ten) Days.    metFORMIN (GLUCOPHAGE) 1000 MG tablet TAKE 1 TABLET TWICE A DAY WITH MEALS (DISCONTINUE INVOKAMET AND RESUME METFORMIN)    multivitamin (THERAGRAN) tablet tablet Take 1 tablet by mouth Daily.    Omega-3 Fatty Acids (fish oil) 1000 MG capsule capsule Take  by mouth Daily With Breakfast.    oxyCODONE-acetaminophen (PERCOCET) 5-325 MG per tablet     pantoprazole (PROTONIX) 40 MG EC tablet TAKE 1 TABLET DAILY    Semaglutide, 2 MG/DOSE, (Ozempic, 2 MG/DOSE,) 8 MG/3ML solution pen-injector Inject 2 mg under the skin into the appropriate area as directed 1 (One) Time Per Week.    Spacer/Aero Chamber Mouthpiece misc 1 each Daily.    venlafaxine XR (EFFEXOR-XR) 75 MG 24 hr capsule TAKE 1 CAPSULE DAILY    sucralfate (CARAFATE) 1 g tablet TAKE 1 TABLET BY MOUTH THREE TIMES A DAY 30 MINUTES BEFORE MEALS FOR 30 DAYS (Patient not taking: Reported on 4/3/2024)     No current facility-administered medications on file prior to visit.          The following portions of the patient's history were reviewed and updated as appropriate: allergies, current medications, past family history, past medical history, past social history, past surgical history and problem list.    Review of Systems   Constitutional:  Positive for  "fatigue. Negative for fever.   Gastrointestinal: Negative.    Genitourinary:  Positive for dysuria, frequency and urgency. Negative for difficulty urinating, flank pain and hematuria.       Objective   Vitals:    04/03/24 1018   BP: 116/66   BP Location: Left arm   Patient Position: Sitting   Cuff Size: Adult   Pulse: 99   Temp: 97.5 °F (36.4 °C)   TempSrc: Infrared   SpO2: 96%   Weight: 74.8 kg (165 lb)   Height: 160 cm (62.99\")       BP Readings from Last 3 Encounters:   04/03/24 116/66   03/22/24 118/82   03/15/24 106/50        Wt Readings from Last 3 Encounters:   04/03/24 74.8 kg (165 lb)   03/22/24 77.5 kg (170 lb 14.4 oz)   03/15/24 76.8 kg (169 lb 6.4 oz)        Body mass index is 29.24 kg/m².  Nursing notes and vitals reviewed.    Physical Exam  Vitals and nursing note reviewed.   Constitutional:       Appearance: Normal appearance. She is well-developed.   HENT:      Head: Normocephalic and atraumatic.      Mouth/Throat:      Mouth: Mucous membranes are moist.   Eyes:      Extraocular Movements: Extraocular movements intact.      Conjunctiva/sclera: Conjunctivae normal.   Neck:      Thyroid: No thyromegaly.   Cardiovascular:      Rate and Rhythm: Normal rate and regular rhythm.      Heart sounds: Normal heart sounds.   Pulmonary:      Effort: Pulmonary effort is normal.      Breath sounds: Normal breath sounds.   Abdominal:      Palpations: Abdomen is soft.      Tenderness: There is no abdominal tenderness. There is no right CVA tenderness or left CVA tenderness.   Musculoskeletal:      Cervical back: Neck supple. No rigidity.      Right lower leg: No edema.      Left lower leg: No edema.   Skin:     General: Skin is warm and dry.   Neurological:      General: No focal deficit present.      Mental Status: She is alert and oriented to person, place, and time.   Psychiatric:         Mood and Affect: Mood normal.         Behavior: Behavior normal.         Recent Results (from the past 672 hour(s))   Vitamin " D,25-Hydroxy    Collection Time: 03/08/24 10:05 AM    Specimen: Blood   Result Value Ref Range    25 Hydroxy, Vitamin D 45.0 30.0 - 100.0 ng/ml   Comprehensive Metabolic Panel    Collection Time: 03/08/24 10:05 AM    Specimen: Blood   Result Value Ref Range    Glucose 153 (H) 65 - 99 mg/dL    BUN 19 8 - 23 mg/dL    Creatinine 0.60 0.57 - 1.00 mg/dL    EGFR Result 93.7 >60.0 mL/min/1.73    BUN/Creatinine Ratio 31.7 (H) 7.0 - 25.0    Sodium 137 136 - 145 mmol/L    Potassium 4.5 3.5 - 5.2 mmol/L    Chloride 99 98 - 107 mmol/L    Total CO2 24.1 22.0 - 29.0 mmol/L    Calcium 9.1 8.6 - 10.5 mg/dL    Total Protein 6.6 6.0 - 8.5 g/dL    Albumin 4.5 3.5 - 5.2 g/dL    Globulin 2.1 gm/dL    A/G Ratio 2.1 g/dL    Total Bilirubin 0.3 0.0 - 1.2 mg/dL    Alkaline Phosphatase 50 39 - 117 U/L    AST (SGOT) 31 1 - 32 U/L    ALT (SGPT) 23 1 - 33 U/L   CBC & Differential    Collection Time: 03/08/24 10:05 AM    Specimen: Blood   Result Value Ref Range    WBC 6.70 3.40 - 10.80 10*3/mm3    RBC 4.22 3.77 - 5.28 10*6/mm3    Hemoglobin 12.1 12.0 - 15.9 g/dL    Hematocrit 36.7 34.0 - 46.6 %    MCV 87.0 79.0 - 97.0 fL    MCH 28.7 26.6 - 33.0 pg    MCHC 33.0 31.5 - 35.7 g/dL    RDW 13.1 12.3 - 15.4 %    Platelets 335 140 - 450 10*3/mm3    Neutrophil Rel % 44.6 42.7 - 76.0 %    Lymphocyte Rel % 40.7 19.6 - 45.3 %    Monocyte Rel % 9.0 5.0 - 12.0 %    Eosinophil Rel % 4.6 0.3 - 6.2 %    Basophil Rel % 1.0 0.0 - 1.5 %    Neutrophils Absolute 2.98 1.70 - 7.00 10*3/mm3    Lymphocytes Absolute 2.73 0.70 - 3.10 10*3/mm3    Monocytes Absolute 0.60 0.10 - 0.90 10*3/mm3    Eosinophils Absolute 0.31 0.00 - 0.40 10*3/mm3    Basophils Absolute 0.07 0.00 - 0.20 10*3/mm3    Immature Granulocyte Rel % 0.1 0.0 - 0.5 %    Immature Grans Absolute 0.01 0.00 - 0.05 10*3/mm3    nRBC 0.0 0.0 - 0.2 /100 WBC   Lipid Panel With / Chol / HDL Ratio    Collection Time: 03/08/24 10:05 AM    Specimen: Blood   Result Value Ref Range    Total Cholesterol 239 (H) 0 - 200 mg/dL     Triglycerides 291 (H) 0 - 150 mg/dL    HDL Cholesterol 67 (H) 40 - 60 mg/dL    VLDL Cholesterol Elliot 50 (H) 5 - 40 mg/dL    LDL Chol Calc (NIH) 122 (H) 0 - 100 mg/dL    Chol/HDL Ratio 3.57    Hemoglobin A1c    Collection Time: 03/08/24 10:05 AM    Specimen: Blood   Result Value Ref Range    Hemoglobin A1C 6.90 (H) 4.80 - 5.60 %   Amylase    Collection Time: 03/08/24 10:05 AM    Specimen: Blood   Result Value Ref Range    Amylase 30 28 - 100 U/L   Lipase    Collection Time: 03/08/24 10:05 AM    Specimen: Blood   Result Value Ref Range    Lipase 43 13 - 60 U/L   POC Urinalysis Dipstick, Automated    Collection Time: 04/03/24 10:15 AM    Specimen: Urine   Result Value Ref Range    Color Orange (A) Yellow, Straw, Dark Yellow, Patrizia    Clarity, UA Cloudy (A) Clear    Specific Gravity  1.015 1.005 - 1.030    pH, Urine 5.5 5.0 - 8.0    Leukocytes Large (3+) (A) Negative    Nitrite, UA Negative Negative    Protein,  mg/dL (A) Negative mg/dL    Glucose, UA Negative Negative mg/dL    Ketones, UA Trace (A) Negative    Urobilinogen, UA 0.2 E.U./dL Normal, 0.2 E.U./dL    Bilirubin Small (1+) (A) Negative    Blood, UA Small (A) Negative    Lot Number [id number]     Expiration Date 10/25/24          Assessment & Plan   Diagnoses and all orders for this visit:    1. Acute cystitis without hematuria (Primary)  -     POC Urinalysis Dipstick, Automated  -     Urine Culture - Urine, Urine, Clean Catch  -     sulfamethoxazole-trimethoprim (Bactrim DS) 800-160 MG per tablet; Take 1 tablet by mouth 2 (Two) Times a Day for 7 days.  Dispense: 14 tablet; Refill: 0        Culture urine.  Treat with Bactrim DS.  Will do 7 day course due to frequency of UTI.  She has seen urology in the past.      Medications, including side effects, were discussed with the patient. Patient verbalized understanding.  The plan of care was discussed. All questions were answered. Patient verbalized understanding.      No follow-ups on  file.

## 2024-04-06 LAB
BACTERIA UR CULT: ABNORMAL
BACTERIA UR CULT: ABNORMAL
OTHER ANTIBIOTIC SUSC ISLT: ABNORMAL

## 2024-04-29 ENCOUNTER — OFFICE VISIT (OUTPATIENT)
Dept: ORTHOPEDIC SURGERY | Facility: CLINIC | Age: 74
End: 2024-04-29
Payer: MEDICARE

## 2024-04-29 VITALS — HEIGHT: 63 IN | BODY MASS INDEX: 29.23 KG/M2 | WEIGHT: 165 LBS

## 2024-04-29 DIAGNOSIS — M17.11 PRIMARY OSTEOARTHRITIS OF RIGHT KNEE: Primary | ICD-10-CM

## 2024-04-29 RX ORDER — TRIAMCINOLONE ACETONIDE 40 MG/ML
80 INJECTION, SUSPENSION INTRA-ARTICULAR; INTRAMUSCULAR
Status: COMPLETED | OUTPATIENT
Start: 2024-04-29 | End: 2024-04-29

## 2024-04-29 RX ORDER — LIDOCAINE HYDROCHLORIDE 10 MG/ML
8 INJECTION, SOLUTION EPIDURAL; INFILTRATION; INTRACAUDAL; PERINEURAL
Status: COMPLETED | OUTPATIENT
Start: 2024-04-29 | End: 2024-04-29

## 2024-04-29 RX ADMIN — TRIAMCINOLONE ACETONIDE 80 MG: 40 INJECTION, SUSPENSION INTRA-ARTICULAR; INTRAMUSCULAR at 09:04

## 2024-04-29 RX ADMIN — LIDOCAINE HYDROCHLORIDE 8 ML: 10 INJECTION, SOLUTION EPIDURAL; INFILTRATION; INTRACAUDAL; PERINEURAL at 09:04

## 2024-04-29 NOTE — PROGRESS NOTES
Subjective:     Patient ID: Amber Campos is a 73 y.o. female.    Chief Complaint:  Follow-up prior to osteoarthritis right knee  Corticosteroid injection right knee 9/14/2023  History of Present Illness  Amber Campos returns to clinic for follow-up of her right lower extremity.  She is experiencing greater tenderness along the medial compartment and the proximal tibia medially as well as lateral aspect of hip radiating from the greater trochanter to the lateral aspect of the tib-fib distally.  She has received corticosteroid injection on 14 2023 with significant symptom improvement.  Her goal is to begin playing pickle ball.  She is fearful due to other instances with patients who have undergone Achilles tendon injuries or other such injuries would like to discuss options how to prevent further injury.  Prior knee arthroscopy outside facility we did complete MRI back in July 2023 of the knee which did show degeneration greatest along the medial compartment and patella.  She is not interested in any surgical intervention.  Pain does not radiate into her groin.  Denies any other concerns present.       Social History     Occupational History    Occupation: retired    Tobacco Use    Smoking status: Never     Passive exposure: Never    Smokeless tobacco: Never   Vaping Use    Vaping status: Never Used   Substance and Sexual Activity    Alcohol use: No    Drug use: No    Sexual activity: Defer      Past Medical History:   Diagnosis Date    Basaloid carcinoma     facial    Carotid artery plaque, bilateral     without stenosis, 2023    Gastroesophageal reflux disease 03/14/2016    History of kidney infection     History of mammogram     Hypertension     IC (interstitial cystitis) 02/03/2021    Osteopenia     Osteoporosis     Postmenopausal     PVCs (premature ventricular contractions)     Seasonal allergies     Simple renal cyst 03/14/2016    Sinus tachycardia     Steatosis of liver 03/14/2016    Type 2 diabetes mellitus  "    Vertigo 05/09/2022     Past Surgical History:   Procedure Laterality Date    BREAST BIOPSY Bilateral     Benign    BUNIONECTOMY Right     COLONOSCOPY N/A 04/25/2017    Procedure: COLONOSCOPY TO CECUM ;  Surgeon: Aidan Roca MD;  Location: [facility] ENDOSCOPY;  Service:     EXOSTECTOMY Left     HEEL    FOOT FUSION Right     9 screws and plate    KNEE ARTHROSCOPY W/ MENISCAL REPAIR Right     ORIF WRIST FRACTURE Right 12/01/2021    Procedure: WRIST OPEN REDUCTION INTERNAL FIXATION;  Surgeon: Salazar Amezcua MD;  Location: [facility] OR;  Service: Orthopedics;  Laterality: Right;    PAP SMEAR      TONSILLECTOMY      TOTAL SHOULDER REVERSE ARTHROPLASTY Right     UMBILICAL HERNIA REPAIR N/A 10/2010       Family History   Problem Relation Age of Onset    Other Mother         brain death    Emphysema Mother     Alcohol abuse Father     Glaucoma Father     Other Sister         fatty liver    Heart disease Sister         valve problem, being followed (no surgery required)    Arthritis Sister     Gallbladder disease Sister     Other Brother         fatty liver    Glaucoma Maternal Grandmother     Heart disease Maternal Grandmother     Alcohol abuse Maternal Aunt     Diabetes Maternal Aunt     Breast cancer Paternal Aunt                Objective:  Physical Exam    General: No acute distress.  Eyes: conjunctiva clear; pupils equally round and reactive  ENT: external ears and nose atraumatic; oropharynx clear  CV: no peripheral edema  Resp: normal respiratory effort  Skin: no rashes or wounds; normal turgor  Psych: mood and affect appropriate; recent and remote memory intact    Vitals:    04/29/24 0858   Weight: 74.8 kg (165 lb)   Height: 160 cm (62.99\")         04/29/24  0858   Weight: 74.8 kg (165 lb)     Body mass index is 29.24 kg/m².      Ortho Exam     Right knee examined  Knee range of motion 0 to 125 degrees  Stable to varus valgus stress at 0 degrees and 30 degrees  Positive tenderness along the medial " compartment, patella  Mildly positive crepitus throughout arc of motion  Mildly positive active patellar compression test  1+ anterior Lachman  Positive tenderness to palpate along the greater trochanter right hip  Negative logroll exam  Negative Stinchfield exam      Assessment:        1. Primary osteoarthritis of right knee           Plan:  1.  Discussed plan of care with patient.  We did discuss treatment options including proceeding with corticosteroid injection right knee.  We also discussed corticosteroid injection greater trochanter however due to risk of increased glucose with both injections we will prefer to see her back in clinic in 2 weeks and we can proceed with corticosteroid injection to the greater trochanter of the right hip.  I do recommend application of ice injection site this evening may take 3 to 7 days before she begins to experience any significant symptom improvement.  We did discuss biking including recumbent bike for 10 minutes a day we did discuss stretching exercises and ankle strengthening as well as calf stretches and strengthening before play.  Also discussed heat for warm up.  I would like to see her complete some biking and even rowing activity for the next few weeks before she begins pickleball.  All questions answered.  - Large Joint Arthrocentesis: R knee on 4/29/2024 9:04 AM  Indications: pain  Details: 22 G needle, superolateral approach  Medications: 80 mg triamcinolone acetonide 40 MG/ML; 8 mL lidocaine PF 1% 1 %  Outcome: tolerated well, no immediate complications  Procedure, treatment alternatives, risks and benefits explained, specific risks discussed. Consent was given by the patient. Immediately prior to procedure a time out was called to verify the correct patient, procedure, equipment, support staff and site/side marked as required. Patient was prepped and draped in the usual sterile fashion.         Orders:  Orders Placed This Encounter   Procedures    - Large Joint  Arthrocentesis: R knee     No orders of the defined types were placed in this encounter.        Dragon dictation utilized

## 2024-05-13 ENCOUNTER — OFFICE VISIT (OUTPATIENT)
Dept: ORTHOPEDIC SURGERY | Facility: CLINIC | Age: 74
End: 2024-05-13
Payer: MEDICARE

## 2024-05-13 VITALS
WEIGHT: 165 LBS | BODY MASS INDEX: 29.23 KG/M2 | DIASTOLIC BLOOD PRESSURE: 91 MMHG | HEART RATE: 87 BPM | HEIGHT: 63 IN | SYSTOLIC BLOOD PRESSURE: 141 MMHG

## 2024-05-13 DIAGNOSIS — M70.61 GREATER TROCHANTERIC BURSITIS OF RIGHT HIP: ICD-10-CM

## 2024-05-13 DIAGNOSIS — M54.31 SCIATICA OF RIGHT SIDE: Primary | ICD-10-CM

## 2024-05-13 PROCEDURE — 20610 DRAIN/INJ JOINT/BURSA W/O US: CPT | Performed by: NURSE PRACTITIONER

## 2024-05-13 PROCEDURE — 1160F RVW MEDS BY RX/DR IN RCRD: CPT | Performed by: NURSE PRACTITIONER

## 2024-05-13 PROCEDURE — 3080F DIAST BP >= 90 MM HG: CPT | Performed by: NURSE PRACTITIONER

## 2024-05-13 PROCEDURE — 99214 OFFICE O/P EST MOD 30 MIN: CPT | Performed by: NURSE PRACTITIONER

## 2024-05-13 PROCEDURE — 1159F MED LIST DOCD IN RCRD: CPT | Performed by: NURSE PRACTITIONER

## 2024-05-13 PROCEDURE — 3077F SYST BP >= 140 MM HG: CPT | Performed by: NURSE PRACTITIONER

## 2024-05-13 PROCEDURE — 72100 X-RAY EXAM L-S SPINE 2/3 VWS: CPT | Performed by: NURSE PRACTITIONER

## 2024-05-13 RX ORDER — TRIAMCINOLONE ACETONIDE 40 MG/ML
80 INJECTION, SUSPENSION INTRA-ARTICULAR; INTRAMUSCULAR
Status: COMPLETED | OUTPATIENT
Start: 2024-05-13 | End: 2024-05-13

## 2024-05-13 RX ORDER — LIDOCAINE HYDROCHLORIDE 10 MG/ML
4 INJECTION, SOLUTION EPIDURAL; INFILTRATION; INTRACAUDAL; PERINEURAL
Status: COMPLETED | OUTPATIENT
Start: 2024-05-13 | End: 2024-05-13

## 2024-05-13 RX ORDER — DIAZEPAM 5 MG/1
TABLET ORAL
Qty: 2 TABLET | Refills: 0 | Status: SHIPPED | OUTPATIENT
Start: 2024-05-13

## 2024-05-13 RX ADMIN — TRIAMCINOLONE ACETONIDE 80 MG: 40 INJECTION, SUSPENSION INTRA-ARTICULAR; INTRAMUSCULAR at 09:44

## 2024-05-13 RX ADMIN — LIDOCAINE HYDROCHLORIDE 4 ML: 10 INJECTION, SOLUTION EPIDURAL; INFILTRATION; INTRACAUDAL; PERINEURAL at 09:44

## 2024-05-13 NOTE — PROGRESS NOTES
Subjective:     Patient ID: Amber Campos is a 73 y.o. female.    Chief Complaint:  Right hip pain, low back pain- new issue to examiner   History of Present Illness  Amber Campos returns to clinic today for follow-up of her right hip.  Maximal tenderness present along the greater trochanter of the right hip.  We did proceed with corticosteroid injection previously right knee 4/29/2024 which did provide symptom improvement.  She is also experiencing pain along the posterior glutes which does start at the proximal glutes radiates down the back of the lower extremity including pain paresthesia down the right lower extremity.  Denies any prior x-ray of the lumbar spine denies any recent MRI CT.  Denies any other concerns present.       Social History     Occupational History    Occupation: retired    Tobacco Use    Smoking status: Never     Passive exposure: Never    Smokeless tobacco: Never   Vaping Use    Vaping status: Never Used   Substance and Sexual Activity    Alcohol use: No    Drug use: No    Sexual activity: Defer      Past Medical History:   Diagnosis Date    Basaloid carcinoma     facial    Carotid artery plaque, bilateral     without stenosis, 2023    Gastroesophageal reflux disease 03/14/2016    History of kidney infection     History of mammogram     Hypertension     IC (interstitial cystitis) 02/03/2021    Osteopenia     Osteoporosis     Postmenopausal     PVCs (premature ventricular contractions)     Seasonal allergies     Simple renal cyst 03/14/2016    Sinus tachycardia     Steatosis of liver 03/14/2016    Type 2 diabetes mellitus     Vertigo 05/09/2022     Past Surgical History:   Procedure Laterality Date    BREAST BIOPSY Bilateral     Benign    BUNIONECTOMY Right     COLONOSCOPY N/A 04/25/2017    Procedure: COLONOSCOPY TO CECUM ;  Surgeon: Aidan Roca MD;  Location: [facility] ENDOSCOPY;  Service:     EXOSTECTOMY Left     HEEL    FOOT FUSION Right     9 screws and plate    KNEE ARTHROSCOPY W/  "MENISCAL REPAIR Right     ORIF WRIST FRACTURE Right 12/01/2021    Procedure: WRIST OPEN REDUCTION INTERNAL FIXATION;  Surgeon: Salazar Amezcua MD;  Location: [facility] OR;  Service: Orthopedics;  Laterality: Right;    PAP SMEAR      TONSILLECTOMY      TOTAL SHOULDER REVERSE ARTHROPLASTY Right     UMBILICAL HERNIA REPAIR N/A 10/2010       Family History   Problem Relation Age of Onset    Other Mother         brain death    Emphysema Mother     Alcohol abuse Father     Glaucoma Father     Other Sister         fatty liver    Heart disease Sister         valve problem, being followed (no surgery required)    Arthritis Sister     Gallbladder disease Sister     Other Brother         fatty liver    Glaucoma Maternal Grandmother     Heart disease Maternal Grandmother     Alcohol abuse Maternal Aunt     Diabetes Maternal Aunt     Breast cancer Paternal Aunt                Objective:  Physical Exam    Vital signs reviewed.   General: No acute distress.  Eyes: conjunctiva clear; pupils equally round and reactive  ENT: external ears and nose atraumatic; oropharynx clear  CV: no peripheral edema  Resp: normal respiratory effort  Skin: no rashes or wounds; normal turgor  Psych: mood and affect appropriate; recent and remote memory intact    Vitals:    05/13/24 0912   BP: 141/91   Pulse: 87   Weight: 74.8 kg (165 lb)   Height: 160 cm (62.99\")         05/13/24  0912   Weight: 74.8 kg (165 lb)     Body mass index is 29.24 kg/m².      Right Hip Exam     Tenderness   The patient is experiencing tenderness in the greater trochanter.    Range of Motion   Abduction:  45   Adduction:  25   Extension:  0   Flexion:  120   External rotation:  60   Internal rotation:  25     Muscle Strength   Abduction: 4/5   Adduction: 4/5   Flexion: 4/5     Tests   SHRAVAN: negative  Fadir:  Negative FADIR test    Other   Erythema: absent  Sensation: normal  Pulse: present    Comments:  Negative logroll exam  negative stinchfield exam        Back Exam "     Tenderness   The patient is experiencing tenderness in the lumbar.    Tests   Straight leg raise right: positive at 70 deg                 Imaging:  Lumbar Spine X-Ray  Indication: Pain  AP and Lateral views    Findings:  No fracture  No bony lesion  Normal soft tissues  SI degeneration, Appears L5-S1 narrowing L4-L5 narrowing L3-L4 for narrowing facet arthropathy    No prior studies were available for comparison.      Assessment:        1. Sciatica of right side    2. Greater trochanteric bursitis of right hip           Plan:    - Large Joint Arthrocentesis: R greater trochanteric bursa on 5/13/2024 9:44 AM  Indications: pain  Details: 22 G needle, lateral approach  Medications: 80 mg triamcinolone acetonide 40 MG/ML; 4 mL lidocaine PF 1% 1 %  Outcome: tolerated well, no immediate complications  Procedure, treatment alternatives, risks and benefits explained, specific risks discussed. Consent was given by the patient. Immediately prior to procedure a time out was called to verify the correct patient, procedure, equipment, support staff and site/side marked as required. Patient was prepped and draped in the usual sterile fashion.         Discussed plan of care with patient.  We did discuss proceeding with corticosteroid injection greater trochanter of the right hip.  We also discussed proceeding with MRI lumbar spine to evaluate nerve compression.  I will plan to call her with results and further plan of care.  Encouraged him back stretches and core strengthening exercises also discussed and provided quad strengthening hamstring strengthening calf strengthening exercises for her to complete.  We will premedicate with Valium as she does have history of claustrophobia does require open machine.  All questions answered.  Orders:  Orders Placed This Encounter   Procedures    - Large Joint Arthrocentesis: R greater trochanteric bursa    XR Spine Lumbar 2 or 3 View    MRI Lumbar Spine Without Contrast     New  Medications Ordered This Visit   Medications    diazePAM (Valium) 5 MG tablet     Sig: One tablet one hour prior to testing and a second tablet five minutes before testing     Dispense:  2 tablet     Refill:  0           I ordered and reviewed the YOSELIN today.       Dragon dictation utilized

## 2024-05-16 ENCOUNTER — TELEPHONE (OUTPATIENT)
Dept: ORTHOPEDIC SURGERY | Facility: CLINIC | Age: 74
End: 2024-05-16
Payer: MEDICARE

## 2024-05-16 NOTE — TELEPHONE ENCOUNTER
Talked with patient and let them know what day and time MRI referral was sent out, 5/13/2024. They voiced understanding on how it takes them a few days and they should be hearing from them anytime now.

## 2024-05-20 ENCOUNTER — OFFICE VISIT (OUTPATIENT)
Dept: INTERNAL MEDICINE | Facility: CLINIC | Age: 74
End: 2024-05-20
Payer: COMMERCIAL

## 2024-05-20 VITALS
BODY MASS INDEX: 29.3 KG/M2 | OXYGEN SATURATION: 97 % | WEIGHT: 165.4 LBS | DIASTOLIC BLOOD PRESSURE: 78 MMHG | HEART RATE: 92 BPM | SYSTOLIC BLOOD PRESSURE: 122 MMHG | HEIGHT: 63 IN | TEMPERATURE: 97.7 F

## 2024-05-20 DIAGNOSIS — R82.90 FOUL SMELLING URINE: Primary | ICD-10-CM

## 2024-05-20 DIAGNOSIS — K80.20 CALCULUS OF GALLBLADDER WITHOUT CHOLECYSTITIS WITHOUT OBSTRUCTION: ICD-10-CM

## 2024-05-20 DIAGNOSIS — E11.8 CONTROLLED TYPE 2 DIABETES MELLITUS WITH COMPLICATION, WITHOUT LONG-TERM CURRENT USE OF INSULIN: ICD-10-CM

## 2024-05-20 LAB
BILIRUB BLD-MCNC: NEGATIVE MG/DL
CLARITY, POC: CLEAR
COLOR UR: YELLOW
EXPIRATION DATE: NORMAL
GLUCOSE UR STRIP-MCNC: NEGATIVE MG/DL
KETONES UR QL: NEGATIVE
LEUKOCYTE EST, POC: NEGATIVE
Lab: NORMAL
NITRITE UR-MCNC: NEGATIVE MG/ML
PH UR: 7 [PH] (ref 5–8)
PROT UR STRIP-MCNC: NEGATIVE MG/DL
RBC # UR STRIP: NEGATIVE /UL
SP GR UR: 1.01 (ref 1–1.03)
UROBILINOGEN UR QL: NORMAL

## 2024-05-20 PROCEDURE — 99214 OFFICE O/P EST MOD 30 MIN: CPT | Performed by: NURSE PRACTITIONER

## 2024-05-20 PROCEDURE — 81003 URINALYSIS AUTO W/O SCOPE: CPT | Performed by: NURSE PRACTITIONER

## 2024-05-20 NOTE — PROGRESS NOTES
"Chief Complaint   Patient presents with    Urinary Tract Infection     Follow up on UTI       Subjective     Amber Campos is a 73 y.o. female being seen for an acute visit for possible UTI. She called the office due to odor of urine.     She has calculus of GB and met with Dr. Wilson Office Visit with Aidan Winchester PA-C (03/22/2024) She started using a \"bile builder\" vitamin.     She wants to switch back to Mounjaro due to fasting glucose higher on the Ozemipc. She was switched to Ozempic due to availability.     History of Present Illness     Allergies   Allergen Reactions    Contrast Dye (Echo Or Unknown Ct/Mr) Anaphylaxis     Contrast dye used in Xray    Iodinated Contrast Media Photosensitivity     contrast    Adhesive Tape Other (See Comments)     EKG stickers only    Farxiga [Dapagliflozin] Diarrhea and Itching     Yeast infeciotn    Neomycin-Bacitracin Zn-Polymyx Rash    Toprol Xl [Metoprolol] Dizziness    Trulicity [Dulaglutide] GI Intolerance     Constipation    Victoza [Liraglutide] Nausea Only         Current Outpatient Medications:     albuterol sulfate  (90 Base) MCG/ACT inhaler, Inhale 2 puffs Every 4 (Four) Hours As Needed for Wheezing., Disp: 8.5 g, Rfl: 2    amlodipine-olmesartan (NIC) 5-40 MG per tablet, TAKE 1 TABLET DAILY, Disp: 90 tablet, Rfl: 3    budesonide-formoterol (Symbicort) 160-4.5 MCG/ACT inhaler, Inhale 2 puffs 2 (Two) Times a Day., Disp: 1 each, Rfl: 6    Cetirizine HCl (ZyrTEC Childrens Allergy) 5 MG/5ML solution solution, Take 5 mL by mouth Daily., Disp: 150 mL, Rfl:     Clocortolone Pivalate 0.1 % cream, APPLY TOPICALLY TO AFFECTED AREA TWICE A DAY AS NEEDED, Disp: , Rfl:     Continuous Blood Gluc  (FreeStyle Willi 14 Day Reynolds) device, 1 each 4 (Four) Times a Day., Disp: 1 each, Rfl: 3    Continuous Blood Gluc Sensor (FreeStyle Willi 2 Sensor) misc, Inject 1 each under the skin into the appropriate area as directed Every 10 (Ten) Days., Disp: 3 each, Rfl: 7    " diazePAM (Valium) 5 MG tablet, One tablet one hour prior to testing and a second tablet five minutes before testing, Disp: 2 tablet, Rfl: 0    metFORMIN (GLUCOPHAGE) 1000 MG tablet, TAKE 1 TABLET TWICE A DAY WITH MEALS (DISCONTINUE INVOKAMET AND RESUME METFORMIN), Disp: 180 tablet, Rfl: 3    multivitamin (THERAGRAN) tablet tablet, Take 1 tablet by mouth Daily., Disp: , Rfl:     Omega-3 Fatty Acids (fish oil) 1000 MG capsule capsule, Take  by mouth Daily With Breakfast., Disp: , Rfl:     oxyCODONE-acetaminophen (PERCOCET) 5-325 MG per tablet, , Disp: , Rfl:     pantoprazole (PROTONIX) 40 MG EC tablet, TAKE 1 TABLET DAILY, Disp: 90 tablet, Rfl: 3    Semaglutide, 2 MG/DOSE, (Ozempic, 2 MG/DOSE,) 8 MG/3ML solution pen-injector, Inject 2 mg under the skin into the appropriate area as directed 1 (One) Time Per Week., Disp: 12 each, Rfl: 1    Spacer/Aero Chamber Mouthpiece misc, 1 each Daily., Disp: 1 each, Rfl: 0    sucralfate (CARAFATE) 1 g tablet, , Disp: , Rfl:     venlafaxine XR (EFFEXOR-XR) 75 MG 24 hr capsule, TAKE 1 CAPSULE DAILY, Disp: 90 capsule, Rfl: 3    The following portions of the patient's history were reviewed and updated as appropriate: allergies, current medications, past family history, past medical history, past social history, past surgical history, and problem list.    Review of Systems   HENT: Negative.     Cardiovascular: Negative.  Negative for chest pain, palpitations and leg swelling.   Gastrointestinal:  Negative for abdominal distention and abdominal pain.   Musculoskeletal:  Positive for arthralgias.   Psychiatric/Behavioral: Negative.  Negative for agitation.    All other systems reviewed and are negative.      Assessment     Physical Exam  Vitals reviewed.   Constitutional:       Appearance: Normal appearance. She is obese. She is not ill-appearing.   Cardiovascular:      Rate and Rhythm: Normal rate and regular rhythm.      Pulses: Normal pulses.      Heart sounds: Normal heart sounds. No  murmur heard.  Pulmonary:      Effort: Pulmonary effort is normal. No respiratory distress.      Breath sounds: Normal breath sounds. No stridor.   Musculoskeletal:      Cervical back: Neck supple.      Right lower leg: No edema.      Left lower leg: No edema.   Skin:     General: Skin is warm and dry.   Neurological:      General: No focal deficit present.      Mental Status: She is alert and oriented to person, place, and time.   Psychiatric:         Mood and Affect: Mood normal.         Behavior: Behavior normal.         Thought Content: Thought content normal.         Plan       Diagnoses and all orders for this visit:    1. Foul smelling urine (Primary)  Comments:  Urine dip clear today  Orders:  -     POCT urinalysis dipstick, automated    2. Calculus of gallbladder without cholecystitis without obstruction  Comments:  Reviewed surgery note. Counsled on low fat diet and GB funciton.    3. Controlled type 2 diabetes mellitus with complication, without long-term current use of insulin  Comments:  Chronic, switch back to Mounjaro due to better glucose control  Orders:  -     Tirzepatide (MOUNJARO) 7.5 MG/0.5ML solution pen-injector pen; Inject 0.5 mL under the skin into the appropriate area as directed 1 (One) Time Per Week.  Dispense: 6 mL; Refill: 1        Follow up as scheduled

## 2024-05-22 DIAGNOSIS — I10 ESSENTIAL HYPERTENSION: ICD-10-CM

## 2024-05-22 DIAGNOSIS — E78.2 MIXED HYPERLIPIDEMIA: ICD-10-CM

## 2024-05-22 DIAGNOSIS — E11.9 CONTROLLED TYPE 2 DIABETES MELLITUS WITHOUT COMPLICATION, WITHOUT LONG-TERM CURRENT USE OF INSULIN: ICD-10-CM

## 2024-05-28 ENCOUNTER — TELEPHONE (OUTPATIENT)
Dept: INTERNAL MEDICINE | Facility: CLINIC | Age: 74
End: 2024-05-28

## 2024-05-28 DIAGNOSIS — F41.8 DEPRESSION WITH ANXIETY: ICD-10-CM

## 2024-05-28 RX ORDER — VENLAFAXINE HYDROCHLORIDE 75 MG/1
CAPSULE, EXTENDED RELEASE ORAL
Qty: 90 CAPSULE | Refills: 3 | Status: SHIPPED | OUTPATIENT
Start: 2024-05-28

## 2024-05-28 NOTE — TELEPHONE ENCOUNTER
Caller: Amber Campos    Relationship: Self    Best call back number: [phone]    Which medication are you concerned about: Tirzepatide (MOUNJARO) 7.5 MG/0.5ML solution pen-injector pen     Who prescribed you this medication: KIRK CARRINGTON    When did you start taking this medication: HAS NOT STARTED.    What are your concerns: PATIENT STATED THAT [facility] DOES NOT HAVE THIS MEDICATION AND THEY ARE REQUESTING FOR KIRK TO CONTACT THEM IN REGARDS TO PATIENTS MEDICATION.    How long have you had these concerns: PATIENT RECEIVED EMAIL ON SATURDAY THAT THEY DO NOT HAVE THIS MEDICATION.    [facility] - [place], [place] - [address] [place] [address] [phone] [facility] [phone] FX

## 2024-05-29 NOTE — TELEPHONE ENCOUNTER
She does not need a referral to Dr. Alaniz, due to recent visit in the past 2 years, and is established with him. Primary care cannot order endoscopies, this has to be done by provider performing th procedure. She may simply call or message his office to discuss. Also, I believe her Mounjaro is causing her symptoms, which we can discuss at her f/u appt.

## 2024-05-29 NOTE — TELEPHONE ENCOUNTER
Called and spoke with [facility] and they informed me that they had sent the medication out yesterday and she should be expecting the medication within 3-5 business days.

## 2024-05-29 NOTE — TELEPHONE ENCOUNTER
I called and spoke with the patient and she informed me that she has been waking up every morning very nauseated, she mentioned that it was like when a pregnant woman has morning sickness. She said that she can eat about 2-3 bites and then she feels full. She mentioned that she was in quite a bit of pain in her stomach and that this had been ongoing for awhile now. She said that she believes that this was due to the Ozempic. She said that she would like to have an Endoscopy done with Dr. Alaniz. Would you be okay with putting this referral in for her?

## 2024-06-05 ENCOUNTER — TRANSCRIBE ORDERS (OUTPATIENT)
Dept: INTERNAL MEDICINE | Facility: CLINIC | Age: 74
End: 2024-06-05
Payer: MEDICARE

## 2024-06-05 ENCOUNTER — TRANSCRIBE ORDERS (OUTPATIENT)
Dept: ADMINISTRATIVE | Facility: HOSPITAL | Age: 74
End: 2024-06-05
Payer: MEDICARE

## 2024-06-05 DIAGNOSIS — Z13.9 SCREENING DUE: Primary | ICD-10-CM

## 2024-06-05 LAB
ALBUMIN SERPL-MCNC: 4.3 G/DL (ref 3.5–5.2)
ALBUMIN/GLOB SERPL: 1.8 G/DL
ALP SERPL-CCNC: 46 U/L (ref 39–117)
ALT SERPL-CCNC: 21 U/L (ref 1–33)
AST SERPL-CCNC: 23 U/L (ref 1–32)
BASOPHILS # BLD AUTO: 0.05 10*3/MM3 (ref 0–0.2)
BASOPHILS NFR BLD AUTO: 0.9 % (ref 0–1.5)
BILIRUB SERPL-MCNC: 0.3 MG/DL (ref 0–1.2)
BUN SERPL-MCNC: 15 MG/DL (ref 8–23)
BUN/CREAT SERPL: 22.4 (ref 7–25)
CALCIUM SERPL-MCNC: 10.2 MG/DL (ref 8.6–10.5)
CHLORIDE SERPL-SCNC: 97 MMOL/L (ref 98–107)
CHOLEST SERPL-MCNC: 236 MG/DL (ref 0–200)
CHOLEST/HDLC SERPL: 2.91 {RATIO}
CO2 SERPL-SCNC: 28 MMOL/L (ref 22–29)
CREAT SERPL-MCNC: 0.67 MG/DL (ref 0.57–1)
EGFRCR SERPLBLD CKD-EPI 2021: 91.3 ML/MIN/1.73
EOSINOPHIL # BLD AUTO: 0.18 10*3/MM3 (ref 0–0.4)
EOSINOPHIL NFR BLD AUTO: 3.1 % (ref 0.3–6.2)
ERYTHROCYTE [DISTWIDTH] IN BLOOD BY AUTOMATED COUNT: 13.7 % (ref 12.3–15.4)
GLOBULIN SER CALC-MCNC: 2.4 GM/DL
GLUCOSE SERPL-MCNC: 135 MG/DL (ref 65–99)
HBA1C MFR BLD: 7.1 % (ref 4.8–5.6)
HCT VFR BLD AUTO: 37.5 % (ref 34–46.6)
HDLC SERPL-MCNC: 81 MG/DL (ref 40–60)
HGB BLD-MCNC: 12.3 G/DL (ref 12–15.9)
IMM GRANULOCYTES # BLD AUTO: 0.01 10*3/MM3 (ref 0–0.05)
IMM GRANULOCYTES NFR BLD AUTO: 0.2 % (ref 0–0.5)
LDLC SERPL CALC-MCNC: 129 MG/DL (ref 0–100)
LYMPHOCYTES # BLD AUTO: 2.52 10*3/MM3 (ref 0.7–3.1)
LYMPHOCYTES NFR BLD AUTO: 43.2 % (ref 19.6–45.3)
MCH RBC QN AUTO: 28.3 PG (ref 26.6–33)
MCHC RBC AUTO-ENTMCNC: 32.8 G/DL (ref 31.5–35.7)
MCV RBC AUTO: 86.4 FL (ref 79–97)
MONOCYTES # BLD AUTO: 0.45 10*3/MM3 (ref 0.1–0.9)
MONOCYTES NFR BLD AUTO: 7.7 % (ref 5–12)
NEUTROPHILS # BLD AUTO: 2.63 10*3/MM3 (ref 1.7–7)
NEUTROPHILS NFR BLD AUTO: 44.9 % (ref 42.7–76)
NRBC BLD AUTO-RTO: 0 /100 WBC (ref 0–0.2)
PLATELET # BLD AUTO: 381 10*3/MM3 (ref 140–450)
POTASSIUM SERPL-SCNC: 4.5 MMOL/L (ref 3.5–5.2)
PROT SERPL-MCNC: 6.7 G/DL (ref 6–8.5)
RBC # BLD AUTO: 4.34 10*6/MM3 (ref 3.77–5.28)
SODIUM SERPL-SCNC: 138 MMOL/L (ref 136–145)
TRIGL SERPL-MCNC: 153 MG/DL (ref 0–150)
VLDLC SERPL CALC-MCNC: 26 MG/DL (ref 5–40)
WBC # BLD AUTO: 5.84 10*3/MM3 (ref 3.4–10.8)

## 2024-06-14 ENCOUNTER — OFFICE VISIT (OUTPATIENT)
Dept: INTERNAL MEDICINE | Facility: CLINIC | Age: 74
End: 2024-06-14
Payer: COMMERCIAL

## 2024-06-14 VITALS
BODY MASS INDEX: 30.02 KG/M2 | WEIGHT: 169.4 LBS | OXYGEN SATURATION: 95 % | HEIGHT: 63 IN | SYSTOLIC BLOOD PRESSURE: 132 MMHG | DIASTOLIC BLOOD PRESSURE: 84 MMHG | HEART RATE: 94 BPM | TEMPERATURE: 97.8 F

## 2024-06-14 DIAGNOSIS — E11.8 CONTROLLED TYPE 2 DIABETES MELLITUS WITH COMPLICATION, WITHOUT LONG-TERM CURRENT USE OF INSULIN: Primary | ICD-10-CM

## 2024-06-14 DIAGNOSIS — N39.0 RECURRENT UTI: ICD-10-CM

## 2024-06-14 DIAGNOSIS — R10.12 LUQ PAIN: ICD-10-CM

## 2024-06-14 DIAGNOSIS — I10 ESSENTIAL HYPERTENSION: ICD-10-CM

## 2024-06-14 LAB
BILIRUB BLD-MCNC: NEGATIVE MG/DL
CLARITY, POC: CLEAR
COLOR UR: YELLOW
EXPIRATION DATE: NORMAL
GLUCOSE UR STRIP-MCNC: NEGATIVE MG/DL
KETONES UR QL: NEGATIVE
LEUKOCYTE EST, POC: NEGATIVE
Lab: NORMAL
NITRITE UR-MCNC: NEGATIVE MG/ML
PH UR: 6 [PH] (ref 5–8)
PROT UR STRIP-MCNC: NEGATIVE MG/DL
RBC # UR STRIP: NEGATIVE /UL
SP GR UR: 1.01 (ref 1–1.03)
UROBILINOGEN UR QL: NORMAL

## 2024-06-14 NOTE — PROGRESS NOTES
Chief Complaint   Patient presents with    Calculus of gallbladder without cholecystitis without obstr    Diabetes       Subjective     Amber Campos is a 73 y.o. female being seen for a follow up appointment today regarding DM 2, HTN, hyperlipidemia, IC. She had diabetic education at [facility] in 2023. She was followed by srinath, but declined to see them anymore. She has a Continuous glucose monitor/Willi meter. Average glucose 160s Fasting glucose ranges from 130-220s, depending on what she had for dinner. PP glucose 150s. She is following a Anamika Jovanny diet. She is prescribed metformin 1000 mg BID and mounjaro 7.5 mg weekly. She stopped the Invokana, due to urinary frequency. She called the office and was switched to Mounjaro due to Ozempic availablity, but has not taken her first injection yet.      She has history of urinary frequency with IC. She is followed by Dr Benton ([facility] urology). She had a CT abd and pelvis and cystoscopy scheduled for renal cysts and IC. She left a urine sample today.      She is on Susannah 5/40mg daily for HTN. She has previously been on Diovan, Toprol XL and Ziac. She does not check her BP, but feels like she is tolerating this medication well. She denies edema, CP, SOA.  She had a lifeline screening showing mild placque in carotid arteries.      She has history of JOSETTE and GERD. She sas been evaluated with both a C-scope 6-6-2022 with Dr. Alaniz. She is on an oral iron supplement I'm MVI. She takes a daily Protonix 40mg tablet.      She is on prolia for osteoporosis with history of traumatic fractures of wrist and ankle.    She is on Effexor XR for depression w anxiety. Her  has hx of alcohol abuse. She has had increasing sadness since the sunlight has changed, and feels lonely.            History of Present Illness     Allergies   Allergen Reactions    Contrast Dye (Echo Or Unknown Ct/Mr) Anaphylaxis     Contrast dye used in Xray    Iodinated Contrast Media Photosensitivity     contrast     Adhesive Tape Other (See Comments)     EKG stickers only    Farxiga [Dapagliflozin] Diarrhea and Itching     Yeast infeciotn    Neomycin-Bacitracin Zn-Polymyx Rash    Toprol Xl [Metoprolol] Dizziness    Trulicity [Dulaglutide] GI Intolerance     Constipation    Victoza [Liraglutide] Nausea Only         Current Outpatient Medications:     albuterol sulfate  (90 Base) MCG/ACT inhaler, Inhale 2 puffs Every 4 (Four) Hours As Needed for Wheezing., Disp: 8.5 g, Rfl: 2    amlodipine-olmesartan (NIC) 5-40 MG per tablet, TAKE 1 TABLET DAILY, Disp: 90 tablet, Rfl: 3    budesonide-formoterol (Symbicort) 160-4.5 MCG/ACT inhaler, Inhale 2 puffs 2 (Two) Times a Day., Disp: 1 each, Rfl: 6    Cetirizine HCl (ZyrTEC Childrens Allergy) 5 MG/5ML solution solution, Take 5 mL by mouth Daily., Disp: 150 mL, Rfl:     Clocortolone Pivalate 0.1 % cream, APPLY TOPICALLY TO AFFECTED AREA TWICE A DAY AS NEEDED, Disp: , Rfl:     Continuous Blood Gluc  (FreeStyle Willi 14 Day [place]) device, 1 each 4 (Four) Times a Day., Disp: 1 each, Rfl: 3    Continuous Blood Gluc Sensor (FreeStyle Willi 2 Sensor) misc, Inject 1 each under the skin into the appropriate area as directed Every 10 (Ten) Days., Disp: 3 each, Rfl: 7    diazePAM (Valium) 5 MG tablet, One tablet one hour prior to testing and a second tablet five minutes before testing, Disp: 2 tablet, Rfl: 0    metFORMIN (GLUCOPHAGE) 1000 MG tablet, TAKE 1 TABLET TWICE A DAY WITH MEALS (DISCONTINUE INVOKAMET AND RESUME METFORMIN), Disp: 180 tablet, Rfl: 3    multivitamin (THERAGRAN) tablet tablet, Take 1 tablet by mouth Daily., Disp: , Rfl:     Omega-3 Fatty Acids (fish oil) 1000 MG capsule capsule, Take  by mouth Daily With Breakfast., Disp: , Rfl:     oxyCODONE-acetaminophen (PERCOCET) 5-325 MG per tablet, , Disp: , Rfl:     pantoprazole (PROTONIX) 40 MG EC tablet, TAKE 1 TABLET DAILY, Disp: 90 tablet, Rfl: 3    Spacer/Aero Chamber Mouthpiece misc, 1 each Daily., Disp: 1 each,  Rfl: 0    sucralfate (CARAFATE) 1 g tablet, , Disp: , Rfl:     Tirzepatide (MOUNJARO) 7.5 MG/0.5ML solution pen-injector pen, Inject 0.5 mL under the skin into the appropriate area as directed 1 (One) Time Per Week., Disp: 6 mL, Rfl: 1    venlafaxine XR (EFFEXOR-XR) 75 MG 24 hr capsule, TAKE 1 CAPSULE DAILY, Disp: 90 capsule, Rfl: 3    The following portions of the patient's history were reviewed and updated as appropriate: allergies, current medications, past family history, past medical history, past social history, past surgical history, and problem list.    Review of Systems   Constitutional: Negative.    HENT: Negative.     Respiratory: Negative.     Cardiovascular: Negative.  Negative for chest pain, palpitations and leg swelling.   Gastrointestinal:  Positive for abdominal distention and abdominal pain.   Musculoskeletal: Negative.    Skin: Negative.    Hematological: Negative.  Negative for adenopathy. Does not bruise/bleed easily.   Psychiatric/Behavioral: Negative.     All other systems reviewed and are negative.      Assessment     Physical Exam  Vitals reviewed.   Constitutional:       Appearance: Normal appearance. She is obese. She is not ill-appearing.   HENT:      Head: Normocephalic.   Cardiovascular:      Rate and Rhythm: Normal rate and regular rhythm.      Pulses: Normal pulses.      Heart sounds: Normal heart sounds. No murmur heard.  Pulmonary:      Effort: Pulmonary effort is normal. No respiratory distress.      Breath sounds: Normal breath sounds. No stridor.   Musculoskeletal:      Cervical back: Neck supple.      Right lower leg: No edema.      Left lower leg: No edema.   Skin:     General: Skin is warm and dry.   Neurological:      General: No focal deficit present.      Mental Status: She is alert and oriented to person, place, and time.   Psychiatric:         Mood and Affect: Mood normal.         Behavior: Behavior normal.         Thought Content: Thought content normal.       Plan      Her fasting labs were reviewed with the patient from last week.      Diagnosis Plan   1. Controlled type 2 diabetes mellitus with complication, without long-term current use of insulin      Chronic, contorlled. On Mounjaro, discsused increasing to 10mg, but she declines. Continue Metformin 1000mg BID      2. Essential hypertension      BP at goal on current meds. Susannah 5/40mg daily      3. Recurrent UTI  POCT urinalysis dipstick, automated    Chronic due to IC. Start Cranberry pills      4. LUQ pain  CT Abdomen Pelvis With & Without Contrast           Follow up in 3 months w labs

## 2024-06-16 PROBLEM — R10.12 LUQ PAIN: Status: ACTIVE | Noted: 2024-02-23

## 2024-06-21 ENCOUNTER — HOSPITAL ENCOUNTER (OUTPATIENT)
Dept: INFUSION THERAPY | Facility: HOSPITAL | Age: 74
Discharge: HOME | End: 2024-06-21
Payer: MEDICARE

## 2024-06-21 VITALS
TEMPERATURE: 97.6 F | RESPIRATION RATE: 16 BRPM | OXYGEN SATURATION: 97 % | DIASTOLIC BLOOD PRESSURE: 73 MMHG | HEART RATE: 88 BPM | SYSTOLIC BLOOD PRESSURE: 127 MMHG

## 2024-06-21 DIAGNOSIS — M80.031A PATHOLOGICAL FRACTURE OF RIGHT RADIUS DUE TO AGE-RELATED OSTEOPOROSIS, INITIAL ENCOUNTER: Primary | ICD-10-CM

## 2024-06-21 LAB
MAGNESIUM SERPL-MCNC: 1.7 MG/DL (ref 1.6–2.4)
PHOSPHATE SERPL-MCNC: 3.2 MG/DL (ref 2.5–4.5)

## 2024-06-21 PROCEDURE — 25010000002 DENOSUMAB 60 MG/ML SOLUTION PREFILLED SYRINGE: Performed by: NURSE PRACTITIONER

## 2024-06-21 PROCEDURE — 84100 ASSAY OF PHOSPHORUS: CPT | Performed by: NURSE PRACTITIONER

## 2024-06-21 PROCEDURE — 96372 THER/PROPH/DIAG INJ SC/IM: CPT

## 2024-06-21 PROCEDURE — 83735 ASSAY OF MAGNESIUM: CPT | Performed by: NURSE PRACTITIONER

## 2024-06-21 PROCEDURE — 36415 COLL VENOUS BLD VENIPUNCTURE: CPT

## 2024-06-21 RX ADMIN — DENOSUMAB 60 MG: 60 INJECTION SUBCUTANEOUS at 09:48

## 2024-06-21 NOTE — PATIENT INSTRUCTIONS
"  Call  Jenny Sun, APRN [phone]  if you have any problems or concerns.    We know you have a Choice in healthcare and appreciate you using [facility].  Our purpose is to provide you \"Excellent Care\".  We hope that you will always choose us in the future and continue to recommend us to your family and friends.             "

## 2024-06-21 NOTE — NURSING NOTE
Patient arrived to [facility] at 0854. History and medications reviewed. Labs drawn. Medication given as ordered. Patient tolerated well without complications. AVS reviewed and sent home. Patient discharged at 0955 in stable condition without complaints.

## 2024-06-24 ENCOUNTER — OFFICE VISIT (OUTPATIENT)
Dept: ORTHOPEDIC SURGERY | Facility: CLINIC | Age: 74
End: 2024-06-24
Payer: MEDICARE

## 2024-06-24 VITALS — BODY MASS INDEX: 29.95 KG/M2 | HEIGHT: 63 IN | WEIGHT: 169 LBS

## 2024-06-24 DIAGNOSIS — M48.061 NEUROFORAMINAL STENOSIS OF LUMBAR SPINE: ICD-10-CM

## 2024-06-24 DIAGNOSIS — M51.36 DEGENERATION OF INTERVERTEBRAL DISC OF LUMBAR REGION: Primary | ICD-10-CM

## 2024-06-24 DIAGNOSIS — M54.31 SCIATICA OF RIGHT SIDE: ICD-10-CM

## 2024-06-24 DIAGNOSIS — M72.0 DUPUYTREN CONTRACTURE: ICD-10-CM

## 2024-06-24 DIAGNOSIS — M25.562 ACUTE PAIN OF LEFT KNEE: ICD-10-CM

## 2024-06-24 PROCEDURE — 99213 OFFICE O/P EST LOW 20 MIN: CPT | Performed by: NURSE PRACTITIONER

## 2024-06-24 NOTE — PROGRESS NOTES
Subjective:     Patient ID: Amber Campos is a 73 y.o. female.    Chief Complaint:  Follow-up due to the right knee  MRI lumbar spine completed, paresthesia bilateral lower extremities  Left knee pain, new issue to examiner  History of Present Illness    Amber Campos Returns to clinic today for follow-up right knee and lumbar spine.  She is completed MRI of the lumbar spine returns to discuss results and further plan of care.  The knee injection has provided her significant symptom improvement.  She has continue with home strengthening exercises of her low back.  She is experiencing discomfort at the medial aspect of the left knee, new issue to examiner denies any prior x-ray, MRI, CT.  She is also experiencing pain bilateral hands has been  seen by Kleinert Kutz in past would like a second opinion.  She is experiencing pain with ambulatory activities along the left knee medial compartment.  Denies that the knee is giving way.  Denies any other concerns present.    Social History     Occupational History    Occupation: retired    Tobacco Use    Smoking status: Never     Passive exposure: Never    Smokeless tobacco: Never   Vaping Use    Vaping status: Never Used   Substance and Sexual Activity    Alcohol use: No    Drug use: No    Sexual activity: Defer      Past Medical History:   Diagnosis Date    Basaloid carcinoma     facial    Carotid artery plaque, bilateral     without stenosis, 2023    Gastroesophageal reflux disease 03/14/2016    History of kidney infection     History of mammogram     Hypertension     IC (interstitial cystitis) 02/03/2021    Osteopenia     Osteoporosis     Postmenopausal     PVCs (premature ventricular contractions)     Seasonal allergies     Simple renal cyst 03/14/2016    Sinus tachycardia     Steatosis of liver 03/14/2016    Type 2 diabetes mellitus     Vertigo 05/09/2022     Past Surgical History:   Procedure Laterality Date    BREAST BIOPSY Bilateral     Benign    BUNIONECTOMY Right   "   COLONOSCOPY N/A 04/25/2017    Procedure: COLONOSCOPY TO CECUM ;  Surgeon: Aidan Roca MD;  Location:  [facility];  Service:     EXOSTECTOMY Left     HEEL    FOOT FUSION Right     9 screws and plate    KNEE ARTHROSCOPY W/ MENISCAL REPAIR Right     ORIF WRIST FRACTURE Right 12/01/2021    Procedure: WRIST OPEN REDUCTION INTERNAL FIXATION;  Surgeon: Salazar Amezcua MD;  Location:  [facility];  Service: Orthopedics;  Laterality: Right;    PAP SMEAR      TONSILLECTOMY      TOTAL SHOULDER REVERSE ARTHROPLASTY Right     UMBILICAL HERNIA REPAIR N/A 10/2010       Family History   Problem Relation Age of Onset    Other Mother         brain death    Emphysema Mother     Alcohol abuse Father     Glaucoma Father     Other Sister         fatty liver    Heart disease Sister         valve problem, being followed (no surgery required)    Arthritis Sister     Gallbladder disease Sister     Other Brother         fatty liver    Glaucoma Maternal Grandmother     Heart disease Maternal Grandmother     Alcohol abuse Maternal Aunt     Diabetes Maternal Aunt     Breast cancer Paternal Aunt                Objective:  Physical Exam  General: No acute distress.  Eyes: conjunctiva clear; pupils equally round and reactive  ENT: external ears and nose atraumatic; oropharynx clear  CV: no peripheral edema  Resp: normal respiratory effort  Skin: no rashes or wounds; normal turgor  Psych: mood and affect appropriate; recent and remote memory intact    Vitals:    06/24/24 1122   Weight: 76.7 kg (169 lb)   Height: 160 cm (62.99\")         06/24/24  1122   Weight: 76.7 kg (169 lb)     Body mass index is 29.95 kg/m².      Ortho Exam     Left knee examined  Maximal tenderness present medial compartment  Stable to varus valgus stress at 0 degrees and 30 degrees  Knee range of motion 0 to 120 degrees  Mild to moderate swelling, negative effusion  Positive active patellar compression test  Negative medial lateral Yaniv's exam  Positive " crepitus throughout arc of motion    Imaging:          Independently reviewed MRI lumbar spine degenerative changes along the L3-L4, L4-L5, L5-S1 including neuroforaminal stenosis mild to moderate along the L3-L4 and L4-L5, mild neuroforaminal stenosis L5-S1.    Left Knee X-Ray  Indication: Pain    AP, Lateral, and Lihue views    Findings:  No fracture  No bony lesion  Normal soft tissues  Tricompartmental narrowing calcification along the medial and lateral joint line    No prior studies were available for comparison.    Assessment:        1. Degeneration of intervertebral disc of lumbar region    2. Acute pain of left knee    3. Dupuytren contracture    4. Sciatica of right side    5. Neuroforaminal stenosis of lumbar spine             Plan:  1.  Discussed with care with patient.  We will proceed with referral to Dr. Yadav for bilateral hands for further evaluation.  Will also proceed to PT prefers [place] location lumbar spine for continued exercises.  We did discuss options for the left knee including corticosteroid injection however we will hold off at this time as she is not experiencing significant pain.  Will plan to see her back in clinic in 2 months to reevaluate.  Encouraged to call with any question concerns.  All questions answered.  Orders:  Orders Placed This Encounter   Procedures    XR Knee 3+ View With Lihue Left    Ambulatory Referral to Hand Surgery    Ambulatory Referral to Physical Therapy     No orders of the defined types were placed in this encounter.      Berna dictation utilized

## 2024-06-28 ENCOUNTER — HOSPITAL ENCOUNTER (OUTPATIENT)
Dept: CT IMAGING | Facility: HOSPITAL | Age: 74
Discharge: HOME | End: 2024-06-28
Payer: MEDICARE

## 2024-06-28 DIAGNOSIS — R10.12 LUQ PAIN: ICD-10-CM

## 2024-07-05 ENCOUNTER — HOSPITAL ENCOUNTER (OUTPATIENT)
Dept: MAMMOGRAPHY | Facility: HOSPITAL | Age: 74
Discharge: HOME | End: 2024-07-05
Admitting: NURSE PRACTITIONER
Payer: MEDICARE

## 2024-07-05 DIAGNOSIS — Z13.9 SCREENING DUE: ICD-10-CM

## 2024-07-05 PROCEDURE — 77067 SCR MAMMO BI INCL CAD: CPT

## 2024-07-05 PROCEDURE — 77063 BREAST TOMOSYNTHESIS BI: CPT

## 2024-07-08 ENCOUNTER — TREATMENT (OUTPATIENT)
Dept: PHYSICAL THERAPY | Facility: CLINIC | Age: 74
End: 2024-07-08
Payer: MEDICARE

## 2024-07-08 DIAGNOSIS — M54.31 SCIATICA OF RIGHT SIDE: Primary | ICD-10-CM

## 2024-07-08 DIAGNOSIS — M54.41 CHRONIC RIGHT-SIDED LOW BACK PAIN WITH RIGHT-SIDED SCIATICA: ICD-10-CM

## 2024-07-08 DIAGNOSIS — G89.29 CHRONIC RIGHT-SIDED LOW BACK PAIN WITH RIGHT-SIDED SCIATICA: ICD-10-CM

## 2024-07-08 NOTE — PROGRESS NOTES
Physical Therapy Initial Evaluation and Plan of Care    [facility]  [address] [place] [address]  [phone] (phone)  [phone] (fax)    Patient: Amber Campos   : 1950  Diagnosis/ICD-10 Code:  Sciatica of right side [M54.31]  Referring practitioner: JAKE Mckeon  Date of Initial Visit: 2024  Today's Date: 2024  Patient seen for 1 sessions           Subjective Evaluation    History of Present Illness  Mechanism of injury: Patient complains of low back pain and R sided posterior buttocks/hip pain. The pain began a few months ago insidiously and was intermittent. Pain responded well to aleve. Seemed to worsen with prolonged standing. Pain radiated down R lateral/posterior leg. Patient recently had an injection (~2 weeks ago) in her R knee (cortisone) which seemed to make her back pain go away.    PLOF: interested in starting pickleball. Primarily sedentary. Used to play Tennis    PMH: diabetes, osteoporosis    MRI: MRI lumbar spine degenerative changes along the L3-L4, L4-L5, L5-S1 including neuroforaminal stenosis mild to moderate along the L3-L4 and L4-L5, mild neuroforaminal stenosis L5-S1.      Patient Occupation: retired Pain  Current pain ratin  At best pain ratin  At worst pain ratin  Location: R posterior hip/buttocks  Quality: sharp and radiating  Relieving factors: change in position and medications  Aggravating factors: prolonged positioning (standing)  Progression: improved    Social Support  Lives with: spouse    Diagnostic Tests  X-ray: abnormal  MRI studies: abnormal (see above)    Treatments  Previous treatment: injection treatment  Patient Goals  Patient goals for therapy: increased strength, decreased pain, increased motion and return to sport/leisure activities             Objective          Neurological Testing     Sensation     Lumbar   Left   Intact: light touch    Right   Intact: light touch    Active Range of Motion      Lumbar   Flexion: 90 degrees   Extension: 10 degrees     Strength/Myotome Testing     Left Hip   Planes of Motion   Flexion: 4  External rotation: 4+  Internal rotation: 4+    Right Hip   Planes of Motion   Flexion: 4+  External rotation: 4+  Internal rotation: 4+    Left Knee   Flexion: 4+  Extension: 4+    Right Knee   Flexion: 4  Extension: 4+    Left Ankle/Foot   Dorsiflexion: 4+    Right Ankle/Foot   Dorsiflexion: 4+    Tests     Lumbar     Left   Negative crossed SLR and passive SLR.     Right   Negative crossed SLR and passive SLR.     Left Pelvic Girdle/Sacrum   Negative: thigh thrust.     Right Pelvic Girdle/Sacrum   Negative: thigh thrust.     Left Hip   Positive SHRAVAN and scour.     Right Hip   Positive SHRAVAN and scour.     Additional Tests Details  No notable LLD    Ambulation     Comments   No notable gait deficits        See Exercise, Manual, and Modality Logs for complete treatment.       Functional Outcome Score: KARY=13/50        Assessment & Plan       Assessment  Impairments: impaired physical strength, lacks appropriate home exercise program and pain with function   Functional limitations: lifting, sleeping, walking, uncomfortable because of pain, sitting and standing   Assessment details: Amber Campos is a 73 y.o. year-old female referred to physical therapy for R sciatica/LBP. She presents with a evolving clinical presentation.  She has comorbidities of osteoporosis and diabetes and personal factors of wanting to start playing pickleball that may affect her progress in the plan of care.  Signs and symptoms are consistent with physical therapy diagnosis of R sciatica. Pt was educated on course of treatment, possible reasons for pain, activity modifications, and use of ice/heat PRN. Pt was given a copy of HEP. Patient is appropriate for skilled physical therapy in order to reduce pain and increase ease with daily mobility.   Prognosis: good    Goals  Plan Goals: Short Term Goals for  completion in 30 days:   -Patient will report good compliance with initial HEP  -Patient will demonstrate good core stabilization strength with PPTs to help reduce strain on low back  -Patient will report increased standing tolerance from 60 min to 90 min or greater to allow patient to complete ADLs such as cooking without pain/discomfort    LTGs for completion within 90 days:  -Patient will demonstrate sit to stand without use of hands in order to increase functional strength  -Patient will reduce perceived disability on Oswestry from 26% disability to <10% disability to improve quality of life  -Patient will be independent with advanced HEP to allow for self management of condition    Plan  Therapy options: will be seen for skilled therapy services  Planned modality interventions: cryotherapy, electrical stimulation/Russian stimulation, dry needling, iontophoresis, thermotherapy (hydrocollator packs), traction, ultrasound and TENS  Planned therapy interventions: postural training, stretching, therapeutic activities, gait training, home exercise program, balance/weight-bearing training, flexibility, strengthening, functional ROM exercises, neuromuscular re-education, abdominal trunk stabilization, manual therapy, spinal/joint mobilization and transfer training  Other planned therapy interventions: Aquatic therapy  Frequency: 2x week (36 visits)  Plan details: Therapy for core stabilization, LE strength/stability, gait training, balance, and posture        Timed:  Manual Therapy:         mins  97385;  Therapeutic Exercise:    15     mins  10804;     Neuromuscular Mercedes:        mins  09015;    Therapeutic Activity:          mins  72632;     Gait Training:           mins  67332;     Ultrasound:          mins  56109;    Iontophoresis         mins 12254    Untimed:  Electrical Stimulation:         mins  06526 ( );  Traction:       mins  63748;   Dry Needling   (1-2 muscles)   _     mins 87250 (Self-pay)  Dry  Needling (3-4 muscles)  _     mins 20561 (Self-pay)  Dry Needling Trial    _     mins DRYNDLTRIAL  (No Charge)  Low Eval          Mins  83561  Mod Eval     25     Mins  70145  High Eval                            Mins  91949  Re- Eval                           Mins  15432    Timed Treatment:   15   mins   Total Treatment:     40   mins    PT SIGNATURE: Lila Cisneros PT     License Number: [place] [id number]    Electronically signed by Lila Cisneros PT, 07/08/24, 11:08 AM EDT    DATE TREATMENT INITIATED: 7/8/2024    Initial Certification  Certification Period: 10/6/2024  I certify that the therapy services are furnished while this patient is under my care.  The services outlined above are required by this patient, and will be reviewed every 90 days.     PHYSICIAN: Naomie Osman APRN   NPI: [id number]                                         DATE:     Please sign and return via fax to [phone] Thank you, [facility].

## 2024-07-26 ENCOUNTER — OFFICE VISIT (OUTPATIENT)
Dept: INTERNAL MEDICINE | Facility: CLINIC | Age: 74
End: 2024-07-26
Payer: MEDICARE

## 2024-07-26 VITALS
HEIGHT: 63 IN | SYSTOLIC BLOOD PRESSURE: 126 MMHG | TEMPERATURE: 97.9 F | HEART RATE: 89 BPM | OXYGEN SATURATION: 98 % | BODY MASS INDEX: 29.45 KG/M2 | WEIGHT: 166.2 LBS | DIASTOLIC BLOOD PRESSURE: 68 MMHG

## 2024-07-26 DIAGNOSIS — R30.0 DYSURIA: Primary | ICD-10-CM

## 2024-07-26 DIAGNOSIS — L82.1 SEBORRHEIC KERATOSES: ICD-10-CM

## 2024-07-26 DIAGNOSIS — M51.36 DDD (DEGENERATIVE DISC DISEASE), LUMBAR: ICD-10-CM

## 2024-07-26 PROBLEM — M51.369 DDD (DEGENERATIVE DISC DISEASE), LUMBAR: Status: ACTIVE | Noted: 2024-07-26

## 2024-07-26 LAB
BILIRUB BLD-MCNC: ABNORMAL MG/DL
CLARITY, POC: CLEAR
COLOR UR: ABNORMAL
EXPIRATION DATE: ABNORMAL
GLUCOSE UR STRIP-MCNC: NEGATIVE MG/DL
KETONES UR QL: ABNORMAL
LEUKOCYTE EST, POC: NEGATIVE
Lab: ABNORMAL
NITRITE UR-MCNC: NEGATIVE MG/ML
PH UR: 5 [PH] (ref 5–8)
PROT UR STRIP-MCNC: NEGATIVE MG/DL
RBC # UR STRIP: NEGATIVE /UL
SP GR UR: 1.01 (ref 1–1.03)
UROBILINOGEN UR QL: ABNORMAL

## 2024-07-26 PROCEDURE — 3051F HG A1C>EQUAL 7.0%<8.0%: CPT | Performed by: NURSE PRACTITIONER

## 2024-07-26 PROCEDURE — 3074F SYST BP LT 130 MM HG: CPT | Performed by: NURSE PRACTITIONER

## 2024-07-26 PROCEDURE — 81003 URINALYSIS AUTO W/O SCOPE: CPT | Performed by: NURSE PRACTITIONER

## 2024-07-26 PROCEDURE — 3078F DIAST BP <80 MM HG: CPT | Performed by: NURSE PRACTITIONER

## 2024-07-26 PROCEDURE — 1126F AMNT PAIN NOTED NONE PRSNT: CPT | Performed by: NURSE PRACTITIONER

## 2024-07-26 PROCEDURE — 99214 OFFICE O/P EST MOD 30 MIN: CPT | Performed by: NURSE PRACTITIONER

## 2024-07-26 NOTE — PROGRESS NOTES
Chief Complaint   Patient presents with    Difficulty Urinating     Burning with urination and cloudy urine       Subjective     Amber Campos is a 73 y.o. female being seen for an acute visit for possible UTI and discuss Lumbar DDD. She reports 2 days of dysuria and freuqency. She praveen fever, blood in urine.     She had a Lumbar MRI done per ortho in JuneRADIOLOGY - SCAN - MRI LUMBAR SPINE WO CONTRAST (06/12/2024) It showed lumbar DDD, and she was sent for PT. She is following up with  in [place] ([facility]).     She has an itchy spot on upper back that is itching and wants it checked.      History of Present Illness     Allergies   Allergen Reactions    Contrast Dye (Echo Or Unknown Ct/Mr) Anaphylaxis     Contrast dye used in Xray    Iodinated Contrast Media Photosensitivity     contrast    Adhesive Tape Other (See Comments)     EKG stickers only    Farxiga [Dapagliflozin] Diarrhea and Itching     Yeast infeciotn    Neomycin-Bacitracin Zn-Polymyx Rash    Toprol Xl [Metoprolol] Dizziness    Trulicity [Dulaglutide] GI Intolerance     Constipation    Victoza [Liraglutide] Nausea Only         Current Outpatient Medications:     albuterol sulfate  (90 Base) MCG/ACT inhaler, Inhale 2 puffs Every 4 (Four) Hours As Needed for Wheezing., Disp: 8.5 g, Rfl: 2    amlodipine-olmesartan (NIC) 5-40 MG per tablet, TAKE 1 TABLET DAILY, Disp: 90 tablet, Rfl: 3    budesonide-formoterol (Symbicort) 160-4.5 MCG/ACT inhaler, Inhale 2 puffs 2 (Two) Times a Day., Disp: 1 each, Rfl: 6    Cetirizine HCl (ZyrTEC Childrens Allergy) 5 MG/5ML solution solution, Take 5 mL by mouth Daily., Disp: 150 mL, Rfl:     Clocortolone Pivalate 0.1 % cream, APPLY TOPICALLY TO AFFECTED AREA TWICE A DAY AS NEEDED, Disp: , Rfl:     Continuous Blood Gluc  (FreeStyle Willi 14 Day [place]) device, 1 each 4 (Four) Times a Day., Disp: 1 each, Rfl: 3    Continuous Blood Gluc Sensor (FreeStyle Willi 2 Sensor) misc, Inject 1 each under the skin  into the appropriate area as directed Every 10 (Ten) Days., Disp: 3 each, Rfl: 7    diazePAM (Valium) 5 MG tablet, One tablet one hour prior to testing and a second tablet five minutes before testing, Disp: 2 tablet, Rfl: 0    metFORMIN (GLUCOPHAGE) 1000 MG tablet, TAKE 1 TABLET TWICE A DAY WITH MEALS (DISCONTINUE INVOKAMET AND RESUME METFORMIN), Disp: 180 tablet, Rfl: 3    multivitamin (THERAGRAN) tablet tablet, Take 1 tablet by mouth Daily., Disp: , Rfl:     Omega-3 Fatty Acids (fish oil) 1000 MG capsule capsule, Take  by mouth Daily With Breakfast., Disp: , Rfl:     pantoprazole (PROTONIX) 40 MG EC tablet, TAKE 1 TABLET DAILY, Disp: 90 tablet, Rfl: 3    Spacer/Aero Chamber Mouthpiece misc, 1 each Daily., Disp: 1 each, Rfl: 0    Tirzepatide (MOUNJARO) 7.5 MG/0.5ML solution pen-injector pen, Inject 0.5 mL under the skin into the appropriate area as directed 1 (One) Time Per Week., Disp: 6 mL, Rfl: 1    venlafaxine XR (EFFEXOR-XR) 75 MG 24 hr capsule, TAKE 1 CAPSULE DAILY, Disp: 90 capsule, Rfl: 3    sucralfate (CARAFATE) 1 g tablet, , Disp: , Rfl:     The following portions of the patient's history were reviewed and updated as appropriate: allergies, current medications, past family history, past medical history, past social history, past surgical history, and problem list.    Review of Systems   Constitutional:  Negative for fatigue.   Respiratory: Negative.  Negative for shortness of breath.    Cardiovascular: Negative.  Negative for chest pain.   Genitourinary:  Positive for dysuria and urgency.   Musculoskeletal:  Positive for arthralgias and back pain.   Allergic/Immunologic: Positive for environmental allergies.   Hematological: Negative.        Assessment     Physical Exam  Vitals reviewed.   Constitutional:       Appearance: Normal appearance. She is obese. She is not ill-appearing.   Cardiovascular:      Rate and Rhythm: Normal rate and regular rhythm.      Pulses: Normal pulses.      Heart sounds: Normal  heart sounds.   Pulmonary:      Effort: Pulmonary effort is normal.      Breath sounds: Normal breath sounds.   Musculoskeletal:      Lumbar back: Decreased range of motion. Negative right straight leg raise test and negative left straight leg raise test.   Skin:     General: Skin is warm and dry.             Comments: 1. 3mm raised rough grey seborrheic keratoses   Neurological:      General: No focal deficit present.      Mental Status: She is alert and oriented to person, place, and time.         Plan         Diagnoses and all orders for this visit:    1. Dysuria (Primary)  Comments:  POC urine clear.  Orders:  -     POC Urinalysis Dipstick, Automated  -     Urine Culture - Urine, Urine, Clean Catch; Future  -     Urine Culture - Urine, Urine, Clean Catch    2. DDD (degenerative disc disease), lumbar  Comments:  Will follow with chiropractor    3. Seborrheic keratoses  Comments:  Re-assured that this is benign, she will have removed with derm        Follow up as scheduled

## 2024-07-28 LAB
BACTERIA UR CULT: NORMAL
BACTERIA UR CULT: NORMAL

## 2024-09-06 ENCOUNTER — TELEPHONE (OUTPATIENT)
Dept: INTERNAL MEDICINE | Facility: CLINIC | Age: 74
End: 2024-09-06
Payer: MEDICARE

## 2024-09-06 NOTE — TELEPHONE ENCOUNTER
Pt is calling about her metformin. Her blood sugars keep dropping really low and she would like to know what she should do.    Please advise

## 2024-09-11 DIAGNOSIS — K21.9 GASTROESOPHAGEAL REFLUX DISEASE WITHOUT ESOPHAGITIS: ICD-10-CM

## 2024-09-11 RX ORDER — PANTOPRAZOLE SODIUM 40 MG/1
TABLET, DELAYED RELEASE ORAL
Qty: 90 TABLET | Refills: 3 | Status: SHIPPED | OUTPATIENT
Start: 2024-09-11

## 2024-09-11 NOTE — TELEPHONE ENCOUNTER
No osteoporosis diagnosis on problem list     Rx Refill Note  Requested Prescriptions     Pending Prescriptions Disp Refills    pantoprazole (PROTONIX) 40 MG EC tablet [Pharmacy Med Name: PANTOPRAZOLE SODIUM DR TABS 40MG] 90 tablet 3     Sig: TAKE 1 TABLET DAILY      Last office visit with prescribing clinician: 7/26/2024   Last telemedicine visit with prescribing clinician: Visit date not found   Next office visit with prescribing clinician: 9/23/2024                         Would you like a call back once the refill request has been completed: [] Yes [] No    If the office needs to give you a call back, can they leave a voicemail: [] Yes [] No    Timothy Osman MA  09/11/24, 14:38 EDT

## 2024-09-16 DIAGNOSIS — M80.031A PATHOLOGICAL FRACTURE OF RIGHT RADIUS DUE TO AGE-RELATED OSTEOPOROSIS, INITIAL ENCOUNTER: ICD-10-CM

## 2024-09-16 DIAGNOSIS — E11.8 CONTROLLED TYPE 2 DIABETES MELLITUS WITH COMPLICATION, WITHOUT LONG-TERM CURRENT USE OF INSULIN: Primary | ICD-10-CM

## 2024-09-23 ENCOUNTER — OFFICE VISIT (OUTPATIENT)
Dept: INTERNAL MEDICINE | Facility: CLINIC | Age: 74
End: 2024-09-23
Payer: MEDICARE

## 2024-09-23 VITALS
WEIGHT: 167.6 LBS | BODY MASS INDEX: 29.7 KG/M2 | TEMPERATURE: 98.6 F | DIASTOLIC BLOOD PRESSURE: 86 MMHG | OXYGEN SATURATION: 96 % | SYSTOLIC BLOOD PRESSURE: 152 MMHG | HEART RATE: 84 BPM | HEIGHT: 63 IN

## 2024-09-23 DIAGNOSIS — E53.8 VITAMIN B12 DEFICIENCY: ICD-10-CM

## 2024-09-23 DIAGNOSIS — E11.8 CONTROLLED TYPE 2 DIABETES MELLITUS WITH COMPLICATION, WITHOUT LONG-TERM CURRENT USE OF INSULIN: Primary | ICD-10-CM

## 2024-09-23 DIAGNOSIS — I10 ESSENTIAL HYPERTENSION: ICD-10-CM

## 2024-09-23 DIAGNOSIS — J45.21 MILD INTERMITTENT REACTIVE AIRWAY DISEASE WITH ACUTE EXACERBATION: ICD-10-CM

## 2024-09-23 DIAGNOSIS — G47.10 HYPERSOMNIA: ICD-10-CM

## 2024-09-23 DIAGNOSIS — E78.2 MIXED HYPERLIPIDEMIA: ICD-10-CM

## 2024-09-23 DIAGNOSIS — N39.0 RECURRENT UTI: ICD-10-CM

## 2024-09-23 LAB
BILIRUB BLD-MCNC: NEGATIVE MG/DL
CLARITY, POC: CLEAR
COLOR UR: YELLOW
EXPIRATION DATE: NORMAL
EXPIRATION DATE: NORMAL
GLUCOSE UR STRIP-MCNC: NEGATIVE MG/DL
KETONES UR QL: NEGATIVE
LEUKOCYTE EST, POC: NEGATIVE
Lab: NORMAL
Lab: NORMAL
NITRITE UR-MCNC: NEGATIVE MG/ML
PH UR: 7 [PH] (ref 5–8)
POC CREATININE URINE: NORMAL
POC MICROALBUMIN URINE: NORMAL
PROT UR STRIP-MCNC: NEGATIVE MG/DL
RBC # UR STRIP: NEGATIVE /UL
SP GR UR: 1.02 (ref 1–1.03)
UROBILINOGEN UR QL: NORMAL

## 2024-09-23 PROCEDURE — 81003 URINALYSIS AUTO W/O SCOPE: CPT | Performed by: NURSE PRACTITIONER

## 2024-09-23 PROCEDURE — 99214 OFFICE O/P EST MOD 30 MIN: CPT | Performed by: NURSE PRACTITIONER

## 2024-09-23 PROCEDURE — 3077F SYST BP >= 140 MM HG: CPT | Performed by: NURSE PRACTITIONER

## 2024-09-23 PROCEDURE — 1126F AMNT PAIN NOTED NONE PRSNT: CPT | Performed by: NURSE PRACTITIONER

## 2024-09-23 PROCEDURE — 96372 THER/PROPH/DIAG INJ SC/IM: CPT | Performed by: NURSE PRACTITIONER

## 2024-09-23 PROCEDURE — 3079F DIAST BP 80-89 MM HG: CPT | Performed by: NURSE PRACTITIONER

## 2024-09-23 PROCEDURE — 82044 UR ALBUMIN SEMIQUANTITATIVE: CPT | Performed by: NURSE PRACTITIONER

## 2024-09-23 PROCEDURE — 3051F HG A1C>EQUAL 7.0%<8.0%: CPT | Performed by: NURSE PRACTITIONER

## 2024-09-23 RX ORDER — BUDESONIDE AND FORMOTEROL FUMARATE DIHYDRATE 160; 4.5 UG/1; UG/1
2 AEROSOL RESPIRATORY (INHALATION)
Qty: 1 EACH | Refills: 6 | Status: SHIPPED | OUTPATIENT
Start: 2024-09-23

## 2024-09-23 RX ORDER — CYANOCOBALAMIN 1000 UG/ML
1000 INJECTION, SOLUTION INTRAMUSCULAR; SUBCUTANEOUS
Status: SHIPPED | OUTPATIENT
Start: 2024-09-23

## 2024-09-23 RX ADMIN — CYANOCOBALAMIN 1000 MCG: 1000 INJECTION, SOLUTION INTRAMUSCULAR; SUBCUTANEOUS at 09:47

## 2024-09-25 ENCOUNTER — TELEPHONE (OUTPATIENT)
Dept: INTERNAL MEDICINE | Facility: CLINIC | Age: 74
End: 2024-09-25

## 2024-09-25 NOTE — TELEPHONE ENCOUNTER
Caller: Amber Campos    Relationship: Self    Best call back number: [phone]    Caller requesting test results: PATIENT    What test was performed: LABS    When was the test performed: 09/23    Where was the test performed: IN OFFICE    Additional notes: PATIENT IS NOT FAMILIAR ON HOW TO READ THEM AND WOULD LIKE TO KNOW SPECIFICALLY ABOUT HER LIVER ENZYMES    PATIENT STATED THAT SHE HAS NOT HEARD ANYTHING ABOUT HER SLEEP STUDY OR HAD NOT RECEIVED ANYTHING FOR IT.    PATIENT STATED SHE AGREES WITH KIRK ABOUT THE SLEEP STUDY.

## 2024-09-30 ENCOUNTER — OFFICE VISIT (OUTPATIENT)
Dept: ORTHOPEDIC SURGERY | Facility: CLINIC | Age: 74
End: 2024-09-30
Payer: MEDICARE

## 2024-09-30 VITALS — HEIGHT: 63 IN | BODY MASS INDEX: 29.59 KG/M2 | WEIGHT: 167 LBS

## 2024-09-30 DIAGNOSIS — M17.11 PRIMARY OSTEOARTHRITIS OF RIGHT KNEE: Primary | ICD-10-CM

## 2024-09-30 DIAGNOSIS — M54.31 SCIATICA OF RIGHT SIDE: ICD-10-CM

## 2024-09-30 PROCEDURE — 99213 OFFICE O/P EST LOW 20 MIN: CPT | Performed by: NURSE PRACTITIONER

## 2024-09-30 PROCEDURE — 20610 DRAIN/INJ JOINT/BURSA W/O US: CPT | Performed by: NURSE PRACTITIONER

## 2024-09-30 RX ORDER — TRIAMCINOLONE ACETONIDE 40 MG/ML
80 INJECTION, SUSPENSION INTRA-ARTICULAR; INTRAMUSCULAR
Status: COMPLETED | OUTPATIENT
Start: 2024-09-30 | End: 2024-09-30

## 2024-09-30 RX ORDER — LIDOCAINE HYDROCHLORIDE 10 MG/ML
8 INJECTION, SOLUTION EPIDURAL; INFILTRATION; INTRACAUDAL; PERINEURAL
Status: COMPLETED | OUTPATIENT
Start: 2024-09-30 | End: 2024-09-30

## 2024-09-30 RX ADMIN — LIDOCAINE HYDROCHLORIDE 8 ML: 10 INJECTION, SOLUTION EPIDURAL; INFILTRATION; INTRACAUDAL; PERINEURAL at 09:10

## 2024-09-30 RX ADMIN — TRIAMCINOLONE ACETONIDE 80 MG: 40 INJECTION, SUSPENSION INTRA-ARTICULAR; INTRAMUSCULAR at 09:10

## 2024-09-30 NOTE — PROGRESS NOTES
Subjective:     Patient ID: Amber Campos is a 73 y.o. female.    Chief Complaint:  Follow-up DJD right knee  Follow-up DJD lumbar spine  History of Present Illness  History of Present Illness  The patient presents to the clinic today for reevaluation of her right knee and lumbar spine.    She has been diligently completing a home exercise program for her lumbar spine following an MRI. However, she began experiencing pain and an inability to comfortably walk down her right lower extremity. She has since discontinued the exercises and has been tolerating them well.    She is experiencing pain across the medial and anterior aspects of her right knee, particularly during extension and flexion. She reports no locking, catching, or giving way of the knee. She is getting ready to leave for a trip and would like to discuss treatment options for the right knee. She has no other concerns at present.       Social History     Occupational History    Occupation: retired    Tobacco Use    Smoking status: Never     Passive exposure: Never    Smokeless tobacco: Never   Vaping Use    Vaping status: Never Used   Substance and Sexual Activity    Alcohol use: No    Drug use: No    Sexual activity: Defer      Past Medical History:   Diagnosis Date    Basaloid carcinoma     facial    Carotid artery plaque, bilateral     without stenosis, 2023    Gastroesophageal reflux disease 03/14/2016    History of kidney infection     History of mammogram     Hypertension     IC (interstitial cystitis) 02/03/2021    Osteopenia     Osteoporosis     Postmenopausal     PVCs (premature ventricular contractions)     Seasonal allergies     Simple renal cyst 03/14/2016    Sinus tachycardia     Steatosis of liver 03/14/2016    Type 2 diabetes mellitus     Vertigo 05/09/2022     Past Surgical History:   Procedure Laterality Date    BREAST BIOPSY Bilateral     Benign    BUNIONECTOMY Right     COLONOSCOPY N/A 04/25/2017    Procedure: COLONOSCOPY TO CECUM ;   "Surgeon: Aidan Roca MD;  Location:  [facility];  Service:     EXOSTECTOMY Left     HEEL    FOOT FUSION Right     9 screws and plate    KNEE ARTHROSCOPY W/ MENISCAL REPAIR Right     ORIF WRIST FRACTURE Right 12/01/2021    Procedure: WRIST OPEN REDUCTION INTERNAL FIXATION;  Surgeon: Salazar Amezcua MD;  Location:  [facility];  Service: Orthopedics;  Laterality: Right;    PAP SMEAR      TONSILLECTOMY      TOTAL SHOULDER REVERSE ARTHROPLASTY Right     UMBILICAL HERNIA REPAIR N/A 10/2010       Family History   Problem Relation Age of Onset    Other Mother         brain death    Emphysema Mother     Alcohol abuse Father     Glaucoma Father     Other Sister         fatty liver    Heart disease Sister         valve problem, being followed (no surgery required)    Arthritis Sister     Gallbladder disease Sister     Other Brother         fatty liver    Glaucoma Maternal Grandmother     Heart disease Maternal Grandmother     Alcohol abuse Maternal Aunt     Diabetes Maternal Aunt     Breast cancer Paternal Aunt                Objective:  Physical Exam    General: No acute distress.  Eyes: conjunctiva clear; pupils equally round and reactive  ENT: external ears and nose atraumatic; oropharynx clear  CV: no peripheral edema  Resp: normal respiratory effort  Skin: no rashes or wounds; normal turgor  Psych: mood and affect appropriate; recent and remote memory intact    Vitals:    09/30/24 0854   Weight: 75.8 kg (167 lb)   Height: 160 cm (63\")         09/30/24  0854   Weight: 75.8 kg (167 lb)     Body mass index is 29.58 kg/m².      Right Knee Exam     Tenderness   The patient is experiencing tenderness in the medial joint line and patella.    Range of Motion   Right knee extension: 0.   Right knee flexion: 125.     Tests   Yaniv:  Medial - negative Lateral - negative  Varus: negative Valgus: negative  Lachman:  Anterior - 1+      Patellar apprehension: positive    Other   Erythema: absent  Sensation: " normal  Pulse: present  Swelling: mild  Effusion: no effusion present             Physical Exam      Assessment:        1. Sciatica of right side    2. Primary osteoarthritis of right knee         Assessment & Plan  1. Right knee pain.  Experiencing pain across the lateral and anterior aspects of the right knee with extension and flexion. No locking, catching, or giving way reported. A corticosteroid injection will be administered during today's visit. Advised to continue with low-impact exercises of the lower extremities. All questions answered.    2. Lumbar spine pain.  Previously completed a home exercise program for lumbar spine pain. Currently not experiencing any pain in the lumbar spine. Advised to hold off on any further activities for the lumbar spine.      Large Joint Arthrocentesis: R knee  Date/Time: 9/30/2024 9:10 AM  Consent given by: patient  Site marked: site marked  Timeout: Immediately prior to procedure a time out was called to verify the correct patient, procedure, equipment, support staff and site/side marked as required   Supporting Documentation  Indications: pain   Procedure Details  Location: knee - R knee  Preparation: Patient was prepped and draped in the usual sterile fashion  Needle size: 22 G  Approach: superior  Medications administered: 80 mg triamcinolone acetonide 40 MG/ML; 8 mL lidocaine PF 1% 1 %  Patient tolerance: patient tolerated the procedure well with no immediate complications        Orders:  Orders Placed This Encounter   Procedures    Large Joint Arthrocentesis: R knee     No orders of the defined types were placed in this encounter.      Dragon dictation utilized          Patient or patient representative verbalized consent for the use of Ambient Listening during the visit with  JAKE Rivera for chart documentation. 9/30/2024  13:21 EDT

## 2024-10-04 DIAGNOSIS — R53.82 CHRONIC FATIGUE: Primary | ICD-10-CM

## 2024-10-10 ENCOUNTER — TELEPHONE (OUTPATIENT)
Dept: INTERNAL MEDICINE | Facility: CLINIC | Age: 74
End: 2024-10-10
Payer: MEDICARE

## 2024-10-10 NOTE — TELEPHONE ENCOUNTER
"Relay     \"I can order a cbc for her to get checked anytime, nonfasting. Order in chart \"                  "

## 2024-10-10 NOTE — TELEPHONE ENCOUNTER
Name: Amber Campos    Relationship: Self    Best Callback Number: [phone]     HUB PROVIDED THE RELAY MESSAGE FROM THE OFFICE   PATIENT VOICED UNDERSTANDING AND HAS NO FURTHER QUESTIONS AT THIS TIME

## 2024-10-21 ENCOUNTER — OFFICE VISIT (OUTPATIENT)
Dept: INTERNAL MEDICINE | Facility: CLINIC | Age: 74
End: 2024-10-21
Payer: MEDICARE

## 2024-10-21 VITALS
OXYGEN SATURATION: 97 % | SYSTOLIC BLOOD PRESSURE: 118 MMHG | BODY MASS INDEX: 29.23 KG/M2 | HEART RATE: 97 BPM | DIASTOLIC BLOOD PRESSURE: 72 MMHG | HEIGHT: 63 IN | TEMPERATURE: 97.5 F | WEIGHT: 165 LBS

## 2024-10-21 DIAGNOSIS — R05.1 ACUTE COUGH: ICD-10-CM

## 2024-10-21 DIAGNOSIS — J01.00 ACUTE NON-RECURRENT MAXILLARY SINUSITIS: Primary | ICD-10-CM

## 2024-10-21 PROCEDURE — 3078F DIAST BP <80 MM HG: CPT | Performed by: NURSE PRACTITIONER

## 2024-10-21 PROCEDURE — 3074F SYST BP LT 130 MM HG: CPT | Performed by: NURSE PRACTITIONER

## 2024-10-21 PROCEDURE — 99213 OFFICE O/P EST LOW 20 MIN: CPT | Performed by: NURSE PRACTITIONER

## 2024-10-21 PROCEDURE — 3051F HG A1C>EQUAL 7.0%<8.0%: CPT | Performed by: NURSE PRACTITIONER

## 2024-10-21 PROCEDURE — 1126F AMNT PAIN NOTED NONE PRSNT: CPT | Performed by: NURSE PRACTITIONER

## 2024-10-21 RX ORDER — BENZONATATE 100 MG/1
100 CAPSULE ORAL 3 TIMES DAILY PRN
Qty: 14 CAPSULE | Refills: 0 | Status: SHIPPED | OUTPATIENT
Start: 2024-10-21

## 2024-10-21 RX ORDER — CEFDINIR 300 MG/1
300 CAPSULE ORAL 2 TIMES DAILY
Qty: 20 CAPSULE | Refills: 0 | Status: SHIPPED | OUTPATIENT
Start: 2024-10-21

## 2024-10-21 RX ORDER — TRIAMCINOLONE ACETONIDE 1 MG/G
CREAM TOPICAL
COMMUNITY
Start: 2024-10-07

## 2024-10-21 NOTE — PROGRESS NOTES
Chief Complaint   Patient presents with    Sinus Problem     Has sinus pressure, blood when blowing nose    Generalized Body Aches       Subjective     Amber Campos is a 73 y.o. female being seen for an acute visit for sinus issues. This began last week after traveling to [place]. Symptoms include sinus pressure, nasal congestion, nosebleeds, and ear pain. She tried Icy hot, aleve. She tested herself for Covid this morning and was negative..       History of Present Illness     Allergies   Allergen Reactions    Contrast Dye (Echo Or Unknown Ct/Mr) Anaphylaxis     Contrast dye used in Xray    Iodinated Contrast Media Photosensitivity     contrast    Adhesive Tape Other (See Comments)     EKG stickers only    Farxiga [Dapagliflozin] Diarrhea and Itching     Yeast infeciotn    Neomycin-Bacitracin Zn-Polymyx Rash    Toprol Xl [Metoprolol] Dizziness    Trulicity [Dulaglutide] GI Intolerance     Constipation    Victoza [Liraglutide] Nausea Only         Current Outpatient Medications:     albuterol sulfate  (90 Base) MCG/ACT inhaler, Inhale 2 puffs Every 4 (Four) Hours As Needed for Wheezing., Disp: 8.5 g, Rfl: 2    amlodipine-olmesartan (NIC) 5-40 MG per tablet, TAKE 1 TABLET DAILY, Disp: 90 tablet, Rfl: 3    budesonide-formoterol (Symbicort) 160-4.5 MCG/ACT inhaler, Inhale 2 puffs 2 (Two) Times a Day., Disp: 1 each, Rfl: 6    Cetirizine HCl (ZyrTEC Childrens Allergy) 5 MG/5ML solution solution, Take 5 mL by mouth Daily., Disp: 150 mL, Rfl:     Clocortolone Pivalate 0.1 % cream, APPLY TOPICALLY TO AFFECTED AREA TWICE A DAY AS NEEDED, Disp: , Rfl:     Continuous Blood Gluc  (FreeStyle Willi 14 Day [place]) device, 1 each 4 (Four) Times a Day., Disp: 1 each, Rfl: 3    Continuous Blood Gluc Sensor (FreeStyle Willi 2 Sensor) misc, Inject 1 each under the skin into the appropriate area as directed Every 10 (Ten) Days., Disp: 3 each, Rfl: 7    metFORMIN (GLUCOPHAGE) 1000 MG tablet, TAKE 1 TABLET TWICE A DAY  WITH MEALS (DISCONTINUE INVOKAMET AND RESUME METFORMIN), Disp: 180 tablet, Rfl: 3    multivitamin (THERAGRAN) tablet tablet, Take 1 tablet by mouth Daily., Disp: , Rfl:     Omega-3 Fatty Acids (fish oil) 1000 MG capsule capsule, Take  by mouth Daily With Breakfast., Disp: , Rfl:     pantoprazole (PROTONIX) 40 MG EC tablet, TAKE 1 TABLET DAILY, Disp: 90 tablet, Rfl: 3    Spacer/Aero Chamber Mouthpiece misc, 1 each Daily., Disp: 1 each, Rfl: 0    Tirzepatide (MOUNJARO) 7.5 MG/0.5ML solution pen-injector pen, Inject 0.5 mL under the skin into the appropriate area as directed 1 (One) Time Per Week., Disp: 6 mL, Rfl: 1    triamcinolone (KENALOG) 0.1 % cream, APPLY TWICE DAILY TO ITCHY SKIN UP TO 2 WEEKS/MONTH AS NEEDED., Disp: , Rfl:     venlafaxine XR (EFFEXOR-XR) 75 MG 24 hr capsule, TAKE 1 CAPSULE DAILY, Disp: 90 capsule, Rfl: 3    Current Facility-Administered Medications:     cyanocobalamin injection 1,000 mcg, 1,000 mcg, Intramuscular, Q28 Days, Jenny Sun APRN, 1,000 mcg at 09/23/24 0947    The following portions of the patient's history were reviewed and updated as appropriate: allergies, current medications, past family history, past medical history, past social history, past surgical history, and problem list.    Review of Systems   Constitutional: Negative.    HENT: Negative.     Eyes: Negative.    Respiratory: Negative.     Cardiovascular: Negative.  Negative for chest pain, palpitations and leg swelling.   Endocrine: Negative.    Genitourinary: Negative.    Musculoskeletal: Negative.    Allergic/Immunologic: Negative.  Negative for environmental allergies and food allergies.   Neurological: Negative.  Negative for dizziness.   Hematological: Negative.  Negative for adenopathy.   Psychiatric/Behavioral: Negative.     All other systems reviewed and are negative.      Assessment     Physical Exam  Vitals reviewed.   Constitutional:       Appearance: Normal appearance. She is obese. She is not ill-appearing.    HENT:      Right Ear: There is no impacted cerumen.      Left Ear: There is no impacted cerumen.      Nose: Congestion and rhinorrhea present.      Right Sinus: Maxillary sinus tenderness and frontal sinus tenderness present.      Left Sinus: Maxillary sinus tenderness and frontal sinus tenderness present.      Mouth/Throat:      Mouth: Mucous membranes are moist.      Pharynx: Posterior oropharyngeal erythema present.   Cardiovascular:      Rate and Rhythm: Normal rate and regular rhythm.      Pulses: Normal pulses.      Heart sounds: Normal heart sounds. No murmur heard.  Pulmonary:      Effort: Pulmonary effort is normal. No respiratory distress.      Breath sounds: Normal breath sounds. No stridor.   Musculoskeletal:      Right lower leg: No edema.      Left lower leg: No edema.   Skin:     General: Skin is warm and dry.   Neurological:      General: No focal deficit present.      Mental Status: She is alert and oriented to person, place, and time.   Psychiatric:         Mood and Affect: Mood normal.         Behavior: Behavior normal.         Thought Content: Thought content normal.         Plan     Her Covid testing was negative    Diagnoses and all orders for this visit:    1. Acute non-recurrent maxillary sinusitis (Primary)  -     cefdinir (OMNICEF) 300 MG capsule; Take 1 capsule by mouth 2 (Two) Times a Day.  Dispense: 20 capsule; Refill: 0    2. Acute cough  -     benzonatate (Tessalon Perles) 100 MG capsule; Take 1 capsule by mouth 3 (Three) Times a Day As Needed for Cough.  Dispense: 14 capsule; Refill: 0        Follow up as needed

## 2024-11-06 ENCOUNTER — OFFICE VISIT (OUTPATIENT)
Dept: INTERNAL MEDICINE | Facility: CLINIC | Age: 74
End: 2024-11-06
Payer: MEDICARE

## 2024-11-06 VITALS
SYSTOLIC BLOOD PRESSURE: 132 MMHG | TEMPERATURE: 98.4 F | BODY MASS INDEX: 29.2 KG/M2 | HEIGHT: 63 IN | OXYGEN SATURATION: 97 % | WEIGHT: 164.8 LBS | DIASTOLIC BLOOD PRESSURE: 76 MMHG | HEART RATE: 96 BPM

## 2024-11-06 DIAGNOSIS — B37.31 VAGINAL YEAST INFECTION: ICD-10-CM

## 2024-11-06 DIAGNOSIS — R30.0 DYSURIA: ICD-10-CM

## 2024-11-06 DIAGNOSIS — I10 ESSENTIAL HYPERTENSION: ICD-10-CM

## 2024-11-06 DIAGNOSIS — N30.00 ACUTE CYSTITIS WITHOUT HEMATURIA: Primary | ICD-10-CM

## 2024-11-06 DIAGNOSIS — R26.89 POOR BALANCE: ICD-10-CM

## 2024-11-06 LAB
BILIRUB BLD-MCNC: NEGATIVE MG/DL
CLARITY, POC: ABNORMAL
COLOR UR: YELLOW
EXPIRATION DATE: ABNORMAL
GLUCOSE UR STRIP-MCNC: NEGATIVE MG/DL
KETONES UR QL: ABNORMAL
LEUKOCYTE EST, POC: ABNORMAL
Lab: ABNORMAL
NITRITE UR-MCNC: POSITIVE MG/ML
PH UR: 7 [PH] (ref 5–8)
PROT UR STRIP-MCNC: ABNORMAL MG/DL
RBC # UR STRIP: ABNORMAL /UL
SP GR UR: 1.01 (ref 1–1.03)
UROBILINOGEN UR QL: ABNORMAL

## 2024-11-06 PROCEDURE — 1126F AMNT PAIN NOTED NONE PRSNT: CPT | Performed by: INTERNAL MEDICINE

## 2024-11-06 PROCEDURE — 81003 URINALYSIS AUTO W/O SCOPE: CPT | Performed by: INTERNAL MEDICINE

## 2024-11-06 PROCEDURE — 3078F DIAST BP <80 MM HG: CPT | Performed by: INTERNAL MEDICINE

## 2024-11-06 PROCEDURE — 99214 OFFICE O/P EST MOD 30 MIN: CPT | Performed by: INTERNAL MEDICINE

## 2024-11-06 PROCEDURE — 3051F HG A1C>EQUAL 7.0%<8.0%: CPT | Performed by: INTERNAL MEDICINE

## 2024-11-06 PROCEDURE — G2211 COMPLEX E/M VISIT ADD ON: HCPCS | Performed by: INTERNAL MEDICINE

## 2024-11-06 PROCEDURE — 3075F SYST BP GE 130 - 139MM HG: CPT | Performed by: INTERNAL MEDICINE

## 2024-11-06 RX ORDER — FLUCONAZOLE 150 MG/1
150 TABLET ORAL ONCE
Qty: 1 TABLET | Refills: 0 | Status: SHIPPED | OUTPATIENT
Start: 2024-11-06 | End: 2024-11-06

## 2024-11-06 RX ORDER — NITROFURANTOIN 25; 75 MG/1; MG/1
100 CAPSULE ORAL 2 TIMES DAILY
Qty: 10 CAPSULE | Refills: 0 | Status: SHIPPED | OUTPATIENT
Start: 2024-11-06 | End: 2024-11-11

## 2024-11-06 RX ORDER — AMLODIPINE AND OLMESARTAN MEDOXOMIL 5; 40 MG/1; MG/1
TABLET ORAL
Qty: 90 TABLET | Refills: 3 | Status: SHIPPED | OUTPATIENT
Start: 2024-11-06

## 2024-11-06 NOTE — PROGRESS NOTES
"Chief Complaint  Urinary Tract Infection (Having some urinary frequency and burning. Has been ongoing for 3 days )    Subjective        Amber Campos presents to [facility]  Urinary Tract Infection         73 year old female here with dysuria, urinary frequency for the past 3 days. Denies any n/v. Has subjective fevers and chills. Recently treated for sinus infection.   She had antibiotics for a recent sinusitis about 2 weeks ago.  She does also has some vaginal burning.    She is concerned about osteopenia and poor balance.  She is on prolia    Objective   Vital Signs:  /76 (BP Location: Right arm, Patient Position: Sitting, Cuff Size: Adult)   Pulse 96   Temp 98.4 °F (36.9 °C) (Infrared)   Ht 160 cm (63\")   Wt 74.8 kg (164 lb 12.8 oz)   SpO2 97%   BMI 29.19 kg/m²   Estimated body mass index is 29.19 kg/m² as calculated from the following:    Height as of this encounter: 160 cm (63\").    Weight as of this encounter: 74.8 kg (164 lb 12.8 oz).            Physical Exam  Vitals and nursing note reviewed.   Constitutional:       General: She is not in acute distress.     Appearance: Normal appearance. She is normal weight.   HENT:      Head: Normocephalic and atraumatic.      Right Ear: External ear normal.      Left Ear: External ear normal.      Nose: Nose normal.      Mouth/Throat:      Mouth: Mucous membranes are moist.      Pharynx: No oropharyngeal exudate.   Eyes:      Pupils: Pupils are equal, round, and reactive to light.   Cardiovascular:      Rate and Rhythm: Normal rate and regular rhythm.      Pulses: Normal pulses.      Heart sounds: Normal heart sounds. No murmur heard.     No friction rub. No gallop.   Pulmonary:      Effort: Pulmonary effort is normal. No respiratory distress.      Breath sounds: Normal breath sounds. No wheezing or rales.   Abdominal:      General: Abdomen is flat. Bowel sounds are normal. There is no distension.      Palpations: Abdomen is " soft.      Tenderness: There is no abdominal tenderness. There is no right CVA tenderness or left CVA tenderness.   Musculoskeletal:         General: Normal range of motion.      Cervical back: Normal range of motion.   Lymphadenopathy:      Cervical: No cervical adenopathy.   Skin:     General: Skin is warm.      Capillary Refill: Capillary refill takes less than 2 seconds.   Neurological:      General: No focal deficit present.      Mental Status: She is alert and oriented to person, place, and time. Mental status is at baseline.   Psychiatric:         Mood and Affect: Mood normal.         Behavior: Behavior normal.         Thought Content: Thought content normal.         Judgment: Judgment normal.        Result Review :  The following data was reviewed by: Glory Crowell MD on 11/06/2024:  UA          7/26/2024    13:08 9/23/2024    09:05 11/6/2024    15:42   Urinalysis   Ketones, UA Trace  Negative  15 mg/dL    Leukocytes, UA Negative  Negative  Small (1+)                Assessment and Plan   Diagnoses and all orders for this visit:    1. Acute cystitis without hematuria (Primary)  -     Urine Culture - Urine, Urine, Clean Catch  -     nitrofurantoin, macrocrystal-monohydrate, (Macrobid) 100 MG capsule; Take 1 capsule by mouth 2 (Two) Times a Day for 5 days.  Dispense: 10 capsule; Refill: 0    2. Dysuria  -     POC Urinalysis Dipstick, Automated  -     Urine Culture - Urine, Urine, Clean Catch    3. Poor balance  -     Ambulatory Referral to Physical Therapy for Evaluation & Treatment    4. Vaginal yeast infection  -     fluconazole (DIFLUCAN) 150 MG tablet; Take 1 tablet by mouth 1 (One) Time for 1 dose.  Dispense: 1 tablet; Refill: 0             Follow Up   Return if symptoms worsen or fail to improve.  Patient was given instructions and counseling regarding her condition or for health maintenance advice. Please see specific information pulled into the AVS if appropriate.     Jarett Valadez MD  Internal  Medicine/Pediatrics, PGY-4      I have seen and examined the patient independently.  I have reviewed the resident's findings as noted above and added to the note where appropriate.  Agree with above.    Glory Crowell MD  Attending Physician  Internal Medicine and Pediatrics

## 2024-11-06 NOTE — TELEPHONE ENCOUNTER
Rx Refill Note  Requested Prescriptions     Pending Prescriptions Disp Refills    amlodipine-olmesartan (NIC) 5-40 MG per tablet [Pharmacy Med Name: AMLODIPINE/OLMESARTAN TABS 5/40MG] 90 tablet 3     Sig: TAKE 1 TABLET DAILY      Last office visit with prescribing clinician: 10/21/2024   Last telemedicine visit with prescribing clinician: Visit date not found   Next office visit with prescribing clinician: 12/30/2024                         Would you like a call back once the refill request has been completed: [] Yes [] No    If the office needs to give you a call back, can they leave a voicemail: [] Yes [] No    Sumi Chanel MA  11/06/24, 11:11 EST

## 2024-11-10 DIAGNOSIS — E11.8 CONTROLLED TYPE 2 DIABETES MELLITUS WITH COMPLICATION, WITHOUT LONG-TERM CURRENT USE OF INSULIN: ICD-10-CM

## 2024-11-10 LAB
BACTERIA UR CULT: ABNORMAL
BACTERIA UR CULT: ABNORMAL
OTHER ANTIBIOTIC SUSC ISLT: ABNORMAL

## 2024-11-11 RX ORDER — TIRZEPATIDE 7.5 MG/.5ML
INJECTION, SOLUTION SUBCUTANEOUS
Qty: 6 ML | Refills: 3 | Status: SHIPPED | OUTPATIENT
Start: 2024-11-11

## 2024-11-11 NOTE — TELEPHONE ENCOUNTER
Caller: Amber Campos    Relationship: Self    Best call back number: [phone]     Requested Prescriptions:   Requested Prescriptions     Pending Prescriptions Disp Refills    Mounjaro 7.5 MG/0.5ML solution auto-injector [Pharmacy Med Name: MOUNJARO PEN 0.5ML 4'S 7.5MG] 6 mL 3     Sig: INJECT 0.5 ML UNDER THE SKIN INTO THE APPROPRIATE AREA AS DIRECTED WEEKLY        Pharmacy where request should be sent: [facility] [address] [phone] [facility] [phone]      Last office visit with prescribing clinician: 10/21/2024   Last telemedicine visit with prescribing clinician: Visit date not found   Next office visit with prescribing clinician: 12/30/2024     Additional details provided by patient: PATIENT STATES SHE IS OUT     Does the patient have less than a 3 day supply:  [x] Yes  [] No

## 2024-11-19 DIAGNOSIS — M81.0 AGE-RELATED OSTEOPOROSIS WITHOUT CURRENT PATHOLOGICAL FRACTURE: Primary | ICD-10-CM

## 2024-11-19 DIAGNOSIS — M80.031A PATHOLOGICAL FRACTURE OF RIGHT RADIUS DUE TO AGE-RELATED OSTEOPOROSIS, INITIAL ENCOUNTER: ICD-10-CM

## 2024-11-19 DIAGNOSIS — M80.031A PATHOLOGICAL FRACTURE OF RIGHT RADIUS DUE TO AGE-RELATED OSTEOPOROSIS, INITIAL ENCOUNTER: Primary | ICD-10-CM

## 2024-11-26 ENCOUNTER — TREATMENT (OUTPATIENT)
Dept: PHYSICAL THERAPY | Facility: CLINIC | Age: 74
End: 2024-11-26
Payer: MEDICARE

## 2024-11-26 DIAGNOSIS — R29.898 IMPAIRED STRENGTH OF LOWER EXTREMITY: ICD-10-CM

## 2024-11-26 DIAGNOSIS — R26.89 IMBALANCE: Primary | ICD-10-CM

## 2024-11-26 NOTE — PROGRESS NOTES
Physical Therapy Initial Evaluation and Plan of Care    [facility]  [address], [place] [address] [phone] (phone)  [phone] (fax)    Patient: Amber Campos   : 1950  Diagnosis/ICD-10 Code:  Imbalance [R26.89]  Referring practitioner: Glory Crowell*  Date of Initial Visit: 2024  Today's Date:  2024  Patient seen for 1 sessions           Past Medical History:   Diagnosis Date    Basaloid carcinoma     facial    Carotid artery plaque, bilateral     without stenosis,     Gastroesophageal reflux disease 2016    History of kidney infection     History of mammogram     Hypertension     IC (interstitial cystitis) 2021    Osteopenia     Osteoporosis     Postmenopausal     PVCs (premature ventricular contractions)     Seasonal allergies     Simple renal cyst 2016    Sinus tachycardia     Steatosis of liver 2016    Type 2 diabetes mellitus     Vertigo 2022        Past Surgical History:   Procedure Laterality Date    BREAST BIOPSY Bilateral     Benign    BUNIONECTOMY Right     COLONOSCOPY N/A 2017    Procedure: COLONOSCOPY TO CECUM ;  Surgeon: Aidan Roca MD;  Location: [facility] ENDOSCOPY;  Service:     EXOSTECTOMY Left     HEEL    FOOT FUSION Right     9 screws and plate    KNEE ARTHROSCOPY W/ MENISCAL REPAIR Right     ORIF WRIST FRACTURE Right 2021    Procedure: WRIST OPEN REDUCTION INTERNAL FIXATION;  Surgeon: Salazar Amezcua MD;  Location: [facility];  Service: Orthopedics;  Laterality: Right;    PAP SMEAR      TONSILLECTOMY      TOTAL SHOULDER REVERSE ARTHROPLASTY Right     UMBILICAL HERNIA REPAIR N/A 10/2010        Subjective Evaluation    History of Present Illness  Mechanism of injury: Patient complains of impaired balance and LE weakness. She hasn't had any falls to the ground but has experienced a few near falls. When walking down the stairs she is navigating with a step to pattern and both  hands on the railing. She also feels unsteady on uneven ground.        PLOF: Primarily sedentary. Used to play Tennis     PMH: diabetes, osteoporosis, chronic back pain with R sciatica     MRI: MRI lumbar spine degenerative changes along the L3-L4, L4-L5, L5-S1 including neuroforaminal stenosis mild to moderate along the L3-L4 and L4-L5, mild neuroforaminal stenosis L5-S1.        Patient Occupation: retired Quality of life: good    Pain  Current pain ratin  At best pain ratin  At worst pain ratin  Location: back/R LE  Quality: throbbing  Relieving factors: relaxation, rest, support and change in position  Aggravating factors: stairs, standing and ambulation  Progression: worsening    Social Support  Lives in: multiple-level home  Lives with: spouse    Patient Goals  Patient goals for therapy: increased strength, decreased pain, improved balance and return to sport/leisure activities             Objective          Strength/Myotome Testing     Left Hip   Planes of Motion   Flexion: 4-  External rotation: 4    Right Hip   Planes of Motion   Flexion: 4  External rotation: 4    Left Knee   Flexion: 4+  Extension: 4+    Right Knee   Flexion: 4+  Extension: 4+    Left Ankle/Foot   Dorsiflexion: 4+    Right Ankle/Foot   Dorsiflexion: 4+    Functional Assessment     Comments  SLS- able to lift leg for ~1 sec  Toe tap- LOB after 5 reps  Tandem stance- Able to obtain position but unable to maintain balance in tandem        See Exercise, Manual, and Modality Logs for complete treatment.       Functional Outcome Score: ABC scale=20%; KIRBY 40        Assessment & Plan       Assessment  Impairments: abnormal gait, abnormal or restricted ROM, activity intolerance, impaired physical strength, lacks appropriate home exercise program and pain with function   Functional limitations: lifting, sleeping, walking, uncomfortable because of pain, sitting and standing   Assessment details: Amber Campos is a 73 y.o. year-old female  referred to physical therapy for impaired balance and LE strength. She presents with a evolving clinical presentation.  She has comorbidities of osteoporosis and chronic back pain and personal factors of being sedentary at home (but hoping to get back into exercise) which may affect her progress in the plan of care.  Signs and symptoms are consistent with physical therapy diagnosis of imbalance and impaired strength of LE. Pt was educated on course of treatment, possible reasons for pain, activity modifications, and use of ice/heat PRN. Pt was given a copy of HEP. Patient is appropriate for skilled physical therapy in order to reduce pain and increase ease with daily mobility.   Prognosis: good    Goals  Plan Goals: Short Term Goals for completion in 30 days:   -Patient will report good compliance with initial HEP  -Patient will improve score on KIRBY from 40 to 45 or more to reduce risk of falls  -Patient will demonstrate sit to stand without use of hands in order to increase functional strength    LTGs for completion within 90 days:    -Patient will increase walking tolerance from 5 min to 15 min or greater to allow for patient to walk for leisure/exercise  -Patient will improve score on ABC scale from 20% confidence to <50% confidence in order to improve quality of life  -Patient will navigate down the steps with only 1 UE on railing  -Patient will increase LE strength to 4+/5 or greater to increase LE stability during gait    Plan  Therapy options: will be seen for skilled therapy services  Planned modality interventions: cryotherapy, dry needling, iontophoresis, TENS, thermotherapy (hydrocollator packs), ultrasound and traction  Planned therapy interventions: postural training, stretching, therapeutic activities, gait training, home exercise program, balance/weight-bearing training, flexibility, strengthening, functional ROM exercises, neuromuscular re-education, abdominal trunk stabilization and ADL  retraining  Other planned therapy interventions: Aquatic therapy  Frequency: 2x week (36 visits)  Plan details: Therapy for core stabilization, LE strength/stability, gait training, balance, and posture        Timed:  Manual Therapy:         mins  42041;  Therapeutic Exercise:    15     mins  59700;     Neuromuscular Mercedes:    10    mins  99258;    Therapeutic Activity:          mins  04220;     Gait Training:           mins  34912;     Ultrasound:          mins  60103;    Iontophoresis         mins 69940;  Self Care         mins 46762    Untimed:  Electrical Stimulation:         mins  16028 ( );  Traction:       mins  41630;   Dry Needling   (1-2 muscles)   _     mins 09529 (Self-pay)  Dry Needling (3-4 muscles)  _     mins 04488 (Self-pay)  Dry Needling Trial    _     mins DRYNDLTRIAL  (No Charge)  Low Eval          Mins  72405  Mod Eval     25     Mins  89939  High Eval                            Mins  38039  Re- Eval                           Mins  49125    Timed Treatment:   25   mins   Total Treatment:     50   mins    PT SIGNATURE: Lila Cisneros PT     License Number: [place] [id number]    Electronically signed by Lila Cisneros PT, 11/26/24, 11:02 AM EST    DATE TREATMENT INITIATED: 11/26/2024    Initial Certification  Certification Period: 2/24/2025  I certify that the therapy services are furnished while this patient is under my care.  The services outlined above are required by this patient, and will be reviewed every 90 days.     PHYSICIAN: Glory Crowell MD   NPI: [id number]                                         DATE:     Please sign and return via fax to [phone] Thank you, [facility].

## 2024-12-04 ENCOUNTER — TREATMENT (OUTPATIENT)
Dept: PHYSICAL THERAPY | Facility: CLINIC | Age: 74
End: 2024-12-04
Payer: MEDICARE

## 2024-12-04 DIAGNOSIS — R29.898 IMPAIRED STRENGTH OF LOWER EXTREMITY: Primary | ICD-10-CM

## 2024-12-04 DIAGNOSIS — E11.65 UNCONTROLLED TYPE 2 DIABETES MELLITUS WITH HYPERGLYCEMIA: ICD-10-CM

## 2024-12-04 DIAGNOSIS — R26.89 IMBALANCE: ICD-10-CM

## 2024-12-04 PROCEDURE — 97110 THERAPEUTIC EXERCISES: CPT | Performed by: PHYSICAL THERAPIST

## 2024-12-04 PROCEDURE — 97112 NEUROMUSCULAR REEDUCATION: CPT | Performed by: PHYSICAL THERAPIST

## 2024-12-04 NOTE — TELEPHONE ENCOUNTER
Rx Refill Note  Requested Prescriptions     Pending Prescriptions Disp Refills    metFORMIN (GLUCOPHAGE) 1000 MG tablet [Pharmacy Med Name: METFORMIN HCL TABS 1000MG] 180 tablet 3     Sig: TAKE 1 TABLET TWICE A DAY WITH MEALS (DISCONTINUE INVOKAMET AND RESUME METFORMIN)      Last office visit with prescribing clinician: 10/21/2024   Last telemedicine visit with prescribing clinician: Visit date not found   Next office visit with prescribing clinician: 12/30/2024                         Would you like a call back once the refill request has been completed: [] Yes [] No    If the office needs to give you a call back, can they leave a voicemail: [] Yes [] No    Ginny Mcclure MA  12/04/24, 12:36 EST

## 2024-12-04 NOTE — PROGRESS NOTES
Physical Therapy Daily Treatment Note    [facility]  [address] [place] [address]  [phone] (phone)  [phone] (fax)    Patient: Amber Campos   : 1950  Diagnosis/ICD-10 Code:  Impaired strength of lower extremity [R29.898]  Referring practitioner: Glory Crowell*  Date of Initial Visit: Type: THERAPY  Noted: 2024  Today's Date:  2024  Patient seen for 2 sessions           Subjective   Patient reports she is trying to practice her balance at home.    Objective     See Exercise, Manual, and Modality Logs for complete treatment.     Assessment/Plan  Patient had some instability with SLS during knee drive portion of step ups. She demonstrated better SLS when performing her clock exercise. Focused on weight shifting in various directions to help patient learn to regain balance when trunk is outside COG. Patient will be busy during the holidays but plans to return after the new year.          Timed:    Manual Therapy:         mins  99937;  Therapeutic Exercise:    10     mins  76426;     Neuromuscular Mercedes:    20    mins  14007;    Therapeutic Activity:          mins  01487;     Gait Training:           mins  66219;     Ultrasound:          mins  53140;    Electrical Stimulation:         mins  51116 ( );  Iontophoresis         mins 74278;  Aquatic Therapy         mins 83991;    Untimed:  Electrical Stimulation:         mins  87836 ( );  Traction:         mins  56739;   Dry Needling   (1-2 muscles)       mins 26903 (Self-pay)  Dry Needling (3-4 muscles)        mins  (Self-pay)  Dry Needling Trial          mins DRYNDLTRIAL  (No Charge)    Timed Treatment:   30   mins   Total Treatment:     30   mins    Lila Cisneros PT  Physical Therapist    [place] License:[id number]

## 2024-12-18 DIAGNOSIS — I10 ESSENTIAL HYPERTENSION: ICD-10-CM

## 2024-12-18 DIAGNOSIS — E53.8 VITAMIN B12 DEFICIENCY: ICD-10-CM

## 2024-12-18 DIAGNOSIS — E11.8 CONTROLLED TYPE 2 DIABETES MELLITUS WITH COMPLICATION, WITHOUT LONG-TERM CURRENT USE OF INSULIN: ICD-10-CM

## 2024-12-18 DIAGNOSIS — E78.2 MIXED HYPERLIPIDEMIA: ICD-10-CM

## 2024-12-25 LAB
ALBUMIN SERPL-MCNC: 4.3 G/DL (ref 3.8–4.8)
ALP SERPL-CCNC: 54 IU/L (ref 44–121)
ALT SERPL-CCNC: 20 IU/L (ref 0–32)
AST SERPL-CCNC: 26 IU/L (ref 0–40)
BASOPHILS # BLD AUTO: 0.1 X10E3/UL (ref 0–0.2)
BASOPHILS NFR BLD AUTO: 1 %
BILIRUB SERPL-MCNC: 0.3 MG/DL (ref 0–1.2)
BUN SERPL-MCNC: 16 MG/DL (ref 8–27)
BUN/CREAT SERPL: 25 (ref 12–28)
CALCIUM SERPL-MCNC: 9.8 MG/DL (ref 8.7–10.3)
CHLORIDE SERPL-SCNC: 99 MMOL/L (ref 96–106)
CHOLEST SERPL-MCNC: 255 MG/DL (ref 100–199)
CHOLEST/HDLC SERPL: 4.2 RATIO (ref 0–4.4)
CO2 SERPL-SCNC: 25 MMOL/L (ref 20–29)
CREAT SERPL-MCNC: 0.65 MG/DL (ref 0.57–1)
EGFRCR SERPLBLD CKD-EPI 2021: 92 ML/MIN/1.73
EOSINOPHIL # BLD AUTO: 0.3 X10E3/UL (ref 0–0.4)
EOSINOPHIL NFR BLD AUTO: 4 %
ERYTHROCYTE [DISTWIDTH] IN BLOOD BY AUTOMATED COUNT: 14.5 % (ref 11.7–15.4)
GLOBULIN SER CALC-MCNC: 2.4 G/DL (ref 1.5–4.5)
GLUCOSE SERPL-MCNC: 149 MG/DL (ref 70–99)
HBA1C MFR BLD: 7.8 % (ref 4.8–5.6)
HCT VFR BLD AUTO: 34.9 % (ref 34–46.6)
HDLC SERPL-MCNC: 61 MG/DL
HGB BLD-MCNC: 11.1 G/DL (ref 11.1–15.9)
IMM GRANULOCYTES # BLD AUTO: 0 X10E3/UL (ref 0–0.1)
IMM GRANULOCYTES NFR BLD AUTO: 0 %
LDLC SERPL CALC-MCNC: 130 MG/DL (ref 0–99)
LYMPHOCYTES # BLD AUTO: 2.4 X10E3/UL (ref 0.7–3.1)
LYMPHOCYTES NFR BLD AUTO: 39 %
MCH RBC QN AUTO: 26.9 PG (ref 26.6–33)
MCHC RBC AUTO-ENTMCNC: 31.8 G/DL (ref 31.5–35.7)
MCV RBC AUTO: 85 FL (ref 79–97)
MONOCYTES # BLD AUTO: 0.5 X10E3/UL (ref 0.1–0.9)
MONOCYTES NFR BLD AUTO: 8 %
NEUTROPHILS # BLD AUTO: 2.9 X10E3/UL (ref 1.4–7)
NEUTROPHILS NFR BLD AUTO: 48 %
PLATELET # BLD AUTO: 402 X10E3/UL (ref 150–450)
POTASSIUM SERPL-SCNC: 4.2 MMOL/L (ref 3.5–5.2)
PROT SERPL-MCNC: 6.7 G/DL (ref 6–8.5)
RBC # BLD AUTO: 4.13 X10E6/UL (ref 3.77–5.28)
SODIUM SERPL-SCNC: 140 MMOL/L (ref 134–144)
TRIGL SERPL-MCNC: 360 MG/DL (ref 0–149)
VIT B12 SERPL-MCNC: 345 PG/ML (ref 232–1245)
VLDLC SERPL CALC-MCNC: 64 MG/DL (ref 5–40)
WBC # BLD AUTO: 6.2 X10E3/UL (ref 3.4–10.8)

## 2024-12-27 ENCOUNTER — HOSPITAL ENCOUNTER (OUTPATIENT)
Dept: INFUSION THERAPY | Facility: HOSPITAL | Age: 74
Discharge: HOME | End: 2024-12-27
Payer: MEDICARE

## 2024-12-27 VITALS
OXYGEN SATURATION: 96 % | HEART RATE: 83 BPM | RESPIRATION RATE: 16 BRPM | DIASTOLIC BLOOD PRESSURE: 62 MMHG | SYSTOLIC BLOOD PRESSURE: 106 MMHG

## 2024-12-27 DIAGNOSIS — M80.031A PATHOLOGICAL FRACTURE OF RIGHT RADIUS DUE TO AGE-RELATED OSTEOPOROSIS, INITIAL ENCOUNTER: Primary | ICD-10-CM

## 2024-12-27 LAB
MAGNESIUM SERPL-MCNC: 1.5 MG/DL (ref 1.6–2.4)
PHOSPHATE SERPL-MCNC: 3.1 MG/DL (ref 2.5–4.5)

## 2024-12-27 PROCEDURE — 25010000002 DENOSUMAB 60 MG/ML SOLUTION PREFILLED SYRINGE: Performed by: NURSE PRACTITIONER

## 2024-12-27 PROCEDURE — 84100 ASSAY OF PHOSPHORUS: CPT | Performed by: NURSE PRACTITIONER

## 2024-12-27 PROCEDURE — 36415 COLL VENOUS BLD VENIPUNCTURE: CPT

## 2024-12-27 PROCEDURE — 83735 ASSAY OF MAGNESIUM: CPT | Performed by: NURSE PRACTITIONER

## 2024-12-27 PROCEDURE — 96372 THER/PROPH/DIAG INJ SC/IM: CPT

## 2024-12-27 RX ADMIN — DENOSUMAB 60 MG: 60 INJECTION SUBCUTANEOUS at 10:50

## 2024-12-27 NOTE — PATIENT INSTRUCTIONS
"  Call [facility] at [phone] if you have any problems or concerns.    We know you have a Choice in healthcare and appreciate you using [facility].  Our purpose is to provide you \"Excellent Care\".  We hope that you will always choose us in the future and continue to recommend us to your family and friends.              "

## 2024-12-27 NOTE — NURSING NOTE
PT ARRIVED TO [facility] FOR APPT. VSS, NO COMPLAINTS AT THIS TIME. LABS DRAWN VIA VENIPUNCTURE. MEDICATION ADMINISTERED PER MD ORDER. PT TOLERATED WELL. VERIFIED PT HAD NEXT SCHEDULED APPT. AVS OFFERED, PT REFUSED. PT DISCHARGED FROM [facility] AT 11:00 AM IN STABLE CONDITION, WITHOUT COMPLAINTS.

## 2024-12-30 ENCOUNTER — OFFICE VISIT (OUTPATIENT)
Dept: INTERNAL MEDICINE | Facility: CLINIC | Age: 74
End: 2024-12-30
Payer: MEDICARE

## 2024-12-30 VITALS
HEIGHT: 63 IN | DIASTOLIC BLOOD PRESSURE: 74 MMHG | OXYGEN SATURATION: 99 % | WEIGHT: 168.2 LBS | TEMPERATURE: 98.6 F | SYSTOLIC BLOOD PRESSURE: 132 MMHG | HEART RATE: 92 BPM | BODY MASS INDEX: 29.8 KG/M2

## 2024-12-30 DIAGNOSIS — N39.0 RECURRENT UTI: ICD-10-CM

## 2024-12-30 DIAGNOSIS — F41.8 DEPRESSION WITH ANXIETY: ICD-10-CM

## 2024-12-30 DIAGNOSIS — I10 ESSENTIAL HYPERTENSION: ICD-10-CM

## 2024-12-30 DIAGNOSIS — E78.2 MIXED HYPERLIPIDEMIA: ICD-10-CM

## 2024-12-30 DIAGNOSIS — R79.0 LOW IRON STORES: ICD-10-CM

## 2024-12-30 DIAGNOSIS — R31.9 HEMATURIA, UNSPECIFIED TYPE: ICD-10-CM

## 2024-12-30 DIAGNOSIS — R22.1 MASS OF LEFT SIDE OF NECK: ICD-10-CM

## 2024-12-30 DIAGNOSIS — E55.9 VITAMIN D DEFICIENCY: ICD-10-CM

## 2024-12-30 DIAGNOSIS — E11.8 CONTROLLED TYPE 2 DIABETES MELLITUS WITH COMPLICATION, WITHOUT LONG-TERM CURRENT USE OF INSULIN: Primary | ICD-10-CM

## 2024-12-30 DIAGNOSIS — E53.8 VITAMIN B12 DEFICIENCY: ICD-10-CM

## 2024-12-30 LAB
BILIRUB BLD-MCNC: NEGATIVE MG/DL
CLARITY, POC: CLEAR
COLOR UR: YELLOW
EXPIRATION DATE: ABNORMAL
GLUCOSE UR STRIP-MCNC: NEGATIVE MG/DL
KETONES UR QL: NEGATIVE
LEUKOCYTE EST, POC: NEGATIVE
Lab: ABNORMAL
NITRITE UR-MCNC: NEGATIVE MG/ML
PH UR: 7 [PH] (ref 5–8)
PROT UR STRIP-MCNC: NEGATIVE MG/DL
RBC # UR STRIP: ABNORMAL /UL
SP GR UR: 1.01 (ref 1–1.03)
UROBILINOGEN UR QL: ABNORMAL

## 2024-12-30 PROCEDURE — 3075F SYST BP GE 130 - 139MM HG: CPT | Performed by: NURSE PRACTITIONER

## 2024-12-30 PROCEDURE — 81003 URINALYSIS AUTO W/O SCOPE: CPT | Performed by: NURSE PRACTITIONER

## 2024-12-30 PROCEDURE — 1126F AMNT PAIN NOTED NONE PRSNT: CPT | Performed by: NURSE PRACTITIONER

## 2024-12-30 PROCEDURE — 3078F DIAST BP <80 MM HG: CPT | Performed by: NURSE PRACTITIONER

## 2024-12-30 PROCEDURE — 99214 OFFICE O/P EST MOD 30 MIN: CPT | Performed by: NURSE PRACTITIONER

## 2024-12-30 NOTE — PROGRESS NOTES
Chief Complaint   Patient presents with    Diabetes     3 month follow up       Subjective     Amber Campos is a 74 y.o. female being seen for a follow up appointment today regarding DM 2, HTN, hyperlipidemia, IC. She had diabetic education at [facility] in 2023. She was followed by srinath, but declined to see them anymore. She has a Continuous glucose monitor/Willi meter, but would liek a Dexcom7 meter.  Average glucose 160s Fasting glucose ranges from 200s, depending on what she had for dinner. PP glucose 150s. She is following a Anamika Jovanny diet. She is prescribed metformin 1000 mg BID and mounjaro 7.5 mg weekly. She stopped the Invokana, due to urinary frequency. She was switched to Mounjaro due to Ozempic availability.     She has history of urinary frequency with IC. She is followed by Dr Benton ([facility] She had a CT abd and pelvis and cystoscopy scheduled for renal cysts and IC. She left a urine sample today.      She is on Susannah 5/40mg daily for HTN. She has previously been on Diovan, Toprol XL and Ziac. She does not check her BP, but feels like she is tolerating this medication well. She denies edema, CP, SOA.  She had a lifeline screening showing mild placque in carotid arteries.      She has history of JOSETTE and GERD. She sas been evaluated with both a C-scope 6-6-2022 with Dr. Alaniz. She is on an oral iron supplement I'm MVI. She takes a daily Protonix 40mg tablet.      She is on prolia for osteoporosis with history of traumatic fractures of wrist and ankle.      She is on Effexor XR for depression w anxiety. Her  has hx of alcohol abuse.              History of Present Illness     Allergies   Allergen Reactions    Contrast Dye (Echo Or Unknown Ct/Mr) Anaphylaxis     Contrast dye used in Xray    Iodinated Contrast Media Photosensitivity     contrast    Adhesive Tape Other (See Comments)     EKG stickers only    Farxiga [Dapagliflozin] Diarrhea and Itching     Yeast infeciotn    Neomycin-Bacitracin  Zn-Polymyx Rash    Toprol Xl [Metoprolol] Dizziness    Trulicity [Dulaglutide] GI Intolerance     Constipation    Victoza [Liraglutide] Nausea Only         Current Outpatient Medications:     albuterol sulfate  (90 Base) MCG/ACT inhaler, Inhale 2 puffs Every 4 (Four) Hours As Needed for Wheezing., Disp: 8.5 g, Rfl: 2    amlodipine-olmesartan (NIC) 5-40 MG per tablet, TAKE 1 TABLET DAILY, Disp: 90 tablet, Rfl: 3    budesonide-formoterol (Symbicort) 160-4.5 MCG/ACT inhaler, Inhale 2 puffs 2 (Two) Times a Day., Disp: 1 each, Rfl: 6    Cetirizine HCl (ZyrTE Childrens Allergy) 5 MG/5ML solution solution, Take 5 mL by mouth Daily., Disp: 150 mL, Rfl:     Clocortolone Pivalate 0.1 % cream, APPLY TOPICALLY TO AFFECTED AREA TWICE A DAY AS NEEDED, Disp: , Rfl:     Continuous Blood Gluc  (FreeStyle Willi 14 Day Oxford) device, 1 each 4 (Four) Times a Day., Disp: 1 each, Rfl: 3    Continuous Blood Gluc Sensor (FreeStyle Willi 2 Sensor) misc, Inject 1 each under the skin into the appropriate area as directed Every 10 (Ten) Days., Disp: 3 each, Rfl: 7    metFORMIN (GLUCOPHAGE) 1000 MG tablet, TAKE 1 TABLET TWICE A DAY WITH MEALS (DISCONTINUE INVOKAMET AND RESUME METFORMIN), Disp: 180 tablet, Rfl: 3    Mounjaro 7.5 MG/0.5ML solution auto-injector, INJECT 0.5 ML UNDER THE SKIN INTO THE APPROPRIATE AREA AS DIRECTED WEEKLY, Disp: 6 mL, Rfl: 3    multivitamin (THERAGRAN) tablet tablet, Take 1 tablet by mouth Daily., Disp: , Rfl:     Omega-3 Fatty Acids (fish oil) 1000 MG capsule capsule, Take  by mouth Daily With Breakfast., Disp: , Rfl:     pantoprazole (PROTONIX) 40 MG EC tablet, TAKE 1 TABLET DAILY, Disp: 90 tablet, Rfl: 3    Spacer/Aero Chamber Mouthpiece misc, 1 each Daily., Disp: 1 each, Rfl: 0    triamcinolone (KENALOG) 0.1 % cream, APPLY TWICE DAILY TO ITCHY SKIN UP TO 2 WEEKS/MONTH AS NEEDED., Disp: , Rfl:     venlafaxine XR (EFFEXOR-XR) 75 MG 24 hr capsule, TAKE 1 CAPSULE DAILY, Disp: 90 capsule, Rfl:  3    Current Facility-Administered Medications:     cyanocobalamin injection 1,000 mcg, 1,000 mcg, Intramuscular, Q28 Days, Jenny Sun APRN, 1,000 mcg at 09/23/24 0947    The following portions of the patient's history were reviewed and updated as appropriate: allergies, current medications, past family history, past medical history, past social history, past surgical history, and problem list.    Review of Systems   Constitutional: Negative.  Negative for unexpected weight change.   HENT:  Positive for congestion.    Respiratory: Negative.     Cardiovascular: Negative.  Negative for chest pain and leg swelling.   Endocrine: Negative.    Musculoskeletal:  Positive for arthralgias.   Allergic/Immunologic: Positive for environmental allergies.   All other systems reviewed and are negative.      Assessment     Physical Exam  Vitals reviewed.   Constitutional:       Appearance: Normal appearance. She is not ill-appearing.   HENT:      Right Ear: Tympanic membrane normal.      Left Ear: Tympanic membrane normal.      Nose: Congestion present. No rhinorrhea.   Cardiovascular:      Rate and Rhythm: Normal rate and regular rhythm.      Pulses: Normal pulses.      Heart sounds: Normal heart sounds.   Pulmonary:      Effort: Pulmonary effort is normal. No respiratory distress.      Breath sounds: Normal breath sounds. No stridor.   Musculoskeletal:      Cervical back: Neck supple.      Right lower leg: No edema.   Skin:     General: Skin is warm and dry.   Neurological:      General: No focal deficit present.      Mental Status: She is alert and oriented to person, place, and time.      Gait: Gait normal.   Psychiatric:         Mood and Affect: Mood normal.         Behavior: Behavior normal.         Thought Content: Thought content normal.         Plan     Her fasting labs were reviewed with the patient from last week.     Diagnoses and all orders for this visit:    1. Controlled type 2 diabetes mellitus with complication,  without long-term current use of insulin (Primary)  Comments:  Hgb A1c increasing, discussed increasing Mounjaro, but she declines due to holidays  Orders:  -     CBC & Differential; Future  -     Comprehensive Metabolic Panel; Future  -     Lipid Panel With / Chol / HDL Ratio; Future  -     Thyroid Cascade Profile; Future  -     Hemoglobin A1c; Future    2. Essential hypertension  Comments:  Chronic, BP at goal on curernt medications and with weight loss  Orders:  -     Comprehensive Metabolic Panel; Future  -     Lipid Panel With / Chol / HDL Ratio; Future  -     Thyroid Cascade Profile; Future    3. Mixed hyperlipidemia  Comments:  Chronic, worsening, LDL goal < 70. She declines statin therapy  Orders:  -     Comprehensive Metabolic Panel; Future  -     Lipid Panel With / Chol / HDL Ratio; Future  -     Thyroid Cascade Profile; Future    4. Recurrent UTI  Comments:  UA dip is clear of nitrites today  Orders:  -     POC Urinalysis Dipstick, Automated  -     Urine Culture - Urine, Urine, Clean Catch    5. Hematuria, unspecified type  -     Urine Culture - Urine, Urine, Clean Catch    6. Mass of left side of neck  -     XR Spine Cervical 2 or 3 View; Future  -     Thyroid Cascade Profile; Future    7. Depression with anxiety  Comments:  Chronic, on Effexor XR 75mg daily  Orders:  -     Thyroid Cascade Profile; Future    8. Low iron stores  -     CBC & Differential; Future  -     Iron Profile; Future  -     Ferritin; Future    9. Vitamin D deficiency  -     Vitamin D,25-Hydroxy; Future    10. Vitamin B12 deficiency  -     CBC & Differential; Future  -     Vitamin B12; Future    Other orders  -     FOOT EXAM SCANNED      Follow up in 3 months w AWV and labs

## 2024-12-31 PROBLEM — R22.1 MASS OF LEFT SIDE OF NECK: Status: ACTIVE | Noted: 2024-12-31

## 2025-01-01 LAB
BACTERIA UR CULT: NORMAL
BACTERIA UR CULT: NORMAL

## 2025-01-02 ENCOUNTER — TELEPHONE (OUTPATIENT)
Dept: INTERNAL MEDICINE | Facility: CLINIC | Age: 75
End: 2025-01-02

## 2025-01-02 DIAGNOSIS — M81.0 OSTEOPOROSIS OF LUMBAR SPINE: Primary | ICD-10-CM

## 2025-01-02 NOTE — TELEPHONE ENCOUNTER
Caller: Amber Campos    Relationship: Self    Best call back number: [phone]    What orders are you requesting (i.e. lab or imaging): DEXA SCAN     In what timeframe would the patient need to come in: PATIENT THINKS SHE IS DUE IN FEBRUARY     Where will you receive your lab/imaging services: [facility]

## 2025-01-13 ENCOUNTER — TREATMENT (OUTPATIENT)
Dept: PHYSICAL THERAPY | Facility: CLINIC | Age: 75
End: 2025-01-13
Payer: MEDICARE

## 2025-01-13 DIAGNOSIS — R29.898 IMPAIRED STRENGTH OF LOWER EXTREMITY: ICD-10-CM

## 2025-01-13 DIAGNOSIS — Z74.09 IMPAIRED FUNCTIONAL MOBILITY, BALANCE, AND ENDURANCE: Primary | ICD-10-CM

## 2025-01-13 PROCEDURE — 97110 THERAPEUTIC EXERCISES: CPT | Performed by: PHYSICAL THERAPIST

## 2025-01-13 PROCEDURE — 97164 PT RE-EVAL EST PLAN CARE: CPT | Performed by: PHYSICAL THERAPIST

## 2025-01-13 PROCEDURE — 97112 NEUROMUSCULAR REEDUCATION: CPT | Performed by: PHYSICAL THERAPIST

## 2025-01-13 NOTE — PROGRESS NOTES
Physical Therapy Recertification     [facility]  [address] [place] [address]  [phone] (phone)  [phone] (fax)    Patient: Amber Campos   : 1950  Diagnosis/ICD-10 Code:  Impaired functional mobility, balance, and endurance [Z74.09]  Referring practitioner: Glory Crowell*  Date of Initial Visit: 2025  Today's Date:  2025  Patient seen for 3 sessions         Clinical Progress: improved  Home Program Compliance: Yes  Treatment has included:  therapeutic exercise, neuro-muscular retraining , and patient education with home exercise program     Subjective   Patient reports no recent falls. She has been careful. She also feels like her confidence is better with balance home, however she is nervous to walk outside when it's icy    Objective     Strength/Myotome Testing      Left Hip   Planes of Motion   Flexion: 4  External rotation: 4     Right Hip   Planes of Motion   Flexion: 4+  External rotation: 4+     Left Knee   Flexion: 4+  Extension: 4+     Right Knee   Flexion: 4+  Extension: 4+     Left Ankle/Foot   Dorsiflexion: 4+     Right Ankle/Foot   Dorsiflexion: 4+     Functional Assessment      Comments  SLS- able to lift leg for ~3 sec  Tandem stance- Able to obtain position with help and then can hold ~15 sec        See Exercise, Manual, and Modality Logs for complete treatment.         Functional Outcome Score: ABC scale=55%; KIRBY 46      Assessment/Plan    Assessment  Impairments: abnormal gait, abnormal or restricted ROM, activity intolerance, impaired physical strength, lacks appropriate home exercise program and pain with function   Functional limitations: lifting, sleeping, walking, uncomfortable because of pain, sitting and standing   Assessment details: Amber Campos is a 73 y.o. year-old female referred to physical therapy for impaired balance and LE strength. She presents with a evolving clinical presentation.  She has comorbidities of  osteoporosis and chronic back pain and personal factors of being sedentary at home (but hoping to get back into exercise) which may affect her progress in the plan of care.  Patient has only participated in 2 PT sessions thus far (due to the holidays and inclement weather). She does feel like her confidence with her balance has improved (and ABC scale has improved as well). She also demonstrates good improvement in her LE strength with MMT. Stamina is still poor as well as balance in tandem or SLS. KIRBY score has improved by 6 points which has lowered her risk of falls. Patient is appropriate for continuation of skilled physical therapy in order to reduce pain and increase ease with daily mobility.   Prognosis: good     Goals  Plan Goals: Short Term Goals for completion in 30 days:   -Patient will report good compliance with initial HEP (MET)  -Patient will improve score on KIRBY from 40 to 45 or more to reduce risk of falls (MET)  -Patient will demonstrate sit to stand without use of hands in order to increase functional strength (MET)     LTGs for completion within 90 days:     -Patient will increase walking tolerance from 5 min to 15 min or greater to allow for patient to walk for leisure/exercise (ONGOING- still fatigued)  -Patient will improve score on ABC scale from 20% confidence to >50% confidence in order to improve quality of life (MET)  -Patient will navigate down the steps with only 1 UE on railing (MET)  -Patient will increase LE strength to 4+/5 or greater to increase LE stability during gait (PROGRESSING)     Plan  Therapy options: will be seen for skilled therapy services  Planned modality interventions: cryotherapy, dry needling, iontophoresis, TENS, thermotherapy (hydrocollator packs), ultrasound and traction  Planned therapy interventions: postural training, stretching, therapeutic activities, gait training, home exercise program, balance/weight-bearing training, flexibility, strengthening,  functional ROM exercises, neuromuscular re-education, abdominal trunk stabilization and ADL retraining  Other planned therapy interventions: Aquatic therapy  Frequency: 1-2x week (36 visits)  Plan details: Therapy for core stabilization, LE strength/stability, gait training, balance, and posture    Timed:  Manual Therapy:         mins  76966;  Therapeutic Exercise:    15     mins  89686;     Neuromuscular Mercedes:   10     mins  18896;    Therapeutic Activity:          mins  83446;     Gait Training:           mins  42700;     Ultrasound:          mins  38831;    Electrical Stimulation:         mins  61566 ( );  Iontophoresis         mins 26865    Untimed:  Electrical Stimulation:         mins  88967 ( );  Traction:         mins  73116;   Dry Needling   (1-2 muscles)       mins 20560 (Self-pay)  Dry Needling (3-4 muscles)        mins 20561 (Self-pay)  Dry Needling Trial          mins DRYNDLTRIAL  (No Charge)  Re Eval    15   mins 62828     Timed Treatment:   25   mins   Total Treatment:     40   mins    PT SIGNATURE: ANDRES Becerril PT License Number: [id number]    Electronically signed by Lila Cisneros PT, 01/13/25, 9:36 AM EST]    DATE TREATMENT INITIATED: 1/13/2025    90 Day Recertification  Certification Period: 4/13/2025  I certify that the therapy services are furnished while this patient is under my care.  The services outlined above are required by this patient, and will be reviewed every 90 days.     PHYSICIAN: Glory Crowell MD   NPI: [id number]                                         DATE:

## 2025-01-13 NOTE — PROGRESS NOTES
[facility]  [address] [place] [address]  [phone] (phone)  [phone] (fax)    Patient: Amber Campos   : 1950  Diagnosis/ICD-10 Code:  Impaired functional mobility, balance, and endurance [Z74.09]  Referring practitioner: Glory Crowell*  Date of Initial Visit: 2025  Today's Date:  2025  Patient seen for 3 sessions           Subjective       Objective       Functional outcome score: ***      See Exercise, Manual, and Modality Logs for complete treatment.       Assessment/Plan    Timed:  Manual Therapy:    ***     mins  20936;  Therapeutic Exercise:    ***     mins  42440;     Neuromuscular Mercedes:    ***    mins  45323;    Therapeutic Activity:     ***     mins  76035;     Gait Training:      ***     mins  58504;     Ultrasound:     ***     mins  94856;    Electrical Stimulation:    ***     mins  16612 ( );  Iontophoresis    ***     mins 66803    Untimed:  Electrical Stimulation:    ***     mins  89224 ( );  Traction:    ***     mins  49699;   Dry Needling   (1-2 muscles)   ***    mins 34876 (Self-pay)  Dry Needling (3-4 muscles)   ***     mins 76180 (Self-pay)  Dry Needling Trial      ***    mins DRYNDLTRIAL  (No Charge)  Re Eval    ***   mins 39708     Timed Treatment:   ***   mins   Total Treatment:     ***   mins    PT SIGNATURE: Lila Cisneros PT     [place] PT License Number: [id number]    Electronically signed by Lila Cisneros PT, 25, 9:36 AM EST]    DATE TREATMENT INITIATED: 2025    {Certification Type:[id number]}  Certification Period: 2025  I certify that the therapy services are furnished while this patient is under my care.  The services outlined above are required by this patient, and will be reviewed every 90 days.     PHYSICIAN: Glory Crowell MD   NPI: [id number]                                         DATE:

## 2025-01-15 ENCOUNTER — TREATMENT (OUTPATIENT)
Dept: PHYSICAL THERAPY | Facility: CLINIC | Age: 75
End: 2025-01-15
Payer: MEDICARE

## 2025-01-15 DIAGNOSIS — Z74.09 IMPAIRED FUNCTIONAL MOBILITY, BALANCE, AND ENDURANCE: Primary | ICD-10-CM

## 2025-01-15 DIAGNOSIS — R29.898 IMPAIRED STRENGTH OF LOWER EXTREMITY: ICD-10-CM

## 2025-01-15 PROCEDURE — 97110 THERAPEUTIC EXERCISES: CPT | Performed by: PHYSICAL THERAPIST

## 2025-01-15 PROCEDURE — 97112 NEUROMUSCULAR REEDUCATION: CPT | Performed by: PHYSICAL THERAPIST

## 2025-01-15 PROCEDURE — 97530 THERAPEUTIC ACTIVITIES: CPT | Performed by: PHYSICAL THERAPIST

## 2025-01-15 NOTE — PROGRESS NOTES
Physical Therapy Daily Treatment Note    [facility]  [address] [place] [address]  [phone] (phone)  [phone] (fax)    Patient: Amber Campos   : 1950  Diagnosis/ICD-10 Code:  Impaired functional mobility, balance, and endurance [Z74.09]  Referring practitioner: Gloyr Crowell*  Date of Initial Visit: Type: THERAPY  Noted: 2024  Today's Date:  1/15/2025  Patient seen for 4 sessions           Subjective   Patient reports she was very sore after PT on Monday which tells her that she is out of shape.    Objective     See Exercise, Manual, and Modality Logs for complete treatment.     Assessment/Plan  Patient continues to feel short of breath with majority of standing exercises therefore took rest breaks as needed (both standing and seated). Added NuStep to warm up muscles at start of session. Provided posterior SBA with all balance exercises for safety and had patient utilize fingertip support as needed on railing.          Timed:    Manual Therapy:         mins  88262;  Therapeutic Exercise:    20     mins  89494;     Neuromuscular Mercedes:    15    mins  42561;    Therapeutic Activity:     10     mins  00278;     Gait Training:           mins  53639;     Ultrasound:          mins  79267;    Electrical Stimulation:         mins  36478 ( );  Iontophoresis         mins 59027;  Aquatic Therapy         mins 00233;    Untimed:  Electrical Stimulation:         mins  39824 ( );  Traction:         mins  35439;   Dry Needling   (1-2 muscles)       mins  (Self-pay)  Dry Needling (3-4 muscles)        mins  (Self-pay)  Dry Needling Trial          mins DRYNDLTRIAL  (No Charge)    Timed Treatment:   45   mins   Total Treatment:     45   mins    Lila Cisneros PT  Physical Therapist    [place] License:[id number]

## 2025-01-20 ENCOUNTER — TELEPHONE (OUTPATIENT)
Dept: PHYSICAL THERAPY | Facility: CLINIC | Age: 75
End: 2025-01-20

## 2025-01-20 ENCOUNTER — OFFICE VISIT (OUTPATIENT)
Dept: INTERNAL MEDICINE | Facility: CLINIC | Age: 75
End: 2025-01-20
Payer: MEDICARE

## 2025-01-20 VITALS
OXYGEN SATURATION: 97 % | DIASTOLIC BLOOD PRESSURE: 62 MMHG | TEMPERATURE: 96 F | SYSTOLIC BLOOD PRESSURE: 118 MMHG | HEIGHT: 63 IN | BODY MASS INDEX: 27.29 KG/M2 | WEIGHT: 154 LBS | HEART RATE: 111 BPM

## 2025-01-20 DIAGNOSIS — J01.00 ACUTE MAXILLARY SINUSITIS, RECURRENCE NOT SPECIFIED: Primary | ICD-10-CM

## 2025-01-20 DIAGNOSIS — E11.65 TYPE 2 DIABETES MELLITUS WITH HYPERGLYCEMIA, WITHOUT LONG-TERM CURRENT USE OF INSULIN: ICD-10-CM

## 2025-01-20 DIAGNOSIS — M54.50 ACUTE RIGHT-SIDED LOW BACK PAIN WITHOUT SCIATICA: ICD-10-CM

## 2025-01-20 DIAGNOSIS — R82.4 KETONURIA: ICD-10-CM

## 2025-01-20 DIAGNOSIS — M54.2 NECK PAIN: ICD-10-CM

## 2025-01-20 LAB
BILIRUB BLD-MCNC: ABNORMAL MG/DL
CLARITY, POC: CLEAR
COLOR UR: ABNORMAL
EXPIRATION DATE: ABNORMAL
GLUCOSE UR STRIP-MCNC: ABNORMAL MG/DL
KETONES UR QL: ABNORMAL
LEUKOCYTE EST, POC: NEGATIVE
Lab: ABNORMAL
NITRITE UR-MCNC: NEGATIVE MG/ML
PH UR: 6 [PH] (ref 5–8)
PROT UR STRIP-MCNC: ABNORMAL MG/DL
RBC # UR STRIP: NEGATIVE /UL
SP GR UR: 1.01 (ref 1–1.03)
UROBILINOGEN UR QL: NORMAL

## 2025-01-20 PROCEDURE — 99214 OFFICE O/P EST MOD 30 MIN: CPT | Performed by: INTERNAL MEDICINE

## 2025-01-20 PROCEDURE — 3078F DIAST BP <80 MM HG: CPT | Performed by: INTERNAL MEDICINE

## 2025-01-20 PROCEDURE — 3074F SYST BP LT 130 MM HG: CPT | Performed by: INTERNAL MEDICINE

## 2025-01-20 PROCEDURE — 1126F AMNT PAIN NOTED NONE PRSNT: CPT | Performed by: INTERNAL MEDICINE

## 2025-01-20 PROCEDURE — 81003 URINALYSIS AUTO W/O SCOPE: CPT | Performed by: INTERNAL MEDICINE

## 2025-01-20 RX ORDER — AMOXICILLIN 500 MG/1
1000 CAPSULE ORAL 2 TIMES DAILY
Qty: 40 CAPSULE | Refills: 0 | Status: SHIPPED | OUTPATIENT
Start: 2025-01-20 | End: 2025-01-30

## 2025-01-20 RX ORDER — FLUTICASONE PROPIONATE 50 MCG
2 SPRAY, SUSPENSION (ML) NASAL DAILY
Qty: 16 G | Refills: 1 | Status: SHIPPED | OUTPATIENT
Start: 2025-01-20

## 2025-01-20 NOTE — PROGRESS NOTES
Amber Campos is a 74 y.o. female, who presents with a chief complaint of   Chief Complaint   Patient presents with    Back Pain     Lower back pain on right side and abnormal urine color    abnormal urine color    Generalized Body Aches     Started last week    Loss of Consciousness     Passed out on Saturday when trying to get to the bathroom, Body chills and tremors    Nasal Congestion    Cough     Started early last week           Back Pain    Cough       Pt has a knot in her neck and would like ot see PT for this.  I recently saw her in the office for concerns about balance.  She has been seeing physical therapy and feels like this is helping improve her balance.     She has had congestion for about a week.  Sx worse on left side of her face.  She has a cough    She ate popcorn over the weekend and had stomach distention.  She fainted in the bathroom.  She had chills and was shaking all over.  The next day her abdominal pain was resolved.  She has still been having some chills.  No fever.  Her urine is dark but doesn't feel like when she has a UTI.     She is diabetic.  A1c 7.8 in December.  Glucose today was 150.    The following portions of the patient's history were reviewed and updated as appropriate: allergies, current medications, past family history, past medical history, past social history, past surgical history and problem list.    Allergies: Contrast dye (echo or unknown ct/mr), Iodinated contrast media, Adhesive tape, Farxiga [dapagliflozin], Neomycin-bacitracin zn-polymyx, Toprol xl [metoprolol], Trulicity [dulaglutide], and Victoza [liraglutide]    Review of Systems   Constitutional: Negative.    HENT: Negative.     Eyes: Negative.    Respiratory:  Positive for cough.    Cardiovascular: Negative.    Gastrointestinal: Negative.    Endocrine: Negative.    Genitourinary: Negative.    Musculoskeletal:  Positive for back pain.   Skin: Negative.    Allergic/Immunologic: Negative.    Neurological:  Negative.    Hematological: Negative.    Psychiatric/Behavioral: Negative.     All other systems reviewed and are negative.            Wt Readings from Last 3 Encounters:   01/20/25 69.9 kg (154 lb)   12/30/24 76.3 kg (168 lb 3.2 oz)   11/06/24 74.8 kg (164 lb 12.8 oz)     Temp Readings from Last 3 Encounters:   01/20/25 96 °F (35.6 °C) (Infrared)   12/30/24 98.6 °F (37 °C) (Infrared)   11/06/24 98.4 °F (36.9 °C) (Infrared)     BP Readings from Last 3 Encounters:   01/20/25 118/62   12/30/24 132/74   12/27/24 106/62     Pulse Readings from Last 3 Encounters:   01/20/25 111   12/30/24 92   12/27/24 83     Body mass index is 27.28 kg/m².  SpO2 Readings from Last 3 Encounters:   01/20/25 97%   12/30/24 99%   12/27/24 96%          Physical Exam  Vitals and nursing note reviewed.   Constitutional:       General: She is not in acute distress.     Appearance: She is well-developed.   HENT:      Head: Normocephalic and atraumatic.      Right Ear: Tympanic membrane and external ear normal.      Left Ear: External ear normal.      Ears:      Comments: Serous effusion of TM     Nose: Nose normal.      Mouth/Throat:      Mouth: Mucous membranes are moist.      Pharynx: Posterior oropharyngeal erythema present. No oropharyngeal exudate.   Eyes:      Conjunctiva/sclera: Conjunctivae normal.      Pupils: Pupils are equal, round, and reactive to light.   Cardiovascular:      Rate and Rhythm: Normal rate and regular rhythm.      Heart sounds: Normal heart sounds.   Pulmonary:      Effort: Pulmonary effort is normal. No respiratory distress.      Breath sounds: Normal breath sounds. No wheezing.   Musculoskeletal:         General: Normal range of motion.      Cervical back: Normal range of motion and neck supple.      Comments: Normal gait   Skin:     General: Skin is warm and dry.   Neurological:      Mental Status: She is alert and oriented to person, place, and time.   Psychiatric:         Behavior: Behavior normal.          Thought Content: Thought content normal.         Judgment: Judgment normal.         Results for orders placed or performed in visit on 01/20/25   POCT urinalysis dipstick, automated    Collection Time: 01/20/25 12:17 PM    Specimen: Urine   Result Value Ref Range    Color Orange (A) Yellow, Straw, Dark Yellow, Patrizia    Clarity, UA Clear Clear    Specific Gravity  1.010 1.005 - 1.030    pH, Urine 6.0 5.0 - 8.0    Leukocytes Negative Negative    Nitrite, UA Negative Negative    Protein, POC 30 mg/dL (A) Negative mg/dL    Glucose,  mg/dL (A) Negative mg/dL    Ketones, UA 15 mg/dL (A) Negative    Urobilinogen, UA Normal Normal, 0.2 E.U./dL    Bilirubin Large (3+) (A) Negative    Blood, UA Negative Negative    Lot Number [id number]     Expiration Date 4/10/26      Result Review :                  Assessment and Plan    Diagnoses and all orders for this visit:    1. Acute maxillary sinusitis, recurrence not specified (Primary)  -     amoxicillin (AMOXIL) 500 MG capsule; Take 2 capsules by mouth 2 (Two) Times a Day for 10 days.  Dispense: 40 capsule; Refill: 0  -     fluticasone (FLONASE) 50 MCG/ACT nasal spray; Administer 2 sprays into the nostril(s) as directed by provider Daily.  Dispense: 16 g; Refill: 1    2. Acute right-sided low back pain without sciatica  -     POCT urinalysis dipstick, automated    3. Neck pain  -     Ambulatory Referral to Physical Therapy for Evaluation & Treatment    4. Type 2 diabetes mellitus with hyperglycemia, without long-term current use of insulin - push fluids and monitor glucoses closely as pt has ketones in her urine.  She is not on insulin.      5. Ketonuria     No sign of infection in urine (neg blood,LE, and nitrates) but pt does have glucose, ketones, and protein.                  Outpatient Medications Prior to Visit   Medication Sig Dispense Refill    albuterol sulfate  (90 Base) MCG/ACT inhaler Inhale 2 puffs Every 4 (Four) Hours As Needed for Wheezing. 8.5 g 2     amlodipine-olmesartan (NIC) 5-40 MG per tablet TAKE 1 TABLET DAILY 90 tablet 3    budesonide-formoterol (Symbicort) 160-4.5 MCG/ACT inhaler Inhale 2 puffs 2 (Two) Times a Day. 1 each 6    Cetirizine HCl (ZyrTEC Childrens Allergy) 5 MG/5ML solution solution Take 5 mL by mouth Daily. 150 mL     Clocortolone Pivalate 0.1 % cream APPLY TOPICALLY TO AFFECTED AREA TWICE A DAY AS NEEDED      Continuous Blood Gluc  (FreeStyle Willi 14 Day Lynn) device 1 each 4 (Four) Times a Day. 1 each 3    Continuous Blood Gluc Sensor (FreeStyle Willi 2 Sensor) misc Inject 1 each under the skin into the appropriate area as directed Every 10 (Ten) Days. 3 each 7    metFORMIN (GLUCOPHAGE) 1000 MG tablet TAKE 1 TABLET TWICE A DAY WITH MEALS (DISCONTINUE INVOKAMET AND RESUME METFORMIN) 180 tablet 3    Mounjaro 7.5 MG/0.5ML solution auto-injector INJECT 0.5 ML UNDER THE SKIN INTO THE APPROPRIATE AREA AS DIRECTED WEEKLY 6 mL 3    multivitamin (THERAGRAN) tablet tablet Take 1 tablet by mouth Daily.      Omega-3 Fatty Acids (fish oil) 1000 MG capsule capsule Take  by mouth Daily With Breakfast.      pantoprazole (PROTONIX) 40 MG EC tablet TAKE 1 TABLET DAILY 90 tablet 3    Spacer/Aero Chamber Mouthpiece misc 1 each Daily. 1 each 0    triamcinolone (KENALOG) 0.1 % cream APPLY TWICE DAILY TO ITCHY SKIN UP TO 2 WEEKS/MONTH AS NEEDED.      venlafaxine XR (EFFEXOR-XR) 75 MG 24 hr capsule TAKE 1 CAPSULE DAILY 90 capsule 3     Facility-Administered Medications Prior to Visit   Medication Dose Route Frequency Provider Last Rate Last Admin    cyanocobalamin injection 1,000 mcg  1,000 mcg Intramuscular Q28 Days Jenny Sun APRN   1,000 mcg at 09/23/24 0947     New Medications Ordered This Visit   Medications    amoxicillin (AMOXIL) 500 MG capsule     Sig: Take 2 capsules by mouth 2 (Two) Times a Day for 10 days.     Dispense:  40 capsule     Refill:  0    fluticasone (FLONASE) 50 MCG/ACT nasal spray     Sig: Administer 2 sprays into the  nostril(s) as directed by provider Daily.     Dispense:  16 g     Refill:  1     [unfilled]  There are no discontinued medications.      No follow-ups on file.    Patient was given instructions and counseling regarding her condition or for health maintenance advice. Please see specific information pulled into the AVS if appropriate.

## 2025-01-20 NOTE — TELEPHONE ENCOUNTER
Caller: Amber Campos    Relationship: Self         What was the call regarding: NOT FEELING WELL

## 2025-01-22 ENCOUNTER — TREATMENT (OUTPATIENT)
Dept: PHYSICAL THERAPY | Facility: CLINIC | Age: 75
End: 2025-01-22
Payer: MEDICARE

## 2025-01-22 DIAGNOSIS — Z74.09 IMPAIRED FUNCTIONAL MOBILITY, BALANCE, AND ENDURANCE: Primary | ICD-10-CM

## 2025-01-22 DIAGNOSIS — M54.2 PAIN, NECK: ICD-10-CM

## 2025-01-22 DIAGNOSIS — R29.898 IMPAIRED STRENGTH OF LOWER EXTREMITY: ICD-10-CM

## 2025-01-22 PROCEDURE — 97164 PT RE-EVAL EST PLAN CARE: CPT | Performed by: PHYSICAL THERAPIST

## 2025-01-22 PROCEDURE — 97140 MANUAL THERAPY 1/> REGIONS: CPT | Performed by: PHYSICAL THERAPIST

## 2025-01-22 PROCEDURE — 97110 THERAPEUTIC EXERCISES: CPT | Performed by: PHYSICAL THERAPIST

## 2025-01-22 PROCEDURE — 97112 NEUROMUSCULAR REEDUCATION: CPT | Performed by: PHYSICAL THERAPIST

## 2025-01-22 NOTE — PROGRESS NOTES
Physical Therapy Recertification     [facility]  [address] [place] [address]  [phone] (phone)  [phone] (fax)    Patient: Amber Campos   : 1950  Diagnosis/ICD-10 Code:  Impaired functional mobility, balance, and endurance [Z74.09]  Referring practitioner: Glory Crowell*  Date of Initial Visit: 2025  Today's Date:  2025  Patient seen for 5 sessions         Clinical Progress: improved  Home Program Compliance: Yes  Treatment has included:  therapeutic exercise, neuro-muscular retraining , and patient education with home exercise program     Subjective   Patient reports that she has been dealing with a knot in the back of her neck (mostly on L side). Patient is concerned it is a side effect of an injectable weight loss medication (because she has read it can cause knots in her muscles). She is able to turn her neck through relatively normal ROM but there is associated tightness.    Pain: 1-2/10. Patient describes the pain as tight and achy.     Pain is only really noticeable at night when patient is trying to sleep.     Objective          Postural Observations  Seated posture: fair      Palpation   Left   Hypertonic in the upper trapezius.   Tenderness of the scalenes, sternocleidomastoid and upper trapezius.   Trigger point to upper trapezius.     Right   Hypertonic in the upper trapezius. Tenderness of the scalenes, sternocleidomastoid and upper trapezius.     Neurological Testing     Sensation   Cervical/Thoracic   Left   Intact: light touch    Right   Intact: light touch    Tests   Cervical     Left   Positive active compression (Captain Cook), cervical distraction and Spurling's sign.         Cervical AROM:  Flexion: 50 degrees   Extension: 30 degrees  L Lateral Flexion: 20 degrees  R Lateral Flexion: 22 degrees  L Rotation: 52 degrees  R rotation: 70 degrees    Strength/Myotome Testing      Left shoulder  Flexion: 4-  Abduction: 4    Right  shoulder  Flexion: 4  Abduction: 4    Left Hip   Planes of Motion   Flexion: 4  External rotation: 4     Right Hip   Planes of Motion   Flexion: 4+  External rotation: 4+     Left Knee   Flexion: 4+  Extension: 4+     Right Knee   Flexion: 4+  Extension: 4+     Left Ankle/Foot   Dorsiflexion: 4+     Right Ankle/Foot   Dorsiflexion: 4+     Functional Assessment      Comments  SLS- able to lift leg for ~3 sec  Tandem stance- Able to obtain position with help and then can hold ~15 sec        See Exercise, Manual, and Modality Logs for complete treatment.         Functional Outcome Score: ABC scale=55% (NT TODAY); KIRBY 46 (NT TODAY); NDI=10/50, 20% disability            Assessment/Plan  Assessment  Impairments: abnormal gait, abnormal or restricted ROM, activity intolerance, impaired physical strength, lacks appropriate home exercise program and pain with function   Functional limitations: lifting, sleeping, walking, uncomfortable because of pain, sitting and standing   Assessment details: Amber Campos is a 73 y.o. year-old female referred to physical therapy for impaired balance and LE strength. New order has now been placed to add neck pain. She has comorbidities of osteoporosis and chronic back pain and personal factors of being sedentary at home (but hoping to get back into exercise) which may affect her progress in the plan of care. We are continuing to work on her overall stamina along with her stability with gait, stairs, and non-compliant surfaces. Evaluation of neck today shows some stiffness and decreased cervical AROM, especially with lateral flexion. Patient is appropriate for continuation of skilled physical therapy in order to reduce pain and increase ease with daily mobility.   Prognosis: good     Goals  Plan Goals: Short Term Goals for completion in 30 days:   -Patient will report good compliance with initial HEP (MET)  -Patient will improve score on KIRBY from 40 to 45 or more to reduce risk of falls  (MET)  -Patient will demonstrate sit to stand without use of hands in order to increase functional strength (MET)     LTGs for completion within 90 days:     -Patient will increase walking tolerance from 5 min to 15 min or greater to allow for patient to walk for leisure/exercise (ONGOING- still fatigued)  -Patient will improve score on ABC scale from 20% confidence to >50% confidence in order to improve quality of life (MET)  -Patient will navigate down the steps with only 1 UE on railing (MET)  -Patient will increase LE strength to 4+/5 or greater to increase LE stability during gait (PROGRESSING)  -Patient will increase cervical lateral flexion to 30 degrees or more to promote cervical mobility during daily tasks. (NEW)     Plan  Therapy options: will be seen for skilled therapy services  Planned modality interventions: cryotherapy, dry needling, iontophoresis, TENS, thermotherapy (hydrocollator packs), ultrasound and traction  Planned therapy interventions: postural training, stretching, therapeutic activities, gait training, home exercise program, balance/weight-bearing training, flexibility, strengthening, functional ROM exercises, neuromuscular re-education, abdominal trunk stabilization and ADL retraining  Other planned therapy interventions: Aquatic therapy  Frequency: 1-2x week (36 visits)  Plan details: Therapy for core stabilization, LE strength/stability, gait training, balance, and posture        Timed:  Manual Therapy:    10     mins  15099;  Therapeutic Exercise:    12     mins  71497;     Neuromuscular Mercedes:    10    mins  51559;    Therapeutic Activity:          mins  23738;     Gait Training:           mins  83492;     Ultrasound:     8     mins  38498;    Electrical Stimulation:         mins  07180 ( );  Iontophoresis         mins 69418    Untimed:  Electrical Stimulation:         mins  04951 ( );  Traction:         mins  14767;   Dry Needling   (1-2 muscles)       mins 12955  (Self-pay)  Dry Needling (3-4 muscles)        mins 20561 (Self-pay)  Dry Needling Trial          mins DRYNDLTRIAL  (No Charge)  Re Eval    10   mins 96234     Timed Treatment:   40   mins   Total Treatment:     50   mins    PT SIGNATURE: Lila Cisneros PT     [place] PT License Number: [id number]    Electronically signed by Lila Cisneros PT, 01/22/25, 9:29 AM EST]    DATE TREATMENT INITIATED: 1/22/2025    90 Day Recertification  Certification Period: 4/22/2025  I certify that the therapy services are furnished while this patient is under my care.  The services outlined above are required by this patient, and will be reviewed every 90 days.     PHYSICIAN: Glory Crowell MD   NPI: [id number]                                         DATE:

## 2025-01-28 ENCOUNTER — TREATMENT (OUTPATIENT)
Dept: PHYSICAL THERAPY | Facility: CLINIC | Age: 75
End: 2025-01-28
Payer: MEDICARE

## 2025-01-28 DIAGNOSIS — M54.2 PAIN, NECK: ICD-10-CM

## 2025-01-28 DIAGNOSIS — R29.898 IMPAIRED STRENGTH OF LOWER EXTREMITY: ICD-10-CM

## 2025-01-28 DIAGNOSIS — Z74.09 IMPAIRED FUNCTIONAL MOBILITY, BALANCE, AND ENDURANCE: Primary | ICD-10-CM

## 2025-01-28 PROCEDURE — 97140 MANUAL THERAPY 1/> REGIONS: CPT | Performed by: PHYSICAL THERAPIST

## 2025-01-28 PROCEDURE — 97110 THERAPEUTIC EXERCISES: CPT | Performed by: PHYSICAL THERAPIST

## 2025-01-28 NOTE — PROGRESS NOTES
Physical Therapy Daily Treatment Note    [facility]  [address] [place] [address]  [phone] (phone)  [phone] (fax)    Patient: Amber Campos   : 1950  Diagnosis/ICD-10 Code:  Impaired functional mobility, balance, and endurance [Z74.09]  Referring practitioner: Glory Crowell*  Date of Initial Visit: Type: THERAPY  Noted: 2024  Today's Date:  2025  Patient seen for 6 sessions           Subjective   Neck felt great after the last treatment. Knot doesn't seem to be present anymore.     Objective     See Exercise, Manual, and Modality Logs for complete treatment.     Assessment/Plan  Patient presented with less tenderness during manual STM today. Knot is harder to find during palpation and cervical ROM appears to be improving. Patient also reports that she has been practicing standing on one leg at home and she thinks her balance is improving.            Timed:    Manual Therapy:    10     mins  24140;  Therapeutic Exercise:    12     mins  30833;     Neuromuscular Mercedes:        mins  97547;    Therapeutic Activity:          mins  89408;     Gait Training:           mins  98231;     Ultrasound:     8     mins  39115;    Electrical Stimulation:         mins  17160 ( );  Iontophoresis         mins 15448;  Aquatic Therapy         mins 20842;    Untimed:  Electrical Stimulation:         mins  83292 ( );  Traction:         mins  17382;   Dry Needling   (1-2 muscles)       mins  (Self-pay)  Dry Needling (3-4 muscles)        mins  (Self-pay)  Dry Needling Trial          mins DRYNDLTRIAL  (No Charge)    Timed Treatment:   30   mins   Total Treatment:     40   mins    Lila Cisneros PT  Physical Therapist    [place] License:[id number]

## 2025-01-31 ENCOUNTER — TREATMENT (OUTPATIENT)
Dept: PHYSICAL THERAPY | Facility: CLINIC | Age: 75
End: 2025-01-31
Payer: MEDICARE

## 2025-01-31 DIAGNOSIS — M54.2 PAIN, NECK: ICD-10-CM

## 2025-01-31 DIAGNOSIS — R29.898 IMPAIRED STRENGTH OF LOWER EXTREMITY: ICD-10-CM

## 2025-01-31 DIAGNOSIS — Z74.09 IMPAIRED FUNCTIONAL MOBILITY, BALANCE, AND ENDURANCE: Primary | ICD-10-CM

## 2025-01-31 PROCEDURE — 97140 MANUAL THERAPY 1/> REGIONS: CPT | Performed by: PHYSICAL THERAPIST

## 2025-01-31 PROCEDURE — 97110 THERAPEUTIC EXERCISES: CPT | Performed by: PHYSICAL THERAPIST

## 2025-01-31 NOTE — PROGRESS NOTES
Physical Therapy Daily Treatment Note    [facility]  [address] [place] [address]  [phone] (phone)  [phone] (fax)    Patient: Amber Campos   : 1950  Diagnosis/ICD-10 Code:  Impaired functional mobility, balance, and endurance [Z74.09]  Referring practitioner: Glory Crowell*  Date of Initial Visit: Type: THERAPY  Noted: 2024  Today's Date:  2025  Patient seen for 7 sessions           Subjective   Patient reports she would like a new printout of her balance exercises so she can do the on her own at home.     Neck is feeling better, feeling more like myself overall. Just wanting to get my stamina back to normal    Objective     See Exercise, Manual, and Modality Logs for complete treatment.     Assessment/Plan  Patient has minimal tightness in her cervical spine at this time. She also reports improvement in cervical ROM and mobility. Plan to have her continue HEP independently for self management of condition (balance and cervical exercises) and provided patient with updated HEP printout. Will have patient return if needed for progression or adjustment of program.            Timed:    Manual Therapy:    10     mins  18868;  Therapeutic Exercise:    24     mins  12589;     Neuromuscular Mercedes:        mins  55798;    Therapeutic Activity:          mins  00436;     Gait Training:           mins  20703;     Ultrasound:     8     mins  40112;    Electrical Stimulation:         mins  70957 ( );  Iontophoresis         mins 52824;  Aquatic Therapy         mins 13493;    Untimed:  Electrical Stimulation:         mins  03204 ([facility] );  Traction:         mins  16412;   Dry Needling   (1-2 muscles)       mins  (Self-pay)  Dry Needling (3-4 muscles)        mins  (Self-pay)  Dry Needling Trial          mins DRYNDLTRIAL  (No Charge)    Timed Treatment:   42   mins   Total Treatment:     42   mins    Lila Cisneros PT  Physical Therapist    [place]  License:[id number]

## 2025-02-07 ENCOUNTER — OFFICE VISIT (OUTPATIENT)
Dept: INTERNAL MEDICINE | Facility: CLINIC | Age: 75
End: 2025-02-07
Payer: MEDICARE

## 2025-02-07 VITALS
OXYGEN SATURATION: 96 % | WEIGHT: 162.6 LBS | HEART RATE: 103 BPM | TEMPERATURE: 97.7 F | HEIGHT: 63 IN | DIASTOLIC BLOOD PRESSURE: 64 MMHG | SYSTOLIC BLOOD PRESSURE: 122 MMHG | BODY MASS INDEX: 28.81 KG/M2

## 2025-02-07 DIAGNOSIS — R30.0 DYSURIA: ICD-10-CM

## 2025-02-07 DIAGNOSIS — E11.8 CONTROLLED TYPE 2 DIABETES MELLITUS WITH COMPLICATION, WITHOUT LONG-TERM CURRENT USE OF INSULIN: ICD-10-CM

## 2025-02-07 DIAGNOSIS — G93.31 POST VIRAL SYNDROME: Primary | ICD-10-CM

## 2025-02-07 LAB
BILIRUB BLD-MCNC: NEGATIVE MG/DL
CLARITY, POC: CLEAR
COLOR UR: YELLOW
EXPIRATION DATE: ABNORMAL
GLUCOSE UR STRIP-MCNC: ABNORMAL MG/DL
KETONES UR QL: ABNORMAL
LEUKOCYTE EST, POC: NEGATIVE
Lab: ABNORMAL
NITRITE UR-MCNC: NEGATIVE MG/ML
PH UR: 5.5 [PH] (ref 5–8)
PROT UR STRIP-MCNC: NEGATIVE MG/DL
RBC # UR STRIP: NEGATIVE /UL
SP GR UR: 1 (ref 1–1.03)
UROBILINOGEN UR QL: ABNORMAL

## 2025-02-07 NOTE — PROGRESS NOTES
Chief Complaint   Patient presents with    Fatigue     Has been sick the entire month of January. Has a little bit of a dry cough. Has been belching a lot     Urinary Tract Infection     Having some burning, itching and frequency        Subjective     Amber Camops is a 74 y.o. female being seen for an acute visit for fatigue. She reports that this started about 4 weeks ago. She was seen by Dr Crowell on 1-2-2024 and treated with Amoxil. She reports that she has had continued extreme fatigue, nasal congestion. She is using a saline rinse.     She is requesting diabetic education. She needs help setting up her DexCom meter as well as dietary instruction.       Fatigue  Symptoms include congestion and fatigue.    Pertinent negative symptoms include no chest pain.   Urinary Tract Infection        Allergies   Allergen Reactions    Contrast Dye (Echo Or Unknown Ct/Mr) Anaphylaxis     Contrast dye used in Xray    Iodinated Contrast Media Photosensitivity     contrast    Adhesive Tape Other (See Comments)     EKG stickers only    Farxiga [Dapagliflozin] Diarrhea and Itching     Yeast infeciotn    Neomycin-Bacitracin Zn-Polymyx Rash    Toprol Xl [Metoprolol] Dizziness    Trulicity [Dulaglutide] GI Intolerance     Constipation    Victoza [Liraglutide] Nausea Only         Current Outpatient Medications:     albuterol sulfate  (90 Base) MCG/ACT inhaler, Inhale 2 puffs Every 4 (Four) Hours As Needed for Wheezing., Disp: 8.5 g, Rfl: 2    amlodipine-olmesartan (NIC) 5-40 MG per tablet, TAKE 1 TABLET DAILY, Disp: 90 tablet, Rfl: 3    budesonide-formoterol (Symbicort) 160-4.5 MCG/ACT inhaler, Inhale 2 puffs 2 (Two) Times a Day., Disp: 1 each, Rfl: 6    Cetirizine HCl (ZyrTE Childrens Allergy) 5 MG/5ML solution solution, Take 5 mL by mouth Daily., Disp: 150 mL, Rfl:     Clocortolone Pivalate 0.1 % cream, APPLY TOPICALLY TO AFFECTED AREA TWICE A DAY AS NEEDED, Disp: , Rfl:     fluticasone (FLONASE) 50 MCG/ACT nasal spray,  Administer 2 sprays into the nostril(s) as directed by provider Daily., Disp: 16 g, Rfl: 1    metFORMIN (GLUCOPHAGE) 1000 MG tablet, TAKE 1 TABLET TWICE A DAY WITH MEALS (DISCONTINUE INVOKAMET AND RESUME METFORMIN), Disp: 180 tablet, Rfl: 3    Mounjaro 7.5 MG/0.5ML solution auto-injector, INJECT 0.5 ML UNDER THE SKIN INTO THE APPROPRIATE AREA AS DIRECTED WEEKLY, Disp: 6 mL, Rfl: 3    multivitamin (THERAGRAN) tablet tablet, Take 1 tablet by mouth Daily., Disp: , Rfl:     Omega-3 Fatty Acids (fish oil) 1000 MG capsule capsule, Take  by mouth Daily With Breakfast., Disp: , Rfl:     pantoprazole (PROTONIX) 40 MG EC tablet, TAKE 1 TABLET DAILY, Disp: 90 tablet, Rfl: 3    Spacer/Aero Chamber Mouthpiece misc, 1 each Daily., Disp: 1 each, Rfl: 0    triamcinolone (KENALOG) 0.1 % cream, APPLY TWICE DAILY TO ITCHY SKIN UP TO 2 WEEKS/MONTH AS NEEDED., Disp: , Rfl:     venlafaxine XR (EFFEXOR-XR) 75 MG 24 hr capsule, TAKE 1 CAPSULE DAILY, Disp: 90 capsule, Rfl: 3    Current Facility-Administered Medications:     cyanocobalamin injection 1,000 mcg, 1,000 mcg, Intramuscular, Q28 Days, Jenny Sun APRN, 1,000 mcg at 09/23/24 0947    The following portions of the patient's history were reviewed and updated as appropriate: allergies, current medications, past family history, past medical history, past social history, past surgical history, and problem list.    Review of Systems   Constitutional:  Positive for fatigue.   HENT:  Positive for congestion.    Eyes: Negative.    Respiratory: Negative.     Cardiovascular:  Negative for chest pain and leg swelling.   Gastrointestinal: Negative.    Musculoskeletal:  Positive for arthralgias.   Allergic/Immunologic: Positive for environmental allergies.   Hematological: Negative.    Psychiatric/Behavioral: Negative.     All other systems reviewed and are negative.      Assessment     Physical Exam  Vitals reviewed.   Constitutional:       Appearance: Normal appearance. She is not  ill-appearing.   HENT:      Right Ear: Tympanic membrane normal.      Left Ear: Tympanic membrane normal.   Cardiovascular:      Rate and Rhythm: Normal rate and regular rhythm.      Pulses: Normal pulses.      Heart sounds: Normal heart sounds. No murmur heard.  Pulmonary:      Effort: Pulmonary effort is normal. No respiratory distress.      Breath sounds: Normal breath sounds. No stridor.   Musculoskeletal:      Cervical back: Neck supple.      Right lower leg: No edema.      Left lower leg: No edema.   Skin:     General: Skin is warm and dry.   Neurological:      General: No focal deficit present.      Mental Status: She is alert and oriented to person, place, and time.      Gait: Gait normal.   Psychiatric:         Mood and Affect: Mood normal.         Behavior: Behavior normal.         Thought Content: Thought content normal.       Plan         Diagnoses and all orders for this visit:    1. Post viral syndrome (Primary)  Comments:  discussed starting immune booster drinks or vit C    2. Controlled type 2 diabetes mellitus with complication, without long-term current use of insulin  Comments:  Requests Nutrition counsleing and Dexcom instruction.  Orders:  -     Ambulatory Referral to Diabetic Education    3. Dysuria  Comments:  POC urine is clear  Orders:  -     POC Urinalysis Dipstick, Automated        Follow up as scheduled in April

## 2025-02-18 ENCOUNTER — TELEPHONE (OUTPATIENT)
Dept: INTERNAL MEDICINE | Facility: CLINIC | Age: 75
End: 2025-02-18

## 2025-02-18 NOTE — TELEPHONE ENCOUNTER
PATIENT CALLED IN REGARDS TO DIETICIAN REFERRAL. NO ONE HAS CALLED TO SCHEDULE AN APPOINTMENT.   GIVEN CENTRAL SCHEDULING NUMBER    CALL BACK NUMBER [phone]

## 2025-02-25 ENCOUNTER — DOCUMENTATION (OUTPATIENT)
Dept: PHYSICAL THERAPY | Facility: CLINIC | Age: 75
End: 2025-02-25
Payer: MEDICARE

## 2025-02-25 NOTE — PROGRESS NOTES
Discharge Summary  Discharge Summary from Physical Therapy Report      Dates  PT visit: 11/26/24- 1/31/25  Number of Visits: 7       Goals: Partially Met    Goals  Plan Goals: Short Term Goals for completion in 30 days:   -Patient will report good compliance with initial HEP (MET)  -Patient will improve score on KIRBY from 40 to 45 or more to reduce risk of falls (MET)  -Patient will demonstrate sit to stand without use of hands in order to increase functional strength (MET)     LTGs for completion within 90 days:     -Patient will increase walking tolerance from 5 min to 15 min or greater to allow for patient to walk for leisure/exercise (NOT MET- still fatigued)  -Patient will improve score on ABC scale from 20% confidence to >50% confidence in order to improve quality of life (MET)  -Patient will navigate down the steps with only 1 UE on railing (MET)  -Patient will increase LE strength to 4+/5 or greater to increase LE stability during gait (NOT MET)  -Patient will increase cervical lateral flexion to 30 degrees or more to promote cervical mobility during daily tasks. (NOT MET)      Discharge Plan: Continue with current home exercise program as instructed      Date of Discharge : 2/25/25        Lila Cisneros, PT  Physical Therapist

## 2025-03-07 ENCOUNTER — TELEPHONE (OUTPATIENT)
Dept: INTERNAL MEDICINE | Facility: CLINIC | Age: 75
End: 2025-03-07
Payer: MEDICARE

## 2025-03-07 NOTE — TELEPHONE ENCOUNTER
Patient called stating that her blood sugar was 284 this morning and is now 205. What would you like for her to do?

## 2025-03-07 NOTE — TELEPHONE ENCOUNTER
Please monitor glucose fasting over the next 3 days. This is not urgently high. Avoid any simple carbs during this time and call with readings Tuesday.

## 2025-03-10 ENCOUNTER — HOSPITAL ENCOUNTER (OUTPATIENT)
Dept: DIABETES SERVICES | Facility: HOSPITAL | Age: 75
Discharge: HOME | End: 2025-03-10
Payer: MEDICARE

## 2025-03-10 NOTE — NURSING NOTE
Diabetes Education    Patient Name:  Amber Campos  YOB: 1950  MRN: [id number]  Admit Date:  3/10/2025      Patient met with RN for outpatient education requesting assistance with starting Dexcom G7. Patient brought all supplies to appointment. Willi CGM was taken off of the back of her right upper arm. RN reviewed with patient instructions for use and how to apply. RN demonstrated with Dexcom demo, patient successfully applied sensor to back of left upper arm. Overlay patch applied. RN assisted in set up of  and connected sensor. BG showing BG of 271 after sensor warmup period complete. Patient expressed she would like to have Dexcom G7 khadijah on iPhone 7, but her phone is not compatible with Dexcom G7 khadijah. All questions answered. Instructed to follow up with provider for refills and reach out with further questions.     Pt has upcoming appointment with RD on 4/7/2025.      Electronically signed by:  Matthew Barragan RN  03/10/25 10:44 EDT

## 2025-03-11 ENCOUNTER — TELEPHONE (OUTPATIENT)
Dept: INTERNAL MEDICINE | Facility: CLINIC | Age: 75
End: 2025-03-11

## 2025-03-11 NOTE — TELEPHONE ENCOUNTER
Provider: KIRK CARRINGTON    Caller: Amber Campos    Relationship to Patient: Self    Phone Number: [phone]     Reason for Call: PATIENT WAS ASKED TO KEEP RECORD OF GLUCOSE NUMBERS FIRST THING IN THE MORNINGS....     03/08/25       314  03/09/25       266  03/10/25       230  03/11/25       239    PLEASE CALL PATIENT TO ADVISE.

## 2025-03-11 NOTE — TELEPHONE ENCOUNTER
Called and spoke with patient and got her scheduled for next week, she said that she would like to just come in and talk with Jenny about what she would like to do.

## 2025-03-11 NOTE — TELEPHONE ENCOUNTER
Okay, hold Jardiance due to intolerance. I will send an increase of Mounjaro 10mg weekly to [facility]. I am not sending to [facility], because we may adjust this at next meeting in April.

## 2025-03-18 DIAGNOSIS — R79.0 LOW IRON STORES: ICD-10-CM

## 2025-03-18 DIAGNOSIS — E53.8 VITAMIN B12 DEFICIENCY: ICD-10-CM

## 2025-03-18 DIAGNOSIS — E11.8 CONTROLLED TYPE 2 DIABETES MELLITUS WITH COMPLICATION, WITHOUT LONG-TERM CURRENT USE OF INSULIN: ICD-10-CM

## 2025-03-18 DIAGNOSIS — R22.1 MASS OF LEFT SIDE OF NECK: ICD-10-CM

## 2025-03-18 DIAGNOSIS — E78.2 MIXED HYPERLIPIDEMIA: ICD-10-CM

## 2025-03-18 DIAGNOSIS — E55.9 VITAMIN D DEFICIENCY: ICD-10-CM

## 2025-03-18 DIAGNOSIS — I10 ESSENTIAL HYPERTENSION: ICD-10-CM

## 2025-03-18 DIAGNOSIS — F41.8 DEPRESSION WITH ANXIETY: ICD-10-CM

## 2025-03-19 ENCOUNTER — OFFICE VISIT (OUTPATIENT)
Dept: INTERNAL MEDICINE | Facility: CLINIC | Age: 75
End: 2025-03-19
Payer: MEDICARE

## 2025-03-19 VITALS
WEIGHT: 157.6 LBS | HEART RATE: 105 BPM | BODY MASS INDEX: 27.93 KG/M2 | SYSTOLIC BLOOD PRESSURE: 102 MMHG | OXYGEN SATURATION: 98 % | TEMPERATURE: 97.7 F | DIASTOLIC BLOOD PRESSURE: 60 MMHG | HEIGHT: 63 IN

## 2025-03-19 DIAGNOSIS — R10.11 RUQ PAIN: ICD-10-CM

## 2025-03-19 DIAGNOSIS — E78.2 MIXED HYPERLIPIDEMIA: ICD-10-CM

## 2025-03-19 DIAGNOSIS — E11.65 UNCONTROLLED TYPE 2 DIABETES MELLITUS WITH HYPERGLYCEMIA: ICD-10-CM

## 2025-03-19 DIAGNOSIS — Z00.00 ENCOUNTER FOR SUBSEQUENT ANNUAL WELLNESS VISIT (AWV) IN MEDICARE PATIENT: Primary | ICD-10-CM

## 2025-03-19 DIAGNOSIS — K82.8 GALLBLADDER SLUDGE: ICD-10-CM

## 2025-03-19 DIAGNOSIS — I10 ESSENTIAL HYPERTENSION: ICD-10-CM

## 2025-03-19 DIAGNOSIS — R52 GENERALIZED BODY ACHES: ICD-10-CM

## 2025-03-19 LAB
EXPIRATION DATE: NORMAL
EXPIRATION DATE: NORMAL
FLUAV AG NPH QL: NEGATIVE
FLUBV AG NPH QL: NEGATIVE
INTERNAL CONTROL: NORMAL
INTERNAL CONTROL: NORMAL
Lab: NORMAL
Lab: NORMAL
SARS-COV-2 AG UPPER RESP QL IA.RAPID: NOT DETECTED

## 2025-03-19 NOTE — PROGRESS NOTES
Procedure     ECG 12 Lead    Date/Time: 3/19/2025 12:39 PM  Performed by: Jenny Sun APRN    Authorized by: Jenny Sun APRN  Comparison: compared with previous ECG from 11/14/2023  Similar to previous ECG  Rhythm: sinus tachycardia  Rate: tachycardic  BPM: 102  Conduction: conduction normal  Conduction comments: NE 0.16 QRS 0.08  ST Segments: ST segments normal  T Waves: T waves normal  QRS axis: normal    Clinical impression: normal ECG

## 2025-03-20 LAB
ALBUMIN SERPL-MCNC: 4.2 G/DL (ref 3.5–5.2)
ALBUMIN/GLOB SERPL: 1.8 G/DL
ALP SERPL-CCNC: 59 U/L (ref 39–117)
ALT SERPL-CCNC: 17 U/L (ref 1–33)
AMYLASE SERPL-CCNC: 22 U/L (ref 28–100)
AST SERPL-CCNC: 22 U/L (ref 1–32)
BILIRUB SERPL-MCNC: 0.5 MG/DL (ref 0–1.2)
BUN SERPL-MCNC: 18 MG/DL (ref 8–23)
BUN/CREAT SERPL: 23.4 (ref 7–25)
CALCIUM SERPL-MCNC: 10 MG/DL (ref 8.6–10.5)
CHLORIDE SERPL-SCNC: 96 MMOL/L (ref 98–107)
CO2 SERPL-SCNC: 26 MMOL/L (ref 22–29)
CREAT SERPL-MCNC: 0.77 MG/DL (ref 0.57–1)
EGFRCR SERPLBLD CKD-EPI 2021: 80.1 ML/MIN/1.73
GLOBULIN SER CALC-MCNC: 2.4 GM/DL
GLUCOSE SERPL-MCNC: 151 MG/DL (ref 65–99)
HBA1C MFR BLD: 9 % (ref 4.8–5.6)
LIPASE SERPL-CCNC: 24 U/L (ref 13–60)
POTASSIUM SERPL-SCNC: 4.1 MMOL/L (ref 3.5–5.2)
PROT SERPL-MCNC: 6.6 G/DL (ref 6–8.5)
SODIUM SERPL-SCNC: 136 MMOL/L (ref 136–145)

## 2025-03-21 ENCOUNTER — RESULTS FOLLOW-UP (OUTPATIENT)
Dept: INTERNAL MEDICINE | Facility: CLINIC | Age: 75
End: 2025-03-21
Payer: MEDICARE

## 2025-03-21 NOTE — PROGRESS NOTES
Tried to call patient to discuss lab results but was unable to reach and left a voicemail for patient to call back to discuss.

## 2025-03-21 NOTE — TELEPHONE ENCOUNTER
"Relay     \"There is no evidence of acute pancreatitis on labs. Her hemoglobin A1c has increased from 7.8% to 9%. Trial of Jardiance as discussed at visit and f/u as scheduled. \"        "

## 2025-04-09 ENCOUNTER — OFFICE VISIT (OUTPATIENT)
Dept: INTERNAL MEDICINE | Facility: CLINIC | Age: 75
End: 2025-04-09
Payer: MEDICARE

## 2025-04-09 VITALS
TEMPERATURE: 98.2 F | HEART RATE: 91 BPM | DIASTOLIC BLOOD PRESSURE: 60 MMHG | OXYGEN SATURATION: 96 % | SYSTOLIC BLOOD PRESSURE: 100 MMHG | HEIGHT: 63 IN | BODY MASS INDEX: 25.76 KG/M2 | WEIGHT: 145.4 LBS

## 2025-04-09 DIAGNOSIS — I10 ESSENTIAL HYPERTENSION: Chronic | ICD-10-CM

## 2025-04-09 DIAGNOSIS — Z11.1 SCREENING-PULMONARY TB: ICD-10-CM

## 2025-04-09 DIAGNOSIS — E78.2 MIXED HYPERLIPIDEMIA: Chronic | ICD-10-CM

## 2025-04-09 DIAGNOSIS — N30.10 IC (INTERSTITIAL CYSTITIS): Chronic | ICD-10-CM

## 2025-04-09 DIAGNOSIS — E53.8 VITAMIN B12 DEFICIENCY: ICD-10-CM

## 2025-04-09 DIAGNOSIS — E11.65 UNCONTROLLED TYPE 2 DIABETES MELLITUS WITH HYPERGLYCEMIA: Primary | ICD-10-CM

## 2025-04-09 PROBLEM — N30.90 CYSTITIS: Status: RESOLVED | Noted: 2022-01-25 | Resolved: 2025-04-09

## 2025-04-09 LAB
BILIRUB BLD-MCNC: NEGATIVE MG/DL
CLARITY, POC: CLEAR
COLOR UR: YELLOW
EXPIRATION DATE: ABNORMAL
GLUCOSE UR STRIP-MCNC: NEGATIVE MG/DL
KETONES UR QL: ABNORMAL
LEUKOCYTE EST, POC: ABNORMAL
Lab: ABNORMAL
NITRITE UR-MCNC: NEGATIVE MG/ML
PH UR: 5.5 [PH] (ref 5–8)
PROT UR STRIP-MCNC: NEGATIVE MG/DL
RBC # UR STRIP: NEGATIVE /UL
SP GR UR: 1.01 (ref 1–1.03)
UROBILINOGEN UR QL: ABNORMAL

## 2025-04-09 NOTE — PROGRESS NOTES
Chief Complaint   Patient presents with    Diabetes     Follow up       Subjective     Amber Campos is a 74 y.o. female being seen for a follow up appointment today regarding DM 2, HTN, hyperlipidemia, and IC. She had diabetic education at [facility] in 2023 and went back for a refresher 3-. She was followed by srinath, but declined to see them anymore. She has a Continuous glucose monitor, Dexcom.  Average glucose 160s Fasting glucose ranges from 200s, depending on what she had for dinner. PP glucose 150s. She is following a Aanmika Jovanny diet. She is prescribed metformin 1000 mg BID and mounjaro 7.5 mg weekly. She stopped the Invokana, due to urinary frequency. She was switched to Mounjaro due to Ozempic availability. She is om tirzepitde 10mg weekly. She has lost 7 pounds.     She has history of urinary frequency with IC. She is [facility] Benton ([facility] She had a CT abd and pelvis and cystoscopy scheduled for renal cysts and IC. She left a urine sample today.      She is on Susannah 5/40mg daily for HTN. She has previously been on Diovan, Toprol XL and Ziac. She does not check her BP, but feels like she is tolerating this medication well. She denies edema, CP, SOA.  She had a lifeline screening showing mild placque in carotid arteries.      She has history of JOSETTE and GERD. She sas been evaluated with both a C-scope 6-6-2022 with Dr. Alaniz. She is on an oral iron supplement I'm MVI. She takes a daily Protonix 40mg tablet.      She is on prolia for osteoporosis with history of traumatic fractures of wrist and ankle.      She is on Effexor XR for depression w anxiety. Her  has hx of alcohol abuse, and recently been back in the hospital regarding this..       She needs a TB screen for volunteer work.       History of Present Illness     Allergies   Allergen Reactions    Contrast Dye (Echo Or Unknown Ct/Mr) Anaphylaxis     Contrast dye used in Xray    Iodinated Contrast Media Photosensitivity     contrast     Adhesive Tape Other (See Comments)     EKG stickers only    Farxiga [Dapagliflozin] Diarrhea and Itching     Yeast infeciotn    Neomycin-Bacitracin Zn-Polymyx Rash    Toprol Xl [Metoprolol] Dizziness    Trulicity [Dulaglutide] GI Intolerance     Constipation    Victoza [Liraglutide] Nausea Only         Current Outpatient Medications:     albuterol sulfate  (90 Base) MCG/ACT inhaler, Inhale 2 puffs Every 4 (Four) Hours As Needed for Wheezing., Disp: 8.5 g, Rfl: 2    amlodipine-olmesartan (NIC) 5-40 MG per tablet, TAKE 1 TABLET DAILY, Disp: 90 tablet, Rfl: 3    budesonide-formoterol (Symbicort) 160-4.5 MCG/ACT inhaler, Inhale 2 puffs 2 (Two) Times a Day., Disp: 1 each, Rfl: 6    Cetirizine HCl (ZyrTE Childrens Allergy) 5 MG/5ML solution solution, Take 5 mL by mouth Daily., Disp: 150 mL, Rfl:     fluticasone (FLONASE) 50 MCG/ACT nasal spray, Administer 2 sprays into the nostril(s) as directed by provider Daily., Disp: 16 g, Rfl: 1    metFORMIN (GLUCOPHAGE) 1000 MG tablet, TAKE 1 TABLET TWICE A DAY WITH MEALS (DISCONTINUE INVOKAMET AND RESUME METFORMIN), Disp: 180 tablet, Rfl: 3    multivitamin (THERAGRAN) tablet tablet, Take 1 tablet by mouth Daily., Disp: , Rfl:     Omega-3 Fatty Acids (fish oil) 1000 MG capsule capsule, Take  by mouth Daily With Breakfast., Disp: , Rfl:     pantoprazole (PROTONIX) 40 MG EC tablet, TAKE 1 TABLET DAILY, Disp: 90 tablet, Rfl: 3    Spacer/Aero Chamber Mouthpiece misc, 1 each Daily., Disp: 1 each, Rfl: 0    Tirzepatide 10 MG/0.5ML solution auto-injector, Inject 10 mg under the skin into the appropriate area as directed 1 (One) Time Per Week., Disp: 2 mL, Rfl: 0    triamcinolone (KENALOG) 0.1 % cream, APPLY TWICE DAILY TO ITCHY SKIN UP TO 2 WEEKS/MONTH AS NEEDED., Disp: , Rfl:     venlafaxine XR (EFFEXOR-XR) 75 MG 24 hr capsule, TAKE 1 CAPSULE DAILY, Disp: 90 capsule, Rfl: 3    Current Facility-Administered Medications:     cyanocobalamin injection 1,000 mcg, 1,000 mcg,  Intramuscular, Q28 Days, Jenny Sun APRN, 1,000 mcg at 09/23/24 0947    The following portions of the patient's history were reviewed and updated as appropriate: allergies, current medications, past family history, past medical history, past social history, past surgical history, and problem list.    Review of Systems   Constitutional: Negative.    HENT: Negative.     Eyes: Negative.    Respiratory: Negative.     Cardiovascular: Negative.  Negative for chest pain, palpitations and leg swelling.   Genitourinary:  Positive for frequency.   Musculoskeletal:  Positive for arthralgias.   Hematological: Negative.    Psychiatric/Behavioral: Negative.     All other systems reviewed and are negative.      Assessment     Physical Exam  Vitals reviewed.   Constitutional:       Appearance: Normal appearance. She is not ill-appearing.   HENT:      Head: Normocephalic.      Nose: No congestion.   Cardiovascular:      Rate and Rhythm: Normal rate and regular rhythm.      Pulses: Normal pulses.      Heart sounds: Normal heart sounds.   Pulmonary:      Effort: Pulmonary effort is normal.      Breath sounds: Normal breath sounds.   Musculoskeletal:      Right lower leg: No edema.      Left lower leg: No edema.   Skin:     General: Skin is warm and dry.   Neurological:      General: No focal deficit present.      Mental Status: She is alert and oriented to person, place, and time.         Plan     Her fasting labs were reviewed with the patient from last week.     Diagnoses and all orders for this visit:    1. Uncontrolled type 2 diabetes mellitus with hyperglycemia (Primary)  Comments:  On Metformin 1000mg BID and tirzepatide 10mg weekly. DexCom meter in placee. Diabetic ducation completed since last visit  Orders:  -     Microalbumin / Creatinine Urine Ratio - Urine, Clean Catch  -     Comprehensive Metabolic Panel  -     Hemoglobin A1c    2. Essential hypertension  Comments:  Controlled on Susannah 5/40mg daily    3. IC  (interstitial cystitis)  Comments:  POC urine free of infection  Orders:  -     POC Urinalysis Dipstick, Automated    4. Mixed hyperlipidemia  Comments:  LDL goal < 70    5. Vitamin B12 deficiency    6. Screening-pulmonary TB  -     QuantiFERON TB Gold    Her Dexcom meter has been a mabel asset to her overall glucose control. She has changed her diet based on readings and lost weight.     Follow up in July 2025 as scheduled

## 2025-04-10 ENCOUNTER — HOSPITAL ENCOUNTER (EMERGENCY)
Facility: HOSPITAL | Age: 75
Discharge: HOME | End: 2025-04-10
Attending: EMERGENCY MEDICINE
Payer: MEDICARE

## 2025-04-10 ENCOUNTER — APPOINTMENT (OUTPATIENT)
Dept: GENERAL RADIOLOGY | Facility: HOSPITAL | Age: 75
End: 2025-04-10
Payer: MEDICARE

## 2025-04-10 ENCOUNTER — TELEPHONE (OUTPATIENT)
Dept: INTERNAL MEDICINE | Facility: CLINIC | Age: 75
End: 2025-04-10
Payer: MEDICARE

## 2025-04-10 VITALS
OXYGEN SATURATION: 99 % | TEMPERATURE: 98.9 F | BODY MASS INDEX: 25.69 KG/M2 | HEIGHT: 63 IN | DIASTOLIC BLOOD PRESSURE: 67 MMHG | WEIGHT: 145 LBS | RESPIRATION RATE: 18 BRPM | HEART RATE: 90 BPM | SYSTOLIC BLOOD PRESSURE: 135 MMHG

## 2025-04-10 DIAGNOSIS — Z04.9 OBSERVATION FOR SUSPECTED CONDITION: Primary | ICD-10-CM

## 2025-04-10 DIAGNOSIS — Z13.9 ENCOUNTER FOR MEDICAL SCREENING EXAMINATION: ICD-10-CM

## 2025-04-10 PROBLEM — Z11.1 SCREENING-PULMONARY TB: Status: ACTIVE | Noted: 2025-04-10

## 2025-04-10 PROCEDURE — 73060 X-RAY EXAM OF HUMERUS: CPT

## 2025-04-10 PROCEDURE — 99283 EMERGENCY DEPT VISIT LOW MDM: CPT | Performed by: EMERGENCY MEDICINE

## 2025-04-11 LAB
ALBUMIN SERPL-MCNC: 4.4 G/DL (ref 3.8–4.8)
ALP SERPL-CCNC: 53 IU/L (ref 44–121)
ALT SERPL-CCNC: 20 IU/L (ref 0–32)
AST SERPL-CCNC: 26 IU/L (ref 0–40)
BILIRUB SERPL-MCNC: 0.4 MG/DL (ref 0–1.2)
BUN SERPL-MCNC: 20 MG/DL (ref 8–27)
BUN/CREAT SERPL: 24 (ref 12–28)
CALCIUM SERPL-MCNC: 9.3 MG/DL (ref 8.7–10.3)
CHLORIDE SERPL-SCNC: 100 MMOL/L (ref 96–106)
CO2 SERPL-SCNC: 22 MMOL/L (ref 20–29)
CREAT SERPL-MCNC: 0.83 MG/DL (ref 0.57–1)
EGFRCR SERPLBLD CKD-EPI 2021: 73 ML/MIN/1.73
GAMMA INTERFERON BACKGROUND BLD IA-ACNC: 0.02 IU/ML
GLOBULIN SER CALC-MCNC: 2.3 G/DL (ref 1.5–4.5)
GLUCOSE SERPL-MCNC: 204 MG/DL (ref 70–99)
HBA1C MFR BLD: 8.8 % (ref 4.8–5.6)
M TB IFN-G BLD-IMP: NEGATIVE
M TB IFN-G CD4+ BCKGRND COR BLD-ACNC: 0.03 IU/ML
M TB IFN-G CD4+CD8+ BCKGRND COR BLD-ACNC: 0.07 IU/ML
MITOGEN IGNF BCKGRD COR BLD-ACNC: 6.24 IU/ML
POTASSIUM SERPL-SCNC: 4.5 MMOL/L (ref 3.5–5.2)
PROT SERPL-MCNC: 6.7 G/DL (ref 6–8.5)
QUANTIFERON INCUBATION: NORMAL
SERVICE CMNT-IMP: NORMAL
SODIUM SERPL-SCNC: 137 MMOL/L (ref 134–144)

## 2025-04-11 NOTE — ED PROVIDER NOTES
Subjective   History of Present Illness  Patient reports removing her blood sugar monitoring device from her right posterior arm and could not find the needle and is concerned that it still stuck in her arm.  Patient denies any symptoms but just wanted to come in to get checked out.  No previous episodes.  Patient denies any swelling or foreign body sensation.      Review of Systems   All other systems reviewed and are negative.      Past Medical History:   Diagnosis Date    Basaloid carcinoma     facial    Carotid artery plaque, bilateral     without stenosis, 2023    Gastroesophageal reflux disease 03/14/2016    History of kidney infection     History of mammogram     Hypertension     IC (interstitial cystitis) 02/03/2021    Osteopenia     Osteoporosis     Postmenopausal     PVCs (premature ventricular contractions)     Seasonal allergies     Simple renal cyst 03/14/2016    Sinus tachycardia     Steatosis of liver 03/14/2016    Type 2 diabetes mellitus     Vertigo 05/09/2022       Allergies   Allergen Reactions    Contrast Dye (Echo Or Unknown Ct/Mr) Anaphylaxis     Contrast dye used in Xray    Iodinated Contrast Media Photosensitivity     contrast    Adhesive Tape Other (See Comments)     EKG stickers only    Farxiga [Dapagliflozin] Diarrhea and Itching     Yeast infeciotn    Neomycin-Bacitracin Zn-Polymyx Rash    Toprol Xl [Metoprolol] Dizziness    Trulicity [Dulaglutide] GI Intolerance     Constipation    Victoza [Liraglutide] Nausea Only       Past Surgical History:   Procedure Laterality Date    BREAST BIOPSY Bilateral     Benign    BUNIONECTOMY Right     COLONOSCOPY N/A 04/25/2017    Procedure: COLONOSCOPY TO CECUM ;  Surgeon: Aidan Roca MD;  Location: [facility] ENDOSCOPY;  Service:     EXOSTECTOMY Left     HEEL    FOOT FUSION Right     9 screws and plate    KNEE ARTHROSCOPY W/ MENISCAL REPAIR Right     ORIF WRIST FRACTURE Right 12/01/2021    Procedure: WRIST OPEN REDUCTION INTERNAL FIXATION;  Surgeon:  Salazar Amezcua MD;  Location: [facility];  Service: Orthopedics;  Laterality: Right;    PAP SMEAR      TONSILLECTOMY      TOTAL SHOULDER REVERSE ARTHROPLASTY Right     UMBILICAL HERNIA REPAIR N/A 10/2010       Family History   Problem Relation Age of Onset    Other Mother         brain death    Emphysema Mother     Alcohol abuse Father     Glaucoma Father     Other Sister         fatty liver    Heart disease Sister         valve problem, being followed (no surgery required)    Arthritis Sister     Gallbladder disease Sister     Other Brother         fatty liver    Glaucoma Maternal Grandmother     Heart disease Maternal Grandmother     Alcohol abuse Maternal Aunt     Diabetes Maternal Aunt     Breast cancer Paternal Aunt        Social History     Socioeconomic History    Marital status:    Tobacco Use    Smoking status: Never     Passive exposure: Never    Smokeless tobacco: Never   Vaping Use    Vaping status: Never Used   Substance and Sexual Activity    Alcohol use: No    Drug use: No    Sexual activity: Defer           Objective   Physical Exam  Vitals and nursing note reviewed.   HENT:      Head: Normocephalic.      Nose: Nose normal.      Mouth/Throat:      Pharynx: Oropharynx is clear.   Eyes:      Conjunctiva/sclera: Conjunctivae normal.   Pulmonary:      Effort: Pulmonary effort is normal.   Musculoskeletal:         General: No swelling or tenderness. Normal range of motion.      Cervical back: Neck supple.      Comments: Right upper arm on the posterior aspect where the device was shows no evidence of bleeding, redness, swelling, induration and no foreign body palpated.   Skin:     General: Skin is warm and dry.      Capillary Refill: Capillary refill takes 2 to 3 seconds.   Neurological:      Mental Status: She is alert.      Comments: Sensory and motor function are grossly intact in the entire right upper extremity   Psychiatric:         Mood and Affect: Mood normal.         Behavior: Behavior  normal.         Procedures           ED Course                                                       Medical Decision Making  XR Humerus Right  Result Date: 4/10/2025  Impression: 1.No evidence for displaced fracture or dislocation. 2.Evidence for calcific tendinopathy involving the distal infraspinatus component of the rotator cuff. Electronically Signed: Fish Henning MD  4/10/2025 2:47 PM EDT  Workstation ID: [id number]    Advised the patient that the needle may eventually work itself out if there is one in there but there was no evidence of it on x-ray.  Advised patient to watch out for signs of infection which would include fever, swelling, redness, pain, bleeding or drainage/discharge.  All questions personally answered at the bedside and all d/c instructions personally reviewed with pt.  Discussed the importance of close outpt. f/u and pt. understands this and agrees to do so.  Pt agrees to return to ED immediately for any new, persistent, or worsening symptoms.    EMR Dragon/Transcription disclaimer:  Much of this encounter note is an electronic transcription/translation of spoken language to printed text using the Dragon Dictation System       Problems Addressed:  Encounter for medical screening examination: complicated acute illness or injury  Observation for suspected condition: complicated acute illness or injury    Amount and/or Complexity of Data Reviewed  Radiology: ordered.        Final diagnoses:   Observation for suspected condition   Encounter for medical screening examination       ED Disposition  ED Disposition       ED Disposition   Discharge    Condition   Stable    Comment   --               Jenny Sun, APRN  [address] [facility] [address]  [place] [address] [phone]    In 3 days  If symptoms worsen         Medication List      No changes were made to your prescriptions during this visit.            Bronson Perez MD  04/11/25 1004       Bronson Perez MD  04/11/25 1000

## 2025-04-14 ENCOUNTER — OFFICE VISIT (OUTPATIENT)
Dept: ORTHOPEDIC SURGERY | Facility: CLINIC | Age: 75
End: 2025-04-14
Payer: MEDICARE

## 2025-04-14 VITALS — BODY MASS INDEX: 25.69 KG/M2 | HEIGHT: 63 IN | WEIGHT: 145 LBS

## 2025-04-14 DIAGNOSIS — M17.11 PRIMARY OSTEOARTHRITIS OF RIGHT KNEE: Primary | ICD-10-CM

## 2025-04-14 PROCEDURE — 99213 OFFICE O/P EST LOW 20 MIN: CPT | Performed by: NURSE PRACTITIONER

## 2025-04-14 NOTE — PROGRESS NOTES
Subjective:     Patient ID: Amber Campos is a 74 y.o. female.    Chief Complaint:  Follow-up DJD right knee  History of Present Illness  History of Present Illness  The patient is a 74-year-old female who returns to the clinic today for follow-up of her right knee.    She is exhibiting some mild discomfort on occasion along the anterior aspect of the knee, but otherwise she is doing quite well. She reports no instances of the knee locking, catching, or giving way. She is experiencing some mild swelling of the knee. She has received steroid injection in the past, with the most recent one administered on 09/30/2024. A prior MRI was also completed. She reports no other concerns at present.         Social History     Occupational History    Occupation: retired    Tobacco Use    Smoking status: Never     Passive exposure: Never    Smokeless tobacco: Never   Vaping Use    Vaping status: Never Used   Substance and Sexual Activity    Alcohol use: No    Drug use: No    Sexual activity: Defer      Past Medical History:   Diagnosis Date    Basaloid carcinoma     facial    Carotid artery plaque, bilateral     without stenosis, 2023    Gastroesophageal reflux disease 03/14/2016    History of kidney infection     History of mammogram     Hypertension     IC (interstitial cystitis) 02/03/2021    Osteopenia     Osteoporosis     Postmenopausal     PVCs (premature ventricular contractions)     Seasonal allergies     Simple renal cyst 03/14/2016    Sinus tachycardia     Steatosis of liver 03/14/2016    Type 2 diabetes mellitus     Vertigo 05/09/2022     Past Surgical History:   Procedure Laterality Date    BREAST BIOPSY Bilateral     Benign    BUNIONECTOMY Right     COLONOSCOPY N/A 04/25/2017    Procedure: COLONOSCOPY TO CECUM ;  Surgeon: Aidan Roca MD;  Location: [facility] ENDOSCOPY;  Service:     EXOSTECTOMY Left     HEEL    FOOT FUSION Right     9 screws and plate    KNEE ARTHROSCOPY W/ MENISCAL REPAIR Right     ORIF WRIST  "FRACTURE Right 12/01/2021    Procedure: WRIST OPEN REDUCTION INTERNAL FIXATION;  Surgeon: Salazar Amezcua MD;  Location: [facility] OR;  Service: Orthopedics;  Laterality: Right;    PAP SMEAR      TONSILLECTOMY      TOTAL SHOULDER REVERSE ARTHROPLASTY Right     UMBILICAL HERNIA REPAIR N/A 10/2010       Family History   Problem Relation Age of Onset    Other Mother         brain death    Emphysema Mother     Alcohol abuse Father     Glaucoma Father     Other Sister         fatty liver    Heart disease Sister         valve problem, being followed (no surgery required)    Arthritis Sister     Gallbladder disease Sister     Other Brother         fatty liver    Glaucoma Maternal Grandmother     Heart disease Maternal Grandmother     Alcohol abuse Maternal Aunt     Diabetes Maternal Aunt     Breast cancer Paternal Aunt                Objective:  Physical Exam      General: No acute distress.  Eyes: conjunctiva clear; pupils equally round and reactive  ENT: external ears and nose atraumatic; oropharynx clear  CV: no peripheral edema  Resp: normal respiratory effort  Skin: no rashes or wounds; normal turgor  Psych: mood and affect appropriate; recent and remote memory intact    Vitals:    04/14/25 1032   Weight: 65.8 kg (145 lb)   Height: 160 cm (63\")         04/14/25  1032   Weight: 65.8 kg (145 lb)     Body mass index is 25.69 kg/m².      Ortho Exam     Physical Exam  Right knee range of motion is 0 to 125 degrees. It is stable to varus and valgus stress at 0 degrees and 30 degrees. Maximal tenderness is present along the medial compartment anterior aspect of the knee. Negative anterior and posterior drawer exam. There is a 1+ anterior Lachman exam. Positive crepitus of motion. Minimal swelling. Negative effusion.    Imaging:  Right Knee X-Ray  Indication: Pain    AP, Lateral, and Avis views    Findings:  No fracture  No bony lesion  Normal soft tissues  : Moderate tricompartmental degenerative ration tricompartmental " osteophytes noted moderate approaching advanced narrowing in all 3 compartments  prior studies were available for comparison.    Assessment:        1. Primary osteoarthritis of right knee         Assessment & Plan  1. Right knee discomfort.  She exhibits occasional mild discomfort along the anterior aspect of the knee, with some mild swelling. There are no symptoms of locking, catching, or giving way. Physical examination reveals maximal tenderness along the medial compartment anterior aspect of the knee, positive crepitus of motion, minimal swelling, and negative effusion. The plan of care was discussed with her. At this time, no further action will be taken as she is not exhibiting any significant symptoms in the knee. Injections will be deferred for now. However, if she wishes to proceed with steroid injections in the future, it can be arranged. She has been encouraged to contact us with any questions or concerns. All her queries were addressed during this visit.    PROCEDURE  The patient received a steroid injection in the past, with the most recent one administered on 09/30/2024.      Orders:  Orders Placed This Encounter   Procedures    XR Knee 3+ View With Gilman Right     No orders of the defined types were placed in this encounter.          Dragon dictation utilized          Patient or patient representative verbalized consent for the use of Ambient Listening during the visit with  JAKE Rivera for chart documentation. 4/14/2025  13:07 EDT

## 2025-04-15 ENCOUNTER — CLINICAL SUPPORT (OUTPATIENT)
Dept: ORTHOPEDIC SURGERY | Facility: CLINIC | Age: 75
End: 2025-04-15
Payer: MEDICARE

## 2025-04-15 DIAGNOSIS — M17.11 PRIMARY OSTEOARTHRITIS OF RIGHT KNEE: Primary | ICD-10-CM

## 2025-04-15 PROCEDURE — 20610 DRAIN/INJ JOINT/BURSA W/O US: CPT | Performed by: NURSE PRACTITIONER

## 2025-04-15 RX ORDER — EMPAGLIFLOZIN 10 MG/1
10 TABLET, FILM COATED ORAL DAILY
Qty: 30 TABLET | Refills: 0 | OUTPATIENT
Start: 2025-04-15

## 2025-04-15 RX ORDER — LIDOCAINE HYDROCHLORIDE 10 MG/ML
8 INJECTION, SOLUTION EPIDURAL; INFILTRATION; INTRACAUDAL; PERINEURAL
Status: COMPLETED | OUTPATIENT
Start: 2025-04-15 | End: 2025-04-15

## 2025-04-15 RX ORDER — TRIAMCINOLONE ACETONIDE 40 MG/ML
80 INJECTION, SUSPENSION INTRA-ARTICULAR; INTRAMUSCULAR
Status: COMPLETED | OUTPATIENT
Start: 2025-04-15 | End: 2025-04-15

## 2025-04-15 RX ADMIN — LIDOCAINE HYDROCHLORIDE 8 ML: 10 INJECTION, SOLUTION EPIDURAL; INFILTRATION; INTRACAUDAL; PERINEURAL at 15:19

## 2025-04-15 RX ADMIN — TRIAMCINOLONE ACETONIDE 80 MG: 40 INJECTION, SUSPENSION INTRA-ARTICULAR; INTRAMUSCULAR at 15:19

## 2025-04-15 NOTE — PROGRESS NOTES
Large Joint Arthrocentesis: R knee  Date/Time: 4/15/2025 3:19 PM  Consent given by: patient  Site marked: site marked  Timeout: Immediately prior to procedure a time out was called to verify the correct patient, procedure, equipment, support staff and site/side marked as required   Supporting Documentation  Indications: pain   Procedure Details  Location: knee - R knee  Preparation: Patient was prepped and draped in the usual sterile fashion  Needle size: 22 G  Approach: anterolateral  Medications administered: 80 mg triamcinolone acetonide 40 MG/ML; 8 mL lidocaine PF 1% 1 %  Patient tolerance: patient tolerated the procedure well with no immediate complications    Presents to clinic today for corticosteroid injection right knee.  I do recommend ice injection site this evening.  I do recommend ice at injection site this evening if necessary.  All questions answered.

## 2025-04-16 ENCOUNTER — TELEPHONE (OUTPATIENT)
Dept: INTERNAL MEDICINE | Facility: CLINIC | Age: 75
End: 2025-04-16

## 2025-04-16 NOTE — TELEPHONE ENCOUNTER
Caller: Amber Campos    Relationship: Self    Best call back number: [phone]     What test was performed: LAB    When was the test performed: 4/9/25    Where was the test performed: IN OFFICE     Additional notes: PATIENT WOULD LIKE A CALLBACK TO GO OVER RESULTS

## 2025-05-06 ENCOUNTER — TREATMENT (OUTPATIENT)
Dept: PHYSICAL THERAPY | Facility: CLINIC | Age: 75
End: 2025-05-06
Payer: MEDICARE

## 2025-05-06 DIAGNOSIS — R26.2 DIFFICULTY WALKING: ICD-10-CM

## 2025-05-06 DIAGNOSIS — M25.572 ACUTE LEFT ANKLE PAIN: Primary | ICD-10-CM

## 2025-05-06 PROCEDURE — 97162 PT EVAL MOD COMPLEX 30 MIN: CPT | Performed by: PHYSICAL THERAPIST

## 2025-05-06 PROCEDURE — 97110 THERAPEUTIC EXERCISES: CPT | Performed by: PHYSICAL THERAPIST

## 2025-05-06 PROCEDURE — 97535 SELF CARE MNGMENT TRAINING: CPT | Performed by: PHYSICAL THERAPIST

## 2025-05-06 NOTE — PROGRESS NOTES
Physical Therapy Initial Evaluation and Plan of Care    [facility]  [address], [place] [id number] [phone] (phone)  [phone] (fax)    Patient: Amber Campos   : 1950  Diagnosis/ICD-10 Code:  Acute left ankle pain [M25.572]  Referring practitioner: Moisés Antoine DPM  Date of Initial Visit: 2025  Today's Date:  2025  Patient seen for 1 sessions           Past Medical History:   Diagnosis Date    Basaloid carcinoma     facial    Carotid artery plaque, bilateral     without stenosis,     Gastroesophageal reflux disease 2016    History of kidney infection     History of mammogram     Hypertension     IC (interstitial cystitis) 2021    Osteopenia     Osteoporosis     Postmenopausal     PVCs (premature ventricular contractions)     Seasonal allergies     Simple renal cyst 2016    Sinus tachycardia     Steatosis of liver 2016    Type 2 diabetes mellitus     Vertigo 2022        Past Surgical History:   Procedure Laterality Date    BREAST BIOPSY Bilateral     Benign    BUNIONECTOMY Right     COLONOSCOPY N/A 2017    Procedure: COLONOSCOPY TO CECUM ;  Surgeon: Aidan Roca MD;  Location: [facility] ENDOSCOPY;  Service:     EXOSTECTOMY Left     HEEL    FOOT FUSION Right     9 screws and plate    KNEE ARTHROSCOPY W/ MENISCAL REPAIR Right     ORIF WRIST FRACTURE Right 2021    Procedure: WRIST OPEN REDUCTION INTERNAL FIXATION;  Surgeon: Salazar Amezcua MD;  Location: [facility];  Service: Orthopedics;  Laterality: Right;    PAP SMEAR      TONSILLECTOMY      TOTAL SHOULDER REVERSE ARTHROPLASTY Right     UMBILICAL HERNIA REPAIR N/A 10/2010        Subjective Evaluation    History of Present Illness  Mechanism of injury: Patient complains of L foot and ankle pain that began insidiously about a month ago. Pain initially was on the bottom of the L foot, more of a plantar fascitis. She was put on a prednisone dose pack which  helped a little but she continued to have ankle pain. Then, she mis-stepped on Easter (inversion injury). X-Rays ruled out any fracture. She was placed in a boot and that made her foot more sore. Over time the pain has gradually improved and is somewhat minimal at this time. She is still dealing with a small knot right near the front of her ankle. She also continues to have pain if she walks long distances. She is unable to bring her foot up very far (dorsiflex)    She has been doing ankle circles to keep her movement.     PLOF: Primarily sedentary. Used to play Tennis     PMH: diabetes, osteoporosis, chronic back pain with R sciatica            Patient Occupation: retired Quality of life: good    Pain  Current pain ratin  At best pain ratin  At worst pain ratin  Location: L Ankle  Quality: sharp and dull ache  Relieving factors: rest, support and relaxation  Aggravating factors: ambulation  Progression: improved    Social Support  Lives in: one-story house  Lives with: spouse    Diagnostic Tests  X-ray: normal    Patient Goals  Patient goals for therapy: increased strength and decreased pain             Objective          Observations   Left Ankle/Foot   Positive for edema.     Additional Ankle/Foot Observation Details  Knot at anterior ankle. Knot is mobile and not painful. Possible lipoma or tendon inflammation. Recommended patient follow up with MD for further evaluation    Palpation   Left   Tenderness of the anterior tibialis.     Tenderness   Left Ankle/Foot   Tenderness in the anterior talofibular ligament.     Neurological Testing     Sensation     Ankle/Foot   Left Ankle/Foot   Intact: light touch    Right Ankle/Foot   Intact: light touch     Active Range of Motion   Left Ankle/Foot   Dorsiflexion (ke): with pain  Plantar flexion: 60 degrees   Inversion: 35 degrees   Eversion: 12 degrees     Right Ankle/Foot   Dorsiflexion (ke): 4 degrees   Plantar flexion: 50 degrees   Inversion: 25 degrees    Eversion: 25 degrees     Additional Active Range of Motion Details  L ankle DF lag: -15 degrees    Passive Range of Motion   Left Ankle/Foot    Dorsiflexion (ke): 0 degrees     Strength/Myotome Testing     Left Ankle/Foot   Dorsiflexion: 3+  Plantar flexion: 4-  Inversion: 3+  Eversion: 3+    Right Ankle/Foot   Dorsiflexion: 4  Plantar flexion: 4  Inversion: 4  Eversion: 4    Tests   Left Ankle/Foot   Negative for valgus stress metatarsophalangeal and varus stress metatarsophalangeal.     Swelling   Left Ankle/Foot   Malleoli: 23 cm    Right Ankle/Foot   Malleoli: 21 cm    Ambulation     Comments   Slow gait, flat foot at initial contact, lack of push off        See Exercise, Manual, and Modality Logs for complete treatment.       Functional Outcome Score: 48/80         Assessment & Plan       Assessment  Impairments: abnormal gait, abnormal or restricted ROM, activity intolerance, impaired balance, impaired physical strength, lacks appropriate home exercise program and pain with function   Functional limitations: walking, uncomfortable because of pain and unable to perform repetitive tasks   Assessment details: Amber Campos is a 74 y.o. year-old female referred to physical therapy for L foot and Ankle Pain. She presents with a evolving clinical presentation.  She has comorbidities diabetes and osteoporosis and personal factors of being somewhat sedentary which may affect her progress in the plan of care.  Signs and symptoms are consistent with physical therapy diagnosis of L foot and ankle pain. Pt was educated on course of treatment, possible reasons for pain, activity modifications, and use of ice/heat PRN. Pt was given a copy of HEP.  Patient is appropriate for skilled physical therapy in order to reduce pain and increase ease with daily mobility.    Prognosis: good    Goals  Plan Goals: STGs to be completed within 30 days:  -Patient will demonstrate compliance and independence with initial HEP  -Patient will  increase L ankle DF AROM to 2 degrees or more to help normalize gait mechanics and increase ease with transfers  -Patient will perform sit to stand transfer with equilateral WB and no UE assistance    LTGs to be completed within 90 days:  -Patient will increase L ankle DF strength to 4/5 or more to prevent foot drag with gait  -Patient will complete community mobility without antalgia and without increased ankle/foot pain  -Patient will reduce edema in L ankle around malleoli by 1 cm to help reduce pain/discomfort  -Patient will improve score on LEFS from 48 at eval to 60 or greater to improve quality of life    Plan  Therapy options: will be seen for skilled therapy services  Planned modality interventions: TENS, ultrasound, electrical stimulation/Russian stimulation, dry needling, cryotherapy, iontophoresis, traction and thermotherapy (hydrocollator packs)  Planned therapy interventions: joint mobilization, stretching, strengthening, therapeutic activities, transfer training, postural training, manual therapy, ADL retraining, balance/weight-bearing training, flexibility, functional ROM exercises, gait training, home exercise program, neuromuscular re-education and motor coordination training  Frequency: 2x week  Treatment plan discussed with: patient  Plan details: Gait training, stairs, Balance, ankle ROM/strength, LE stability      Timed:  Manual Therapy:         mins  62373;  Therapeutic Exercise:    10     mins  26694;     Neuromuscular Mercedes:        mins  20611;    Therapeutic Activity:          mins  78628;     Gait Training:           mins  86781;     Ultrasound:     8     mins  41238;    Iontophoresis         mins 50140;  Self Care    12     mins 70328    Untimed:  Electrical Stimulation:         mins  79685 ( );  Traction:       mins  87177;   Dry Needling   (1-2 muscles)   _     mins 20560 (Self-pay)  Dry Needling (3-4 muscles)  _     mins 20561 (Self-pay)  Dry Needling Trial    _     mins  DRYNDLTRIAL  (No Charge)  Low Eval          Mins  90201  Mod Eval     20     Mins  49345  High Eval                            Mins  24988  Re- Eval                           Mins  95662    Timed Treatment:   30   mins   Total Treatment:     50   mins    PT SIGNATURE: Lila Cisneros PT     License Number: [place] [id number]    Electronically signed by Lila Cisneros PT, 05/06/25, 8:10 AM EDT    DATE TREATMENT INITIATED: 5/6/2025    Initial Certification  Certification Period: 8/4/2025  I certify that the therapy services are furnished while this patient is under my care.  The services outlined above are required by this patient, and will be reviewed every 90 days.     PHYSICIAN: Moisés Antoine DPM   NPI: [id number]                                         DATE:     Please sign and return via fax to [phone] Thank you, [facility].

## 2025-05-14 ENCOUNTER — TREATMENT (OUTPATIENT)
Dept: PHYSICAL THERAPY | Facility: CLINIC | Age: 75
End: 2025-05-14
Payer: MEDICARE

## 2025-05-14 ENCOUNTER — APPOINTMENT (OUTPATIENT)
Dept: BONE DENSITY | Facility: HOSPITAL | Age: 75
End: 2025-05-14
Payer: MEDICARE

## 2025-05-14 DIAGNOSIS — M25.572 ACUTE LEFT ANKLE PAIN: ICD-10-CM

## 2025-05-14 DIAGNOSIS — M81.0 OSTEOPOROSIS OF LUMBAR SPINE: ICD-10-CM

## 2025-05-14 DIAGNOSIS — R26.2 DIFFICULTY WALKING: Primary | ICD-10-CM

## 2025-05-14 PROCEDURE — 97530 THERAPEUTIC ACTIVITIES: CPT | Performed by: PHYSICAL THERAPIST

## 2025-05-14 PROCEDURE — 97110 THERAPEUTIC EXERCISES: CPT | Performed by: PHYSICAL THERAPIST

## 2025-05-14 PROCEDURE — 77080 DXA BONE DENSITY AXIAL: CPT

## 2025-05-14 NOTE — PROGRESS NOTES
"Physical Therapy Daily Treatment Note    [facility]  [address] [place] [address]  [phone] (phone)  [phone] (fax)    Patient: Amber Campos   : 1950  Diagnosis/ICD-10 Code:  Difficulty walking [R26.2]  Referring practitioner: Moisés Antoine DPM  Date of Initial Visit: Type: THERAPY  Noted: 2025  Today's Date:  2025  Patient seen for 2 sessions           Subjective   US seemed to be helpful. I have been doing my towel stretch but I couldn't remember the other exercises. Ankle is not hurting at all right now.     Objective     See Exercise, Manual, and Modality Logs for complete treatment.     Assessment/Plan  Reviewed HEP and provided patient will printout to encourage better compliance. Also added ankle inversion, eversion, and plantarflexion to promote overall ankle stability. Patient complains of her L foot \"slapping\" when she walks, therefore there is likely poor muscle endurance in her L anterior tibialis. Explained how good compliance with HEP should help improve gait mechanics.          Timed:    Manual Therapy:         mins  28172;  Therapeutic Exercise:    12     mins  45830;     Neuromuscular Mercedes:        mins  26251;    Therapeutic Activity:     10     mins  28367;     Gait Training:           mins  56765;     Ultrasound:     8     mins  23056;    Electrical Stimulation:         mins  50128 ( );  Iontophoresis         mins 61991;  Aquatic Therapy         mins 51431;    Untimed:  Electrical Stimulation:         mins  20897 ( );  Traction:         mins  85717;   Dry Needling   (1-2 muscles)       mins  (Self-pay)  Dry Needling (3-4 muscles)        mins  (Self-pay)  Dry Needling Trial          mins DRYNDLTRIAL  (No Charge)    Timed Treatment:   30   mins   Total Treatment:     30   mins    Lila Cisneros PT  Physical Therapist    [place] License:[id number]  "

## 2025-05-23 DIAGNOSIS — F41.8 DEPRESSION WITH ANXIETY: ICD-10-CM

## 2025-05-23 RX ORDER — VENLAFAXINE HYDROCHLORIDE 75 MG/1
75 CAPSULE, EXTENDED RELEASE ORAL DAILY
Qty: 90 CAPSULE | Refills: 3 | Status: SHIPPED | OUTPATIENT
Start: 2025-05-23

## 2025-05-23 NOTE — TELEPHONE ENCOUNTER
Rx Refill Note  Requested Prescriptions     Pending Prescriptions Disp Refills    venlafaxine XR (EFFEXOR-XR) 75 MG 24 hr capsule [Pharmacy Med Name: VENLAFAXINE HCL ER CAPS 75MG] 90 capsule 3     Sig: TAKE 1 CAPSULE DAILY      Last office visit with prescribing clinician: 4/9/2025   Last telemedicine visit with prescribing clinician: Visit date not found   Next office visit with prescribing clinician: 7/18/2025                         Would you like a call back once the refill request has been completed: [] Yes [] No    If the office needs to give you a call back, can they leave a voicemail: [] Yes [] No    Timothy Osman MA  05/23/25, 08:01 EDT

## 2025-05-26 ENCOUNTER — APPOINTMENT (OUTPATIENT)
Dept: CT IMAGING | Facility: HOSPITAL | Age: 75
End: 2025-05-26
Payer: MEDICARE

## 2025-05-26 ENCOUNTER — HOSPITAL ENCOUNTER (EMERGENCY)
Facility: HOSPITAL | Age: 75
Discharge: HOME | End: 2025-05-26
Attending: EMERGENCY MEDICINE | Admitting: EMERGENCY MEDICINE
Payer: MEDICARE

## 2025-05-26 ENCOUNTER — APPOINTMENT (OUTPATIENT)
Dept: GENERAL RADIOLOGY | Facility: HOSPITAL | Age: 75
End: 2025-05-26
Payer: MEDICARE

## 2025-05-26 VITALS
WEIGHT: 150 LBS | RESPIRATION RATE: 18 BRPM | HEIGHT: 63 IN | BODY MASS INDEX: 26.58 KG/M2 | DIASTOLIC BLOOD PRESSURE: 61 MMHG | TEMPERATURE: 97.4 F | SYSTOLIC BLOOD PRESSURE: 132 MMHG | OXYGEN SATURATION: 99 % | HEART RATE: 88 BPM

## 2025-05-26 DIAGNOSIS — S09.90XA INJURY OF HEAD, INITIAL ENCOUNTER: ICD-10-CM

## 2025-05-26 DIAGNOSIS — S62.92XA: Primary | ICD-10-CM

## 2025-05-26 DIAGNOSIS — T14.8XXA ABRASION: ICD-10-CM

## 2025-05-26 PROCEDURE — 72125 CT NECK SPINE W/O DYE: CPT

## 2025-05-26 PROCEDURE — 99284 EMERGENCY DEPT VISIT MOD MDM: CPT | Performed by: EMERGENCY MEDICINE

## 2025-05-26 PROCEDURE — 70450 CT HEAD/BRAIN W/O DYE: CPT

## 2025-05-26 PROCEDURE — 70486 CT MAXILLOFACIAL W/O DYE: CPT

## 2025-05-26 PROCEDURE — 73130 X-RAY EXAM OF HAND: CPT

## 2025-05-26 NOTE — ED PROVIDER NOTES
Subjective   History of Present Illness  Patient is a 74-year-old female who had a mechanical fall when she tripped over her own 2 feet walking down the hallway.  She fell forward and had some blood from her nose but says she feels like is from the sore at the tip of her nose.  She also has some pain at the base of the fifth metatarsal and some bruising on her palm.  She is not having neck pain or headache or syncope or vomiting.  She does have some soreness on the right knee but that resolved prior to physical exam.  She denies blood thinners.      Review of Systems   Musculoskeletal:  Negative for back pain and neck pain.   Skin:  Positive for color change and wound.   Neurological:  Negative for dizziness, syncope and headaches.   All other systems reviewed and are negative.      Past Medical History:   Diagnosis Date    Basaloid carcinoma     facial    Carotid artery plaque, bilateral     without stenosis, 2023    Gastroesophageal reflux disease 03/14/2016    History of kidney infection     History of mammogram     Hypertension     IC (interstitial cystitis) 02/03/2021    Osteopenia     Osteoporosis     Postmenopausal     PVCs (premature ventricular contractions)     Seasonal allergies     Simple renal cyst 03/14/2016    Sinus tachycardia     Steatosis of liver 03/14/2016    Type 2 diabetes mellitus     Vertigo 05/09/2022       Allergies   Allergen Reactions    Contrast Dye (Echo Or Unknown Ct/Mr) Anaphylaxis     Contrast dye used in Xray    Iodinated Contrast Media Photosensitivity     contrast    Adhesive Tape Other (See Comments)     EKG stickers only    Farxiga [Dapagliflozin] Diarrhea and Itching     Yeast infeciotn    Neomycin-Bacitracin Zn-Polymyx Rash    Toprol Xl [Metoprolol] Dizziness    Trulicity [Dulaglutide] GI Intolerance     Constipation    Victoza [Liraglutide] Nausea Only       Past Surgical History:   Procedure Laterality Date    BREAST BIOPSY Bilateral     Benign    BUNIONECTOMY Right      COLONOSCOPY N/A 04/25/2017    Procedure: COLONOSCOPY TO CECUM ;  Surgeon: Aidan Roca MD;  Location:  [facility];  Service:     EXOSTECTOMY Left     HEEL    FOOT FUSION Right     9 screws and plate    KNEE ARTHROSCOPY W/ MENISCAL REPAIR Right     ORIF WRIST FRACTURE Right 12/01/2021    Procedure: WRIST OPEN REDUCTION INTERNAL FIXATION;  Surgeon: Salazar Amezcua MD;  Location:  [facility];  Service: Orthopedics;  Laterality: Right;    PAP SMEAR      TONSILLECTOMY      TOTAL SHOULDER REVERSE ARTHROPLASTY Right     UMBILICAL HERNIA REPAIR N/A 10/2010       Family History   Problem Relation Age of Onset    Other Mother         brain death    Emphysema Mother     Alcohol abuse Father     Glaucoma Father     Other Sister         fatty liver    Heart disease Sister         valve problem, being followed (no surgery required)    Arthritis Sister     Gallbladder disease Sister     Other Brother         fatty liver    Glaucoma Maternal Grandmother     Heart disease Maternal Grandmother     Alcohol abuse Maternal Aunt     Diabetes Maternal Aunt     Breast cancer Paternal Aunt        Social History     Socioeconomic History    Marital status:    Tobacco Use    Smoking status: Never     Passive exposure: Never    Smokeless tobacco: Never   Vaping Use    Vaping status: Never Used   Substance and Sexual Activity    Alcohol use: No    Drug use: No    Sexual activity: Defer           Objective   Physical Exam  Vitals and nursing note reviewed.   Constitutional:       Appearance: Normal appearance.   HENT:      Right Ear: Tympanic membrane normal.      Left Ear: Tympanic membrane normal.   Eyes:      Extraocular Movements: Extraocular movements intact.      Conjunctiva/sclera: Conjunctivae normal.      Pupils: Pupils are equal, round, and reactive to light.   Cardiovascular:      Rate and Rhythm: Normal rate. Rhythm irregular.      Pulses: Normal pulses.      Heart sounds: Normal heart sounds.   Pulmonary:       Effort: Pulmonary effort is normal.      Breath sounds: Normal breath sounds.   Musculoskeletal:      Cervical back: Normal range of motion and neck supple.   Skin:     General: Skin is warm and dry.      Comments:  bruising left palm between thenar and hyperthenar eminence.  Tenderness along the proximal half fifth left metacarpal.  Small abrasion at the very top portion of the nares with dried blood   Neurological:      Mental Status: She is alert.      Gait: Gait normal.   Psychiatric:         Mood and Affect: Mood normal.         Behavior: Behavior normal.         Procedures           ED Course                                                       Medical Decision Making  Patient says she had a mechanical fall prior to arrival.  There is pain in the left fifth metacarpal.  She also has a abrasion and tenderness to the tip of her nose.  There is a small cut inside her lip and a very superficial cut just above the upper lip.  Sutures not needed inside or outside the mouth.  She denies loose teeth.  CAT scan head, face, neck are unremarkable other than chronic issues.  There is concern for a possible left fifth metacarpal fracture and this is where the pain is.  We will put her in a splint and give her a sling and she will follow-up with hand.  She can return to emergency room for any reasons including any neurological symptoms and at this time medically cleared.  She is ANO x 4 here.  It was a mechanical fall.  Feels comfortable going home.  I answered all questions of hers and her .    ---------------------------               05/26/25                      1258         ---------------------------   BP:          132/61         Pulse:         88           Resp:          18           Temp:   97.4 °F (36.3 °C)   SpO2:          99%         ---------------------------    XR Hand 3+ View Left  Result Date: 5/26/2025  No acute fracture or dislocation. Electronically Signed: Piyush  MD Altaf  5/26/2025 2:35 PM EDT  Workstation ID: [id number]    CT Facial Bones Without Contrast  Result Date: 5/26/2025  Impression: 1.No acute maxillofacial fracture. 2.No acute fracture or traumatic malalignment of the cervical spine. Electronically Signed: Dustin Delacruz MD  5/26/2025 2:24 PM EDT  Workstation ID: [id number]    CT Cervical Spine Without Contrast  Result Date: 5/26/2025  Impression: 1.No acute maxillofacial fracture. 2.No acute fracture or traumatic malalignment of the cervical spine. Electronically Signed: Dustin Delacruz MD  5/26/2025 2:24 PM EDT  Workstation ID: [id number]    CT Head Without Contrast  Result Date: 5/26/2025  Impression: No acute intracranial findings. Electronically Signed: Dustin Delacruz MD  5/26/2025 2:18 PM EDT  Workstation ID: [id number]        Problems Addressed:  Abrasion: complicated acute illness or injury  Closed hand fracture, left, initial encounter: complicated acute illness or injury  Injury of head, initial encounter: complicated acute illness or injury    Amount and/or Complexity of Data Reviewed  Radiology: ordered.        Final diagnoses:   Closed hand fracture, left, initial encounter   Injury of head, initial encounter   Abrasion       ED Disposition  ED Disposition       ED Disposition   Discharge    Condition   Stable    Comment   --               Salazar Amezcua MD  [address] [address] [phone]    Schedule an appointment as soon as possible for a visit       Jacqueline Ray MD  [address] [facility] [id number] [phone]      Hand specialist    [facility]  [address]  [facility] [address]  [phone]    Hand specialist         Medication List      No changes were made to your prescriptions during this visit.            Sirena Martinez PA-C  05/26/25 7100

## 2025-06-09 ENCOUNTER — TRANSCRIBE ORDERS (OUTPATIENT)
Dept: ULTRASOUND IMAGING | Facility: HOSPITAL | Age: 75
End: 2025-06-09
Payer: MEDICARE

## 2025-06-09 DIAGNOSIS — Z12.31 SCREENING MAMMOGRAM, ENCOUNTER FOR: Primary | ICD-10-CM

## 2025-06-19 DIAGNOSIS — M81.0 AGE-RELATED OSTEOPOROSIS WITHOUT CURRENT PATHOLOGICAL FRACTURE: Primary | ICD-10-CM

## 2025-06-19 DIAGNOSIS — M80.031A PATHOLOGICAL FRACTURE OF RIGHT RADIUS DUE TO AGE-RELATED OSTEOPOROSIS, INITIAL ENCOUNTER: ICD-10-CM

## 2025-06-30 ENCOUNTER — TELEPHONE (OUTPATIENT)
Dept: INTERNAL MEDICINE | Facility: CLINIC | Age: 75
End: 2025-06-30

## 2025-06-30 ENCOUNTER — HOSPITAL ENCOUNTER (OUTPATIENT)
Dept: INFUSION THERAPY | Facility: HOSPITAL | Age: 75
Discharge: HOME | End: 2025-06-30
Admitting: NURSE PRACTITIONER
Payer: MEDICARE

## 2025-06-30 ENCOUNTER — TELEPHONE (OUTPATIENT)
Dept: INTERNAL MEDICINE | Facility: CLINIC | Age: 75
End: 2025-06-30
Payer: MEDICARE

## 2025-06-30 VITALS
SYSTOLIC BLOOD PRESSURE: 119 MMHG | HEART RATE: 80 BPM | RESPIRATION RATE: 16 BRPM | TEMPERATURE: 97.2 F | OXYGEN SATURATION: 100 % | DIASTOLIC BLOOD PRESSURE: 67 MMHG

## 2025-06-30 DIAGNOSIS — M80.031A PATHOLOGICAL FRACTURE OF RIGHT RADIUS DUE TO AGE-RELATED OSTEOPOROSIS, INITIAL ENCOUNTER: Primary | ICD-10-CM

## 2025-06-30 LAB
25(OH)D3 SERPL-MCNC: 41.8 NG/ML (ref 30–100)
ALBUMIN SERPL-MCNC: 4.2 G/DL (ref 3.5–5.2)
ALBUMIN/GLOB SERPL: 1.4 G/DL
ALP SERPL-CCNC: 50 U/L (ref 39–117)
ALT SERPL W P-5'-P-CCNC: 16 U/L (ref 1–33)
ANION GAP SERPL CALCULATED.3IONS-SCNC: 12.6 MMOL/L (ref 5–15)
AST SERPL-CCNC: 21 U/L (ref 1–32)
BILIRUB SERPL-MCNC: 0.3 MG/DL (ref 0–1.2)
BUN SERPL-MCNC: 19.1 MG/DL (ref 8–23)
BUN/CREAT SERPL: 32.4 (ref 7–25)
CALCIUM SPEC-SCNC: 9.4 MG/DL (ref 8.6–10.5)
CHLORIDE SERPL-SCNC: 102 MMOL/L (ref 98–107)
CO2 SERPL-SCNC: 23.4 MMOL/L (ref 22–29)
CREAT SERPL-MCNC: 0.59 MG/DL (ref 0.57–1)
EGFRCR SERPLBLD CKD-EPI 2021: 93.5 ML/MIN/1.73
GLOBULIN UR ELPH-MCNC: 3 GM/DL
GLUCOSE SERPL-MCNC: 145 MG/DL (ref 65–99)
MAGNESIUM SERPL-MCNC: 1.6 MG/DL (ref 1.6–2.4)
PHOSPHATE SERPL-MCNC: 3.3 MG/DL (ref 2.5–4.5)
POTASSIUM SERPL-SCNC: 4.2 MMOL/L (ref 3.5–5.2)
PROT SERPL-MCNC: 7.2 G/DL (ref 6–8.5)
SODIUM SERPL-SCNC: 138 MMOL/L (ref 136–145)

## 2025-06-30 PROCEDURE — 25010000002 DENOSUMAB 60 MG/ML SOLUTION PREFILLED SYRINGE: Performed by: NURSE PRACTITIONER

## 2025-06-30 PROCEDURE — 84100 ASSAY OF PHOSPHORUS: CPT | Performed by: NURSE PRACTITIONER

## 2025-06-30 PROCEDURE — 96372 THER/PROPH/DIAG INJ SC/IM: CPT

## 2025-06-30 PROCEDURE — 83735 ASSAY OF MAGNESIUM: CPT | Performed by: NURSE PRACTITIONER

## 2025-06-30 PROCEDURE — 80053 COMPREHEN METABOLIC PANEL: CPT | Performed by: NURSE PRACTITIONER

## 2025-06-30 PROCEDURE — 82306 VITAMIN D 25 HYDROXY: CPT | Performed by: NURSE PRACTITIONER

## 2025-06-30 RX ORDER — ACYCLOVIR 400 MG/1
1 TABLET ORAL
Qty: 12 EACH | Refills: 3 | Status: SHIPPED | OUTPATIENT
Start: 2025-06-30

## 2025-06-30 RX ORDER — ACYCLOVIR 400 MG/1
1 TABLET ORAL
Qty: 12 EACH | Refills: 3 | Status: SHIPPED | OUTPATIENT
Start: 2025-06-30 | End: 2025-06-30

## 2025-06-30 RX ADMIN — DENOSUMAB 60 MG: 60 INJECTION SUBCUTANEOUS at 09:37

## 2025-06-30 NOTE — TELEPHONE ENCOUNTER
Caller: Amber Campos    Relationship: Self    Best call back number: [phone]     What was the call regarding: [facility] HAS OPENED UP A NEW [facility]ARMACY NAMED [facility] FOR GLP1 PATIENTS. SHE GOT HER LATEST PRESCRIPTION TODAY, BUT THEY WILL CONTACT PCP TO GET PRESCRIPTION TO GO THE NEW PHARMACY. WANTED TO MAKE SURE THAT PRESCRIPTION FOR MOUNJARO WAS AT 7.5 MG.

## 2025-06-30 NOTE — TELEPHONE ENCOUNTER
Caller: Amber Campos    Relationship: Self    Best call back number: [phone]     What was the call regarding:   PATIENT STATED SHE HAD A BONE DENSITY TEST AND SHE WOULD LIKE TO KNOW THE RESULTS OF IT. PLEASE ADVISE.

## 2025-06-30 NOTE — TELEPHONE ENCOUNTER
Caller: Amber Campos    Relationship: Self    Best call back number: [phone]     What was the call regarding: PATIENT STATED SHE JUST HAD HER PROLIA SHOT TODAY, 06/30/25. SHE NEEDS A NEW PRESCRIPTION FOR THEM TO DO IT IN DECEMBER.

## 2025-06-30 NOTE — TELEPHONE ENCOUNTER
Caller: Amber Campos    Relationship: Self    Best call back number: [phone]     What was the call regarding:   PATIENT IS NEEDING HER PRESCRIPTION FOR  DEXCOM G7 SWITCHED TO [facility].

## 2025-07-01 DIAGNOSIS — M81.0 AGE-RELATED OSTEOPOROSIS WITHOUT CURRENT PATHOLOGICAL FRACTURE: Primary | ICD-10-CM

## 2025-07-09 ENCOUNTER — TELEPHONE (OUTPATIENT)
Dept: ORTHOPEDIC SURGERY | Facility: CLINIC | Age: 75
End: 2025-07-09
Payer: MEDICARE

## 2025-07-09 ENCOUNTER — TELEPHONE (OUTPATIENT)
Dept: ORTHOPEDIC SURGERY | Facility: CLINIC | Age: 75
End: 2025-07-09

## 2025-07-09 NOTE — TELEPHONE ENCOUNTER
Will watch for request to come through. Have not received yet. Patient has enough to last the rest of the month and has an upcoming appt we can discuss at as well.

## 2025-07-09 NOTE — TELEPHONE ENCOUNTER
Patient called to cancel appointment due to her having surgery with  she won't be able to get injection. She will call back to r/s.

## 2025-07-11 ENCOUNTER — HOSPITAL ENCOUNTER (OUTPATIENT)
Dept: GENERAL RADIOLOGY | Facility: HOSPITAL | Age: 75
Discharge: HOME | End: 2025-07-11
Payer: MEDICARE

## 2025-07-11 ENCOUNTER — PRE-ADMISSION TESTING (OUTPATIENT)
Dept: PREADMISSION TESTING | Facility: HOSPITAL | Age: 75
End: 2025-07-11
Payer: MEDICARE

## 2025-07-11 VITALS
BODY MASS INDEX: 28.28 KG/M2 | HEART RATE: 88 BPM | RESPIRATION RATE: 16 BRPM | OXYGEN SATURATION: 95 % | HEIGHT: 63 IN | WEIGHT: 159.6 LBS | TEMPERATURE: 98.2 F | SYSTOLIC BLOOD PRESSURE: 127 MMHG | DIASTOLIC BLOOD PRESSURE: 64 MMHG

## 2025-07-11 LAB
BASOPHILS # BLD AUTO: 0.06 10*3/MM3 (ref 0–0.2)
BASOPHILS NFR BLD AUTO: 0.8 % (ref 0–1.5)
DEPRECATED RDW RBC AUTO: 44.3 FL (ref 37–54)
EOSINOPHIL # BLD AUTO: 0.25 10*3/MM3 (ref 0–0.4)
EOSINOPHIL NFR BLD AUTO: 3.5 % (ref 0.3–6.2)
ERYTHROCYTE [DISTWIDTH] IN BLOOD BY AUTOMATED COUNT: 14.4 % (ref 12.3–15.4)
HCT VFR BLD AUTO: 31.1 % (ref 34–46.6)
HGB BLD-MCNC: 10.2 G/DL (ref 12–15.9)
IMM GRANULOCYTES # BLD AUTO: 0.01 10*3/MM3 (ref 0–0.05)
IMM GRANULOCYTES NFR BLD AUTO: 0.1 % (ref 0–0.5)
LYMPHOCYTES # BLD AUTO: 2.38 10*3/MM3 (ref 0.7–3.1)
LYMPHOCYTES NFR BLD AUTO: 33.2 % (ref 19.6–45.3)
MCH RBC QN AUTO: 27.9 PG (ref 26.6–33)
MCHC RBC AUTO-ENTMCNC: 32.8 G/DL (ref 31.5–35.7)
MCV RBC AUTO: 85.2 FL (ref 79–97)
MONOCYTES # BLD AUTO: 0.61 10*3/MM3 (ref 0.1–0.9)
MONOCYTES NFR BLD AUTO: 8.5 % (ref 5–12)
NEUTROPHILS NFR BLD AUTO: 3.85 10*3/MM3 (ref 1.7–7)
NEUTROPHILS NFR BLD AUTO: 53.9 % (ref 42.7–76)
NRBC BLD AUTO-RTO: 0 /100 WBC (ref 0–0.2)
PLATELET # BLD AUTO: 374 10*3/MM3 (ref 140–450)
PMV BLD AUTO: 10.3 FL (ref 6–12)
RBC # BLD AUTO: 3.65 10*6/MM3 (ref 3.77–5.28)
WBC NRBC COR # BLD AUTO: 7.16 10*3/MM3 (ref 3.4–10.8)

## 2025-07-11 PROCEDURE — 85025 COMPLETE CBC W/AUTO DIFF WBC: CPT

## 2025-07-11 PROCEDURE — 36415 COLL VENOUS BLD VENIPUNCTURE: CPT

## 2025-07-11 PROCEDURE — 71046 X-RAY EXAM CHEST 2 VIEWS: CPT

## 2025-07-11 RX ORDER — TIRZEPATIDE 7.5 MG/.5ML
INJECTION, SOLUTION SUBCUTANEOUS
COMMUNITY

## 2025-07-11 RX ORDER — KETOCONAZOLE 20 MG/ML
SHAMPOO, SUSPENSION TOPICAL
COMMUNITY
Start: 2025-06-30

## 2025-07-11 NOTE — DISCHARGE INSTRUCTIONS
Take the following medications the morning of surgery:  PANTOPRAZOLE AND VENLAFAXINE    BRING INHALERS WITH YOU    If you are on an Aspirin or a Blood Thinner please clarify with the surgeon and prescribing physician if and when you are to hold the medication or if you are to continue the medication.  If you are on prescription narcotic pain medication to control your pain you may also take that medication the morning of surgery.      General Instructions:     Do not eat solid food after midnight the night before surgery.  Clear liquids day of surgery are allowed but must be stopped at least two hours before your hospital arrival time.       Allowed clear liquids      Water, sodas, and tea or coffee with no cream or milk added.       12 to 20 ounces of a clear liquid that contains carbohydrates is recommended.  If non-diabetic, have Gatorade or Powerade.  If diabetic, have G2 or Powerade Zero.     Do not have liquids red in color.  Do not consume chicken, beef, pork or vegetable broth or bouillon cubes of any variety as they are not considered clear liquids and are not allowed.      Infants may have breast milk up to four hours before surgery.  Infants drinking formula may drink formula up to six hours before surgery.   Patients who avoid smoking, chewing tobacco and alcohol for 4 weeks prior to surgery have a reduced risk of post-operative complications.  Quit smoking as many days before surgery as you can.  Do not smoke, use chewing tobacco or drink alcohol the day of surgery.   If applicable bring your C-PAP/ BI-PAP machine in with you to preop day of surgery.  Bring any papers given to you in the doctor’s office.  Wear clean comfortable clothes.  Do not wear contact lenses, false eyelashes or make-up.  Bring a case for your glasses.   Bring crutches or walker if applicable.  Remove all piercings.  Leave jewelry and any other valuables at home.  Hair extensions with metal clips must be removed prior to  surgery.  The Pre-Admission Testing nurse will instruct you to bring medications if unable to obtain an accurate list in Pre-Admission Testing.    Day of surgery you will need to let the preoperative nurse know the last time you took each of your medications.  To ensure a safe environment for patients and staff, we kindly ask that children under the age of 16 not accompany patients.  If you must bring a dependent child or dependent adult please ensure a responsible adult, other than yourself, is present to supervise them.      If you were given a blood bank ID arm band remember to bring it with you the day of surgery.    Preventing a Surgical Site Infection:  For 2 to 3 days before surgery, avoid shaving with a razor because the razor can irritate skin and make it easier to develop an infection.    Any areas of open skin can increase the risk of a post-operative wound infection by allowing bacteria to enter and travel throughout the body.  Notify your surgeon if you have any skin wounds / rashes even if it is not near the expected surgical site.  The area will need assessed to determine if surgery should be delayed until it is healed.  The night prior to surgery shower using a fresh bar of anti-bacterial soap (such as Dial) and clean washcloth.  Sleep in a clean bed with clean clothing.  Do not allow pets to sleep with you.  Shower on the morning of surgery using a fresh bar of anti-bacterial soap (such as Dial) and clean washcloth.  Dry with a clean towel and dress in clean clothing.  Ask your surgeon if you will be receiving antibiotics prior to surgery.  Make sure you, your family, and all healthcare providers clean their hands with soap and water or an alcohol based hand  before caring for you or your wound.    Day of surgery:  Your arrival time is approximately two hours before your scheduled surgery time.  Please note if you have an early arrival time the surgery doors do not open before 5:00 AM.   Upon arrival, a Pre-op nurse and Anesthesiologist will review your health history, obtain vital signs, and answer questions you may have.  The only belongings needed at this time will be a list of your home medications and if applicable your C-PAP/BI-PAP machine.  A Pre-op nurse will start an IV and you may receive medication in preparation for surgery, including something to help you relax.     Please be aware that surgery does come with discomfort.  We want to make every effort to control your discomfort so please discuss any uncontrolled symptoms with your nurse.   Your doctor will most likely have prescribed pain medications.      If you are going home after surgery you will receive individualized written care instructions before being discharged.  A responsible adult must drive you to and from the hospital on the day of your surgery and ideally stay with you through the night.   .  Discharge prescriptions can be filled by the hospital pharmacy during regular pharmacy hours.  If you are having surgery late in the day/evening your prescription may be e-prescribed to your pharmacy.  Please verify your pharmacy hours or chose a 24 hour pharmacy to avoid not having access to your prescription because your pharmacy has closed for the day.    If you are staying overnight following surgery, you will be transported to your hospital room following the recovery period.  [facility] has all private rooms.    If you have any questions please call Pre-Admission Testing at [phone].  Deductibles and co-payments are collected on the day of service. Please be prepared to pay the required co-pay, deductible or deposit on the day of service as defined by your plan.    Call your surgeon immediately if you experience any of the following symptoms:  Sore Throat  Shortness of Breath or difficulty breathing  Cough  Chills  Body soreness or muscle pain  Headache  Fever  New loss of taste or smell  Do not arrive for  your surgery ill.  Your procedure will need to be rescheduled to another time.  You will need to call your physician before the day of surgery to avoid any unnecessary exposure to hospital staff as well as other patients.

## 2025-07-12 LAB
25(OH)D3+25(OH)D2 SERPL-MCNC: 43.6 NG/ML (ref 30–100)
ALBUMIN SERPL-MCNC: 4.1 G/DL (ref 3.5–5.2)
ALBUMIN/CREAT UR: 21 MG/G CREAT (ref 0–29)
ALBUMIN/GLOB SERPL: 1.5 G/DL
ALP SERPL-CCNC: 55 U/L (ref 39–117)
ALT SERPL-CCNC: 16 U/L (ref 1–33)
AST SERPL-CCNC: 25 U/L (ref 1–32)
BASOPHILS # BLD AUTO: 0.06 10*3/MM3 (ref 0–0.2)
BASOPHILS NFR BLD AUTO: 1.2 % (ref 0–1.5)
BILIRUB SERPL-MCNC: 0.2 MG/DL (ref 0–1.2)
BUN SERPL-MCNC: 16 MG/DL (ref 8–23)
BUN/CREAT SERPL: 24.2 (ref 7–25)
CALCIUM SERPL-MCNC: 9.8 MG/DL (ref 8.6–10.5)
CHLORIDE SERPL-SCNC: 100 MMOL/L (ref 98–107)
CHOLEST SERPL-MCNC: 248 MG/DL (ref 0–200)
CHOLEST/HDLC SERPL: 3.4 {RATIO}
CO2 SERPL-SCNC: 23 MMOL/L (ref 22–29)
CREAT SERPL-MCNC: 0.66 MG/DL (ref 0.57–1)
CREAT UR-MCNC: 108 MG/DL
EGFRCR SERPLBLD CKD-EPI 2021: 91 ML/MIN/1.73
EOSINOPHIL # BLD AUTO: 0.27 10*3/MM3 (ref 0–0.4)
EOSINOPHIL NFR BLD AUTO: 5.3 % (ref 0.3–6.2)
ERYTHROCYTE [DISTWIDTH] IN BLOOD BY AUTOMATED COUNT: 14.3 % (ref 12.3–15.4)
FERRITIN SERPL-MCNC: 13.7 NG/ML (ref 13–150)
GLOBULIN SER CALC-MCNC: 2.7 GM/DL
GLUCOSE SERPL-MCNC: 169 MG/DL (ref 65–99)
HBA1C MFR BLD: 6.9 % (ref 4.8–5.6)
HCT VFR BLD AUTO: 33.6 % (ref 34–46.6)
HDLC SERPL-MCNC: 73 MG/DL (ref 40–60)
HGB BLD-MCNC: 11.3 G/DL (ref 12–15.9)
IMM GRANULOCYTES # BLD AUTO: 0.01 10*3/MM3 (ref 0–0.05)
IMM GRANULOCYTES NFR BLD AUTO: 0.2 % (ref 0–0.5)
IRON SATN MFR SERPL: 7 % (ref 20–50)
IRON SERPL-MCNC: 39 MCG/DL (ref 37–145)
LDLC SERPL CALC-MCNC: 129 MG/DL (ref 0–100)
LYMPHOCYTES # BLD AUTO: 1.94 10*3/MM3 (ref 0.7–3.1)
LYMPHOCYTES NFR BLD AUTO: 38.1 % (ref 19.6–45.3)
MCH RBC QN AUTO: 28.1 PG (ref 26.6–33)
MCHC RBC AUTO-ENTMCNC: 33.6 G/DL (ref 31.5–35.7)
MCV RBC AUTO: 83.6 FL (ref 79–97)
MICROALBUMIN UR-MCNC: 23.1 UG/ML
MONOCYTES # BLD AUTO: 0.46 10*3/MM3 (ref 0.1–0.9)
MONOCYTES NFR BLD AUTO: 9 % (ref 5–12)
NEUTROPHILS # BLD AUTO: 2.35 10*3/MM3 (ref 1.7–7)
NEUTROPHILS NFR BLD AUTO: 46.2 % (ref 42.7–76)
NRBC BLD AUTO-RTO: 0 /100 WBC (ref 0–0.2)
PLATELET # BLD AUTO: 426 10*3/MM3 (ref 140–450)
POTASSIUM SERPL-SCNC: 4.3 MMOL/L (ref 3.5–5.2)
PROT SERPL-MCNC: 6.8 G/DL (ref 6–8.5)
RBC # BLD AUTO: 4.02 10*6/MM3 (ref 3.77–5.28)
SODIUM SERPL-SCNC: 139 MMOL/L (ref 136–145)
TIBC SERPL-MCNC: 533 MCG/DL
TRIGL SERPL-MCNC: 262 MG/DL (ref 0–150)
TSH SERPL DL<=0.005 MIU/L-ACNC: 1.27 UIU/ML (ref 0.45–4.5)
UIBC SERPL-MCNC: 494 MCG/DL (ref 112–346)
VIT B12 SERPL-MCNC: 300 PG/ML (ref 211–946)
VLDLC SERPL CALC-MCNC: 46 MG/DL (ref 5–40)
WBC # BLD AUTO: 5.09 10*3/MM3 (ref 3.4–10.8)

## 2025-07-14 ENCOUNTER — TELEPHONE (OUTPATIENT)
Dept: INTERNAL MEDICINE | Facility: CLINIC | Age: 75
End: 2025-07-14

## 2025-07-18 ENCOUNTER — TELEPHONE (OUTPATIENT)
Dept: INTERNAL MEDICINE | Facility: CLINIC | Age: 75
End: 2025-07-18

## 2025-07-18 NOTE — TELEPHONE ENCOUNTER
Pt called with concerns of maybe a broken bone. She was helping her  putting on his shoes, as she reached over her shoulder to grab something she heard a pop near her clavicle. The bone is swelling.   Patient was advised urgent care or ER for immediate medical care.

## 2025-07-30 ENCOUNTER — OFFICE VISIT (OUTPATIENT)
Dept: INTERNAL MEDICINE | Facility: CLINIC | Age: 75
End: 2025-07-30
Payer: MEDICARE

## 2025-07-30 VITALS
BODY MASS INDEX: 28.27 KG/M2 | SYSTOLIC BLOOD PRESSURE: 126 MMHG | DIASTOLIC BLOOD PRESSURE: 70 MMHG | TEMPERATURE: 97.3 F | HEIGHT: 63 IN | OXYGEN SATURATION: 96 % | HEART RATE: 92 BPM

## 2025-07-30 DIAGNOSIS — R82.90 ABNORMAL URINE ODOR: ICD-10-CM

## 2025-07-30 DIAGNOSIS — M25.512 ACUTE PAIN OF LEFT SHOULDER: ICD-10-CM

## 2025-07-30 DIAGNOSIS — I10 ESSENTIAL HYPERTENSION: ICD-10-CM

## 2025-07-30 DIAGNOSIS — E78.2 MIXED HYPERLIPIDEMIA: ICD-10-CM

## 2025-07-30 DIAGNOSIS — K21.9 GASTROESOPHAGEAL REFLUX DISEASE, UNSPECIFIED WHETHER ESOPHAGITIS PRESENT: ICD-10-CM

## 2025-07-30 DIAGNOSIS — E11.65 UNCONTROLLED TYPE 2 DIABETES MELLITUS WITH HYPERGLYCEMIA: Primary | ICD-10-CM

## 2025-07-30 DIAGNOSIS — R82.90 ABNORMAL URINALYSIS: ICD-10-CM

## 2025-07-30 DIAGNOSIS — D64.9 ANEMIA OF UNKNOWN ETIOLOGY: ICD-10-CM

## 2025-07-30 DIAGNOSIS — J45.21 MILD INTERMITTENT REACTIVE AIRWAY DISEASE WITH ACUTE EXACERBATION: ICD-10-CM

## 2025-07-30 PROBLEM — Z11.1 SCREENING-PULMONARY TB: Status: RESOLVED | Noted: 2025-04-10 | Resolved: 2025-07-30

## 2025-07-30 LAB
BILIRUB BLD-MCNC: ABNORMAL MG/DL
CLARITY, POC: CLEAR
COLOR UR: YELLOW
EXPIRATION DATE: ABNORMAL
GLUCOSE UR STRIP-MCNC: ABNORMAL MG/DL
KETONES UR QL: ABNORMAL
LEUKOCYTE EST, POC: ABNORMAL
Lab: ABNORMAL
NITRITE UR-MCNC: NEGATIVE MG/ML
PH UR: 6 [PH] (ref 5–8)
PROT UR STRIP-MCNC: ABNORMAL MG/DL
RBC # UR STRIP: NEGATIVE /UL
SP GR UR: 1.01 (ref 1–1.03)
UROBILINOGEN UR QL: ABNORMAL

## 2025-07-30 RX ORDER — BUDESONIDE AND FORMOTEROL FUMARATE DIHYDRATE 160; 4.5 UG/1; UG/1
2 AEROSOL RESPIRATORY (INHALATION) 2 TIMES DAILY PRN
Start: 2025-07-30

## 2025-07-30 NOTE — PROGRESS NOTES
Chief Complaint   Patient presents with    Diabetes     3 month follow up       Subjective     History of Present Illness      Amber Campos is a 74 y.o. female being seen for a follow up appointment today regarding DM 2, HTN, hyperlipidemia, and IC. She had diabetic education at [facility] in 2023 and went back for a refresher 3-. She was followed by srinath, but declined to see them anymore. She has a Continuous glucose monitor, Dexcom.  Average glucose 160s Fasting glucose ranges from 200s, depending on what she had for dinner. PP glucose 150s. She is following a Anamika Jovanny diet. She is prescribed metformin 1000 mg BID and mounjaro 7.5 mg weekly. She stopped the Invokana, due to urinary frequency. She was switched to Mounjaro due to Ozempic availability. She is om tirzepitde 10mg weekly.      She has history of urinary frequency with IC. She is [facility] Benton ([facility] She had a CT abd and pelvis and cystoscopy scheduled for renal cysts and IC. She left a urine sample today.      She is on Susannah 5/40mg daily for HTN. She has previously been on Diovan, Toprol XL and Ziac. She does not check her BP, but feels like she is tolerating this medication well. She denies edema, CP, SOA.  She had a lifeline screening showing mild placque in carotid arteries.      She has history of JOSETTE and GERD. She sas been evaluated with both a C-scope 6-6-2022 with Dr. Alaniz. She is on an oral iron supplement I'm MVI. She takes a daily Protonix 40mg tablet.      She is on prolia for osteoporosis with history of traumatic fractures of wrist and ankle.      She is on Effexor XR for depression w anxiety. Her  has hx of alcohol abuse, and recently been back in the hospital regarding this..      History of Present Illness      She is currently on Mounjaro 7.5 mg and has a supply at home. She does not require a new prescription at this time. She believes she can discontinue her metformin 2000 mg, which she takes twice daily.  She uses a continuous glucose meter to monitor her blood sugar levels. She reports consuming sweets, which may have contributed to her elevated blood sugar levels.    She has been experiencing indigestion and plans to consult Dr. Alaniz for a colonoscopy and endoscopy. She reports no visible blood in her stools. She had to stop taking her multivitamin due to her upcoming surgery but plans to resume it post-surgery. She also intends to restart her fish oil supplement.    She has been under significant stress due to her 's health condition. He was hospitalized in February and is now in hospice care with a prognosis of approximately six months to live due to cirrhosis of the liver. She is aware that she will need grief counseling after his passing. She is currently taking venlafaxine 75 mg for anxiety and does not wish to add any other medications.    She reports a recent injury while assisting her , resulting in pain over her clavicle area. She believes she needs an x-ray but cannot get it today due to a hospice meeting at 10:30 AM. She also inquired about her urinalysis results and was informed that there were leukocytes present, but no nitrites indicating a bacterial infection.    She has a torn ligament and is scheduled for surgery on 08/20/2025 with Dr. Campos. She had to discontinue Aleve and Mounjaro 10 mg two weeks prior to the surgery. She has severe hip pain and cannot take anything for it. She had a fall in [place] and was wearing a boot due to drop foot. She reports swelling in her knee.    Social History:  Diet: Reports consuming sweets    Results  Labs   - Urinalysis: Some leukocytes, no nitrites   - Cholesterol: 248, down from 255   - Triglycerides: 262   - Blood Sugar: 169   - A1c: 6.9, down from 8.8   - Hemoglobin: 11.3    Allergies   Allergen Reactions    Contrast Dye (Echo Or Unknown Ct/Mr) Anaphylaxis     Contrast dye used in Xray    Iodinated Contrast Media Photosensitivity     contrast     Adhesive Tape Rash     EKG stickers only    Farxiga [Dapagliflozin] Diarrhea and Itching     Yeast infeciotn    Neomycin-Bacitracin Zn-Polymyx Rash    Toprol Xl [Metoprolol] Dizziness    Trulicity [Dulaglutide] GI Intolerance     Constipation    Victoza [Liraglutide] Nausea Only         Current Outpatient Medications:     amlodipine-olmesartan (NIC) 5-40 MG per tablet, TAKE 1 TABLET DAILY (Patient taking differently: Take 1 tablet by mouth Every Night. HOLD NIGHT DOSE 7/22), Disp: 90 tablet, Rfl: 3    budesonide-formoterol (Symbicort) 160-4.5 MCG/ACT inhaler, Inhale 2 puffs 2 (Two) Times a Day. (Patient taking differently: Inhale 2 puffs 2 (Two) Times a Day As Needed. USING ONLY WHEN ALLERGIES ARE BAD), Disp: 1 each, Rfl: 6    Cetirizine HCl (ZyrTEC Childrens Allergy) 5 MG/5ML solution solution, Take 5 mL by mouth Daily., Disp: 150 mL, Rfl:     Continuous Glucose Sensor (Dexcom G7 Sensor) misc, Use 1 each Every 10 (Ten) Days., Disp: 12 each, Rfl: 3    ketoconazole (NIZORAL) 2 % shampoo, USE TOPICALLY 2 TIMES WEEKLY, LATHER SCALP FOR 5-10 MINUTES THEN RINSE, Disp: , Rfl:     metFORMIN (GLUCOPHAGE) 1000 MG tablet, TAKE 1 TABLET TWICE A DAY WITH MEALS (DISCONTINUE INVOKAMET AND RESUME METFORMIN), Disp: 180 tablet, Rfl: 3    Mounjaro 7.5 MG/0.5ML solution auto-injector, Inject  under the skin into the appropriate area as directed. TAKES ON SUNDAY/LAST DOSE 7/6/2025 PT IS HOLDING FOR 2 WEEKS PER MD ORDER, Disp: , Rfl:     multivitamin (THERAGRAN) tablet tablet, Take 1 tablet by mouth Daily. (Patient taking differently: Take 1 tablet by mouth Daily. HOLD PRIOR TO SURG), Disp: , Rfl:     Omega-3 Fatty Acids (fish oil) 1000 MG capsule capsule, Take  by mouth Daily With Breakfast. HOLD PRIOR TO SURG, Disp: , Rfl:     pantoprazole (PROTONIX) 40 MG EC tablet, TAKE 1 TABLET DAILY, Disp: 90 tablet, Rfl: 3    Spacer/Aero Chamber Mouthpiece misc, 1 each Daily., Disp: 1 each, Rfl: 0    triamcinolone (KENALOG) 0.1 % cream, APPLY  TWICE DAILY TO ITCHY SKIN UP TO 2 WEEKS/MONTH AS NEEDED., Disp: , Rfl:     venlafaxine XR (EFFEXOR-XR) 75 MG 24 hr capsule, TAKE 1 CAPSULE DAILY, Disp: 90 capsule, Rfl: 3    albuterol sulfate  (90 Base) MCG/ACT inhaler, Inhale 2 puffs Every 4 (Four) Hours As Needed for Wheezing., Disp: 8.5 g, Rfl: 2    Tirzepatide 10 MG/0.5ML solution auto-injector, Inject 10 mg under the skin into the appropriate area as directed 1 (One) Time Per Week. (Patient not taking: Reported on 7/30/2025), Disp: 2 mL, Rfl: 0    Current Facility-Administered Medications:     cyanocobalamin injection 1,000 mcg, 1,000 mcg, Intramuscular, Q28 Days, Jenny Sun, JAKE, 1,000 mcg at 09/23/24 0947    The following portions of the patient's history were reviewed and updated as appropriate: allergies, current medications, past family history, past medical history, past social history, past surgical history, and problem list.    Review of Systems   Constitutional: Negative.  Negative for fatigue and fever.   Cardiovascular:  Negative for palpitations and leg swelling.   Gastrointestinal: Negative.    Musculoskeletal:  Positive for arthralgias.   Psychiatric/Behavioral: Negative.     All other systems reviewed and are negative.      Assessment     Physical Exam  Vitals reviewed.   Constitutional:       Appearance: Normal appearance. She is not ill-appearing.   Cardiovascular:      Rate and Rhythm: Normal rate and regular rhythm.      Pulses: Normal pulses.      Heart sounds: Normal heart sounds. No murmur heard.  Pulmonary:      Effort: Pulmonary effort is normal. No respiratory distress.      Breath sounds: Normal breath sounds. No stridor.   Musculoskeletal:      Right lower leg: No edema.      Left lower leg: No edema.      Comments: Abrasion right knee   Skin:     General: Skin is warm and dry.   Neurological:      General: No focal deficit present.      Mental Status: She is alert and oriented to person, place, and time.   Psychiatric:          Mood and Affect: Mood normal.         Behavior: Behavior normal.         Thought Content: Thought content normal.       Plan     Her fasting labs were reviewed with the patient from last week.     Diagnoses and all orders for this visit:    1. Uncontrolled type 2 diabetes mellitus with hyperglycemia (Primary)  -     metFORMIN (GLUCOPHAGE) 1000 MG tablet; Take 0.5 tablets by mouth 2 (Two) Times a Day With Meals.  -     CBC & Differential; Future  -     Comprehensive Metabolic Panel; Future  -     Lipid Panel With / Chol / HDL Ratio; Future  -     Hemoglobin A1c; Future    2. Essential hypertension  -     Comprehensive Metabolic Panel; Future  -     Lipid Panel With / Chol / HDL Ratio; Future    3. Mixed hyperlipidemia  -     Comprehensive Metabolic Panel; Future  -     Lipid Panel With / Chol / HDL Ratio; Future    4. Acute pain of left shoulder  -     XR Shoulder 2+ View Left; Future    5. Abnormal urine odor  Comments:  culture sent  Orders:  -     POCT urinalysis dipstick, automated  -     Urine Culture - Urine, Urine, Clean Catch; Future  -     Urine Culture - Urine, Urine, Clean Catch    6. Mild intermittent reactive airway disease with acute exacerbation  -     budesonide-formoterol (Symbicort) 160-4.5 MCG/ACT inhaler; Inhale 2 puffs 2 (Two) Times a Day As Needed (wheezing or cough). USING ONLY WHEN ALLERGIES ARE BAD    7. Gastroesophageal reflux disease, unspecified whether esophagitis present  -     Ambulatory Referral to Gastroenterology  -     CBC & Differential; Future  -     Comprehensive Metabolic Panel; Future    8. Anemia of unknown etiology  -     CBC & Differential; Future  -     Vitamin B12; Future  -     Iron Profile w/o Ferritin; Future  -     Ferritin; Future    9. Abnormal urinalysis  -     Urine Culture - Urine, Urine, Clean Catch; Future  -     Urine Culture - Urine, Urine, Clean Catch        Assessment & Plan   Diabetes mellitus: Stable. A1c improved from 8.8 to 6.9.  - Reduce metformin  dosage to 500 mg twice daily by splitting current 1000 mg tablets.  - Continue with Mounjaro 7.5 mg.  - Blood sugar was 169.    . Anemia: Mild.  - Hemoglobin level at 11.3. FOB kit given, but she prefers to f/u with Dr Alaniz  - Referral to Dr. Love for evaluation of worsening reflux and potential need for an EGD.  - Blood count in 4 weeks to monitor anemia.  - Fasting labs in 3 months.     Stress.  - Significant stress due to 's illness and impending hospice care.  - Seek grief counseling through hospice or consider seeing Dr. Hallie Rose for additional support.  - Inquire about grief counseling services from hospice.    . Shoulder pain.  - Pain over the clavicle area with crepitus noted.  - Get an x-ray for further evaluation.    Follow-up  - Blood count in 4 weeks.  - Fasting labs in 3 months.       CBC in 4 weeks     Follow up in 3 months w labs    Patient or patient representative verbalized consent for the use of Ambient Listening during the visit with  JAKE Blount for chart documentation. 7/30/2025  10:04 EDT

## 2025-07-31 ENCOUNTER — TELEPHONE (OUTPATIENT)
Dept: INTERNAL MEDICINE | Facility: CLINIC | Age: 75
End: 2025-07-31

## 2025-08-04 LAB
BACTERIA UR CULT: ABNORMAL
BACTERIA UR CULT: ABNORMAL
OTHER ANTIBIOTIC SUSC ISLT: ABNORMAL

## 2025-08-06 ENCOUNTER — OFFICE (OUTPATIENT)
Dept: URBAN - METROPOLITAN AREA CLINIC 76 | Facility: CLINIC | Age: 75
End: 2025-08-06
Payer: MEDICARE

## 2025-08-06 VITALS
HEIGHT: 63 IN | HEART RATE: 91 BPM | SYSTOLIC BLOOD PRESSURE: 107 MMHG | DIASTOLIC BLOOD PRESSURE: 62 MMHG | WEIGHT: 168 LBS | OXYGEN SATURATION: 96 %

## 2025-08-06 DIAGNOSIS — D50.9 IRON DEFICIENCY ANEMIA, UNSPECIFIED: ICD-10-CM

## 2025-08-06 DIAGNOSIS — K21.9 GASTRO-ESOPHAGEAL REFLUX DISEASE WITHOUT ESOPHAGITIS: ICD-10-CM

## 2025-08-06 PROCEDURE — 99213 OFFICE O/P EST LOW 20 MIN: CPT

## 2025-08-08 ENCOUNTER — HOSPITAL ENCOUNTER (OUTPATIENT)
Dept: MAMMOGRAPHY | Facility: HOSPITAL | Age: 75
Discharge: HOME | End: 2025-08-08
Admitting: NURSE PRACTITIONER
Payer: MEDICARE

## 2025-08-08 ENCOUNTER — TRANSCRIBE ORDERS (OUTPATIENT)
Dept: ADMINISTRATIVE | Facility: HOSPITAL | Age: 75
End: 2025-08-08
Payer: MEDICARE

## 2025-08-08 ENCOUNTER — LAB (OUTPATIENT)
Dept: LAB | Facility: HOSPITAL | Age: 75
End: 2025-08-08
Payer: MEDICARE

## 2025-08-08 DIAGNOSIS — M67.80 TENDINOSIS: Primary | ICD-10-CM

## 2025-08-08 DIAGNOSIS — M67.80 TENDINOSIS: ICD-10-CM

## 2025-08-08 DIAGNOSIS — Z12.31 SCREENING MAMMOGRAM, ENCOUNTER FOR: ICD-10-CM

## 2025-08-08 LAB
ALBUMIN SERPL-MCNC: 4.2 G/DL (ref 3.5–5.2)
ALBUMIN/GLOB SERPL: 1.4 G/DL
ALP SERPL-CCNC: 57 U/L (ref 39–117)
ALT SERPL W P-5'-P-CCNC: 18 U/L (ref 1–33)
ANION GAP SERPL CALCULATED.3IONS-SCNC: 12.5 MMOL/L (ref 5–15)
AST SERPL-CCNC: 20 U/L (ref 1–32)
BASOPHILS # BLD AUTO: 0.06 10*3/MM3 (ref 0–0.2)
BASOPHILS NFR BLD AUTO: 1 % (ref 0–1.5)
BILIRUB SERPL-MCNC: 0.3 MG/DL (ref 0–1.2)
BUN SERPL-MCNC: 17 MG/DL (ref 8–23)
BUN/CREAT SERPL: 24.6 (ref 7–25)
CALCIUM SPEC-SCNC: 9.9 MG/DL (ref 8.6–10.5)
CHLORIDE SERPL-SCNC: 99 MMOL/L (ref 98–107)
CO2 SERPL-SCNC: 25.5 MMOL/L (ref 22–29)
CREAT SERPL-MCNC: 0.69 MG/DL (ref 0.57–1)
DEPRECATED RDW RBC AUTO: 40.5 FL (ref 37–54)
EGFRCR SERPLBLD CKD-EPI 2021: 90.1 ML/MIN/1.73
EOSINOPHIL # BLD AUTO: 0.24 10*3/MM3 (ref 0–0.4)
EOSINOPHIL NFR BLD AUTO: 4 % (ref 0.3–6.2)
ERYTHROCYTE [DISTWIDTH] IN BLOOD BY AUTOMATED COUNT: 13.4 % (ref 12.3–15.4)
GLOBULIN UR ELPH-MCNC: 3 GM/DL
GLUCOSE SERPL-MCNC: 211 MG/DL (ref 65–99)
HCT VFR BLD AUTO: 32.3 % (ref 34–46.6)
HGB BLD-MCNC: 10.6 G/DL (ref 12–15.9)
IMM GRANULOCYTES # BLD AUTO: 0.02 10*3/MM3 (ref 0–0.05)
IMM GRANULOCYTES NFR BLD AUTO: 0.3 % (ref 0–0.5)
LYMPHOCYTES # BLD AUTO: 2.25 10*3/MM3 (ref 0.7–3.1)
LYMPHOCYTES NFR BLD AUTO: 37.2 % (ref 19.6–45.3)
MCH RBC QN AUTO: 27.4 PG (ref 26.6–33)
MCHC RBC AUTO-ENTMCNC: 32.8 G/DL (ref 31.5–35.7)
MCV RBC AUTO: 83.5 FL (ref 79–97)
MONOCYTES # BLD AUTO: 0.46 10*3/MM3 (ref 0.1–0.9)
MONOCYTES NFR BLD AUTO: 7.6 % (ref 5–12)
NEUTROPHILS NFR BLD AUTO: 3.02 10*3/MM3 (ref 1.7–7)
NEUTROPHILS NFR BLD AUTO: 49.9 % (ref 42.7–76)
NRBC BLD AUTO-RTO: 0 /100 WBC (ref 0–0.2)
PLATELET # BLD AUTO: 424 10*3/MM3 (ref 140–450)
PMV BLD AUTO: 10.2 FL (ref 6–12)
POTASSIUM SERPL-SCNC: 4.1 MMOL/L (ref 3.5–5.2)
PROT SERPL-MCNC: 7.2 G/DL (ref 6–8.5)
RBC # BLD AUTO: 3.87 10*6/MM3 (ref 3.77–5.28)
SODIUM SERPL-SCNC: 137 MMOL/L (ref 136–145)
WBC NRBC COR # BLD AUTO: 6.05 10*3/MM3 (ref 3.4–10.8)

## 2025-08-08 PROCEDURE — 36415 COLL VENOUS BLD VENIPUNCTURE: CPT

## 2025-08-08 PROCEDURE — 77067 SCR MAMMO BI INCL CAD: CPT | Performed by: RADIOLOGY

## 2025-08-08 PROCEDURE — 85025 COMPLETE CBC W/AUTO DIFF WBC: CPT

## 2025-08-08 PROCEDURE — 77063 BREAST TOMOSYNTHESIS BI: CPT | Performed by: RADIOLOGY

## 2025-08-08 PROCEDURE — 77063 BREAST TOMOSYNTHESIS BI: CPT

## 2025-08-08 PROCEDURE — 80053 COMPREHEN METABOLIC PANEL: CPT

## 2025-08-08 PROCEDURE — 77067 SCR MAMMO BI INCL CAD: CPT

## 2025-08-19 DIAGNOSIS — I10 ESSENTIAL HYPERTENSION: ICD-10-CM

## 2025-08-19 DIAGNOSIS — D64.9 ANEMIA OF UNKNOWN ETIOLOGY: ICD-10-CM

## 2025-08-19 DIAGNOSIS — E78.2 MIXED HYPERLIPIDEMIA: ICD-10-CM

## 2025-08-19 DIAGNOSIS — K21.9 GASTROESOPHAGEAL REFLUX DISEASE, UNSPECIFIED WHETHER ESOPHAGITIS PRESENT: ICD-10-CM

## 2025-08-19 DIAGNOSIS — E11.65 UNCONTROLLED TYPE 2 DIABETES MELLITUS WITH HYPERGLYCEMIA: ICD-10-CM

## 2025-08-25 LAB
ALBUMIN SERPL-MCNC: 4.1 G/DL (ref 3.5–5.2)
ALBUMIN/GLOB SERPL: 1.8 G/DL
ALP SERPL-CCNC: 62 U/L (ref 39–117)
ALT SERPL-CCNC: 16 U/L (ref 1–33)
AST SERPL-CCNC: 25 U/L (ref 1–32)
BASOPHILS # BLD AUTO: 0.04 10*3/MM3 (ref 0–0.2)
BASOPHILS NFR BLD AUTO: 0.8 % (ref 0–1.5)
BILIRUB SERPL-MCNC: <0.2 MG/DL (ref 0–1.2)
BUN SERPL-MCNC: 16 MG/DL (ref 8–23)
BUN/CREAT SERPL: 23.9 (ref 7–25)
CALCIUM SERPL-MCNC: 9.8 MG/DL (ref 8.6–10.5)
CHLORIDE SERPL-SCNC: 99 MMOL/L (ref 98–107)
CHOLEST SERPL-MCNC: 241 MG/DL (ref 0–200)
CHOLEST/HDLC SERPL: 3.35 {RATIO}
CO2 SERPL-SCNC: 26.6 MMOL/L (ref 22–29)
CREAT SERPL-MCNC: 0.67 MG/DL (ref 0.57–1)
EGFRCR SERPLBLD CKD-EPI 2021: 90.7 ML/MIN/1.73
EOSINOPHIL # BLD AUTO: 0.19 10*3/MM3 (ref 0–0.4)
EOSINOPHIL NFR BLD AUTO: 3.7 % (ref 0.3–6.2)
ERYTHROCYTE [DISTWIDTH] IN BLOOD BY AUTOMATED COUNT: 13 % (ref 12.3–15.4)
FERRITIN SERPL-MCNC: 14.1 NG/ML (ref 13–150)
GLOBULIN SER CALC-MCNC: 2.3 GM/DL
GLUCOSE SERPL-MCNC: 231 MG/DL (ref 65–99)
HBA1C MFR BLD: 8.7 % (ref 4.8–5.6)
HCT VFR BLD AUTO: 33.3 % (ref 34–46.6)
HDLC SERPL-MCNC: 72 MG/DL (ref 40–60)
HGB BLD-MCNC: 10.8 G/DL (ref 12–15.9)
IMM GRANULOCYTES # BLD AUTO: 0.01 10*3/MM3 (ref 0–0.05)
IMM GRANULOCYTES NFR BLD AUTO: 0.2 % (ref 0–0.5)
IRON SATN MFR SERPL: 5 % (ref 20–50)
IRON SERPL-MCNC: 26 MCG/DL (ref 37–145)
LDLC SERPL CALC-MCNC: 124 MG/DL (ref 0–100)
LYMPHOCYTES # BLD AUTO: 1.51 10*3/MM3 (ref 0.7–3.1)
LYMPHOCYTES NFR BLD AUTO: 29.5 % (ref 19.6–45.3)
MCH RBC QN AUTO: 27 PG (ref 26.6–33)
MCHC RBC AUTO-ENTMCNC: 32.4 G/DL (ref 31.5–35.7)
MCV RBC AUTO: 83.3 FL (ref 79–97)
MONOCYTES # BLD AUTO: 0.32 10*3/MM3 (ref 0.1–0.9)
MONOCYTES NFR BLD AUTO: 6.3 % (ref 5–12)
NEUTROPHILS # BLD AUTO: 3.05 10*3/MM3 (ref 1.7–7)
NEUTROPHILS NFR BLD AUTO: 59.5 % (ref 42.7–76)
NRBC BLD AUTO-RTO: 0 /100 WBC (ref 0–0.2)
PLATELET # BLD AUTO: 414 10*3/MM3 (ref 140–450)
POTASSIUM SERPL-SCNC: 4.3 MMOL/L (ref 3.5–5.2)
PROT SERPL-MCNC: 6.4 G/DL (ref 6–8.5)
RBC # BLD AUTO: 4 10*6/MM3 (ref 3.77–5.28)
SODIUM SERPL-SCNC: 139 MMOL/L (ref 136–145)
TIBC SERPL-MCNC: 513 MCG/DL
TRIGL SERPL-MCNC: 261 MG/DL (ref 0–150)
UIBC SERPL-MCNC: 487 MCG/DL (ref 112–346)
VIT B12 SERPL-MCNC: 378 PG/ML (ref 211–946)
VLDLC SERPL CALC-MCNC: 45 MG/DL (ref 5–40)
WBC # BLD AUTO: 5.12 10*3/MM3 (ref 3.4–10.8)

## 2025-08-28 ENCOUNTER — TELEPHONE (OUTPATIENT)
Dept: INTERNAL MEDICINE | Facility: CLINIC | Age: 75
End: 2025-08-28
Payer: MEDICARE

## (undated) DEVICE — SPNG GZ WOVN 4X4IN 12PLY 10/BX STRL

## (undated) DEVICE — IMPLANTABLE DEVICE
Type: IMPLANTABLE DEVICE | Site: WRIST | Status: NON-FUNCTIONAL
Removed: 2021-12-01

## (undated) DEVICE — BNDG ESMARK 4IN 9FT LF STRL BLU

## (undated) DEVICE — BIT DRL DVR CRSLK TRY 2.2MM

## (undated) DEVICE — NDL HYPO PRECISIONGLIDE SHRT 22G 11/2

## (undated) DEVICE — KWIRE SS 1.6MM NS
Type: IMPLANTABLE DEVICE | Site: WRIST | Status: NON-FUNCTIONAL
Removed: 2021-12-01

## (undated) DEVICE — BOWL PLSTC LG 32OZ BLU STRL

## (undated) DEVICE — CVR C/ARM MINI

## (undated) DEVICE — GLV SURG SENSICARE PI PF LF 7 GRN STRL

## (undated) DEVICE — TOWEL,OR,DSP,ST,BLUE,STD,4/PK,20PK/CS: Brand: MEDLINE

## (undated) DEVICE — BNDG ELAS MATRX V/CLS 4IN 5YD LF

## (undated) DEVICE — ARM SLING II: Brand: DEROYAL

## (undated) DEVICE — TRAP FLD MINIVAC MEGADYNE 100ML

## (undated) DEVICE — DRSNG TELFA PAD NONADH STR 1S 3X8IN

## (undated) DEVICE — DISPOSABLE TOURNIQUET CUFF SINGLE BLADDER, SINGLE PORT AND LUER LOCK CONNECTOR: Brand: COLOR CUFF

## (undated) DEVICE — DISPOSABLE BIPOLAR CODE, 12' (3.66 M): Brand: CONMED

## (undated) DEVICE — DRSNG GZ PETROLTM XEROFORM CURAD 1X8IN STRL

## (undated) DEVICE — PATIENT RETURN ELECTRODE, SINGLE-USE, CONTACT QUALITY MONITORING, ADULT, WITH 9FT CORD, FOR PATIENTS WEIGING OVER 33LBS. (15KG): Brand: MEGADYNE

## (undated) DEVICE — WEBRIL* CAST PADDING: Brand: DEROYAL

## (undated) DEVICE — PENCL E/S ULTRAVAC TELESCP NOSE HOLSTR 10FT

## (undated) DEVICE — GAUZE,SPONGE,FLUFF,6"X6.75",STRL,5/TRAY: Brand: MEDLINE

## (undated) DEVICE — INTENDED FOR TISSUE SEPARATION, AND OTHER PROCEDURES THAT REQUIRE A SHARP SURGICAL BLADE TO PUNCTURE OR CUT.: Brand: BARD-PARKER ® STAINLESS STEEL BLADES

## (undated) DEVICE — SUT VIC 3/0 SH CR8 18IN J864D

## (undated) DEVICE — PAD UNDERCAST WYTEX 4IN 4YD LF STRL

## (undated) DEVICE — SPLNT 1 STEP 3X35IN

## (undated) DEVICE — APPL CHLORAPREP HI/LITE 26ML ORNG

## (undated) DEVICE — IRRIGATOR BULB ASEPTO 60CC STRL

## (undated) DEVICE — THE TORRENT IRRIGATION SCOPE CONNECTOR IS USED WITH THE TORRENT IRRIGATION TUBING TO PROVIDE IRRIGATION FLUIDS SUCH AS STERILE WATER DURING GASTROINTESTINAL ENDOSCOPIC PROCEDURES WHEN USED IN CONJUNCTION WITH AN IRRIGATION PUMP (OR ELECTROSURGICAL UNIT).: Brand: TORRENT

## (undated) DEVICE — SOL ISO/ALC RUB 70PCT 4OZ

## (undated) DEVICE — Device: Brand: DEFENDO AIR/WATER/SUCTION AND BIOPSY VALVE

## (undated) DEVICE — FRAZIER SUCTION INSTRUMENT 10 FR W/CONTROL VENT & OBTURATOR: Brand: FRAZIER

## (undated) DEVICE — GLV SURG SENSICARE PI LF PF 7.0

## (undated) DEVICE — SKIN PREP TRAY W/CHG: Brand: MEDLINE INDUSTRIES, INC.

## (undated) DEVICE — ELASTIC BANDAGE WITH REMOVABLE CLIP: Brand: CURITY

## (undated) DEVICE — TUBING, SUCTION, 1/4" X 10', STRAIGHT: Brand: MEDLINE

## (undated) DEVICE — CANN NASL CO2 TRULINK W/O2 A/

## (undated) DEVICE — PK BASIC ORTHO 90

## (undated) DEVICE — 3M™ STERI-DRAPE™ U-DRAPE 1015: Brand: STERI-DRAPE™

## (undated) DEVICE — GLV SURG NEOLON 2G PF LF 7.5 STRL